# Patient Record
Sex: MALE | Race: WHITE | NOT HISPANIC OR LATINO | Employment: FULL TIME | URBAN - METROPOLITAN AREA
[De-identification: names, ages, dates, MRNs, and addresses within clinical notes are randomized per-mention and may not be internally consistent; named-entity substitution may affect disease eponyms.]

---

## 2017-08-18 ENCOUNTER — OFFICE VISIT (OUTPATIENT)
Dept: FAMILY MEDICINE | Facility: CLINIC | Age: 47
End: 2017-08-18
Payer: COMMERCIAL

## 2017-08-18 VITALS
HEART RATE: 88 BPM | SYSTOLIC BLOOD PRESSURE: 139 MMHG | DIASTOLIC BLOOD PRESSURE: 89 MMHG | OXYGEN SATURATION: 97 % | BODY MASS INDEX: 37.76 KG/M2 | HEIGHT: 72 IN | TEMPERATURE: 97.7 F | WEIGHT: 278.8 LBS

## 2017-08-18 DIAGNOSIS — J31.0 CHRONIC RHINITIS, UNSPECIFIED TYPE: ICD-10-CM

## 2017-08-18 DIAGNOSIS — G47.33 OBSTRUCTIVE SLEEP APNEA SYNDROME: ICD-10-CM

## 2017-08-18 DIAGNOSIS — R73.01 IMPAIRED FASTING GLUCOSE: ICD-10-CM

## 2017-08-18 DIAGNOSIS — E66.01 MORBID OBESITY DUE TO EXCESS CALORIES (H): ICD-10-CM

## 2017-08-18 DIAGNOSIS — E78.5 HYPERLIPIDEMIA LDL GOAL <160: ICD-10-CM

## 2017-08-18 DIAGNOSIS — K76.0 FATTY LIVER: ICD-10-CM

## 2017-08-18 DIAGNOSIS — I10 BENIGN ESSENTIAL HYPERTENSION: Primary | ICD-10-CM

## 2017-08-18 PROBLEM — L65.9 ALOPECIA: Status: ACTIVE | Noted: 2017-08-18

## 2017-08-18 PROCEDURE — 99203 OFFICE O/P NEW LOW 30 MIN: CPT | Performed by: INTERNAL MEDICINE

## 2017-08-18 RX ORDER — FLUTICASONE PROPIONATE 50 MCG
1 SPRAY, SUSPENSION (ML) NASAL PRN
COMMUNITY

## 2017-08-18 RX ORDER — IPRATROPIUM BROMIDE 42 UG/1
2 SPRAY, METERED NASAL PRN
COMMUNITY
End: 2021-05-25

## 2017-08-18 RX ORDER — LISINOPRIL AND HYDROCHLOROTHIAZIDE 20; 25 MG/1; MG/1
1 TABLET ORAL DAILY
Qty: 90 TABLET | Refills: 3 | Status: SHIPPED | OUTPATIENT
Start: 2017-08-18 | End: 2019-06-13

## 2017-08-18 NOTE — MR AVS SNAPSHOT
After Visit Summary   8/18/2017    Low Juárez    MRN: [id number]           Patient Information     Date Of Birth          1970        Visit Information        Provider Department      8/18/2017 1:30 PM Gilson Tello MD [place] [facility]        Today's Diagnoses     Benign essential hypertension    -  1    Obstructive sleep apnea syndrome        Morbid obesity due to excess calories (H)        Impaired fasting glucose        Hyperlipidemia LDL goal <160        Fatty liver        Chronic rhinitis, unspecified type           Follow-ups after your visit        Additional Services     OTOLARYNGOLOGY REFERRAL       Your provider has referred you to: [facility] [place] [phone]   [email or web address]    Stephanie Wilkes MD    Please be aware that coverage of these services is subject to the terms and limitations of your health insurance plan.  Call member services at your health plan with any benefit or coverage questions.      Please bring the following with you to your appointment:    (1) Any X-Rays, CTs or MRIs which have been performed.  Contact the facility where they were done to arrange for  prior to your scheduled appointment.   (2) List of current medications  (3) This referral request   (4) Any documents/labs given to you for this referral                  Future tests that were ordered for you today     Open Future Orders        Priority Expected Expires Ordered    Lipid panel reflex to direct LDL Routine  8/18/2018 8/18/2017    Comprehensive metabolic panel Routine  8/18/2018 8/18/2017    CBC with platelets Routine  8/18/2018 8/18/2017    Hemoglobin A1c Routine  8/18/2018 8/18/2017            Who to contact     If you have questions or need follow up information about today's clinic visit or your schedule please contact [facility] [place] directly at [phone].  Normal or non-critical lab and imaging results will be  "communicated to you by cottonTrackshart, letter or phone within 4 business days after the clinic has received the results. If you do not hear from us within 7 days, please contact the clinic through [employer] or phone. If you have a critical or abnormal lab result, we will notify you by phone as soon as possible.  Submit refill requests through [employer] or call your pharmacy and they will forward the refill request to us. Please allow 3 business days for your refill to be completed.          Additional Information About Your Visit        [employer] Information     [employer] lets you send messages to your doctor, view your test results, renew your prescriptions, schedule appointments and more. To sign up, go to [email or web address] . Click on \"Log in\" on the left side of the screen, which will take you to the Welcome page. Then click on \"Sign up Now\" on the right side of the page.     You will be asked to enter the access code listed below, as well as some personal information. Please follow the directions to create your username and password.     Your access code is: [email or web address]  Expires: 2017  2:22 PM     Your access code will  in 90 days. If you need help or a new code, please call your [place] clinic or [phone].        Care EveryWhere ID     This is your Care EveryWhere ID. This could be used by other organizations to access your [place] medical records  [id number]        Your Vitals Were     Pulse Temperature Height Pulse Oximetry BMI (Body Mass Index)       88 97.7  F (36.5  C) (Tympanic) 5' 11.5\" (1.816 m) 97% 38.34 kg/m2        Blood Pressure from Last 3 Encounters:   17 139/89   12 127/83    Weight from Last 3 Encounters:   17 278 lb 12.8 oz (126.5 kg)   12 264 lb (119.7 kg)              We Performed the Following     OTOLARYNGOLOGY REFERRAL          Today's Medication Changes          These changes are accurate as of: 17  2:22 PM.  If you have any questions, ask your " nurse or doctor.               Stop taking these medicines if you haven't already. Please contact your care team if you have questions.     amLODIPine 10 MG tablet   Commonly known as:  NORVASC   Stopped by:  Gilson Tello MD                Where to get your medicines      These medications were sent to [id number] - [place], [place] - [address]  [address] [facility] [address]     Phone:  [phone]     lisinopril-hydrochlorothiazide 20-25 MG per tablet                Primary Care Provider Office Phone # Fax #    James Hoff [phone]       [facility] [address] [place] [address]        Equal Access to Services     CRISTINA GREENBERG : Hadii aad ku hadasho Soomaali, waaxda luqadaha, qaybta kaalmada adeegyada, waxay idiin haysanjuanan hilario prajapati . So [facility] [phone].    ATENCIÓN: Si habla español, tiene a barros disposición servicios gratuitos de asistencia lingüística. Llame al [phone].    We comply with applicable federal civil rights laws and [place] laws. We do not discriminate on the basis of race, color, national origin, age, disability sex, sexual orientation or gender identity.            Thank you!     Thank you for choosing [facility]  for your care. Our goal is always to provide you with excellent care. Hearing back from our patients is one way we can continue to improve our services. Please take a few minutes to complete the written survey that you may receive in the mail after your visit with us. Thank you!             Your Updated Medication List - Protect others around you: Learn how to safely use, store and throw away your medicines at www.disposemymeds.org.          This list is accurate as of: 8/18/17  2:22 PM.  Always use your most recent med list.                   Brand Name Dispense Instructions for use Diagnosis    fluticasone 50 MCG/ACT spray    FLONASE     Spray 1 spray into both nostrils as needed for  rhinitis or allergies        glucosamine-chondroitin 500-400 MG Caps per capsule      Take 1 capsule by mouth daily.        ipratropium 0.06 % spray    ATROVENT     Spray 2 sprays into both nostrils as needed for rhinitis        lisinopril-hydrochlorothiazide 20-25 MG per tablet    PRINZIDE/ZESTORETIC    90 tablet    Take 1 tablet by mouth daily    Benign essential hypertension       Multi-vitamin Tabs tablet   Generic drug:  multivitamin, therapeutic with minerals      Take 1 tablet by mouth daily.

## 2017-08-18 NOTE — NURSING NOTE
"No chief complaint on file.      Initial /89 (BP Location: Left arm, Patient Position: Chair, Cuff Size: Adult Large)  Pulse 88  Temp 97.7  F (36.5  C) (Tympanic)  Ht 5' 11.5\" (1.816 m)  Wt 278 lb 12.8 oz (126.5 kg)  SpO2 97%  BMI 38.34 kg/m2 Estimated body mass index is 38.34 kg/(m^2) as calculated from the following:    Height as of this encounter: 5' 11.5\" (1.816 m).    Weight as of this encounter: 278 lb 12.8 oz (126.5 kg).  Medication Reconciliation: complete   Silvina Bartlett MA  "

## 2017-08-18 NOTE — PROGRESS NOTES
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  Additional history: as documented    Patient Active Problem List   Diagnosis     Benign essential hypertension     Obstructive sleep apnea syndrome     Morbid obesity due to excess calories (H)     Alopecia     Impaired fasting glucose     Hyperlipidemia LDL goal <160     Fatty liver     Past Surgical History:   Procedure Laterality Date     CHOLECYSTECTOMY       CYSTECTOMY PILONIDAL       SEPTOPLASTY, TURBINOPLASTY, COMBINED  3/20/2012    Procedure:COMBINED SEPTOPLASTY, TURBINOPLASTY; COMBINED SEPTOPLASTY,  Grafts, Left Turbinate Reduction ; Surgeon:SARABJIT COREAS; Location: OR       Social History   Substance Use Topics     Smoking status: Former Smoker     Smokeless tobacco: Never Used     Alcohol use Yes      Comment: occas     Family History   Problem Relation Age of Onset     Hypertension Father          Current Outpatient Prescriptions   Medication Sig Dispense Refill     lisinopril-hydrochlorothiazide (PRINZIDE/ZESTORETIC) 20-25 MG per tablet Take 1 tablet by mouth daily 90 tablet 3     fluticasone (FLONASE) 50 MCG/ACT spray Spray 1 spray into both nostrils as needed for rhinitis or allergies       ipratropium (ATROVENT) 0.06 % spray Spray 2 sprays into both nostrils as needed for rhinitis       Multiple Vitamin (MULTI-VITAMIN) per tablet Take 1 tablet by mouth daily.       glucosamine-chondroitinoitin 500-400 MG CAPS Take 1 capsule by mouth  "daily.       [DISCONTINUED] lisinopril-hydrochlorothiazide (PRINZIDE,ZESTORETIC) 20-25 MG per tablet Take 1 tablet by mouth daily.       No Known Allergies      Reviewed and updated as needed this visit by clinical staffTobacco  Allergies  Meds  Med Hx  Surg Hx  Fam Hx  Soc Hx      Reviewed and updated as needed this visit by Provider  Tobacco  Med Hx  Surg Hx  Fam Hx  Soc Hx        ROS:  Constitutional, HEENT, cardiovascular, pulmonary, gi and gu systems are negative, except as otherwise noted.      OBJECTIVE:   /89 (BP Location: Left arm, Patient Position: Chair, Cuff Size: Adult Large)  Pulse 88  Temp 97.7  F (36.5  C) (Tympanic)  Ht 5' 11.5\" (1.816 m)  Wt 278 lb 12.8 oz (126.5 kg)  SpO2 97%  BMI 38.34 kg/m2  Body mass index is 38.34 kg/(m^2).  GENERAL: healthy, alert and no distress  EYES: Eyes grossly normal to inspection, PERRL and conjunctivae and sclerae normal  HENT: ear canals and TM's normal, nose and mouth without ulcers or lesions  NECK: no adenopathy, no asymmetry, masses, or scars and thyroid normal to palpation  RESP: lungs clear to auscultation - no rales, rhonchi or wheezes  CV: regular rate and rhythm, normal S1 S2, no S3 or S4, no murmur, click or rub, no peripheral edema and peripheral pulses strong  ABDOMEN: Obese, soft, nontender, no hepatosplenomegaly, no masses and bowel sounds normal  MS: no gross musculoskeletal defects noted, no edema  SKIN: no suspicious lesions or rashes  NEURO: Normal strength and tone, mentation intact and speech normal  PSYCH: mentation appears normal, affect normal/bright    Diagnostic Test Results:  Labs pending     ASSESSMENT/PLAN:       1. Benign essential hypertension  Adequately controlled   - lisinopril-hydrochlorothiazide (PRINZIDE/ZESTORETIC) 20-25 MG per tablet; Take 1 tablet by mouth daily  Dispense: 90 tablet; Refill: 3  - CBC with platelets; Future    2. Obstructive sleep apnea syndrome  Continue CPAP     3. Morbid obesity due to " excess calories (H)  Counseled on diet and exercise interventions to promote weight loss     4. Impaired fasting glucose    - Hemoglobin A1c; Future    5. Hyperlipidemia LDL goal <160    - Lipid panel reflex to direct LDL; Future    6. Fatty liver  We discussed that weight loss would improve this   - Comprehensive metabolic panel; Future    7. Chronic rhinitis, unspecified type  Return to ENT   - OTOLARYNGOLOGY REFERRAL    FUTURE APPOINTMENTS:       - Return lab for above tests fasting     Gilson Tello MD  [facility]

## 2018-01-30 NOTE — PROGRESS NOTES
SUBJECTIVE:   Low Juárez is a 47 year old male who presents to clinic today for the following health issues:      ED/UC Followup:    Facility:  [facility]  Date of visit: 12/20/17  Reason for visit: High Blood Sugar  Current Status: at home     Seen at [facility] ER with elevated sugars noted on home glucometer   No polyuria or polydypsia     Diabetes Follow-up      Patient is checking blood sugars: not at all    Diabetic concerns: None and other - new diagnosis      Symptoms of hypoglycemia (low blood sugar): none     Paresthesias (numbness or burning in feet) or sores: No     Date of last diabetic eye exam: Reminded to schedule ASAP     Hyperlipidemia Follow-Up      Rate your low fat/cholesterol diet?: good    Taking statin?  No    Other lipid medications/supplements?:  none    Hypertension Follow-up      Outpatient blood pressures are not being checked.    Low Salt Diet: no added salt    BP Readings from Last 2 Encounters:   01/31/18 129/73   08/18/17 139/89     No results found for: A1C, LDL    Problem list and histories reviewed & adjusted, as indicated.  Additional history: as documented    Patient Active Problem List   Diagnosis     Benign essential hypertension     Obstructive sleep apnea syndrome     Morbid obesity due to excess calories (H)     Alopecia     Hyperlipidemia LDL goal <100     Fatty liver     Type 2 diabetes mellitus without complication, without long-term current use of insulin (H)     Past Surgical History:   Procedure Laterality Date     CHOLECYSTECTOMY       CYSTECTOMY PILONIDAL       SEPTOPLASTY, TURBINOPLASTY, COMBINED  3/20/2012    Procedure:COMBINED SEPTOPLASTY, TURBINOPLASTY; COMBINED SEPTOPLASTY,  Grafts, Left Turbinate Reduction ; Surgeon:SARABJIT COREAS; Location: OR       Social History   Substance Use Topics     Smoking status: Former Smoker     Smokeless tobacco: Never Used     Alcohol use Yes      Comment: occas     Family History   Problem Relation Age of  "Onset     Hypertension Father          Current Outpatient Prescriptions   Medication Sig Dispense Refill     metFORMIN (GLUCOPHAGE) 500 MG tablet Take 1 tablet (500 mg) by mouth 2 times daily (with meals) 180 tablet 3     betamethasone, augmented, (DIPROLENE) 0.05 % lotion Apply topically 2 times daily 60 mL 11     augmented betamethasone dipropionate (DIPROLENE-AF) 0.05 % ointment Apply topically 2 times daily 45 g 11     lisinopril-hydrochlorothiazide (PRINZIDE/ZESTORETIC) 20-25 MG per tablet Take 1 tablet by mouth daily 90 tablet 3     fluticasone (FLONASE) 50 MCG/ACT spray Spray 1 spray into both nostrils as needed for rhinitis or allergies       ipratropium (ATROVENT) 0.06 % spray Spray 2 sprays into both nostrils as needed for rhinitis       Multiple Vitamin (MULTI-VITAMIN) per tablet Take 1 tablet by mouth daily.       glucosamine-chondroitinoitin 500-400 MG CAPS Take 1 capsule by mouth daily.       Allergies   Allergen Reactions     Vicodin [Hydrocodone-Acetaminophen]      itch       Reviewed and updated as needed this visit by clinical staff  Tobacco  Allergies  Meds  Soc Hx      Reviewed and updated as needed this visit by Provider         ROS:  Constitutional, HEENT, cardiovascular, pulmonary, gi and gu systems are negative, except as otherwise noted.    OBJECTIVE:     /73 (BP Location: Right arm, Patient Position: Chair, Cuff Size: Adult Large)  Pulse 80  Temp 97  F (36.1  C) (Tympanic)  Ht 5' 11.5\" (1.816 m)  Wt 271 lb 8 oz (123.2 kg)  SpO2 99%  BMI 37.34 kg/m2  Body mass index is 37.34 kg/(m^2).  GENERAL: healthy, alert and no distress  MS: no gross musculoskeletal defects noted, no edema  SKIN: Diffuse erythema with scale on face and areas of scalp   NEURO: Normal strength and tone, mentation intact and speech normal  PSYCH: mentation appears normal, affect normal/bright  Diabetic foot exam: normal DP and PT pulses, no trophic changes or ulcerative lesions and normal sensory " exam    Diagnostic Test Results:  A1c 8.6 at [facility] on 12/20/17    ASSESSMENT/PLAN:       1. Type 2 diabetes mellitus without complication, without long-term current use of insulin (H)  Continue metformin at current dose  See ophthalmology   Go to DIABETES education   Return around 3/20 for follow up A1c with office visit appointment several days later to discuss result   - metFORMIN (GLUCOPHAGE) 500 MG tablet; Take 1 tablet (500 mg) by mouth 2 times daily (with meals)  Dispense: 180 tablet; Refill: 3  - DIABETES EDUCATOR REFERRAL  - OPHTHALMOLOGY ADULT REFERRAL  - Hemoglobin A1c; Future    2. Morbid obesity due to excess calories (H)  Counseled on diet and exercise interventions to promote weight loss     3. Psoriasis  Refill topical medications   - betamethasone, augmented, (DIPROLENE) 0.05 % lotion; Apply topically 2 times daily  Dispense: 60 mL; Refill: 11  - augmented betamethasone dipropionate (DIPROLENE-AF) 0.05 % ointment; Apply topically 2 times daily  Dispense: 45 g; Refill: 11    4. Hyperlipidemia LDL goal <100  Check fasting lipids when he returns   - Lipid panel reflex to direct LDL Fasting; Future    5. Fatty liver  Work on weight loss to address this     6. Obstructive sleep apnea syndrome  Continue CPAP     7. Benign essential hypertension  Actually well controlled today     8. Need for prophylactic vaccination and inoculation against influenza  He had one this season at [facility]       FUTURE APPOINTMENTS:  Lab appointment after 3/20 for A1c, lipids        - Make follow-up visit after next lab draw    Gilson Tello MD  [facility] [place]    30 minutes mostly counseling and coordinating care

## 2018-01-31 ENCOUNTER — OFFICE VISIT (OUTPATIENT)
Dept: FAMILY MEDICINE | Facility: CLINIC | Age: 48
End: 2018-01-31
Payer: COMMERCIAL

## 2018-01-31 VITALS
HEIGHT: 72 IN | SYSTOLIC BLOOD PRESSURE: 129 MMHG | OXYGEN SATURATION: 99 % | DIASTOLIC BLOOD PRESSURE: 73 MMHG | TEMPERATURE: 97 F | HEART RATE: 80 BPM | BODY MASS INDEX: 36.77 KG/M2 | WEIGHT: 271.5 LBS

## 2018-01-31 DIAGNOSIS — E11.9 TYPE 2 DIABETES MELLITUS WITHOUT COMPLICATION, WITHOUT LONG-TERM CURRENT USE OF INSULIN (H): Primary | ICD-10-CM

## 2018-01-31 DIAGNOSIS — K76.0 FATTY LIVER: ICD-10-CM

## 2018-01-31 DIAGNOSIS — E66.01 MORBID OBESITY DUE TO EXCESS CALORIES (H): ICD-10-CM

## 2018-01-31 DIAGNOSIS — L40.9 PSORIASIS: ICD-10-CM

## 2018-01-31 DIAGNOSIS — I10 BENIGN ESSENTIAL HYPERTENSION: ICD-10-CM

## 2018-01-31 DIAGNOSIS — Z23 NEED FOR PROPHYLACTIC VACCINATION AND INOCULATION AGAINST INFLUENZA: ICD-10-CM

## 2018-01-31 DIAGNOSIS — E78.5 HYPERLIPIDEMIA LDL GOAL <100: ICD-10-CM

## 2018-01-31 DIAGNOSIS — G47.33 OBSTRUCTIVE SLEEP APNEA SYNDROME: ICD-10-CM

## 2018-01-31 PROBLEM — R73.01 IMPAIRED FASTING GLUCOSE: Status: RESOLVED | Noted: 2017-08-18 | Resolved: 2018-01-31

## 2018-01-31 PROCEDURE — 99214 OFFICE O/P EST MOD 30 MIN: CPT | Performed by: INTERNAL MEDICINE

## 2018-01-31 RX ORDER — BETAMETHASONE DIPROPIONATE 0.5 MG/ML
LOTION, AUGMENTED TOPICAL 2 TIMES DAILY
Qty: 60 ML | Refills: 11 | Status: SHIPPED | OUTPATIENT
Start: 2018-01-31 | End: 2019-07-03

## 2018-01-31 RX ORDER — BETAMETHASONE DIPROPIONATE 0.5 MG/G
OINTMENT, AUGMENTED TOPICAL 2 TIMES DAILY
Qty: 45 G | Refills: 11 | Status: SHIPPED | OUTPATIENT
Start: 2018-01-31 | End: 2020-05-13

## 2018-01-31 NOTE — MR AVS SNAPSHOT
After Visit Summary   1/31/2018    Low Juárez    MRN: [id number]           Patient Information     Date Of Birth          1970        Visit Information        Provider Department      1/31/2018 2:00 PM Gilson Tello MD [facility]        Today's Diagnoses     Type 2 diabetes mellitus without complication, without long-term current use of insulin (H)    -  1    Morbid obesity due to excess calories (H)        Psoriasis        Hyperlipidemia LDL goal <100        Fatty liver        Obstructive sleep apnea syndrome        Benign essential hypertension        Need for prophylactic vaccination and inoculation against influenza           Follow-ups after your visit        Additional Services     DIABETES EDUCATOR REFERRAL       DIABETES SELF MANAGEMENT TRAINING (DSMT)      Your provider has referred you to Diabetes Education: FMG: Diabetes Education - All [facility] [phone]   [email or web address]     If an urgent visit is needed or A1C is above 12, Care Team to call the Diabetes  Education Team at [phone] or send an In Basket message to the Diabetes Education Pool (P DIAB ED-PATIENT CARE).    A  will call you to make your appointment. If it has been more than 3 business days since your referral was placed, please call the above phone number to schedule.    Type of training and number of hours: New Diagnosis: Initial group DSMT - 10 hours.      Medicare covers: 10 hours of initial DSMT in 12 month period from the time of first visit, plus 2 hours of follow-up DSMT annually, and additional hours as requested for insulin training.    Diabetes Type: Type 2 - On Oral Medication             Diabetes Co-Morbidities: none               A1C Goal:  <7.0       A1C is: No results found for: A1C    Diabetes Education Topics: Comprehensive Knowledge Assessment and Instruction    Special Educational Needs Requiring Individual DSMT:  None       MEDICAL NUTRITION THERAPY (MNT) for Diabetes    Medical Nutrition Therapy with a Registered Dietitian can be provided in coordination with Diabetes Self-Management Training to assist in achieving optimal diabetes management.    MNT Type and Hours: Do not initiate MNT at this time.                       Medicare will cover: 3 hours initial MNT in 12 month period after first visit, plus 2 hours of follow-up MNT annually    Please be aware that coverage of these services is subject to the terms and limitations of your health insurance plan.  Call member services at your health plan to determine Diabetes Self-Management Training (Codes  &amp; ) and Medical Nutrition Therapy (Codes 73684 & 47114) benefits and ask which blood glucose monitor brands are covered by your plan.  Please bring the following with you to your appointment:    (1)  List of current medications   (2)  List of Blood Glucose Monitor brands that are covered by your insurance plan  (3)  Blood Glucose Monitor and log book  (4)   Food records for the 3 days prior to your visit    The Certified Diabetes Educator may make diabetes medication adjustments per the CDE Protocol and Collaborative Practice Agreement.            OPHTHALMOLOGY ADULT REFERRAL       Your provider has referred you to: [facility], [facility] [phone]   [email or web address]    Dr. Jackson Boston  Dr. Kandice Rubin    Please be aware that coverage of these services is subject to the terms and limitations of your health insurance plan.  Call member services at your health plan with any benefit or coverage questions.      Please bring the following with you to your appointment:    (1) Any X-Rays, CTs or MRIs which have been performed.  Contact the facility where they were done to arrange for  prior to your scheduled appointment.    (2) List of current  "medications  (3) This referral request   (4) Any documents/labs given to you for this referral                  Follow-up notes from your care team     Return in about 7 weeks (around 3/20/2018) for Lab Work; office vist with Dr. Tello 3-4 days later.      Future tests that were ordered for you today     Open Future Orders        Priority Expected Expires Ordered    Lipid panel reflex to direct LDL Fasting Routine 3/20/2018 2019 2018    Hemoglobin A1c Routine 3/20/2018 2019 2018            Who to contact     If you have questions or need follow up information about today's clinic visit or your schedule please contact [facility] directly at [phone].  Normal or non-critical lab and imaging results will be communicated to you by MyChart, letter or phone within 4 business days after the clinic has received the results. If you do not hear from us within 7 days, please contact the clinic through [employer] or phone. If you have a critical or abnormal lab result, we will notify you by phone as soon as possible.  Submit refill requests through [employer] or call your pharmacy and they will forward the refill request to us. Please allow 3 business days for your refill to be completed.          Additional Information About Your Visit        [employer] Information     [employer] lets you send messages to your doctor, view your test results, renew your prescriptions, schedule appointments and more. To sign up, go to [email or web address] . Click on \"Log in\" on the left side of the screen, which will take you to the Welcome page. Then click on \"Sign up Now\" on the right side of the page.     You will be asked to enter the access code listed below, as well as some personal information. Please follow the directions to create your username and password.     Your access code is: [email or web address]  Expires: 2018  1:48 PM     Your access code will  in 90 days. If you need help or a new code, please " "call your [facility] clinic or [phone].        Care EveryWhere ID     This is your Care EveryWhere ID. This could be used by other organizations to access your [facility] medical records  [id number]        Your Vitals Were     Pulse Temperature Height Pulse Oximetry BMI (Body Mass Index)       80 97  F (36.1  C) (Tympanic) 5' 11.5\" (1.816 m) 99% 37.34 kg/m2        Blood Pressure from Last 3 Encounters:   01/31/18 129/73   08/18/17 139/89   03/20/12 127/83    Weight from Last 3 Encounters:   01/31/18 271 lb 8 oz (123.2 kg)   08/18/17 278 lb 12.8 oz (126.5 kg)   03/20/12 264 lb (119.7 kg)              We Performed the Following     DIABETES EDUCATOR REFERRAL     OPHTHALMOLOGY ADULT REFERRAL          Today's Medication Changes          These changes are accurate as of 1/31/18  2:45 PM.  If you have any questions, ask your nurse or doctor.               Start taking these medicines.        Dose/Directions    * betamethasone (augmented) 0.05 % lotion   Commonly known as:  DIPROLENE   Used for:  Psoriasis   Started by:  Gilson Tello MD        Apply topically 2 times daily   Quantity:  60 mL   Refills:  11       * augmented betamethasone dipropionate 0.05 % ointment   Commonly known as:  DIPROLENE-AF   Used for:  Psoriasis   Started by:  Gilson Tello MD        Apply topically 2 times daily   Quantity:  45 g   Refills:  11       metFORMIN 500 MG tablet   Commonly known as:  GLUCOPHAGE   Used for:  Type 2 diabetes mellitus without complication, without long-term current use of insulin (H)   Started by:  Gilson Tello MD        Dose:  500 mg   Take 1 tablet (500 mg) by mouth 2 times daily (with meals)   Quantity:  180 tablet   Refills:  3       * Notice:  This list has 2 medication(s) that are the same as other medications prescribed for you. Read the directions carefully, and ask your doctor or other care provider to review them with you.         Where to get your medicines      These medications were sent to " [id number] - [place], [place] - [address]  [address] [address]     Phone:  [phone]     augmented betamethasone dipropionate 0.05 % ointment    betamethasone (augmented) 0.05 % lotion    metFORMIN 500 MG tablet                Primary Care Provider Office Phone # Fax #    Gilson EDWARDO Tello [phone]       [facility] [address] [facility] [address]  [place] [address]        Equal Access to Services     PRASHANTH GREENBERG : Hadii aad ku hadasho Soomaali, waaxda luqadaha, qaybta kaalmada adeegyada, waxay idiin hayaan adeeg kharash lashona . So [facility] [phone].    ATENCIÓN: Si habla español, tiene a barros disposición servicios gratuitos de asistencia lingüística. [facility] [phone].    We comply with applicable federal civil rights laws and [place] laws. We do not discriminate on the basis of race, color, national origin, age, disability, sex, sexual orientation, or gender identity.            Thank you!     Thank you for choosing [facility]  for your care. Our goal is always to provide you with excellent care. Hearing back from our patients is one way we can continue to improve our services. Please take a few minutes to complete the written survey that you may receive in the mail after your visit with us. Thank you!             Your Updated Medication List - Protect others around you: Learn how to safely use, store and throw away your medicines at www.disposemymeds.org.          This list is accurate as of 1/31/18  2:45 PM.  Always use your most recent med list.                   Brand Name Dispense Instructions for use Diagnosis    * betamethasone (augmented) 0.05 % lotion    DIPROLENE    60 mL    Apply topically 2 times daily    Psoriasis       * augmented betamethasone dipropionate 0.05 % ointment    DIPROLENE-AF    45 g    Apply topically 2 times daily    Psoriasis       fluticasone 50 MCG/ACT spray    FLONASE     Spray 1 spray into both nostrils  as needed for rhinitis or allergies        glucosamine-chondroitin 500-400 MG Caps per capsule      Take 1 capsule by mouth daily.        ipratropium 0.06 % spray    ATROVENT     Spray 2 sprays into both nostrils as needed for rhinitis        lisinopril-hydrochlorothiazide 20-25 MG per tablet    PRINZIDE/ZESTORETIC    90 tablet    Take 1 tablet by mouth daily    Benign essential hypertension       metFORMIN 500 MG tablet    GLUCOPHAGE    180 tablet    Take 1 tablet (500 mg) by mouth 2 times daily (with meals)    Type 2 diabetes mellitus without complication, without long-term current use of insulin (H)       Multi-vitamin Tabs tablet   Generic drug:  multivitamin, therapeutic with minerals      Take 1 tablet by mouth daily.        * Notice:  This list has 2 medication(s) that are the same as other medications prescribed for you. Read the directions carefully, and ask your doctor or other care provider to review them with you.

## 2018-01-31 NOTE — NURSING NOTE
"Chief Complaint   Patient presents with     ER Follow up        Initial /73 (BP Location: Right arm, Patient Position: Chair, Cuff Size: Adult Large)  Pulse 80  Temp 97  F (36.1  C) (Tympanic)  Ht 5' 11.5\" (1.816 m)  Wt 271 lb 8 oz (123.2 kg)  SpO2 99%  BMI 37.34 kg/m2 Estimated body mass index is 37.34 kg/(m^2) as calculated from the following:    Height as of this encounter: 5' 11.5\" (1.816 m).    Weight as of this encounter: 271 lb 8 oz (123.2 kg)..    BP completed using cuff size: large  MEDICATIONS REVIEWED  SOCIAL AND FAMILY HX REVIEWED  Miya Nick CMA  "

## 2018-09-06 ENCOUNTER — TELEPHONE (OUTPATIENT)
Dept: FAMILY MEDICINE | Facility: CLINIC | Age: 48
End: 2018-09-06

## 2018-09-17 ENCOUNTER — RADIANT APPOINTMENT (OUTPATIENT)
Dept: GENERAL RADIOLOGY | Facility: CLINIC | Age: 48
End: 2018-09-17
Attending: PHYSICIAN ASSISTANT
Payer: COMMERCIAL

## 2018-09-17 ENCOUNTER — OFFICE VISIT (OUTPATIENT)
Dept: URGENT CARE | Facility: URGENT CARE | Age: 48
End: 2018-09-17
Payer: COMMERCIAL

## 2018-09-17 VITALS
DIASTOLIC BLOOD PRESSURE: 87 MMHG | HEART RATE: 73 BPM | OXYGEN SATURATION: 99 % | WEIGHT: 270 LBS | TEMPERATURE: 97.2 F | BODY MASS INDEX: 36.57 KG/M2 | SYSTOLIC BLOOD PRESSURE: 136 MMHG | HEIGHT: 72 IN

## 2018-09-17 DIAGNOSIS — R07.9 RIGHT-SIDED CHEST PAIN: Primary | ICD-10-CM

## 2018-09-17 PROCEDURE — 93010 ELECTROCARDIOGRAM REPORT: CPT | Performed by: PHYSICIAN ASSISTANT

## 2018-09-17 PROCEDURE — 71046 X-RAY EXAM CHEST 2 VIEWS: CPT

## 2018-09-17 PROCEDURE — 99214 OFFICE O/P EST MOD 30 MIN: CPT | Performed by: PHYSICIAN ASSISTANT

## 2018-09-17 ASSESSMENT — ENCOUNTER SYMPTOMS
NAUSEA: 0
HEMOPTYSIS: 0
DIARRHEA: 1
SHORTNESS OF BREATH: 0
BACK PAIN: 1
NECK PAIN: 0
DIZZINESS: 0
BLURRED VISION: 1
ABDOMINAL PAIN: 0
FEVER: 0
COUGH: 0
DYSURIA: 0
HEADACHES: 0
VOMITING: 0
SORE THROAT: 0

## 2018-09-17 NOTE — MR AVS SNAPSHOT
After Visit Summary   9/17/2018    Low Juárez    MRN: [id number]           Patient Information     Date Of Birth          1970        Visit Information        Provider Department      9/17/2018 6:50 PM Alisa Linda PA-C [facility]        Today's Diagnoses     Right-sided chest pain    -  1      Care Instructions    Your EKG showed a normal heartbeat today.  Your chest xray showed no pneumonia, fluid in the lungs, or signs of an enlarged heart. There are some changes in your thoracic spine which could be arthritis.    Your chest pain is most likely musculoskeletal in nature due to positional worsening.    Recommend ibuprofen 600mg every 6 hours for pain. Recommend heating pad to affected area. Recommend avoiding activities that worsen the pain.    Monitor symptoms closely - if worsening in any way, or if developing lightheadedness, shortness of breath, cough, coughing up blood, fever, or any other new, concerning symptoms, go to the ER immediately.    Otherwise, follow up with your primary care provider for a recheck in 1 week.          Follow-ups after your visit        Follow-up notes from your care team     Return in about 1 week (around 9/24/2018).      Who to contact     If you have questions or need follow up information about today's clinic visit or your schedule please contact [facility] directly at [phone].  Normal or non-critical lab and imaging results will be communicated to you by MyChart, letter or phone within 4 business days after the clinic has received the results. If you do not hear from us within 7 days, please contact the clinic through MyChart or phone. If you have a critical or abnormal lab result, we will notify you by phone as soon as possible.  Submit refill requests through [employer] or call your pharmacy and they will forward the refill request to us. Please allow 3 business days for your refill to be  "completed.          Additional Information About Your Visit        [employer] Information     [employer] lets you send messages to your doctor, view your test results, renew your prescriptions, schedule appointments and more. To sign up, go to [email or web address] . Click on \"Log in\" on the left side of the screen, which will take you to the Welcome page. Then click on \"Sign up Now\" on the right side of the page.     You will be asked to enter the access code listed below, as well as some personal information. Please follow the directions to create your username and password.     Your access code is: [email or web address]  Expires: 2018  8:46 PM     Your access code will  in 90 days. If you need help or a new code, please call your [facility] clinic or [phone].        Care EveryWhere ID     This is your Care EveryWhere ID. This could be used by other organizations to access your [facility] medical records  [id number]        Your Vitals Were     Pulse Temperature Height Pulse Oximetry BMI (Body Mass Index)       73 97.2  F (36.2  C) (Oral) 5' 11.5\" (1.816 m) 99% 37.13 kg/m2        Blood Pressure from Last 3 Encounters:   18 136/87   18 129/73   17 139/89    Weight from Last 3 Encounters:   18 270 lb (122.5 kg)   18 271 lb 8 oz (123.2 kg)   17 278 lb 12.8 oz (126.5 kg)              We Performed the Following     EKG 12-LEAD CLINIC READ     XR Chest 2 Views        Primary Care Provider Office Phone # Fax #    Gilson Tello [phone] [address]  [place] [address]        Equal Access to Services     [facility] YARI : Haddonna aquinoo Socherry, waaxda luqadaha, qaybta kaalmada vince, nam humphries. So [facility] [phone].    ATENCIÓN: Si habla español, tiene a barros disposición servicios gratuitos de asistencia lingüística. Llame al [phone].    We comply with applicable federal civil rights laws and [place] " laws. We do not discriminate on the basis of race, color, national origin, age, disability, sex, sexual orientation, or gender identity.            Thank you!     Thank you for choosing [facility]  for your care. Our goal is always to provide you with excellent care. Hearing back from our patients is one way we can continue to improve our services. Please take a few minutes to complete the written survey that you may receive in the mail after your visit with us. Thank you!             Your Updated Medication List - Protect others around you: Learn how to safely use, store and throw away your medicines at www.disposemymeds.org.          This list is accurate as of 9/17/18  8:46 PM.  Always use your most recent med list.                   Brand Name Dispense Instructions for use Diagnosis    * betamethasone (augmented) 0.05 % lotion    DIPROLENE    60 mL    Apply topically 2 times daily    Psoriasis       * augmented betamethasone dipropionate 0.05 % ointment    DIPROLENE-AF    45 g    Apply topically 2 times daily    Psoriasis       fluticasone 50 MCG/ACT spray    FLONASE     Spray 1 spray into both nostrils as needed for rhinitis or allergies        glucosamine-chondroitin 500-400 MG Caps per capsule      Take 1 capsule by mouth daily.        ipratropium 0.06 % spray    ATROVENT     Spray 2 sprays into both nostrils as needed for rhinitis        lisinopril-hydrochlorothiazide 20-25 MG per tablet    PRINZIDE/ZESTORETIC    90 tablet    Take 1 tablet by mouth daily    Benign essential hypertension       metFORMIN 500 MG tablet    GLUCOPHAGE    180 tablet    Take 1 tablet (500 mg) by mouth 2 times daily (with meals)    Type 2 diabetes mellitus without complication, without long-term current use of insulin (H)       Multi-vitamin Tabs tablet   Generic drug:  multivitamin, therapeutic with minerals      Take 1 tablet by mouth daily.        * Notice:  This list has 2 medication(s) that are the same  as other medications prescribed for you. Read the directions carefully, and ask your doctor or other care provider to review them with you.

## 2018-09-18 NOTE — PROGRESS NOTES
"SUBJECTIVE:   Low Juárez is a 48 year old male presenting for evaluation of   Chief Complaint   Patient presents with     Urgent Care     Chest Wall Pain     c/o chest wall pain ,Rib pain ,lower back pain and SOB for 3 days     Tightness in his chest started 3-4 days ago. Pain is located around the lower sternal area. Pain radiates to the right thoracic back region. Pain is worse when he takes a deep breath in and when he twists to the left side.  No known inciting events, heavy lifting, or injuries.   No cough, hemoptysis. No shortness of breath. Pain is NOT worse with exertion.   He wears compression socks to work but he denies worsening swelling today. No calf pain. He did get a DVT in his arm when he had a PIC line in his arm. He is no longer on blood thinners.  No nausea, vomiting, or abdominal pain. He had some diarrhea the other day. He denies fever but was having \"neck sweats\" yesterday.   He has been taking naproxen for pain. He bought a new pillow. Nothing is helping.        Review of Systems   Constitutional: Negative for fever.   HENT: Negative for sore throat.    Eyes: Positive for blurred vision (every once in a while over the last few months, thinks he needs to have an eye exam).   Respiratory: Negative for cough, hemoptysis and shortness of breath.    Cardiovascular: Positive for chest pain. Negative for leg swelling.   Gastrointestinal: Positive for diarrhea (had it the other day). Negative for abdominal pain, nausea and vomiting.   Genitourinary: Negative for dysuria.   Musculoskeletal: Positive for back pain. Negative for neck pain.   Skin: Negative for rash.   Neurological: Negative for dizziness and headaches.         PMH:  Past Medical History:   Diagnosis Date     Apnea, sleep      Fatty liver 8/18/2017     High cholesterol      Hyperlipidemia LDL goal <100 8/18/2017     Hypertension      Morbid obesity due to excess calories (H) 8/18/2017     Rhinitis      Type 2 diabetes mellitus without " complication, without long-term current use of insulin (H) 1/31/2018     Patient Active Problem List    Diagnosis Date Noted     Type 2 diabetes mellitus without complication, without long-term current use of insulin (H) 01/31/2018     Priority: Medium     Benign essential hypertension 08/18/2017     Priority: Medium     Obstructive sleep apnea syndrome 08/18/2017     Priority: Medium     Morbid obesity due to excess calories (H) 08/18/2017     Priority: Medium     Alopecia 08/18/2017     Priority: Medium     Hyperlipidemia LDL goal <100 08/18/2017     Priority: Medium     Fatty liver 08/18/2017     Priority: Medium         Current medications:  Current Outpatient Prescriptions   Medication Sig Dispense Refill     betamethasone, augmented, (DIPROLENE) 0.05 % lotion Apply topically 2 times daily 60 mL 11     fluticasone (FLONASE) 50 MCG/ACT spray Spray 1 spray into both nostrils as needed for rhinitis or allergies       glucosamine-chondroitinoitin 500-400 MG CAPS Take 1 capsule by mouth daily.       ipratropium (ATROVENT) 0.06 % spray Spray 2 sprays into both nostrils as needed for rhinitis       lisinopril-hydrochlorothiazide (PRINZIDE/ZESTORETIC) 20-25 MG per tablet Take 1 tablet by mouth daily 90 tablet 3     metFORMIN (GLUCOPHAGE) 500 MG tablet Take 1 tablet (500 mg) by mouth 2 times daily (with meals) 180 tablet 3     Multiple Vitamin (MULTI-VITAMIN) per tablet Take 1 tablet by mouth daily.       augmented betamethasone dipropionate (DIPROLENE-AF) 0.05 % ointment Apply topically 2 times daily (Patient not taking: Reported on 9/17/2018) 45 g 11       Surgical History:  Past Surgical History:   Procedure Laterality Date     CHOLECYSTECTOMY       CYSTECTOMY PILONIDAL       SEPTOPLASTY, TURBINOPLASTY, COMBINED  3/20/2012    Procedure:COMBINED SEPTOPLASTY, TURBINOPLASTY; COMBINED SEPTOPLASTY,  Grafts, Left Turbinate Reduction ; Surgeon:SARABJIT COREAS; Location: OR       Family history:  Family History  "  Problem Relation Age of Onset     Hypertension Father          Social History:  Social History   Substance Use Topics     Smoking status: Former Smoker     Smokeless tobacco: Never Used     Alcohol use Yes      Comment: antonietta     Works in xray    OBJECTIVE:  /87  Pulse 73  Temp 97.2  F (36.2  C) (Oral)  Ht 5' 11.5\" (1.816 m)  Wt 270 lb (122.5 kg)  SpO2 99%  BMI 37.13 kg/m2    Physical Exam  General: alert, appears well and comfortable, NAD. Afebrile.  Skin: no pallor or diaphoresis.  Neck: supple, no lymphadenopathy.  Respiratory: No distress. Equal inspiration to bilateral bases. No crackles wheeze, rhonchi, rales.   Cardiovascular: RRR. No murmurs, clicks, gallups, or rub. No peripheral edema.  Gastrointestinal: Abdomen obese, soft, nontender, BS present. No RUQ tenderness, negative Duron's sign. No masses, organomegaly.  Musculoskeletal: there is tenderness over the right side of the chest wall with lateral compression. There is mild tenderness along the inferior aspect of the sternum. No crepitus.  Neurologic: Follows commands. Gait normal.   Psychiatric: mentation appears normal and affect normal/bright           Labs:  Results for orders placed or performed in visit on 09/17/18 (from the past 24 hour(s))   XR Chest 2 Views    Narrative    CHEST TWO VIEWS  9/17/2018 8:26 PM     HISTORY:  Right-sided chest pain.    COMPARISON: None.      Impression    IMPRESSION: Hypertrophic changes are noted in the thoracic spine.  Lungs clear. No pleural effusions are identified, however the  costophrenic angles are not entirely included on the lateral view.    DEBRA CAMPBELL MD       X-Ray was done, my findings are: no infiltrate, pneumothorax, pleural effusion, or pneumothorax. No rib fractures seen.    EKG per my read:  Rate: 75  Rhythm: normal sinus  Conduction: normal  Axis: normal  Ischemic changes/ST segments:  Comparison to prior: Prior EKG- none available    Overall impression: normal " EKG        ASSESSMENT/PLAN:      ICD-10-CM    1. Right-sided chest pain R07.9 EKG [facility] READ     XR Chest 2 Views        Medical Decision Makinyo male with a history of SIMON, T2DM, and HTN, presenting for chest pain x 3 days. Patient was vitally normal here today and appeared comfortable and well.    Differential Diagnosis:   -costochondritis-  Reports pain originates at the bottom of the sternum and did have some tenderness here on exam as well.  -intercostal muscle pain- does have tenderness with my palpation of the right side of the chest in the lower rib region, and reports pain with deep inspiration. Pain is also reportedly worse when he bends to the left side which would put the right-sided intercostal muscles in stretch.   -PE - this is much less likely with 3 days of symptoms, lack of hypoxia, shortness of breath, chest pain   -ACS- this is much less likely with no ischemic EKG changes today, no exertional worsening of pain.  -aortic dissection- chest pain radiating to back.. Yet much less likely with normal vital signs, symptoms over 3 days  -acute cholecystitis- considered referred pain with pain in the right lower chest area. However, no GI symptoms and no RUQ tenderness on exam today.    Overall I favor a musculoskeletal etiology of the pain as above. Discussed with patient limitations of workup in urgent care, but also think it is much less likely that he has a serious, life-threatening cause of his pain over the last 3 days as above.  I discussed with patient that if any symptoms are worsening, that should prompt him to go into the ER immediately. He understood and agreed to this.    For treatment of musculoskeletal pain, recommend NSAIDs, ice. Recommend he follow up with his PCP for a recheck in a week.    At the end of the encounter, I discussed all available results with patient. Discussed diagnosis and treatment plan.   Return precautions provided to patient and printed below in  patient instructions.  Patient understood and agreed to plan. Patient was appropriate for discharge.        Patient Instructions   Your EKG showed a normal heartbeat today.  Your chest xray showed no pneumonia, fluid in the lungs, or signs of an enlarged heart. There are some changes in your thoracic spine which could be arthritis.    Your chest pain is most likely musculoskeletal in nature due to positional worsening.    Recommend ibuprofen 600mg every 6 hours for pain. Recommend heating pad to affected area. Recommend avoiding activities that worsen the pain.    Monitor symptoms closely - if worsening in any way, or if developing lightheadedness, shortness of breath, cough, coughing up blood, fever, or any other new, concerning symptoms, go to the ER immediately.    Otherwise, follow up with your primary care provider for a recheck in 1 week.            Alisa Linda PA-C  09/17/18 10:45 PM

## 2018-09-18 NOTE — PATIENT INSTRUCTIONS
Your EKG showed a normal heartbeat today.  Your chest xray showed no pneumonia, fluid in the lungs, or signs of an enlarged heart. There are some changes in your thoracic spine which could be arthritis.    Your chest pain is most likely musculoskeletal in nature due to positional worsening.    Recommend ibuprofen 600mg every 6 hours for pain. Recommend heating pad to affected area. Recommend avoiding activities that worsen the pain.    Monitor symptoms closely - if worsening in any way, or if developing lightheadedness, shortness of breath, cough, coughing up blood, fever, or any other new, concerning symptoms, go to the ER immediately.    Otherwise, follow up with your primary care provider for a recheck in 1 week.

## 2018-12-05 DIAGNOSIS — E78.5 HYPERLIPIDEMIA LDL GOAL <100: ICD-10-CM

## 2018-12-05 DIAGNOSIS — E11.9 TYPE 2 DIABETES MELLITUS WITHOUT COMPLICATION, WITHOUT LONG-TERM CURRENT USE OF INSULIN (H): ICD-10-CM

## 2018-12-05 LAB — HBA1C MFR BLD: 10.4 % (ref 0–5.6)

## 2018-12-05 PROCEDURE — 80061 LIPID PANEL: CPT | Performed by: INTERNAL MEDICINE

## 2018-12-05 PROCEDURE — 83721 ASSAY OF BLOOD LIPOPROTEIN: CPT | Performed by: INTERNAL MEDICINE

## 2018-12-05 PROCEDURE — 36415 COLL VENOUS BLD VENIPUNCTURE: CPT | Performed by: INTERNAL MEDICINE

## 2018-12-05 PROCEDURE — 83036 HEMOGLOBIN GLYCOSYLATED A1C: CPT | Performed by: INTERNAL MEDICINE

## 2018-12-05 NOTE — LETTER
[address] [facility]  [address]  [place]  [address]  Tel: [phone]    December 6, 2018    Low Juárez  [address] [facility] [id number]        Dear Mr. Juárez,    The following letter pertains to your most recent diagnostic tests:    Your cholesterol is dangerously elevated.  The triglycerides are very high because your blood sugar control is very poor.  Your A1c is out of control.  You goal A1c is less than 7 and your A1c is 10.4.      Bottom line:  Your numbers look terrible.      You need to add a medication called glipizide to your existing metformin and take it twice daily to get your sugars down.  You need to start checking your sugars every morning before breakfast and right them down so we can review them together.  You goal is morning fasting sugars greater than 90 but less than 130.        Follow up:  You need to schedule an office visit appointment with me as soon as possible so we can talk about how to manage this.  Bring your blood sugar log so we can review it. Come in fasting so we can recheck your triglycerides on the glipizide.     Sincerely,    Gilson Tello MD/SML          Enclosure: Lab Results  Results for orders placed or performed in visit on 12/05/18   Lipid panel reflex to direct LDL Fasting   Result Value Ref Range    Cholesterol 257 (H) <200 mg/dL    Triglycerides 919 (H) <150 mg/dL    HDL Cholesterol 24 (L) >39 mg/dL    LDL Cholesterol Calculated  <100 mg/dL     Cannot estimate LDL when triglyceride exceeds 400 mg/dL    Non HDL Cholesterol 233 (H) <130 mg/dL   Hemoglobin A1c   Result Value Ref Range    Hemoglobin A1C 10.4 (H) 0 - 5.6 %   LDL cholesterol direct   Result Value Ref Range    LDL Cholesterol Direct 133 (H) <100 mg/dL

## 2018-12-06 ENCOUNTER — TELEPHONE (OUTPATIENT)
Dept: FAMILY MEDICINE | Facility: CLINIC | Age: 48
End: 2018-12-06

## 2018-12-06 LAB
CHOLEST SERPL-MCNC: 257 MG/DL
HDLC SERPL-MCNC: 24 MG/DL
LDLC SERPL CALC-MCNC: ABNORMAL MG/DL
LDLC SERPL DIRECT ASSAY-MCNC: 133 MG/DL
NONHDLC SERPL-MCNC: 233 MG/DL
TRIGL SERPL-MCNC: 919 MG/DL

## 2018-12-06 RX ORDER — GLIPIZIDE 10 MG/1
10 TABLET ORAL
Qty: 180 TABLET | Refills: 3 | Status: SHIPPED | OUTPATIENT
Start: 2018-12-06 | End: 2019-12-10

## 2018-12-06 NOTE — PROGRESS NOTES
Can you please call this patient and inform him of his lab letter results and help him schedule an appointment with me and remind him to  the glipizide and start taking it.

## 2018-12-06 NOTE — TELEPHONE ENCOUNTER
Discussed lab result notes with patient in detail  Per patient request mailed copy of lab results to him  Future appointment scheduled with PCP     Na BASS RN

## 2018-12-06 NOTE — TELEPHONE ENCOUNTER
Gilson Tello MD at 12/5/2018  2:45 PM        Status: Sign at close encounter            Can you please call this patient and inform him of his lab letter results and help him schedule an appointment with me and remind him to  the glipizide and start taking it.

## 2018-12-06 NOTE — LETTER
December 6, 2018      Low Juárez  [address] [place] [id number]        Dear Low,     Notes Recorded by Gilson Tello MD on 12/6/2018 at 9:33 AM  The following letter pertains to your most recent diagnostic tests:    Your cholesterol is dangerously elevated.  The triglycerides are very high because your blood sugar control is very poor.  Your A1c is out of control.  You goal A1c is less than 7 and your A1c is 10.4.    Bottom line:  Your numbers look terrible.      You need to add a medication called glipizide to your existing metformin and take it twice daily to get your sugars down.  You need to start checking your sugars every morning before breakfast and right them down so we can review them together.  You goal is morning fasting sugars greater than 90 but less than 130.      Follow up:  You need to schedule an office visit appointment with me as soon as possible so we can talk about how to manage this.  Bring your blood sugar log so we can review it. Come in fasting so we can recheck your triglycerides on the glipizide.       Sincerely,    Dr. Tello

## 2018-12-06 NOTE — PROGRESS NOTES
The following letter pertains to your most recent diagnostic tests:    Your cholesterol is dangerously elevated.  The triglycerides are very high because your blood sugar control is very poor.  Your A1c is out of control.  You goal A1c is less than 7 and your A1c is 10.4.      Bottom line:  Your numbers look terrible.      You need to add a medication called glipizide to your existing metformin and take it twice daily to get your sugars down.  You need to start checking your sugars every morning before breakfast and right them down so we can review them together.  You goal is morning fasting sugars greater than 90 but less than 130.        Follow up:  You need to schedule an office visit appointment with me as soon as possible so we can talk about how to manage this.  Bring your blood sugar log so we can review it. Come in fasting so we can recheck your triglycerides on the glipizide.         Sincerely,    Dr. Tello

## 2018-12-20 ENCOUNTER — OFFICE VISIT (OUTPATIENT)
Dept: FAMILY MEDICINE | Facility: CLINIC | Age: 48
End: 2018-12-20
Payer: COMMERCIAL

## 2018-12-20 VITALS
SYSTOLIC BLOOD PRESSURE: 119 MMHG | OXYGEN SATURATION: 99 % | WEIGHT: 255 LBS | DIASTOLIC BLOOD PRESSURE: 80 MMHG | HEART RATE: 72 BPM | BODY MASS INDEX: 35.07 KG/M2 | TEMPERATURE: 97.1 F

## 2018-12-20 DIAGNOSIS — Z11.4 ENCOUNTER FOR SCREENING FOR HIV: ICD-10-CM

## 2018-12-20 DIAGNOSIS — H57.89 REDNESS OF LEFT EYE: ICD-10-CM

## 2018-12-20 DIAGNOSIS — E78.5 HYPERLIPIDEMIA LDL GOAL <100: ICD-10-CM

## 2018-12-20 DIAGNOSIS — E11.9 TYPE 2 DIABETES MELLITUS WITHOUT COMPLICATION, WITHOUT LONG-TERM CURRENT USE OF INSULIN (H): Primary | ICD-10-CM

## 2018-12-20 LAB
ERYTHROCYTE [DISTWIDTH] IN BLOOD BY AUTOMATED COUNT: 14 % (ref 10–15)
HCT VFR BLD AUTO: 45.3 % (ref 40–53)
HGB BLD-MCNC: 15.4 G/DL (ref 13.3–17.7)
MCH RBC QN AUTO: 30.2 PG (ref 26.5–33)
MCHC RBC AUTO-ENTMCNC: 34 G/DL (ref 31.5–36.5)
MCV RBC AUTO: 89 FL (ref 78–100)
PLATELET # BLD AUTO: 316 10E9/L (ref 150–450)
RBC # BLD AUTO: 5.1 10E12/L (ref 4.4–5.9)
WBC # BLD AUTO: 7.1 10E9/L (ref 4–11)

## 2018-12-20 PROCEDURE — 87389 HIV-1 AG W/HIV-1&-2 AB AG IA: CPT | Performed by: INTERNAL MEDICINE

## 2018-12-20 PROCEDURE — 36415 COLL VENOUS BLD VENIPUNCTURE: CPT | Performed by: INTERNAL MEDICINE

## 2018-12-20 PROCEDURE — 85027 COMPLETE CBC AUTOMATED: CPT | Performed by: INTERNAL MEDICINE

## 2018-12-20 PROCEDURE — 84443 ASSAY THYROID STIM HORMONE: CPT | Performed by: INTERNAL MEDICINE

## 2018-12-20 PROCEDURE — 80053 COMPREHEN METABOLIC PANEL: CPT | Performed by: INTERNAL MEDICINE

## 2018-12-20 PROCEDURE — 80061 LIPID PANEL: CPT | Performed by: INTERNAL MEDICINE

## 2018-12-20 PROCEDURE — 99214 OFFICE O/P EST MOD 30 MIN: CPT | Performed by: INTERNAL MEDICINE

## 2018-12-20 PROCEDURE — 82043 UR ALBUMIN QUANTITATIVE: CPT | Performed by: INTERNAL MEDICINE

## 2018-12-20 RX ORDER — TOBRAMYCIN 3 MG/ML
1 SOLUTION/ DROPS OPHTHALMIC EVERY 4 HOURS
Qty: 1 BOTTLE | Refills: 0 | Status: SHIPPED | OUTPATIENT
Start: 2018-12-20 | End: 2018-12-27

## 2018-12-20 ASSESSMENT — PAIN SCALES - GENERAL: PAINLEVEL: NO PAIN (0)

## 2018-12-20 NOTE — PROGRESS NOTES
SUBJECTIVE:   Low Juárez is a 48 year old male who presents to clinic today for the following health issues:      Diabetes Follow-up      Patient is checking blood sugars: rarely.  Results range from morning fasting 215-149  to before dinner 149- 170'S    Diabetic concerns: None and blood sugar frequently over 200     Symptoms of hypoglycemia (low blood sugar): blurred vision, Symptoms occur if sugars < sweat  Patient will get tired and feel weird .     Paresthesias (numbness or burning in feet) or sores: No     Date of last diabetic eye exam: not yet    BP Readings from Last 2 Encounters:   09/17/18 136/87   01/31/18 129/73     Hemoglobin A1C (%)   Date Value   12/05/2018 10.4 (H)     LDL Cholesterol Calculated (mg/dL)   Date Value   12/05/2018     Cannot estimate LDL when triglyceride exceeds 400 mg/dL     LDL Cholesterol Direct (mg/dL)   Date Value   12/05/2018 133 (H)       Diabetes Management Resources    Amount of exercise or physical activity:   Keeps active     Problems taking medications regularly: No    Medication side effects: none    Diet: regular (no restrictions) watches sugar intake    All.EJVON Ya      A1c and triglycerides were really  High sugars improving since starting glipizide  He is fasting today with hopes to recheck triglycerides   Intermittent redness and mild discomfort in left eye for several weeks without vision change     Problem list and histories reviewed & adjusted, as indicated.  Additional history: as documented    Patient Active Problem List   Diagnosis     Benign essential hypertension     Obstructive sleep apnea syndrome     Morbid obesity due to excess calories (H)     Alopecia     Hyperlipidemia LDL goal <100     Fatty liver     Type 2 diabetes mellitus without complication, without long-term current use of insulin (H)     Past Surgical History:   Procedure Laterality Date     CHOLECYSTECTOMY       CYSTECTOMY PILONIDAL       SEPTOPLASTY, TURBINOPLASTY, COMBINED   3/20/2012    Procedure:COMBINED SEPTOPLASTY, TURBINOPLASTY; COMBINED SEPTOPLASTY,  Grafts, Left Turbinate Reduction ; Surgeon:SARABJIT COREAS; Location:[facility] OR       Social History     Tobacco Use     Smoking status: Former Smoker     Smokeless tobacco: Never Used   Substance Use Topics     Alcohol use: Yes     Comment: occas     Family History   Problem Relation Age of Onset     Hypertension Father          Current Outpatient Medications   Medication Sig Dispense Refill     augmented betamethasone dipropionate (DIPROLENE-AF) 0.05 % ointment Apply topically 2 times daily 45 g 11     betamethasone, augmented, (DIPROLENE) 0.05 % lotion Apply topically 2 times daily 60 mL 11     fluticasone (FLONASE) 50 MCG/ACT spray Spray 1 spray into both nostrils as needed for rhinitis or allergies       glipiZIDE (GLUCOTROL) 10 MG tablet Take 1 tablet (10 mg) by mouth 2 times daily (before meals) 180 tablet 3     glucosamine-chondroitinoitin 500-400 MG CAPS Take 1 capsule by mouth daily.       ipratropium (ATROVENT) 0.06 % spray Spray 2 sprays into both nostrils as needed for rhinitis       lisinopril-hydrochlorothiazide (PRINZIDE/ZESTORETIC) 20-25 MG per tablet Take 1 tablet by mouth daily 90 tablet 3     metFORMIN (GLUCOPHAGE) 1000 MG tablet Take 1 tablet (1,000 mg) by mouth 2 times daily (with meals) 180 tablet 3     Multiple Vitamin (MULTI-VITAMIN) per tablet Take 1 tablet by mouth daily.       tobramycin (TOBREX) 0.3 % ophthalmic solution Apply 1 drop to eye every 4 hours for 7 days 1 Bottle 0     Allergies   Allergen Reactions     Vicodin [Hydrocodone-Acetaminophen]      itch       Reviewed and updated as needed this visit by clinical staff       Reviewed and updated as needed this visit by Provider         ROS:  Constitutional, HEENT, cardiovascular, pulmonary, gi and gu systems are negative, except as otherwise noted.    OBJECTIVE:     /80 (BP Location: Right arm, Patient Position: Sitting, Cuff Size: Adult  Large)   Pulse 72   Temp 97.1  F (36.2  C) (Oral)   Wt 115.7 kg (255 lb)   SpO2 99%   BMI 35.07 kg/m    Body mass index is 35.07 kg/m .  GENERAL: healthy, alert and no distress  EYES: Eyes grossly normal to inspection, PERRL and conjunctivae and sclerae normal    Diagnostic Test Results:  Labs pending     ASSESSMENT/PLAN:       1. Type 2 diabetes mellitus without complication, without long-term current use of insulin (H)  Diet consideration discussed  He really needs to go to diabetes education and he was re referred  We discussed starting insulin  He would really like to try to improve his diet an loose weight rather than go on insuli but has lots of questions regarding food choices  We made a deal that if his triglycerides and random sugar this are improved we can try continuing glipizide and increasing metformin for next 3 months, if A1c in March > 8 then we would need to stop glipizide and start lantus   - metFORMIN (GLUCOPHAGE) 1000 MG tablet; Take 1 tablet (1,000 mg) by mouth 2 times daily (with meals)  Dispense: 180 tablet; Refill: 3  - DIABETES EDUCATOR REFERRAL  - OPHTHALMOLOGY ADULT REFERRAL  - TSH  - Albumin Random Urine Quantitative with Creat Ratio  - Hemoglobin A1c; Future    2. Encounter for screening for HIV    - HIV Antigen Antibody Combo    3. Hyperlipidemia LDL goal <100  If triglycerides remain > 500, then start tricor to prevent recurrent pancreatitis   - Lipid panel reflex to direct LDL Fasting  - Comprehensive metabolic panel  - CBC with platelets    4. Redness of left eye  I don't see any obvious foreign body on exam today, start antibiotic incase he has corneal abrasion  See ophthalmology as soon as possible for exam for diabetes and to evaluate recurrent red eye; see referral above   - tobramycin (TOBREX) 0.3 % ophthalmic solution; Apply 1 drop to eye every 4 hours for 7 days  Dispense: 1 Bottle; Refill: 0    Total face to face contact time was greater than 26 minutes of which more  than 50% of this time was spent counseling and coordinating care regarding the above topics.      Gilson Tello MD  [facility]

## 2018-12-20 NOTE — LETTER
"[address]. [facility]  [address]  [place]  [address]  Tel: [phone]    December 24, 2018    Low Juárez  [address] [facility] [address]        Dear Mr. Juárez,    The following letter pertains to your most recent diagnostic tests:    -TSH (thyroid stimulating hormone) level is normal which indicates normal circulating thyroid hormone levels.      Triglycerides look better, but LDL cholesterol (\"bad cholesterol\") is too high and good cholesterol HDL is too low.      Your sugar is better.      -Your HIV test is negative.     -Your micro albumin level is elevated. This means you have trace amounts of protein in the urine. It indicates that your kidneys are being affected by diabetes. Keeping blood pressure low is the best treatment in order to keep this stable. People with microalbuminuria should avoid anti-inflamatory agents such as motrin, alleve and ibuprofen as much as possible because excessive use of these medications can be harmful to the kidneys. Anybody that has microalbuminuria should be on lisinopril or losartan-as you already are-since these medications are protective for the kidney's in this situation.     -Your complete blood counts including your hemoglobin returned normal for you.             Bottom line:  These results indicate that we can see how things go on the higher dose of metformin and the glipizide over the next 3 months.  You can reduce your total and LDL cholesterol levels by eating less saturated fats.  This means you should eat less fried foods and meat.  If you eat meat, you should try to eat more chicken and fish and less beef or pork.  Also, you should increase dietary fiber intake by eating more fruits, vegetables and whole grains.  A diet high in fiber reduces total and LDL cholesterol levels. Reducing carbohydrates and fats in your diet, losing weight and consuming less alcohol can improve your triglyceride levels. Increasing exercise can improve " "HDL (\"good cholesterol\") levels.  A good target to shoot for is 30 minutes of aerobic activity at least 4 days per week. We should discuss a statin medication such as atorvastatin (Lipitor) to improve the LDL cholesterol when we see you back in 3 months.        Follow up:  Lab appointment followed by office visit appointment in 3 months or return sooner if new symptoms or problems develop.        Sincerely,    Gilson Tello MD/ELPIDIO          Enclosure: Lab Results  Results for orders placed or performed in visit on 12/20/18   TSH   Result Value Ref Range    TSH 0.84 0.40 - 4.00 mU/L   Lipid panel reflex to direct LDL Fasting   Result Value Ref Range    Cholesterol 219 (H) <200 mg/dL    Triglycerides 247 (H) <150 mg/dL    HDL Cholesterol 30 (L) >39 mg/dL    LDL Cholesterol Calculated 140 (H) <100 mg/dL    Non HDL Cholesterol 189 (H) <130 mg/dL   Comprehensive metabolic panel   Result Value Ref Range    Sodium 135 133 - 144 mmol/L    Potassium 4.2 3.4 - 5.3 mmol/L    Chloride 102 94 - 109 mmol/L    Carbon Dioxide 25 20 - 32 mmol/L    Anion Gap 8 3 - 14 mmol/L    Glucose 157 (H) 70 - 99 mg/dL    Urea Nitrogen 18 7 - 30 mg/dL    Creatinine 0.94 0.66 - 1.25 mg/dL    GFR Estimate >90 >60 mL/min/[1.73_m2]    GFR Estimate If Black >90 >60 mL/min/[1.73_m2]    Calcium 9.8 8.5 - 10.1 mg/dL    Bilirubin Total 0.6 0.2 - 1.3 mg/dL    Albumin 3.9 3.4 - 5.0 g/dL    Protein Total 7.4 6.8 - 8.8 g/dL    Alkaline Phosphatase 80 40 - 150 U/L     (H) 0 - 70 U/L    AST 99 (H) 0 - 45 U/L   CBC with platelets   Result Value Ref Range    WBC 7.1 4.0 - 11.0 10e9/L    RBC Count 5.10 4.4 - 5.9 10e12/L    Hemoglobin 15.4 13.3 - 17.7 g/dL    Hematocrit 45.3 40.0 - 53.0 %    MCV 89 78 - 100 fl    MCH 30.2 26.5 - 33.0 pg    MCHC 34.0 31.5 - 36.5 g/dL    RDW 14.0 10.0 - 15.0 %    Platelet Count 316 150 - 450 10e9/L   Albumin Random Urine Quantitative with Creat Ratio   Result Value Ref Range    Creatinine Urine 151 mg/dL    Albumin Urine mg/L " 60 mg/L    Albumin Urine mg/g Cr 39.74 (H) 0 - 17 mg/g Cr   HIV Antigen Antibody Combo   Result Value Ref Range    HIV Antigen Antibody Combo Nonreactive NR^Nonreactive

## 2018-12-20 NOTE — NURSING NOTE
"Chief Complaint   Patient presents with     Diabetes     follow up     /80 (BP Location: Right arm, Patient Position: Sitting, Cuff Size: Adult Large)   Pulse 72   Temp 97.1  F (36.2  C) (Oral)   Wt 115.7 kg (255 lb)   SpO2 99%   BMI 35.07 kg/m   Estimated body mass index is 35.07 kg/m  as calculated from the following:    Height as of 9/17/18: 1.816 m (5' 11.5\").    Weight as of this encounter: 115.7 kg (255 lb).  bp completed using cuff size: large      Health Maintenance addressed:  NONE    n/a              "

## 2018-12-21 LAB
ALBUMIN SERPL-MCNC: 3.9 G/DL (ref 3.4–5)
ALP SERPL-CCNC: 80 U/L (ref 40–150)
ALT SERPL W P-5'-P-CCNC: 170 U/L (ref 0–70)
ANION GAP SERPL CALCULATED.3IONS-SCNC: 8 MMOL/L (ref 3–14)
AST SERPL W P-5'-P-CCNC: 99 U/L (ref 0–45)
BILIRUB SERPL-MCNC: 0.6 MG/DL (ref 0.2–1.3)
BUN SERPL-MCNC: 18 MG/DL (ref 7–30)
CALCIUM SERPL-MCNC: 9.8 MG/DL (ref 8.5–10.1)
CHLORIDE SERPL-SCNC: 102 MMOL/L (ref 94–109)
CHOLEST SERPL-MCNC: 219 MG/DL
CO2 SERPL-SCNC: 25 MMOL/L (ref 20–32)
CREAT SERPL-MCNC: 0.94 MG/DL (ref 0.66–1.25)
CREAT UR-MCNC: 151 MG/DL
GFR SERPL CREATININE-BSD FRML MDRD: >90 ML/MIN/{1.73_M2}
GLUCOSE SERPL-MCNC: 157 MG/DL (ref 70–99)
HDLC SERPL-MCNC: 30 MG/DL
HIV 1+2 AB+HIV1 P24 AG SERPL QL IA: NONREACTIVE
LDLC SERPL CALC-MCNC: 140 MG/DL
MICROALBUMIN UR-MCNC: 60 MG/L
MICROALBUMIN/CREAT UR: 39.74 MG/G CR (ref 0–17)
NONHDLC SERPL-MCNC: 189 MG/DL
POTASSIUM SERPL-SCNC: 4.2 MMOL/L (ref 3.4–5.3)
PROT SERPL-MCNC: 7.4 G/DL (ref 6.8–8.8)
SODIUM SERPL-SCNC: 135 MMOL/L (ref 133–144)
TRIGL SERPL-MCNC: 247 MG/DL
TSH SERPL DL<=0.005 MIU/L-ACNC: 0.84 MU/L (ref 0.4–4)

## 2018-12-24 NOTE — RESULT ENCOUNTER NOTE
"The following letter pertains to your most recent diagnostic tests:    -TSH (thyroid stimulating hormone) level is normal which indicates normal circulating thyroid hormone levels.      Triglycerides look better, but LDL cholesterol (\"bad cholesterol\") is too high and good cholesterol HDL is too low.      Your sugar is better.      -Your HIV test is negative.     -Your micro albumin level is elevated. This means you have trace amounts of protein in the urine. It indicates that your kidneys are being affected by diabetes. Keeping blood pressure low is the best treatment in order to keep this stable. People with microalbuminuria should avoid anti-inflamatory agents such as motrin, alleve and ibuprofen as much as possible because excessive use of these medications can be harmful to the kidneys. Anybody that has microalbuminuria should be on lisinopril or losartan-as you already are-since these medications are protective for the kidney's in this situation.     -Your complete blood counts including your hemoglobin returned normal for you.             Bottom line:  These results indicate that we can see how things go on the higher dose of metformin and the glipizide over the next 3 months.  You can reduce your total and LDL cholesterol levels by eating less saturated fats.  This means you should eat less fried foods and meat.  If you eat meat, you should try to eat more chicken and fish and less beef or pork.  Also, you should increase dietary fiber intake by eating more fruits, vegetables and whole grains.  A diet high in fiber reduces total and LDL cholesterol levels. Reducing carbohydrates and fats in your diet, losing weight and consuming less alcohol can improve your triglyceride levels. Increasing exercise can improve HDL (\"good cholesterol\") levels.  A good target to shoot for is 30 minutes of aerobic activity at least 4 days per week. We should discuss a statin medication such as atorvastatin (Lipitor) to improve " the LDL cholesterol when we see you back in 3 months.        Follow up:  Lab appointment followed by office visit appointment in 3 months or return sooner if new symptoms or problems develop.        Sincerely,    Dr. Tello

## 2019-01-16 ENCOUNTER — TRANSFERRED RECORDS (OUTPATIENT)
Dept: HEALTH INFORMATION MANAGEMENT | Facility: CLINIC | Age: 49
End: 2019-01-16

## 2019-01-29 ENCOUNTER — OFFICE VISIT (OUTPATIENT)
Dept: FAMILY MEDICINE | Facility: CLINIC | Age: 49
End: 2019-01-29
Payer: COMMERCIAL

## 2019-01-29 VITALS
TEMPERATURE: 97.9 F | SYSTOLIC BLOOD PRESSURE: 135 MMHG | WEIGHT: 260.5 LBS | HEIGHT: 72 IN | OXYGEN SATURATION: 98 % | HEART RATE: 93 BPM | DIASTOLIC BLOOD PRESSURE: 94 MMHG | BODY MASS INDEX: 35.28 KG/M2

## 2019-01-29 DIAGNOSIS — J02.9 SORE THROAT: ICD-10-CM

## 2019-01-29 DIAGNOSIS — J01.00 SUBACUTE MAXILLARY SINUSITIS: Primary | ICD-10-CM

## 2019-01-29 DIAGNOSIS — J04.0 LARYNGITIS: ICD-10-CM

## 2019-01-29 LAB
DEPRECATED S PYO AG THROAT QL EIA: NORMAL
SPECIMEN SOURCE: NORMAL

## 2019-01-29 PROCEDURE — 87081 CULTURE SCREEN ONLY: CPT | Performed by: INTERNAL MEDICINE

## 2019-01-29 PROCEDURE — 87880 STREP A ASSAY W/OPTIC: CPT | Performed by: INTERNAL MEDICINE

## 2019-01-29 PROCEDURE — 99214 OFFICE O/P EST MOD 30 MIN: CPT | Performed by: INTERNAL MEDICINE

## 2019-01-29 RX ORDER — PREDNISONE 20 MG/1
20 TABLET ORAL DAILY
Qty: 3 TABLET | Refills: 0 | Status: SHIPPED | OUTPATIENT
Start: 2019-01-29 | End: 2019-02-01

## 2019-01-29 ASSESSMENT — MIFFLIN-ST. JEOR: SCORE: 2081.68

## 2019-01-29 NOTE — PROGRESS NOTES
"HPI      SUBJECTIVE:   Low Juárez is a 48 year old male who presents to clinic today for the following health issues:      RESPIRATORY SYMPTOMS      Duration: X 2 weeks     Description: sore throat, facial pain/pressure    Severity: moderate    Accompanying signs and symptoms: horse voice, nasal congestion, cough, headache, fatigue/malaise    History (predisposing factors):  has been exposed to sick contacts with same symptoms    Precipitating or alleviating factors:     Therapies tried and outcome:  Sudafed -- temporary relief      Past Medical History:   Diagnosis Date     Apnea, sleep      Fatty liver 8/18/2017     High cholesterol      Hyperlipidemia LDL goal <100 8/18/2017     Hypertension      Morbid obesity due to excess calories (H) 8/18/2017     Rhinitis      Type 2 diabetes mellitus without complication, without long-term current use of insulin (H) 1/31/2018       Review of Systems   Constitutional: Positive for chills and malaise/fatigue. Negative for fever.   HENT: Positive for congestion, sinus pain and sore throat. Negative for ear pain.    Respiratory: Positive for cough. Negative for hemoptysis, sputum production and shortness of breath.    Cardiovascular: Negative for chest pain.   Gastrointestinal: Negative for abdominal pain, diarrhea, nausea and vomiting.   Skin: Negative for rash.       BP (!) 135/94 (BP Location: Left arm, Cuff Size: Adult Large)   Pulse 93   Temp 97.9  F (36.6  C) (Oral)   Ht 1.816 m (5' 11.5\")   Wt 118.2 kg (260 lb 8 oz)   SpO2 98%   BMI 35.83 kg/m        Physical Exam   Constitutional: He is oriented to person, place, and time. No distress.   HENT:   Right Ear: External ear normal.   Left Ear: External ear normal.   Mouth/Throat: No oropharyngeal exudate.   Cardiovascular: Normal rate, regular rhythm and normal heart sounds.   Pulmonary/Chest: Effort normal and breath sounds normal. No respiratory distress.   Lymphadenopathy:     He has no cervical adenopathy. "   Neurological: He is alert and oriented to person, place, and time.   Skin: No rash noted.   Vitals reviewed.        ICD-10-CM    1. Subacute maxillary sinusitis J01.00 amoxicillin-clavulanate (AUGMENTIN) 875-125 MG tablet   2. Laryngitis J04.0 predniSONE (DELTASONE) 20 MG tablet   3. Sore throat J02.9 Strep, Rapid Screen     Beta strep group A culture       Patient Instructions   Take prescribed medications as directed.    Monitor your blood glucose closely while you are taking the prednisone.  Call doctor if your blood sugars are in the high 200s or even higher.    Follow up symptoms persist/worsens, or if you develop new symptoms or side effects from the medication/s.

## 2019-01-29 NOTE — PATIENT INSTRUCTIONS
Take prescribed medications as directed.    Monitor your blood glucose closely while you are taking the prednisone.  Call doctor if your blood sugars are in the high 200s or even higher.    Follow up symptoms persist/worsens, or if you develop new symptoms or side effects from the medication/s.

## 2019-01-30 LAB
BACTERIA SPEC CULT: NORMAL
SPECIMEN SOURCE: NORMAL

## 2019-02-11 ASSESSMENT — ENCOUNTER SYMPTOMS
HEMOPTYSIS: 0
SHORTNESS OF BREATH: 0
ABDOMINAL PAIN: 0
FEVER: 0
DIARRHEA: 0
COUGH: 1
SPUTUM PRODUCTION: 0
VOMITING: 0
SINUS PAIN: 1
NAUSEA: 0
CHILLS: 1
SORE THROAT: 1

## 2019-03-18 ENCOUNTER — TELEPHONE (OUTPATIENT)
Dept: FAMILY MEDICINE | Facility: CLINIC | Age: 49
End: 2019-03-18

## 2019-03-18 DIAGNOSIS — E11.9 TYPE 2 DIABETES MELLITUS WITHOUT COMPLICATION, WITHOUT LONG-TERM CURRENT USE OF INSULIN (H): Primary | ICD-10-CM

## 2019-03-18 NOTE — LETTER
[address] [address]  Phone: [phone]        March 22, 2019      Low Juárez                                                                                                                                [address] [place] [id number]            Dear Mr. Juárez,    We have been trying to reach you by phone and left several messages for you to return our call.     Dr Tello wants you to schedule an office visit with him to discuss your uncontrolled diabetes.  He also wants you to schedule a non-fasting lab test to be done in our lab a few days prior to the office visit.     Please call our clinic to schedule both appointments: a non-fasting lab test, and an office visit with Dr Tello.:  [phone]      Thank you,      Gilson Tello MD/ Sirisha DARDEN RN,BSN

## 2019-03-18 NOTE — TELEPHONE ENCOUNTER
Non-detailed message left for patient to return call.    LOU MirzaN, RN  [employer] [facility]

## 2019-03-18 NOTE — TELEPHONE ENCOUNTER
Please call him and get him rescheduled for an office visit to follow up on his uncontrolled diabetes   He should have pre visit non fasting hemoglobin A1c test   Order placed

## 2019-03-20 NOTE — TELEPHONE ENCOUNTER
Recalled, no answer. Left another VM to return call to clinic    Pt Reps: Please assist pt with scheduling appt with Dr. Tello (also for Lab Only prior to scheduled appt).     Thank you!  Marlen KELLER RN

## 2019-03-21 NOTE — TELEPHONE ENCOUNTER
Attempt #3.  Routing to TC.  Please send letter patient needs to schedule an appointment for uncontrolled diabetes.    SABINO Gallagher, RN  [employer] [facility]

## 2019-06-13 DIAGNOSIS — I10 BENIGN ESSENTIAL HYPERTENSION: ICD-10-CM

## 2019-06-13 RX ORDER — LISINOPRIL AND HYDROCHLOROTHIAZIDE 20; 25 MG/1; MG/1
1 TABLET ORAL DAILY
Qty: 30 TABLET | Refills: 1 | Status: SHIPPED | OUTPATIENT
Start: 2019-06-13 | End: 2019-07-18

## 2019-06-13 NOTE — TELEPHONE ENCOUNTER
Reason for Call:  Medication or medication refill:    Do you use a [facility]?  Name of the pharmacy and phone number for the current request:  [facility] [id number] [place], [place] - [address]      Name of the medication requested: lisinopril-hydrochlorothiazide (PRINZIDE/ZESTORETIC) 20-25 MG       Other request: pt reports pharmacy called this in to us a couple of days ago    Can we leave a detailed message on this number? YES    Phone number patient can be reached at: Home number on file [phone] (home)    Best Time: any    Call taken on 6/13/2019 at 11:25 AM by Kelsey Veliz

## 2019-06-13 NOTE — TELEPHONE ENCOUNTER
"lisinopril-hydrochlorothiazide (PRINZIDE/ZESTORETIC) 20-25 MG     Last Written Prescription Date:  8/18/17  Last Fill Quantity: 90,  # refills: 3   Last office visit: 12/20/2019 with prescribing provider:  Elisha   Future Office Visit:   Next 5 appointments (look out 90 days)    Jul 18, 2019 12:30 PM CDT  Office Visit with Gilson Tello MD  [facility] ([facility]) [address] [facility] [address] [phone]          Refilled #30, R-1- with note to pharmacy to inform patient to attend upcoming appt.       Requested Prescriptions   Pending Prescriptions Disp Refills     lisinopril-hydrochlorothiazide (PRINZIDE/ZESTORETIC) 20-25 MG tablet 30 tablet 1     Sig: Take 1 tablet by mouth daily       Diuretics (Including Combos) Protocol Failed - 6/13/2019 11:36 AM        Failed - Blood pressure under 140/90 in past 12 months     BP Readings from Last 3 Encounters:   01/29/19 (!) 135/94   12/20/18 119/80   09/17/18 136/87                 Passed - Recent (12 mo) or future (30 days) visit within the authorizing provider's specialty     Patient had office visit in the last 12 months or has a visit in the next 30 days with authorizing provider or within the authorizing provider's specialty.  See \"Patient Info\" tab in inbasket, or \"Choose Columns\" in Meds & Orders section of the refill encounter.              Passed - Medication is active on med list        Passed - Patient is age 18 or older        Passed - Normal serum creatinine on file in past 12 months     Recent Labs   Lab Test 12/20/18  1256   CR 0.94              Passed - Normal serum potassium on file in past 12 months     Recent Labs   Lab Test 12/20/18  1256   POTASSIUM 4.2                    Passed - Normal serum sodium on file in past 12 months     Recent Labs   Lab Test 12/20/18  1256                   "

## 2019-07-03 DIAGNOSIS — L40.9 PSORIASIS: ICD-10-CM

## 2019-07-03 NOTE — TELEPHONE ENCOUNTER
"Pending Prescriptions:                       Disp   Refills    augmented betamethasone dipropionate (DIP*60 mL  11           Sig: APPLY TOPICALLY 2 TIMES DAILY    Last Written Prescription Date:  1/31/18  Last Fill Quantity: 45 g,  # refills: 11   Last office visit: 1/29/2019 with prescribing provider:     Future Office Visit:   Next 5 appointments (look out 90 days)    Jul 18, 2019 12:30 PM CDT  Office Visit with Gilson Tello MD  [facility] ([facility]) [address] [facility] [id number] [phone]         Requested Prescriptions   Pending Prescriptions Disp Refills     augmented betamethasone dipropionate (DIPROLENE) 0.05 % external lotion [Pharmacy Med Name: BETAMETHASONE DP AUG 0.05% LOT] 60 mL 11     Sig: APPLY TOPICALLY 2 TIMES DAILY       Topical Steroids and Nonsteroidals Protocol Passed - 7/3/2019  2:57 PM        Passed - Patient is age 6 or older        Passed - Authorizing prescriber's most recent note related to this medication read.     If refill request is for ophthalmic use, please forward request to provider for approval.          Passed - High potency steroid not ordered        Passed - Recent (12 mo) or future (30 days) visit within the authorizing provider's specialty     Patient had office visit in the last 12 months or has a visit in the next 30 days with authorizing provider or within the authorizing provider's specialty.  See \"Patient Info\" tab in inbasket, or \"Choose Columns\" in Meds & Orders section of the refill encounter.              Passed - Medication is active on med list          "

## 2019-07-04 ENCOUNTER — NURSE TRIAGE (OUTPATIENT)
Dept: NURSING | Facility: CLINIC | Age: 49
End: 2019-07-04

## 2019-07-04 NOTE — TELEPHONE ENCOUNTER
"\"Taking two pills in the past. Lisinopril-hydrochlorothiazide 20-25 mg. MD ordered it wrong\", first time ordering it for the patient who switched clinics.   \"Old bottle says take two tabs by mouth once daily. 20-25 mg Lisinopril-hydrochlorothiazide   from previous MD who filled it Dr Gramajo. This is the first time Dr Camejo did a refill and it's wrong compared to what he prevsiously was on.\"   Has been out of the pills since the 28th of June. He has an headache and it's not going away with drinking caffeine. July 18th is his next appointment.   12:43 p.m. Dr Santana was paged to call this RN directly.  Dr Santana returned the page and stated patient has one refill left and the pharmacy call fill it and the patient will have to pay out of pocket for the early refill. The patient must take ONE pill a day and discuss any thing more with his MD at his next appointment. Dr Santana stated they don't prescribe 50 mg hydrochlorothiazide. I left a detailed message for the patient to go to the pharmacy and  his medication refill within one hour when they close. I clearly told him he is to take one pill, not two pills and he must discuss any change from that with his MD when he sees him at the next appointment. That he will be paying out of pocket since it is an early refill unless insurance approves an early refill.  Keya Mclaughlin RN-[facility]    "

## 2019-07-05 RX ORDER — BETAMETHASONE DIPROPIONATE 0.5 MG/ML
LOTION, AUGMENTED TOPICAL 2 TIMES DAILY
Qty: 60 ML | Refills: 6 | Status: SHIPPED | OUTPATIENT
Start: 2019-07-05 | End: 2020-05-13

## 2019-07-18 ENCOUNTER — OFFICE VISIT (OUTPATIENT)
Dept: FAMILY MEDICINE | Facility: CLINIC | Age: 49
End: 2019-07-18
Payer: COMMERCIAL

## 2019-07-18 VITALS
HEIGHT: 72 IN | TEMPERATURE: 97.4 F | SYSTOLIC BLOOD PRESSURE: 124 MMHG | WEIGHT: 274.5 LBS | HEART RATE: 78 BPM | BODY MASS INDEX: 37.18 KG/M2 | OXYGEN SATURATION: 98 % | DIASTOLIC BLOOD PRESSURE: 68 MMHG

## 2019-07-18 DIAGNOSIS — I10 BENIGN ESSENTIAL HYPERTENSION: ICD-10-CM

## 2019-07-18 DIAGNOSIS — N52.9 MALE ERECTILE DISORDER: ICD-10-CM

## 2019-07-18 DIAGNOSIS — E78.5 HYPERLIPIDEMIA LDL GOAL <100: ICD-10-CM

## 2019-07-18 DIAGNOSIS — E66.01 MORBID OBESITY DUE TO EXCESS CALORIES (H): Primary | ICD-10-CM

## 2019-07-18 DIAGNOSIS — E11.9 TYPE 2 DIABETES MELLITUS WITHOUT COMPLICATION, WITHOUT LONG-TERM CURRENT USE OF INSULIN (H): ICD-10-CM

## 2019-07-18 DIAGNOSIS — R74.8 ELEVATED LIVER ENZYMES: ICD-10-CM

## 2019-07-18 LAB
ERYTHROCYTE [DISTWIDTH] IN BLOOD BY AUTOMATED COUNT: 13.9 % (ref 10–15)
HBA1C MFR BLD: 7.5 % (ref 0–5.6)
HCT VFR BLD AUTO: 45.5 % (ref 40–53)
HGB BLD-MCNC: 16.2 G/DL (ref 13.3–17.7)
MCH RBC QN AUTO: 32 PG (ref 26.5–33)
MCHC RBC AUTO-ENTMCNC: 35.6 G/DL (ref 31.5–36.5)
MCV RBC AUTO: 90 FL (ref 78–100)
PLATELET # BLD AUTO: 251 10E9/L (ref 150–450)
RBC # BLD AUTO: 5.07 10E12/L (ref 4.4–5.9)
WBC # BLD AUTO: 5.9 10E9/L (ref 4–11)

## 2019-07-18 PROCEDURE — 83540 ASSAY OF IRON: CPT | Performed by: INTERNAL MEDICINE

## 2019-07-18 PROCEDURE — 83550 IRON BINDING TEST: CPT | Performed by: INTERNAL MEDICINE

## 2019-07-18 PROCEDURE — 86704 HEP B CORE ANTIBODY TOTAL: CPT | Performed by: INTERNAL MEDICINE

## 2019-07-18 PROCEDURE — 99207 C FOOT EXAM  NO CHARGE: CPT | Performed by: INTERNAL MEDICINE

## 2019-07-18 PROCEDURE — 86706 HEP B SURFACE ANTIBODY: CPT | Performed by: INTERNAL MEDICINE

## 2019-07-18 PROCEDURE — 80061 LIPID PANEL: CPT | Performed by: INTERNAL MEDICINE

## 2019-07-18 PROCEDURE — 82390 ASSAY OF CERULOPLASMIN: CPT | Performed by: INTERNAL MEDICINE

## 2019-07-18 PROCEDURE — 86803 HEPATITIS C AB TEST: CPT | Performed by: INTERNAL MEDICINE

## 2019-07-18 PROCEDURE — 80053 COMPREHEN METABOLIC PANEL: CPT | Mod: 59 | Performed by: INTERNAL MEDICINE

## 2019-07-18 PROCEDURE — 83516 IMMUNOASSAY NONANTIBODY: CPT | Mod: 59 | Performed by: INTERNAL MEDICINE

## 2019-07-18 PROCEDURE — 87340 HEPATITIS B SURFACE AG IA: CPT | Performed by: INTERNAL MEDICINE

## 2019-07-18 PROCEDURE — 85027 COMPLETE CBC AUTOMATED: CPT | Performed by: INTERNAL MEDICINE

## 2019-07-18 PROCEDURE — 83036 HEMOGLOBIN GLYCOSYLATED A1C: CPT | Performed by: INTERNAL MEDICINE

## 2019-07-18 PROCEDURE — 36415 COLL VENOUS BLD VENIPUNCTURE: CPT | Performed by: INTERNAL MEDICINE

## 2019-07-18 PROCEDURE — 83516 IMMUNOASSAY NONANTIBODY: CPT | Performed by: INTERNAL MEDICINE

## 2019-07-18 PROCEDURE — 82248 BILIRUBIN DIRECT: CPT | Performed by: INTERNAL MEDICINE

## 2019-07-18 PROCEDURE — 99214 OFFICE O/P EST MOD 30 MIN: CPT | Performed by: INTERNAL MEDICINE

## 2019-07-18 RX ORDER — SILDENAFIL 100 MG/1
100 TABLET, FILM COATED ORAL DAILY PRN
Qty: 20 TABLET | Refills: 11 | Status: SHIPPED | OUTPATIENT
Start: 2019-07-18 | End: 2023-10-13

## 2019-07-18 RX ORDER — LISINOPRIL AND HYDROCHLOROTHIAZIDE 20; 25 MG/1; MG/1
2 TABLET ORAL DAILY
Qty: 180 TABLET | Refills: 3 | Status: SHIPPED | OUTPATIENT
Start: 2019-07-18 | End: 2021-05-25

## 2019-07-18 ASSESSMENT — MIFFLIN-ST. JEOR: SCORE: 2140.18

## 2019-07-18 NOTE — PROGRESS NOTES
Subjective     Low Juárez is a 49 year old male who presents to clinic today for the following health issues:    HPI     Follow up on blood pressure medications  49-year-old man with multiple problems here for follow-up he has gained weight since his last visit.  He tries to watch his diet but he is busy at work.  He ran out of blood pressure medications.  He tells me that he had been in the habit of taking too 20 mg / 25 mg lisinopril hydrochlorothiazide tablets per day.  However, since he was running out of medications he reduced his dose to 1 tablet daily recently.  He has been taking the glipizide without issues.  He has a new girlfriend and has trouble maintaining an erection although he reports good libido.  He has been having symptoms for several months.  He does not get much time to exercise.  He recently bought a home.  Patient Active Problem List   Diagnosis     Benign essential hypertension     Obstructive sleep apnea syndrome     Morbid obesity due to excess calories (H)     Alopecia     Hyperlipidemia LDL goal <100     Fatty liver     Type 2 diabetes mellitus without complication, without long-term current use of insulin (H)     Past Surgical History:   Procedure Laterality Date     CHOLECYSTECTOMY       CYSTECTOMY PILONIDAL       SEPTOPLASTY, TURBINOPLASTY, COMBINED  3/20/2012    Procedure:COMBINED SEPTOPLASTY, TURBINOPLASTY; COMBINED SEPTOPLASTY,  Grafts, Left Turbinate Reduction ; Surgeon:SARABJIT COREAS; Location: OR       Social History     Tobacco Use     Smoking status: Former Smoker     Smokeless tobacco: Never Used   Substance Use Topics     Alcohol use: Yes     Comment: occas     Family History   Problem Relation Age of Onset     Hypertension Father          Current Outpatient Medications   Medication Sig Dispense Refill     augmented betamethasone dipropionate (DIPROLENE) 0.05 % external lotion APPLY TOPICALLY 2 TIMES DAILY 60 mL 6     augmented betamethasone dipropionate  "(DIPROLENE-AF) 0.05 % ointment Apply topically 2 times daily 45 g 11     fluticasone (FLONASE) 50 MCG/ACT spray Spray 1 spray into both nostrils as needed for rhinitis or allergies       glipiZIDE (GLUCOTROL) 10 MG tablet Take 1 tablet (10 mg) by mouth 2 times daily (before meals) 180 tablet 3     glucosamine-chondroitinoitin 500-400 MG CAPS Take 1 capsule by mouth daily.       ipratropium (ATROVENT) 0.06 % spray Spray 2 sprays into both nostrils as needed for rhinitis       lisinopril-hydrochlorothiazide (PRINZIDE/ZESTORETIC) 20-25 MG tablet Take 2 tablets by mouth daily 180 tablet 3     metFORMIN (GLUCOPHAGE) 1000 MG tablet Take 1 tablet (1,000 mg) by mouth 2 times daily (with meals) 180 tablet 3     Multiple Vitamin (MULTI-VITAMIN) per tablet Take 1 tablet by mouth daily.       sildenafil (VIAGRA) 100 MG tablet Take 1 tablet (100 mg) by mouth daily as needed 20 tablet 11     Allergies   Allergen Reactions     Vicodin [Hydrocodone-Acetaminophen]      itch         Reviewed and updated as needed this visit by Provider         Review of Systems   ROS COMP: Constitutional, HEENT, cardiovascular, pulmonary, gi and gu systems are negative, except as otherwise noted.      Objective    /88 (BP Location: Right arm, Patient Position: Sitting, Cuff Size: Adult Large)   Pulse 78   Temp 97.4  F (36.3  C) (Oral)   Ht 1.816 m (5' 11.5\")   Wt 124.5 kg (274 lb 8 oz)   SpO2 98%   BMI 37.75 kg/m    Body mass index is 37.75 kg/m .  Physical Exam   GENERAL: healthy, alert and no distress  Diabetic foot exam: normal DP and PT pulses, no trophic changes or ulcerative lesions and normal sensory exam    Diagnostic Test Results:  Labs pending        Assessment & Plan     1. Morbid obesity due to excess calories (H)  Counseled on diet and exercise interventions to promote weight loss     2. Type 2 diabetes mellitus without complication, without long-term current use of insulin (H)  Recheck A1c, start Victoza or insulin pending " "results  - Hemoglobin A1c  - C FOOT EXAM  NO CHARGE  - metFORMIN (GLUCOPHAGE) 1000 MG tablet; Take 1 tablet (1,000 mg) by mouth 2 times daily (with meals)  Dispense: 180 tablet; Refill: 3    3. Hyperlipidemia LDL goal <100  Recheck lipids  - CBC with platelets  - Lipid panel reflex to direct LDL Fasting    4. Benign essential hypertension  Second check in clinic shows adequate control, provided that metabolic panel stable, double dose of lisinopril hydrochlorothiazide is reasonable  - lisinopril-hydrochlorothiazide (PRINZIDE/ZESTORETIC) 20-25 MG tablet; Take 2 tablets by mouth daily  Dispense: 180 tablet; Refill: 3    5. Male erectile disorder  Can try Viagra, side effects and risks discussed  - sildenafil (VIAGRA) 100 MG tablet; Take 1 tablet (100 mg) by mouth daily as needed  Dispense: 20 tablet; Refill: 11    6. Elevated liver enzymes  Recheck liver enzymes, with weight gain, I am not hopeful that things will have improved, if they are persistently high, check for viral serologies and other potentially reversible causes for liver enzyme elevation and consider ultrasound to further evaluate  - Comprehensive metabolic panel  - JUST IN CASE     BMI:   Estimated body mass index is 37.75 kg/m  as calculated from the following:    Height as of this encounter: 1.816 m (5' 11.5\").    Weight as of this encounter: 124.5 kg (274 lb 8 oz).   Weight management plan: Discussed healthy diet and exercise guidelines            Return in about 3 months (around 10/18/2019) for Diabetes Check.  Discussed the importance of more than every 6 or 7-month intervals follow-up to get the diabetes and other health parameters under better control.  He will need to return in at least 3 months or sooner depending on symptoms and the results of his lab tests    Total face to face contact time was greater than 27 minutes of which more than 50% of this time was spent counseling and coordinating care regarding the above topics.      Gilson RHODES" MD [place]  [facility]

## 2019-07-18 NOTE — LETTER
[address]  [address]  [place]  [address]  Tel: [phone]    July 23, 2019    Low Juárez  [address] [place] [id number]        Dear Cori Franck,    The following letter pertains to your most recent diagnostic tests:     Tests for viral hepatitis, Bebeto's disease, hemochromatosis, celiac sprue are all negative.  I suspect that your liver enzymes are mildly elevated to overweight and too many fat cells in the liver.  The liver ultrasound would confirm this and rule out any obstruction of the liver ducts.  Please call [facility] at [phone] to schedule your liver ultrasound.  Any amount of weight loss would improve this.  Also, consuming alcohol will make this liver problem worse.           Sincerely,     Dr. Tello/SHANELL           Enclosure: Lab Results    Results for orders placed or performed in visit on 07/18/19   Hemoglobin A1c   Result Value Ref Range    Hemoglobin A1C 7.5 (H) 0 - 5.6 %   Comprehensive metabolic panel   Result Value Ref Range    Sodium 137 133 - 144 mmol/L    Potassium 4.1 3.4 - 5.3 mmol/L    Chloride 102 94 - 109 mmol/L    Carbon Dioxide 28 20 - 32 mmol/L    Anion Gap 7 3 - 14 mmol/L    Glucose 176 (H) 70 - 99 mg/dL    Urea Nitrogen 22 7 - 30 mg/dL    Creatinine 1.01 0.66 - 1.25 mg/dL    GFR Estimate 87 >60 mL/min/[1.73_m2]    GFR Estimate If Black >90 >60 mL/min/[1.73_m2]    Calcium 9.2 8.5 - 10.1 mg/dL    Bilirubin Total 0.6 0.2 - 1.3 mg/dL    Albumin 4.2 3.4 - 5.0 g/dL    Protein Total 7.5 6.8 - 8.8 g/dL    Alkaline Phosphatase 101 40 - 150 U/L     (H) 0 - 70 U/L    AST 73 (H) 0 - 45 U/L   CBC with platelets   Result Value Ref Range    WBC 5.9 4.0 - 11.0 10e9/L    RBC Count 5.07 4.4 - 5.9 10e12/L    Hemoglobin 16.2 13.3 - 17.7 g/dL    Hematocrit 45.5 40.0 - 53.0 %    MCV 90 78 - 100 fl    MCH 32.0 26.5 - 33.0 pg    MCHC 35.6 31.5 - 36.5 g/dL    RDW 13.9 10.0 - 15.0 %    Platelet Count 251 150 - 450 10e9/L   Lipid panel reflex to  direct LDL Fasting   Result Value Ref Range    Cholesterol 224 (H) <200 mg/dL    Triglycerides 292 (H) <150 mg/dL    HDL Cholesterol 27 (L) >39 mg/dL    LDL Cholesterol Calculated 139 (H) <100 mg/dL    Non HDL Cholesterol 197 (H) <130 mg/dL   Iron and iron binding capacity   Result Value Ref Range    Iron 118 35 - 180 ug/dL    Iron Binding Cap 319 240 - 430 ug/dL    Iron Saturation Index 37 15 - 46 %   Tissue transglutaminase jayne IgA and IgG   Result Value Ref Range    Tissue Transglutaminase Antibody IgA 2 <7 U/mL    Tissue Transglutaminase Jayne IgG <1 <7 U/mL   Hepatitis B core antibody   Result Value Ref Range    Hepatitis B Core Jayne Nonreactive NR^Nonreactive   Hepatitis B Surface Antibody   Result Value Ref Range    Hepatitis B Surface Antibody 258.48 (H) <8.00 m[IU]/mL   Hepatitis B surface antigen   Result Value Ref Range    Hep B Surface Agn Nonreactive NR^Nonreactive   Hepatitis C antibody   Result Value Ref Range    Hepatitis C Antibody Nonreactive NR^Nonreactive   Ceruloplasmin   Result Value Ref Range    Ceruloplasmin 27 23 - 53 mg/dL   Hepatic panel (Albumin, ALT, AST, Bili, Alk Phos, TP)   Result Value Ref Range    Bilirubin Direct 0.2 0.0 - 0.2 mg/dL    Bilirubin Total 0.6 0.2 - 1.3 mg/dL    Albumin 4.3 3.4 - 5.0 g/dL    Protein Total 7.4 6.8 - 8.8 g/dL    Alkaline Phosphatase 105 40 - 150 U/L     (H) 0 - 70 U/L    AST 73 (H) 0 - 45 U/L

## 2019-07-18 NOTE — LETTER
"[facility]  [address] [place] [address]. [facility]  [address]  [place]  [address]  Tel: [phone]    July 22, 2019    Lowsameera Juárez  [address] [place] [id number]        Dear Mr. Juárez,    The following letter pertains to your most recent diagnostic tests:    Your cholesterol is a little higher than last check.  We should talk about medications to lower cholesterol when you return.      Your liver tests are better, but still high.    -Your hemoglobin A1c test which is a diabetes blood test that represents and average of your blood sugars over the last 3 months returned at 7.5 which is above your goal of hemoglobin A1c less than 7, but improved a lot since last check when it was 10.4.     -Your complete blood counts including your hemoglobin returned normal for you.       Bottom line:  Diabetes control is better.      Liver enzymes remain high and this needs further investigation with an ultrasound test.  Please call [facility] at [phone] to schedule your liver ultrasound.      Your cholesterol is high.  You can reduce your total and LDL cholesterol levels by eating less saturated fats.  This means you should eat less fried foods and meat.  If you eat meat, you should try to eat more chicken and fish and less beef or pork.  Also, you should increase dietary fiber intake by eating more fruits, vegetables and whole grains.  A diet high in fiber reduces total and LDL cholesterol levels. Reducing carbohydrates and fats in your diet, losing weight and consuming less alcohol can improve your triglyceride levels. Increasing exercise can improve HDL (\"good cholesterol\") levels.  A good target to shoot for is 30 minutes of aerobic activity at least 4 days per week. I think we should discuss a medication to lower cholesterol as well when we see you next in 3 months.        Follow up:  Schedule your ultrasound and return in 3 months.  Work hard on your diet between then and now.        Sincerely,    Dr." Elisha/SML        Enclosure: Lab Results  Results for orders placed or performed in visit on 07/18/19   Hemoglobin A1c   Result Value Ref Range    Hemoglobin A1C 7.5 (H) 0 - 5.6 %   Comprehensive metabolic panel   Result Value Ref Range    Sodium 137 133 - 144 mmol/L    Potassium 4.1 3.4 - 5.3 mmol/L    Chloride 102 94 - 109 mmol/L    Carbon Dioxide 28 20 - 32 mmol/L    Anion Gap 7 3 - 14 mmol/L    Glucose 176 (H) 70 - 99 mg/dL    Urea Nitrogen 22 7 - 30 mg/dL    Creatinine 1.01 0.66 - 1.25 mg/dL    GFR Estimate 87 >60 mL/min/[1.73_m2]    GFR Estimate If Black >90 >60 mL/min/[1.73_m2]    Calcium 9.2 8.5 - 10.1 mg/dL    Bilirubin Total 0.6 0.2 - 1.3 mg/dL    Albumin 4.2 3.4 - 5.0 g/dL    Protein Total 7.5 6.8 - 8.8 g/dL    Alkaline Phosphatase 101 40 - 150 U/L     (H) 0 - 70 U/L    AST 73 (H) 0 - 45 U/L   CBC with platelets   Result Value Ref Range    WBC 5.9 4.0 - 11.0 10e9/L    RBC Count 5.07 4.4 - 5.9 10e12/L    Hemoglobin 16.2 13.3 - 17.7 g/dL    Hematocrit 45.5 40.0 - 53.0 %    MCV 90 78 - 100 fl    MCH 32.0 26.5 - 33.0 pg    MCHC 35.6 31.5 - 36.5 g/dL    RDW 13.9 10.0 - 15.0 %    Platelet Count 251 150 - 450 10e9/L   Lipid panel reflex to direct LDL Fasting   Result Value Ref Range    Cholesterol 224 (H) <200 mg/dL    Triglycerides 292 (H) <150 mg/dL    HDL Cholesterol 27 (L) >39 mg/dL    LDL Cholesterol Calculated 139 (H) <100 mg/dL    Non HDL Cholesterol 197 (H) <130 mg/dL   Iron and iron binding capacity   Result Value Ref Range    Iron 118 35 - 180 ug/dL    Iron Binding Cap 319 240 - 430 ug/dL    Iron Saturation Index 37 15 - 46 %   Ceruloplasmin   Result Value Ref Range    Ceruloplasmin 27 23 - 53 mg/dL

## 2019-07-19 LAB
ALBUMIN SERPL-MCNC: 4.2 G/DL (ref 3.4–5)
ALP SERPL-CCNC: 101 U/L (ref 40–150)
ALT SERPL W P-5'-P-CCNC: 126 U/L (ref 0–70)
ANION GAP SERPL CALCULATED.3IONS-SCNC: 7 MMOL/L (ref 3–14)
AST SERPL W P-5'-P-CCNC: 73 U/L (ref 0–45)
BILIRUB SERPL-MCNC: 0.6 MG/DL (ref 0.2–1.3)
BUN SERPL-MCNC: 22 MG/DL (ref 7–30)
CALCIUM SERPL-MCNC: 9.2 MG/DL (ref 8.5–10.1)
CHLORIDE SERPL-SCNC: 102 MMOL/L (ref 94–109)
CHOLEST SERPL-MCNC: 224 MG/DL
CO2 SERPL-SCNC: 28 MMOL/L (ref 20–32)
CREAT SERPL-MCNC: 1.01 MG/DL (ref 0.66–1.25)
GFR SERPL CREATININE-BSD FRML MDRD: 87 ML/MIN/{1.73_M2}
GLUCOSE SERPL-MCNC: 176 MG/DL (ref 70–99)
HDLC SERPL-MCNC: 27 MG/DL
LDLC SERPL CALC-MCNC: 139 MG/DL
NONHDLC SERPL-MCNC: 197 MG/DL
POTASSIUM SERPL-SCNC: 4.1 MMOL/L (ref 3.4–5.3)
PROT SERPL-MCNC: 7.5 G/DL (ref 6.8–8.8)
SODIUM SERPL-SCNC: 137 MMOL/L (ref 133–144)
TRIGL SERPL-MCNC: 292 MG/DL

## 2019-07-20 LAB
IRON SATN MFR SERPL: 37 % (ref 15–46)
IRON SERPL-MCNC: 118 UG/DL (ref 35–180)
TIBC SERPL-MCNC: 319 UG/DL (ref 240–430)

## 2019-07-20 NOTE — RESULT ENCOUNTER NOTE
"The following letter pertains to your most recent diagnostic tests:    Your cholesterol is a little higher than last check.  We should talk about medications to lower cholesterol when you return.      Your liver tests are better, but still high.    -Your hemoglobin A1c test which is a diabetes blood test that represents and average of your blood sugars over the last 3 months returned at 7.5 which is above your goal of hemoglobin A1c less than 7, but improved a lot since last check when it was 10.4.     -Your complete blood counts including your hemoglobin returned normal for you.       Bottom line:  Diabetes control is better.      Liver enzymes remain high and this needs further investigation with an ultrasound test.  Please call [facility] at [phone] to schedule your liver ultrasound.      Your cholesterol is high.  You can reduce your total and LDL cholesterol levels by eating less saturated fats.  This means you should eat less fried foods and meat.  If you eat meat, you should try to eat more chicken and fish and less beef or pork.  Also, you should increase dietary fiber intake by eating more fruits, vegetables and whole grains.  A diet high in fiber reduces total and LDL cholesterol levels. Reducing carbohydrates and fats in your diet, losing weight and consuming less alcohol can improve your triglyceride levels. Increasing exercise can improve HDL (\"good cholesterol\") levels.  A good target to shoot for is 30 minutes of aerobic activity at least 4 days per week. I think we should discuss a medication to lower cholesterol as well when we see you next in 3 months.        Follow up:  Schedule your ultrasound and return in 3 months.  Work hard on your diet between then and now.        Sincerely,    Dr. Tello"

## 2019-07-22 LAB
ALBUMIN SERPL-MCNC: 4.3 G/DL (ref 3.4–5)
ALP SERPL-CCNC: 105 U/L (ref 40–150)
ALT SERPL W P-5'-P-CCNC: 129 U/L (ref 0–70)
AST SERPL W P-5'-P-CCNC: 73 U/L (ref 0–45)
BILIRUB DIRECT SERPL-MCNC: 0.2 MG/DL (ref 0–0.2)
BILIRUB SERPL-MCNC: 0.6 MG/DL (ref 0.2–1.3)
CERULOPLASMIN SERPL-MCNC: 27 MG/DL (ref 23–53)
HBV CORE AB SERPL QL IA: NONREACTIVE
HBV SURFACE AB SERPL IA-ACNC: 258.48 M[IU]/ML
HBV SURFACE AG SERPL QL IA: NONREACTIVE
HCV AB SERPL QL IA: NONREACTIVE
PROT SERPL-MCNC: 7.4 G/DL (ref 6.8–8.8)
TTG IGA SER-ACNC: 2 U/ML
TTG IGG SER-ACNC: <1 U/ML

## 2019-07-23 NOTE — RESULT ENCOUNTER NOTE
The following letter pertains to your most recent diagnostic tests:    Tests for viral hepatitis, Bebeto's disease, hemochromatosis, celiac sprue are all negative.  I suspect that your liver enzymes are mildly elevated to overweight and too many fat cells in the liver.  The liver ultrasound would confirm this and rule out any obstruction of the liver ducts.  Please call [facility] at [phone] to schedule your liver ultrasound.  Any amount of weight loss would improve this.  Also, consuming alcohol will make this liver problem worse.          Sincerely,    Dr. Tello

## 2019-07-30 DIAGNOSIS — I10 BENIGN ESSENTIAL HYPERTENSION: ICD-10-CM

## 2019-07-30 NOTE — TELEPHONE ENCOUNTER
"Last Written Prescription Date:  7/18/19  Last Fill Quantity: 180 tablet,  # refills: 3   Last office visit: 7/18/2019 with prescribing provider:  Elsiha   Future Office Visit:   Next 5 appointments (look out 90 days)    Oct 18, 2019 12:30 PM CDT  Office Visit with Gilson Tello MD  [facility] ([facility] [address] [facility] [address] [phone]         Requested Prescriptions   Pending Prescriptions Disp Refills     lisinopril-hydrochlorothiazide (PRINZIDE/ZESTORETIC) 20-25 MG tablet [Pharmacy Med Name: LISINOPRIL-HCTZ 20-25 MG TAB] 30 tablet 1     Sig: TAKE 1 TABLET BY MOUTH EVERY DAY       Diuretics (Including Combos) Protocol Passed - 7/30/2019  1:27 AM        Passed - Blood pressure under 140/90 in past 12 months     BP Readings from Last 3 Encounters:   07/18/19 124/68   01/29/19 (!) 135/94   12/20/18 119/80                 Passed - Recent (12 mo) or future (30 days) visit within the authorizing provider's specialty     Patient had office visit in the last 12 months or has a visit in the next 30 days with authorizing provider or within the authorizing provider's specialty.  See \"Patient Info\" tab in inbasket, or \"Choose Columns\" in Meds & Orders section of the refill encounter.              Passed - Medication is active on med list        Passed - Patient is age 18 or older        Passed - Normal serum creatinine on file in past 12 months     Recent Labs   Lab Test 07/18/19  1325   CR 1.01              Passed - Normal serum potassium on file in past 12 months     Recent Labs   Lab Test 07/18/19  1325   POTASSIUM 4.1                    Passed - Normal serum sodium on file in past 12 months     Recent Labs   Lab Test 07/18/19  1325                   "

## 2019-07-31 RX ORDER — LISINOPRIL AND HYDROCHLOROTHIAZIDE 20; 25 MG/1; MG/1
TABLET ORAL
Refills: 0
Start: 2019-07-31

## 2019-10-18 ENCOUNTER — ANCILLARY PROCEDURE (OUTPATIENT)
Dept: GENERAL RADIOLOGY | Facility: CLINIC | Age: 49
End: 2019-10-18
Attending: INTERNAL MEDICINE
Payer: COMMERCIAL

## 2019-10-18 ENCOUNTER — OFFICE VISIT (OUTPATIENT)
Dept: FAMILY MEDICINE | Facility: CLINIC | Age: 49
End: 2019-10-18
Payer: COMMERCIAL

## 2019-10-18 VITALS
SYSTOLIC BLOOD PRESSURE: 122 MMHG | OXYGEN SATURATION: 96 % | BODY MASS INDEX: 36.57 KG/M2 | TEMPERATURE: 97.1 F | DIASTOLIC BLOOD PRESSURE: 79 MMHG | HEIGHT: 72 IN | WEIGHT: 270 LBS | HEART RATE: 85 BPM

## 2019-10-18 DIAGNOSIS — E11.9 TYPE 2 DIABETES MELLITUS WITHOUT COMPLICATION, WITHOUT LONG-TERM CURRENT USE OF INSULIN (H): Primary | ICD-10-CM

## 2019-10-18 DIAGNOSIS — I10 BENIGN ESSENTIAL HYPERTENSION: ICD-10-CM

## 2019-10-18 DIAGNOSIS — R21 RASH: ICD-10-CM

## 2019-10-18 DIAGNOSIS — E78.5 HYPERLIPIDEMIA LDL GOAL <100: ICD-10-CM

## 2019-10-18 DIAGNOSIS — R07.89 CHEST WALL PAIN: ICD-10-CM

## 2019-10-18 DIAGNOSIS — R74.8 ELEVATED LIVER ENZYMES: ICD-10-CM

## 2019-10-18 DIAGNOSIS — Z12.11 COLON CANCER SCREENING: ICD-10-CM

## 2019-10-18 LAB — HBA1C MFR BLD: 8.6 % (ref 0–5.6)

## 2019-10-18 PROCEDURE — 83036 HEMOGLOBIN GLYCOSYLATED A1C: CPT | Performed by: INTERNAL MEDICINE

## 2019-10-18 PROCEDURE — 99214 OFFICE O/P EST MOD 30 MIN: CPT | Performed by: INTERNAL MEDICINE

## 2019-10-18 PROCEDURE — 36415 COLL VENOUS BLD VENIPUNCTURE: CPT | Performed by: INTERNAL MEDICINE

## 2019-10-18 PROCEDURE — 71046 X-RAY EXAM CHEST 2 VIEWS: CPT

## 2019-10-18 RX ORDER — ATORVASTATIN CALCIUM 40 MG/1
40 TABLET, FILM COATED ORAL DAILY
Qty: 90 TABLET | Refills: 3 | Status: SHIPPED | OUTPATIENT
Start: 2019-10-18 | End: 2020-10-26

## 2019-10-18 ASSESSMENT — MIFFLIN-ST. JEOR: SCORE: 2119.77

## 2019-10-18 NOTE — LETTER
[address]  [address]  [place]  [address]  Tel: [phone]    October 21, 2019    Low Juárez  [address] [facility] [address]        Dear Mr. Juárez,    The following letter pertains to your most recent diagnostic tests:     The hemoglobin A1c has increase from 7.5 to 8.6.  This is movement in the wrong direction.  Our goal is hemoglobin A1c at least less than 8, but better would be less than 7.         Bottom line:  Based on this finding, I would recommend adding another medication to your regimen to help get the blood sugar down.   I would recommend adding Jardiance to the metformin and glipizide that you are already taking.   Jardiance lowers blood sugar by making you urinate out blood sugar.   Side effects are rare but frequent urination and dizziness /lightheadeness are possible side effects.  We should definitely check your hemoglobin A1c in 3 months.  Schedule a fasting lab appointment prior to your office visit appointment so we can have the lab results including your follow up cholesterol panel and hemoglobin A1c result to discuss when we meet.         Follow up:  Lab appointment followed by office visit appointment in 3 months.     If you have any further questions or problems, please contact our office.      Sincerely,    Gilson Tello MD        Enclosure: Lab Results  Results for orders placed or performed in visit on 10/18/19   Hemoglobin A1c   Result Value Ref Range    Hemoglobin A1C 8.6 (H) 0 - 5.6 %

## 2019-10-18 NOTE — PROGRESS NOTES
Subjective     Low Juárez is a 49 year old male who presents to clinic today for the following health issues:    HPI   Diabetes Follow-up      BP Readings from Last 2 Encounters:   10/18/19 122/79   07/18/19 124/68     Hemoglobin A1C (%)   Date Value   07/18/2019 7.5 (H)   12/05/2018 10.4 (H)     LDL Cholesterol Calculated (mg/dL)   Date Value   07/18/2019 139 (H)   12/20/2018 140 (H)       Diabetes Management Resources      How many servings of fruits and vegetables do you eat daily?  2-3    On average, how many sweetened beverages do you drink each day (soda, juice, sweet tea, etc)?   Energy drinks  How many days per week do you miss taking your medication? 1    What makes it hard for you to take your medications?  remembering to take    49-year-old man with type 2 diabetes, morbid obesity, sleep apnea, hypertension, hyperlipidemia, psoriasis, fatty liver.  He is a non-smoker.  He returns in follow-up for these problems.  He developed right chest wall pain after lifting a 600 pound man onto an x-ray table about 2 weeks ago.  The pain is intermittent and does not seem to worsen with exertion.  The pain can be reproduced by pushing hard on the right chest wall.  Also, he developed a intermittent rash on his abdomen and back that is intermittently itchy.  It is different than his previous psoriasis although sometimes it is scaly or forms blisters.  Rash has been present for several months and is mild in severity.    Patient Active Problem List   Diagnosis     Benign essential hypertension     Obstructive sleep apnea syndrome     Morbid obesity due to excess calories (H)     Alopecia     Hyperlipidemia LDL goal <100     Fatty liver     Type 2 diabetes mellitus without complication, without long-term current use of insulin (H)     Past Surgical History:   Procedure Laterality Date     CHOLECYSTECTOMY       CYSTECTOMY PILONIDAL       SEPTOPLASTY, TURBINOPLASTY, COMBINED  3/20/2012    Procedure:COMBINED SEPTOPLASTY,  TURBINOPLASTY; COMBINED SEPTOPLASTY,  Grafts, Left Turbinate Reduction ; Surgeon:SARABJIT COREAS; Location:[facility]       Social History     Tobacco Use     Smoking status: Former Smoker     Smokeless tobacco: Never Used   Substance Use Topics     Alcohol use: Yes     Comment: occas     Family History   Problem Relation Age of Onset     Hypertension Father          Current Outpatient Medications   Medication Sig Dispense Refill     aspirin (ASA) 81 MG tablet Take 81 mg by mouth daily       atorvastatin (LIPITOR) 40 MG tablet Take 1 tablet (40 mg) by mouth daily 90 tablet 3     augmented betamethasone dipropionate (DIPROLENE) 0.05 % external lotion APPLY TOPICALLY 2 TIMES DAILY 60 mL 6     augmented betamethasone dipropionate (DIPROLENE-AF) 0.05 % ointment Apply topically 2 times daily 45 g 11     fluticasone (FLONASE) 50 MCG/ACT spray Spray 1 spray into both nostrils as needed for rhinitis or allergies       glipiZIDE (GLUCOTROL) 10 MG tablet Take 1 tablet (10 mg) by mouth 2 times daily (before meals) 180 tablet 3     glucosamine-chondroitinoitin 500-400 MG CAPS Take 1 capsule by mouth daily.       ipratropium (ATROVENT) 0.06 % spray Spray 2 sprays into both nostrils as needed for rhinitis       lisinopril-hydrochlorothiazide (PRINZIDE/ZESTORETIC) 20-25 MG tablet Take 2 tablets by mouth daily 180 tablet 3     metFORMIN (GLUCOPHAGE) 1000 MG tablet Take 1 tablet (1,000 mg) by mouth 2 times daily (with meals) 180 tablet 3     Multiple Vitamin (MULTI-VITAMIN) per tablet Take 1 tablet by mouth daily.       sildenafil (VIAGRA) 100 MG tablet Take 1 tablet (100 mg) by mouth daily as needed 20 tablet 11     Allergies   Allergen Reactions     Vicodin [Hydrocodone-Acetaminophen]      itch         Reviewed and updated as needed this visit by Provider         Review of Systems   ROS COMP: Constitutional, HEENT, cardiovascular, pulmonary, gi and gu systems are negative, except as otherwise noted.      Objective    BP  "122/79 (BP Location: Left arm, Cuff Size: Adult Large)   Pulse 85   Temp 97.1  F (36.2  C) (Oral)   Ht 1.816 m (5' 11.5\")   Wt 122.5 kg (270 lb)   SpO2 96%   BMI 37.13 kg/m    Body mass index is 37.13 kg/m .  Physical Exam   GENERAL: healthy, alert and no distress  RESP: lungs clear to auscultation - no rales, rhonchi or wheezes  CV: Heart has a regular rate and rhythm, no murmur gallop or rub  ABDOMEN: soft, nontender, no hepatosplenomegaly, no masses and bowel sounds normal  MS: no gross musculoskeletal defects noted, no edema  NEURO: Normal strength and tone, mentation intact and speech normal  PSYCH: mentation appears normal, affect normal/bright  Skin: Diffuse erythematous macules and papules distributed on abdomen and back unclear etiology    Chest x-ray pending, A1c pending        Assessment & Plan     1. Type 2 diabetes mellitus without complication, without long-term current use of insulin (H)  Recheck A1c, continue diet and exercise modification  - Hemoglobin A1c    2. Hyperlipidemia LDL goal <100  Start statin therapy for primary prevention, side effects toxicities discussed, recheck lipids when he returns in 3 months  - atorvastatin (LIPITOR) 40 MG tablet; Take 1 tablet (40 mg) by mouth daily  Dispense: 90 tablet; Refill: 3    3. Benign essential hypertension  Under good control, continue current medications    4. Chest wall pain  Check chest x-ray to exclude pathologic fracture or intrathoracic pathology.  Chest x-ray negative, probably muscular pain and try Tylenol and heat.  - XR Chest 2 Views; Future    5. Rash  I wonder if this might be an atypical presentation of psoriasis?  I think he should see a dermatologist regarding this.  He was referred to Dr. hinton  - DERMATOLOGY REFERRAL    6. Elevated liver enzymes  Continue to work on diet modification to promote weight loss to improve this.  I reminded him that I thought an ultrasound would be helpful.  He reminded me that he has had ultrasounds " "in the past showing fatty liver without other pathology and other health systems although I cannot really see those results in care everywhere.  He declines to have a liver ultrasound today.    7. Colon cancer screening  He will be turning 50 this spring, time to think about scheduling a screening colonoscopy.  Patient was reminded.  He was referred for the exam.  - GASTROENTEROLOGY ADULT REF PROCEDURE ONLY None     BMI:   Estimated body mass index is 37.13 kg/m  as calculated from the following:    Height as of this encounter: 1.816 m (5' 11.5\").    Weight as of this encounter: 122.5 kg (270 lb).   Weight management plan: Discussed healthy diet and exercise guidelines            Return in about 3 months (around 1/18/2020) for Diabetes Check.    Gilson Tello MD  [facility]    The 10-year ASCVD risk score (Suffieldaro ALBRIGHT Jr., et al., 2013) is: 14.3%    Values used to calculate the score:      Age: 49 years      Sex: Male      Is Non- : No      Diabetic: Yes      Tobacco smoker: No      Systolic Blood Pressure: 122 mmHg      Is BP treated: Yes      HDL Cholesterol: 27 mg/dL      Total Cholesterol: 224 mg/dL     Total face to face contact time was greater than 30 minutes of which more than 50% of this time was spent counseling and coordinating care regarding the above topics.    "

## 2019-10-18 NOTE — LETTER
[address]  [address]  [place]  [address]  Tel: [phone]    October 21, 2019    Low Juárez  [address] [facility] [address]        Dear Mr. Juárez,    The following letter pertains to your most recent diagnostic tests:    Chest x-ray is normal.  No abnormal findings in the chest wall to explain symptoms.  The chest symptom are almost certainly muscular.  The hypertrophic findings in the thoracic spine are again noted.      If you have any further questions or problems, please contact our office.      Sincerely,    Gilson Tello MD/ Aury Ramirez CMA  Results for orders placed or performed in visit on 10/18/19   XR Chest 2 Views    Narrative    Examination:  XR CHEST 2 VW    Date:  10/18/2019 5:25 PM     Clinical Information: Chest wall pain     Additional Information: none    Comparison: 9/17/2018.    Findings:     Clear lungs. No infiltrate, pulmonary edema, or pleural fluid. No  pneumothorax. Normal heart and pulmonary vessels. Hypertrophic changes  in the thoracic spine. No acute osseous abnormality.      Impression    Impression:    1. No acute cardiopulmonary or osseous abnormality. Hypertrophic  changes in the thoracic spine.    LUCIA ARVIZU MD               Enclosure: Lab Results

## 2019-10-19 NOTE — RESULT ENCOUNTER NOTE
The following letter pertains to your most recent diagnostic tests:    The hemoglobin A1c has increase from 7.5 to 8.6.  This is movement in the wrong direction.  Our goal is hemoglobin A1c at least less than 8, but better would be less than 7.        Bottom line:  Based on this finding, I would recommend adding another medication to your regimen to help get the blood sugar down.   I would recommend adding Jardiance to the metformin and glipizide that you are already taking.   Jardiance lowers blood sugar by making you urinate out blood sugar.   Side effects are rare but frequent urination and dizziness /lightheadeness are possible side effects.  We should definitely check your hemoglobin A1c in 3 months.  Schedule a fasting lab appointment prior to your office visit appointment so we can have the lab results including your follow up cholesterol panel and hemoglobin A1c result to discuss when we meet.         Follow up:  Lab appointment followed by office visit appointment in 3 months.         Sincerely,    Dr. Tello

## 2019-10-19 NOTE — RESULT ENCOUNTER NOTE
The following letter pertains to your most recent diagnostic tests:    Chest x-ray is normal.  No abnormal findings in the chest wall to explain symptoms.  The chest symptom are almost certainly muscular.  The hypertrophic findings in the thoracic spine are again noted.           Sincerely,    Dr. Tello

## 2019-12-09 DIAGNOSIS — E11.9 TYPE 2 DIABETES MELLITUS WITHOUT COMPLICATION, WITHOUT LONG-TERM CURRENT USE OF INSULIN (H): ICD-10-CM

## 2019-12-10 RX ORDER — GLIPIZIDE 10 MG/1
10 TABLET ORAL
Qty: 180 TABLET | Refills: 1 | Status: SHIPPED | OUTPATIENT
Start: 2019-12-10 | End: 2020-10-26

## 2019-12-10 NOTE — TELEPHONE ENCOUNTER
"    Disp Refills Start End JUANY   glipiZIDE (GLUCOTROL) 10 MG tablet 180 tablet 3 12/6/2018  --   Last office visit: 10/18/2019 with prescribing provider:  PCP   Future Office Visit:   Next 5 appointments (look out 90 days)    Jan 20, 2020 12:30 PM CST  Office Visit with Gilson Tello MD  [facility] [address] [facility] [address] [phone]         Requested Prescriptions   Pending Prescriptions Disp Refills     glipiZIDE (GLUCOTROL) 10 MG tablet 180 tablet 3     Sig: Take 1 tablet (10 mg) by mouth 2 times daily (before meals)       Sulfonylurea Agents Passed - 12/9/2019  7:32 PM        Passed - Blood pressure less than 140/90 in past 6 months     BP Readings from Last 3 Encounters:   10/18/19 122/79   07/18/19 124/68   01/29/19 (!) 135/94                 Passed - Patient has documented LDL within the past 12 mos.     Recent Labs   Lab Test 07/18/19  1325   *             Passed - Patient has had a Microalbumin in the past 15 mos.     Recent Labs   Lab Test 12/20/18  1256   MICROL 60   UMALCR 39.74*             Passed - Patient has documented A1c within the specified period of time.     If HgbA1C is 8 or greater, it needs to be on file within the past 3 months.  If less than 8, must be on file within the past 6 months.     Recent Labs   Lab Test 10/18/19  1727   A1C 8.6*             Passed - Medication is active on med list        Passed - Patient is age 18 or older        Passed - Patient has a recent creatinine (normal) within the past 12 mos.     Recent Labs   Lab Test 07/18/19  1325   CR 1.01             Passed - Recent (6 mo) or future (30 days) visit within the authorizing provider's specialty     Patient had office visit in the last 6 months or has a visit in the next 30 days with authorizing provider or within the authorizing provider's specialty.  See \"Patient Info\" tab in inbasket, or \"Choose Columns\" in Meds & Orders section of the refill " encounter.          Future refills by PCP Dr. Gilson Tello with phone number [phone].

## 2020-01-20 ENCOUNTER — OFFICE VISIT (OUTPATIENT)
Dept: FAMILY MEDICINE | Facility: CLINIC | Age: 50
End: 2020-01-20
Payer: COMMERCIAL

## 2020-01-20 VITALS
HEART RATE: 90 BPM | HEIGHT: 72 IN | OXYGEN SATURATION: 97 % | SYSTOLIC BLOOD PRESSURE: 128 MMHG | BODY MASS INDEX: 33.49 KG/M2 | TEMPERATURE: 96.8 F | WEIGHT: 247.3 LBS | DIASTOLIC BLOOD PRESSURE: 80 MMHG

## 2020-01-20 DIAGNOSIS — I10 BENIGN ESSENTIAL HYPERTENSION: ICD-10-CM

## 2020-01-20 DIAGNOSIS — E78.5 HYPERLIPIDEMIA LDL GOAL <100: ICD-10-CM

## 2020-01-20 DIAGNOSIS — L29.9 PRURITIC DISORDER: ICD-10-CM

## 2020-01-20 DIAGNOSIS — E66.01 MORBID OBESITY DUE TO EXCESS CALORIES (H): ICD-10-CM

## 2020-01-20 DIAGNOSIS — E11.9 TYPE 2 DIABETES MELLITUS WITHOUT COMPLICATION, WITHOUT LONG-TERM CURRENT USE OF INSULIN (H): Primary | ICD-10-CM

## 2020-01-20 LAB
ERYTHROCYTE [DISTWIDTH] IN BLOOD BY AUTOMATED COUNT: 13.4 % (ref 10–15)
HBA1C MFR BLD: 12.2 % (ref 0–5.6)
HCT VFR BLD AUTO: 45.3 % (ref 40–53)
HGB BLD-MCNC: 15.8 G/DL (ref 13.3–17.7)
MCH RBC QN AUTO: 30.4 PG (ref 26.5–33)
MCHC RBC AUTO-ENTMCNC: 34.9 G/DL (ref 31.5–36.5)
MCV RBC AUTO: 87 FL (ref 78–100)
PLATELET # BLD AUTO: 306 10E9/L (ref 150–450)
RBC # BLD AUTO: 5.19 10E12/L (ref 4.4–5.9)
WBC # BLD AUTO: 13.1 10E9/L (ref 4–11)

## 2020-01-20 PROCEDURE — 80061 LIPID PANEL: CPT | Performed by: INTERNAL MEDICINE

## 2020-01-20 PROCEDURE — 36415 COLL VENOUS BLD VENIPUNCTURE: CPT | Performed by: INTERNAL MEDICINE

## 2020-01-20 PROCEDURE — 83036 HEMOGLOBIN GLYCOSYLATED A1C: CPT | Performed by: INTERNAL MEDICINE

## 2020-01-20 PROCEDURE — 99214 OFFICE O/P EST MOD 30 MIN: CPT | Performed by: INTERNAL MEDICINE

## 2020-01-20 PROCEDURE — 82043 UR ALBUMIN QUANTITATIVE: CPT | Performed by: INTERNAL MEDICINE

## 2020-01-20 PROCEDURE — 84443 ASSAY THYROID STIM HORMONE: CPT | Performed by: INTERNAL MEDICINE

## 2020-01-20 PROCEDURE — 80053 COMPREHEN METABOLIC PANEL: CPT | Performed by: INTERNAL MEDICINE

## 2020-01-20 PROCEDURE — 85027 COMPLETE CBC AUTOMATED: CPT | Performed by: INTERNAL MEDICINE

## 2020-01-20 RX ORDER — PREDNISONE 20 MG/1
TABLET ORAL
COMMUNITY
Start: 2020-01-08 | End: 2020-01-20

## 2020-01-20 RX ORDER — DOXYCYCLINE 100 MG/1
100 TABLET ORAL
COMMUNITY
Start: 2020-01-08 | End: 2020-01-20

## 2020-01-20 RX ORDER — HYDROXYZINE HYDROCHLORIDE 25 MG/1
25 TABLET, FILM COATED ORAL 3 TIMES DAILY PRN
COMMUNITY
Start: 2020-01-14

## 2020-01-20 ASSESSMENT — MIFFLIN-ST. JEOR: SCORE: 2016.81

## 2020-01-20 NOTE — PROGRESS NOTES
Subjective     Low Juárez is a 49 year old male who presents to clinic today for the following health issues:    HPI     Several month history of severe full body pruritis with nodules  Was seen in urgent care 3 times   Treated with antibiotics and steroids  Dermatologist did a biopsy without definitive diagnosis   He is completing course of prednisone and doxycycline and is no better  Very frustrated  Scabs on back, trunk arms, inner thighs   He does endorse some family and work stressors; he had to switch to graveyard shifts     Patient Active Problem List   Diagnosis     Benign essential hypertension     Obstructive sleep apnea syndrome     Morbid obesity due to excess calories (H)     Alopecia     Hyperlipidemia LDL goal <100     Fatty liver     Type 2 diabetes mellitus without complication, without long-term current use of insulin (H)     Past Surgical History:   Procedure Laterality Date     CHOLECYSTECTOMY       CYSTECTOMY PILONIDAL       SEPTOPLASTY, TURBINOPLASTY, COMBINED  3/20/2012    Procedure:COMBINED SEPTOPLASTY, TURBINOPLASTY; COMBINED SEPTOPLASTY,  Grafts, Left Turbinate Reduction ; Surgeon:SARABJIT COREAS; Location: OR       Social History     Tobacco Use     Smoking status: Former Smoker     Smokeless tobacco: Never Used   Substance Use Topics     Alcohol use: Yes     Comment: occas     Family History   Problem Relation Age of Onset     Hypertension Father          Current Outpatient Medications   Medication Sig Dispense Refill     aspirin (ASA) 81 MG tablet Take 81 mg by mouth daily       atorvastatin (LIPITOR) 40 MG tablet Take 1 tablet (40 mg) by mouth daily 90 tablet 3     augmented betamethasone dipropionate (DIPROLENE) 0.05 % external lotion APPLY TOPICALLY 2 TIMES DAILY 60 mL 6     augmented betamethasone dipropionate (DIPROLENE-AF) 0.05 % ointment Apply topically 2 times daily 45 g 11     fluticasone (FLONASE) 50 MCG/ACT spray Spray 1 spray into both nostrils as needed for  "rhinitis or allergies       glipiZIDE (GLUCOTROL) 10 MG tablet Take 1 tablet (10 mg) by mouth 2 times daily (before meals) 180 tablet 1     glucosamine-chondroitinoitin 500-400 MG CAPS Take 1 capsule by mouth daily.       hydrOXYzine (ATARAX) 25 MG tablet Take 25 mg by mouth       ipratropium (ATROVENT) 0.06 % spray Spray 2 sprays into both nostrils as needed for rhinitis       lisinopril-hydrochlorothiazide (PRINZIDE/ZESTORETIC) 20-25 MG tablet Take 2 tablets by mouth daily 180 tablet 3     metFORMIN (GLUCOPHAGE) 1000 MG tablet Take 1 tablet (1,000 mg) by mouth 2 times daily (with meals) 180 tablet 3     Multiple Vitamin (MULTI-VITAMIN) per tablet Take 1 tablet by mouth daily.       sildenafil (VIAGRA) 100 MG tablet Take 1 tablet (100 mg) by mouth daily as needed 20 tablet 11     Allergies   Allergen Reactions     Vicodin [Hydrocodone-Acetaminophen]      itch         Reviewed and updated as needed this visit by Provider         Review of Systems   ROS COMP: No fevers or chills , jardiance caused too much frequent urination without dysuria and so he stopped taking it       Objective    /80 (BP Location: Right arm, Patient Position: Sitting, Cuff Size: Adult Large)   Pulse 90   Temp 96.8  F (36  C) (Oral)   Ht 1.816 m (5' 11.5\")   Wt 112.2 kg (247 lb 4.8 oz)   SpO2 97%   BMI 34.01 kg/m    Body mass index is 34.01 kg/m .  Physical Exam     GEN:  Well appearing  SKIN:  Nodules, excoriations distributed on chest, arms, legs, back   Labs pending         Assessment & Plan     1. Type 2 diabetes mellitus without complication, without long-term current use of insulin (H)  Recheck A1c, he does not tolerate jardiance, if A1c< 7 consider injectable medication   - Albumin Random Urine Quantitative with Creat Ratio  - Hemoglobin A1c    2. Morbid obesity due to excess calories (H)  Counseled on diet and exercise interventions to promote weight loss     3. Benign essential hypertension  Better control today; he has " lost some weight which may be improving     4. Hyperlipidemia LDL goal <100  He tells me he is taking the atorvastatin (Lipitor) recheck lipids   - Lipid panel reflex to direct LDL Fasting  - Comprehensive metabolic panel  - CBC with platelets    5. Pruritic disorder  I don't know what is causing this, I told him honestly  I think his best bet would be second opinion form Dr. Bullard, he will walk up to his clinic today to see what he has available for consultation  - DERMATOLOGY REFERRAL       Return in about 3 months (around 4/20/2020) for Diabetes Check.    Gilson Tello MD  [facility]    Total face to face contact time was greater than 25 minutes of which more than 50% of this time was spent counseling and coordinating care regarding the above topics.

## 2020-01-20 NOTE — LETTER
[facility]  [address] [place], [place] [address]. [facility]  [address]  [place]  [address]  Tel: [phone]    January 21, 2020    Low Juárez  [address] [facility] [address]        Dear Cori Franck,    The following letter pertains to your most recent diagnostic tests:     -Your micro albumin level is elevated. This means you have trace amounts of protein in the urine. It indicates that your kidneys are being affected by diabetes. Keeping blood pressure low is the best treatment in order to keep this stable. People with microalbuminuria should avoid anti-inflamatory agents such as motrin, alleve and ibuprofen as much as possible because excessive use of these medications can be harmful to the kidneys. Anybody that has microalbuminuria should be on lisinopril or losartan-as you already are-since these medications are protective for the kidney's in this situation.     -TSH (thyroid stimulating hormone) level is normal which indicates normal circulating thyroid hormone levels.       -Your cholesterol has not changed significantly since last check.  Are you taking atorvastatin (Lipitor) every day?     -The liver tests have normalized.  The sugar is extremely high.       -The white blood cell count is high due to prednisone.       -The hemoglobin A1c increased dramatically.  Part of this is from the prednisone, but the diabetes remains very poorly controlled.           Bottom line:  The sugars will come down a little as you taper off prednisone.  However, I still think you should try something to get your sugars down.  One option is to use a lower dose of jardiance (10mg instead of 25mg), another option is to start an injectable medication.   My recommendation would be to try the 10 mg dose of jardiance.  If the urinary symptoms are intolerable, then stop jardiance and inform me.   I sent an prescription for 10 mg tablets to your pharmacy.  We need to recheck your hemoglobin A1c in 3 months.  I recommend you schedule a lab  appointment for that purpose followed by an office visit appointment with me to discuss the result.   See Dr. Bullard as soon as you can regarding your skin.         Follow up:  Lab appointment followed by an office visit appointment in 3 months.         Sincerely,     Dr. Tello / arya      Resulted Orders   Lipid panel reflex to direct LDL Fasting   Result Value Ref Range    Cholesterol 238 (H) <200 mg/dL      Comment:      Desirable:       <200 mg/dl    Triglycerides 383 (H) <150 mg/dL      Comment:      Borderline high:  150-199 mg/dl  High:             200-499 mg/dl  Very high:       >499 mg/dl  Fasting specimen      HDL Cholesterol 28 (L) >39 mg/dL    LDL Cholesterol Calculated 133 (H) <100 mg/dL      Comment:      Above desirable:  100-129 mg/dl  Borderline High:  130-159 mg/dL  High:             160-189 mg/dL  Very high:       >189 mg/dl      Non HDL Cholesterol 210 (H) <130 mg/dL      Comment:      Above Desirable:  130-159 mg/dl  Borderline high:  160-189 mg/dl  High:             190-219 mg/dl  Very high:       >219 mg/dl     Albumin Random Urine Quantitative with Creat Ratio   Result Value Ref Range    Creatinine Urine 63 mg/dL    Albumin Urine mg/L 54 mg/L    Albumin Urine mg/g Cr 86.05 (H) 0 - 17 mg/g Cr   Hemoglobin A1c   Result Value Ref Range    Hemoglobin A1C 12.2 (H) 0 - 5.6 %      Comment:      Normal <5.7% Prediabetes 5.7-6.4%  Diabetes 6.5% or higher - adopted from ADA   consensus guidelines.     Comprehensive metabolic panel   Result Value Ref Range    Sodium 130 (L) 133 - 144 mmol/L    Potassium 4.4 3.4 - 5.3 mmol/L    Chloride 96 94 - 109 mmol/L    Carbon Dioxide 25 20 - 32 mmol/L    Anion Gap 9 3 - 14 mmol/L    Glucose 363 (H) 70 - 99 mg/dL      Comment:      Fasting specimen    Urea Nitrogen 20 7 - 30 mg/dL    Creatinine 0.84 0.66 - 1.25 mg/dL    GFR Estimate >90 >60 mL/min/[1.73_m2]      Comment:      Non  GFR Calc  Starting 12/18/2018, serum creatinine based estimated GFR  (eGFR) will be   calculated using the Chronic Kidney Disease Epidemiology Collaboration   (CKD-EPI) equation.      GFR Estimate If Black >90 >60 mL/min/[1.73_m2]      Comment:       GFR Calc  Starting 12/18/2018, serum creatinine based estimated GFR (eGFR) will be   calculated using the Chronic Kidney Disease Epidemiology Collaboration   (CKD-EPI) equation.      Calcium 9.1 8.5 - 10.1 mg/dL    Bilirubin Total 0.7 0.2 - 1.3 mg/dL    Albumin 4.1 3.4 - 5.0 g/dL    Protein Total 7.4 6.8 - 8.8 g/dL    Alkaline Phosphatase 125 40 - 150 U/L    ALT 49 0 - 70 U/L    AST 16 0 - 45 U/L   CBC with platelets   Result Value Ref Range    WBC 13.1 (H) 4.0 - 11.0 10e9/L    RBC Count 5.19 4.4 - 5.9 10e12/L    Hemoglobin 15.8 13.3 - 17.7 g/dL    Hematocrit 45.3 40.0 - 53.0 %    MCV 87 78 - 100 fl    MCH 30.4 26.5 - 33.0 pg    MCHC 34.9 31.5 - 36.5 g/dL    RDW 13.4 10.0 - 15.0 %    Platelet Count 306 150 - 450 10e9/L   TSH with free T4 reflex   Result Value Ref Range    TSH 1.57 0.40 - 4.00 mU/L

## 2020-01-21 LAB
ALBUMIN SERPL-MCNC: 4.1 G/DL (ref 3.4–5)
ALP SERPL-CCNC: 125 U/L (ref 40–150)
ALT SERPL W P-5'-P-CCNC: 49 U/L (ref 0–70)
ANION GAP SERPL CALCULATED.3IONS-SCNC: 9 MMOL/L (ref 3–14)
AST SERPL W P-5'-P-CCNC: 16 U/L (ref 0–45)
BILIRUB SERPL-MCNC: 0.7 MG/DL (ref 0.2–1.3)
BUN SERPL-MCNC: 20 MG/DL (ref 7–30)
CALCIUM SERPL-MCNC: 9.1 MG/DL (ref 8.5–10.1)
CHLORIDE SERPL-SCNC: 96 MMOL/L (ref 94–109)
CHOLEST SERPL-MCNC: 238 MG/DL
CO2 SERPL-SCNC: 25 MMOL/L (ref 20–32)
CREAT SERPL-MCNC: 0.84 MG/DL (ref 0.66–1.25)
CREAT UR-MCNC: 63 MG/DL
GFR SERPL CREATININE-BSD FRML MDRD: >90 ML/MIN/{1.73_M2}
GLUCOSE SERPL-MCNC: 363 MG/DL (ref 70–99)
HDLC SERPL-MCNC: 28 MG/DL
LDLC SERPL CALC-MCNC: 133 MG/DL
MICROALBUMIN UR-MCNC: 54 MG/L
MICROALBUMIN/CREAT UR: 86.05 MG/G CR (ref 0–17)
NONHDLC SERPL-MCNC: 210 MG/DL
POTASSIUM SERPL-SCNC: 4.4 MMOL/L (ref 3.4–5.3)
PROT SERPL-MCNC: 7.4 G/DL (ref 6.8–8.8)
SODIUM SERPL-SCNC: 130 MMOL/L (ref 133–144)
TRIGL SERPL-MCNC: 383 MG/DL
TSH SERPL DL<=0.005 MIU/L-ACNC: 1.57 MU/L (ref 0.4–4)

## 2020-01-21 NOTE — RESULT ENCOUNTER NOTE
The following letter pertains to your most recent diagnostic tests:    -Your micro albumin level is elevated. This means you have trace amounts of protein in the urine. It indicates that your kidneys are being affected by diabetes. Keeping blood pressure low is the best treatment in order to keep this stable. People with microalbuminuria should avoid anti-inflamatory agents such as motrin, alleve and ibuprofen as much as possible because excessive use of these medications can be harmful to the kidneys. Anybody that has microalbuminuria should be on lisinopril or losartan-as you already are-since these medications are protective for the kidney's in this situation.     -TSH (thyroid stimulating hormone) level is normal which indicates normal circulating thyroid hormone levels.      -Your cholesterol has not changed significantly since last check.  Are you taking atorvastatin (Lipitor) every day?    -The liver tests have normalized.  The sugar is extremely high.      -The white blood cell count is high due to prednisone.      -The hemoglobin A1c increased dramatically.  Part of this is from the prednisone, but the diabetes remains very poorly controlled.           Bottom line:  The sugars will come down a little as you taper off prednisone.  However, I still think you should try something to get your sugars down.  One option is to use a lower dose of jardiance (10mg instead of 25mg), another option is to start an injectable medication.   My recommendation would be to try the 10 mg dose of jardiance.  If the urinary symptoms are intolerable, then stop jardiance and inform me.   I sent an prescription for 10 mg tablets to your pharmacy.  We need to recheck your hemoglobin A1c in 3 months.  I recommend you schedule a lab appointment for that purpose followed by an office visit appointment with me to discuss the result.   See Dr. Bullard as soon as you can regarding your skin.        Follow up:  Lab appointment followed by an  office visit appointment in 3 months.         Sincerely,    Dr. Telol

## 2020-02-03 ENCOUNTER — TELEPHONE (OUTPATIENT)
Dept: FAMILY MEDICINE | Facility: CLINIC | Age: 50
End: 2020-02-03

## 2020-02-03 DIAGNOSIS — E11.9 TYPE 2 DIABETES MELLITUS WITHOUT COMPLICATION, WITHOUT LONG-TERM CURRENT USE OF INSULIN (H): Primary | ICD-10-CM

## 2020-02-03 RX ORDER — LIRAGLUTIDE 6 MG/ML
INJECTION SUBCUTANEOUS
Qty: 9 ML | Refills: 11 | Status: ON HOLD | OUTPATIENT
Start: 2020-02-03 | End: 2020-08-23

## 2020-02-03 NOTE — TELEPHONE ENCOUNTER
He can stop the jardiance  If the eventual plan is for him to get off prednisone for his rash, his blood sugars will improve  I recommend trying victoza, I sent prescription, he would need MTM appointment to learn how to inject and to make sure it gets covered by insurance

## 2020-02-03 NOTE — TELEPHONE ENCOUNTER
Dr. Tello,    Pt states that he tried the 10mg Jardiance and is back to urinating every 20 minutes.  Pt would like to take next step for injectable medication training.  BG has gone up to 466, this morning fasting 10-12 hours, BG was 200.  Was going to pend Diab Ed, but unsure if you want Diabetic ed or MTM for insulin.  Please review and order appropriate referral/medication.       Silvana Lundberg RN

## 2020-02-03 NOTE — TELEPHONE ENCOUNTER
Please see below and call patient or send to RN's if you would like them to discuss with patient.   Guerline Adamson MA

## 2020-02-03 NOTE — TELEPHONE ENCOUNTER
Reason for Call:  Other     Detailed comments: Pt called requesting to speak with Dr Tello regarding A1C. Pt states he is pre diabetic and has questions. Please call to discuss.     Phone Number Patient can be reached at: Home number on file [phone] (home)    Best Time: Anytime     Can we leave a detailed message on this number? YES    Call taken on 2/3/2020 at 9:47 AM by Vargas Lozoya

## 2020-02-06 ENCOUNTER — OFFICE VISIT (OUTPATIENT)
Dept: PHARMACY | Facility: CLINIC | Age: 50
End: 2020-02-06
Payer: COMMERCIAL

## 2020-02-06 VITALS — DIASTOLIC BLOOD PRESSURE: 80 MMHG | SYSTOLIC BLOOD PRESSURE: 126 MMHG

## 2020-02-06 DIAGNOSIS — Z78.9 TAKES DIETARY SUPPLEMENTS: ICD-10-CM

## 2020-02-06 DIAGNOSIS — R21 RASH: ICD-10-CM

## 2020-02-06 DIAGNOSIS — E11.9 TYPE 2 DIABETES MELLITUS WITHOUT COMPLICATION, WITHOUT LONG-TERM CURRENT USE OF INSULIN (H): Primary | ICD-10-CM

## 2020-02-06 DIAGNOSIS — I10 BENIGN ESSENTIAL HYPERTENSION: ICD-10-CM

## 2020-02-06 DIAGNOSIS — E78.5 HYPERLIPIDEMIA LDL GOAL <100: ICD-10-CM

## 2020-02-06 PROCEDURE — 99607 MTMS BY PHARM ADDL 15 MIN: CPT | Performed by: PHARMACIST

## 2020-02-06 PROCEDURE — 99605 MTMS BY PHARM NP 15 MIN: CPT | Performed by: PHARMACIST

## 2020-02-06 RX ORDER — BLOOD-GLUCOSE CONTROL, NORMAL
EACH MISCELLANEOUS
Qty: 1 EACH | Refills: 3 | Status: SHIPPED | OUTPATIENT
Start: 2020-02-06

## 2020-02-06 RX ORDER — LIRAGLUTIDE 6 MG/ML
1.2 INJECTION SUBCUTANEOUS DAILY
Qty: 6 ML | Status: CANCELLED | OUTPATIENT
Start: 2020-02-06

## 2020-02-06 RX ORDER — CETIRIZINE HYDROCHLORIDE 10 MG/1
10-20 TABLET ORAL PRN
COMMUNITY

## 2020-02-06 RX ORDER — CHLORAL HYDRATE 500 MG
1 CAPSULE ORAL DAILY
COMMUNITY
End: 2020-08-23

## 2020-02-06 NOTE — PATIENT INSTRUCTIONS
Recommendations from today's MTM visit:                                                    MTM (medication therapy management) is a service provided by a clinical pharmacist designed to help you get the most of out of your medicines.   Today we reviewed what your medicines are for, how to know if they are working, that your medicines are safe and how to make your medicine regimen as easy as possible.     1. Sent Rx for pen needles, new blood sugar meter.     2. Could consider stopping the fish oil and potassium, and chinese herb.     3. Stop Jardiance.    4. Start Victoza 0.6 mg once daily x 1 week, then increase to 1.2 mg daily.    Handout on carb counting given.     It was great to speak with you today.  I value your experience and would be very thankful for your time with providing feedback on our clinic survey. You may receive a survey via email or text message in the next few days.     Next MTM visit: 1 month    To schedule another MTM appointment, please call the clinic directly or you may call the MTM scheduling line at [phone] or toll-free at [phone].     My Clinical Pharmacist's contact information:                                                      It was a pleasure talking with you today!  Please feel free to contact me with any questions or concerns you have.      Susie Tran, PharmD  Medication Therapy Management Pharmacist  [phone]

## 2020-02-06 NOTE — PROGRESS NOTES
SUBJECTIVE/OBJECTIVE:                           Low Juárez is a 49 year old male coming in for an initial visit for Medication Therapy Management.  He was referred to me from Gilson Tello for start victoza.    Chief Complaint: Victoza start. He also has several questions about blood sugars, carb counting, and medications that could contribute to rash.    Allergies/ADRs: Reviewed in Epic  Tobacco:  reports that he has quit smoking. He has never used smokeless tobacco.  Alcohol: occasional use  Caffeine: several cups/day of coffee  Activity: going to start exercising again before evening shift  PMH: Reviewed in Epic    Medication Adherence/Access:  Patient takes medications 2 time(s) per day.  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    Diabetes:    Jardiance 10 mg daily   Metformin 1000 mg twice daily   Glipizide 10 mg twice daily     Reduced Jardiance from 25 mg daily to 10 mg daily d/t frequent urination. Takes glipizide right when he wakes up without food.  Pt is not experiencing side effects.  SMBG: one-two times daily.   Ranges (patient reported): 140 (FBG) yesterday, 200 (FBG) and 220 (1 hour after eating) at work yesterday. He has been buying meter OTC, doesn't have prescription.  Patient is not experiencing hypoglycemia  Recent symptoms of high blood sugar? vision changes, polyuria and fatigue  Eye exam: up to date  Foot exam: due  ACEi/ARB: Yes: lisinopril/ hydrochlorothiazide.   Urine Albumin:   Lab Results   Component Value Date    UMALCR 86.05 (H) 01/20/2020      Aspirin: Taking 81mg daily and denies side effects  Diet/Exercise: Sample meal:Tomatoes, cheese, apple. Starting to exercise again before night shift. Notes he sometimes eats Ramen noodles at work, needs to cut back on sweets, pop.  Lab Results   Component Value Date    A1C 12.2 01/20/2020    A1C 8.6 10/18/2019     A1C 7.5 07/18/2019    A1C 10.4 12/05/2018     Rash:    Cetirizine 10-20 mg daily  Prednisone (done with this x 2-3 weeks, he did three different rounds of it)  Hydroxyzine three times daily as needed for itching  Betamethasone 0.05% cream and lotion as needed    Also has used triamcinolone for this. States he is still having issues with this.  All body rash, he's working with specialists on this, but is frustrated.  This all started last fall, around November.     Hyperlipidemia:  Atorvastatin 40 mg daily (had been taking 1-2 months prior to most recent lipid panel)     He has a history of pancreatitis (~10 years ago).  He started atorvastatin in the fall, but then stopped x 2-3 weeks because he thought it might have caused the rash, but he discussed with PCP and agreed to restart. His rash did not change during the time off atorvastatin and did not get worse when restarted.   The 10-year ASCVD risk score ([place]ownrao ALBRIGHT Jr., et al., 2013) is: 16.2%    Values used to calculate the score:      Age: 49 years      Sex: Male      Is Non- : No      Diabetic: Yes      Tobacco smoker: No      Systolic Blood Pressure: 128 mmHg      Is BP treated: Yes      HDL Cholesterol: 28 mg/dL      Total Cholesterol: 238 mg/dL  Recent Labs   Lab Test 01/20/20  1334 07/18/19  1325   CHOL 238* 224*   HDL 28* 27*   * 139*   TRIG 383* 292*     Hypertension:   Lisinopril- hydrochlorothiazide 20-25 - take two tablets daily.    Patient does not self-monitor BP.  Patient reports no current medication side effects.  BP Readings from Last 3 Encounters:   01/20/20 128/80   10/18/19 122/79   07/18/19 124/68     Supplements:   Fish oil 1000 mg daily  Potassium (for leg cramps)  Magnesium (for leg cramps)  Chinese herb    Was taking echinacea, bottle is now out.    Denies side effects.     Potassium   Date Value Ref Range Status   01/20/2020 4.4 3.4 - 5.3 mmol/L Final     Today's Vitals: /80     ASSESSMENT:                               Medication Adherence: good, no issues identified    Diabetes: Needs Improvement. Patient is not meeting A1c goal of < 7%. Pt would benefit from starting Victoza and stopping Jaridance.  Education provided.  With current A1C of 12.2, it is likely his increased urination is from elevated glucose, of which Jardiance and Hydrochlorothiazide are exacerbating. Pt may also benefit for referral to diabetic education for nutrition advice. Due for annual eye exam.  Aspirin therapy is safe and appropriate.    Rash:  Needs improvement. Discussed supplements and potential for these to contain allergens, he plans to stop some and will not restart echinacea. (see plan).  Future: this was not discussed with patient, however it is possible that hydrochlorothiazide is contributing, as he is on a higher dose (50 mg daily) and of current medications is the most likely to contribute. This dose was also increased from 25 mg to 50 mg in July of last year, and his symptoms started around October/November.       Hyperlipidemia: Needs Improvement. Pt is on high intensity statin which is indicated based on 2019 ACC/AHA guidelines for lipid management, however FLP remains elevated. Future: pt may benefit from atorvastatin dose increase.     Hypertension: Needs improvement. BP is at goal < 140/90, however see rash above regarding hydrochlorothiazide.     Supplements: See rash and plan.    PLAN:                            Message sent to Dr. Tello regarding possibility of hydrochlorothiazide contributing to rash.    Patient:    1. Sent Rx for pen needles, new blood sugar meter.     2. Could consider stopping the fish oil and potassium, and chinese herb.     3. Stop Jardiance.    4. Start Victoza 0.6 mg once daily x 1 week, then increase to 1.2 mg daily.    Handout on carb counting given.     I spent 60 minutes with this patient today. All changes were made via collaborative practice agreement with Gilson Tello. A copy of the  visit note was provided to the patient's primary care provider.    Will follow up in 1 month.    The patient was given a summary of these recommendations as an after visit summary.     Addendum 2/6/20: After discussion with Dr. Tello, unclear from dermatology perspective if his rash is a drug rash and patient is seeking second option, will continue hydrochlorothiazide for now. (as above, this was not discussed with patient).    Susie Tran, PharmD  Medication Therapy Management Pharmacist  [phone]

## 2020-02-07 ENCOUNTER — TELEPHONE (OUTPATIENT)
Dept: PHARMACY | Facility: CLINIC | Age: 50
End: 2020-02-07

## 2020-02-07 NOTE — TELEPHONE ENCOUNTER
Diabetes EducationDiabetes Education Scheduling Outreach #1:    Call to patient to schedule. Left message with phone number to call to schedule.    Plan for 2nd outreach attempt within 2 business days.    [facility]  Diabetes and Nutrition Scheduling

## 2020-02-11 NOTE — TELEPHONE ENCOUNTER
Diabetes Education Scheduling Outreach #2:    Call to patient to schedule. Left message with phone number to call to schedule.    Na Pedro  [facility]  Diabetes and Nutrition Scheduling

## 2020-03-05 ENCOUNTER — OFFICE VISIT (OUTPATIENT)
Dept: PHARMACY | Facility: CLINIC | Age: 50
End: 2020-03-05
Payer: COMMERCIAL

## 2020-03-05 VITALS — WEIGHT: 251 LBS | DIASTOLIC BLOOD PRESSURE: 80 MMHG | SYSTOLIC BLOOD PRESSURE: 120 MMHG | BODY MASS INDEX: 34.52 KG/M2

## 2020-03-05 DIAGNOSIS — I10 BENIGN ESSENTIAL HYPERTENSION: ICD-10-CM

## 2020-03-05 DIAGNOSIS — Z78.9 TAKES DIETARY SUPPLEMENTS: ICD-10-CM

## 2020-03-05 DIAGNOSIS — E11.9 TYPE 2 DIABETES MELLITUS WITHOUT COMPLICATION, WITHOUT LONG-TERM CURRENT USE OF INSULIN (H): ICD-10-CM

## 2020-03-05 DIAGNOSIS — R21 RASH: Primary | ICD-10-CM

## 2020-03-05 DIAGNOSIS — E78.5 HYPERLIPIDEMIA LDL GOAL <100: ICD-10-CM

## 2020-03-05 PROCEDURE — 99607 MTMS BY PHARM ADDL 15 MIN: CPT | Performed by: PHARMACIST

## 2020-03-05 PROCEDURE — 99606 MTMS BY PHARM EST 15 MIN: CPT | Performed by: PHARMACIST

## 2020-03-05 RX ORDER — IVERMECTIN 3 MG/1
24 TABLET ORAL
COMMUNITY
Start: 2020-03-04 | End: 2020-08-23

## 2020-03-05 NOTE — PATIENT INSTRUCTIONS
Recommendations from today's MTM visit:                                                      1. Get yearly eye exam.     2. Continue Jardiance 25 mg daily    3.    Rule of 15    To treat low blood sugar (< 70 mg/dL on a glucometer)  a) take 15g of carbs (e.g. 3-4 glucose tablets,   cup or 4 oz of fruit juice,   can of regular soda, 7 or 8 pieces of sugary hard candy, 4 teaspoons of sugar, 1 tablespoon of honey).   b) Rest and then check your sugar again in 15 minutes.   c) Repeat these steps until your blood sugar is over 70 mg/dL. If your blood sugar does not raise to >70 mg/dL after 3 attempts, call 911.   d) If glucose level is in target range and your next meal is more than 2 hours away, follow with an additional 30g of carbs. Always be prepared to treat low blood sugar by having a source of sugar with you at all times.     It was great to speak with you today.  I value your experience and would be very thankful for your time with providing feedback on our clinic survey. You may receive a survey via email or text message in the next few days.     Next MTM visit: 3 weeks    To schedule another MTM appointment, please call the clinic directly or you may call the MTM scheduling line at [phone] or toll-free at [phone].     My Clinical Pharmacist's contact information:                                                      It was a pleasure talking with you today!  Please feel free to contact me with any questions or concerns you have.      Susie Tran, PharmD  Medication Therapy Management Pharmacist  [phone]

## 2020-03-05 NOTE — PROGRESS NOTES
[facility] ENCOUNTER  SUBJECTIVE/OBJECTIVE:                           Low Juárez is a 49 year old male coming in for a follow-up visit. He was referred to me from Gilson Tello for start victoza.  Follow-up from [facility] visit on 2/6/20.     Chief Complaint: Victoza start. He also has several questions about blood sugars, carb counting, and medications that could contribute to rash.    Allergies/ADRs: Reviewed in Epic  Tobacco:  reports that he has quit smoking. He has never used smokeless tobacco.  Alcohol: occasional use  Caffeine: several cups/day of coffee  Activity: going to start exercising again before evening shift  PMH: Reviewed in Epic    Medication Adherence/Access:  Patient takes medications 2 time(s) per day.  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    Diabetes:    Jardiance 25 mg daily  Metformin 1000 mg twice daily  (twice a day difficult while on vacation)  Glipizide 10 mg twice daily  (twice a day difficult while on vacation)   Victoza 1.2 mg daily      He he restarted Jardiance while he was on vacation, he's been out ~ 1 now. When he was taking again he wasn't having the increased urination.    Victoza is new, he has really liked it, has helped curb appetite.  His BG readings were < 120 on Jardiance 25 (lowest was 87 prior to vacation), now they are 160-200 off Jardiance 25 mg.  Pt is not experiencing side effects.  Patient is not experiencing hypoglycemia  Recent symptoms of high blood sugar? vision changes, polyuria and fatigue - improved  Eye exam: up to date  Foot exam: due  ACEi/ARB: Yes: lisinopril/ hydrochlorothiazide.   Urine Albumin:   Lab Results   Component Value Date    UMALCR 86.05 (H) 01/20/2020   ]  Aspirin: Taking 81mg daily and denies side effects  Diet/Exercise: Sample meal:Tomatoes, cheese, apple. Starting to exercise again before night shift. Notes he  sometimes eats Ramen noodles at work, needs to cut back on sweets, pop.  Lab Results   Component Value Date    A1C 12.2 01/20/2020    A1C 8.6 10/18/2019    A1C 7.5 07/18/2019    A1C 10.4 12/05/2018     Rash:    Cetirizine 10-20 mg daily  Hydroxyzine three times daily as needed for itching  Betamethasone 0.05% cream and lotion as needed  Ivermectin 24 mg q14 x 2 doses to rule out scabies    He is off prednisone.  Also has used triamcinolone for this. States he is still having issues with this.  All body rash, he's working with specialists on this, but is frustrated.  This all started last fall, around November.     Hyperlipidemia:  Atorvastatin 40 mg daily (had been taking 1-2 months prior to most recent lipid panel)     He has a history of pancreatitis (~10 years ago).  He started atorvastatin in the fall, but then stopped x 2-3 weeks because he thought it might have caused the rash, but he discussed with PCP and agreed to restart. His rash did not change during the time off atorvastatin and did not get worse when restarted.   The 10-year ASCVD risk score (Mark JOSE RAMON Jr., et al., 2013) is: 15.8%    Values used to calculate the score:      Age: 49 years      Sex: Male      Is Non- : No      Diabetic: Yes      Tobacco smoker: No      Systolic Blood Pressure: 126 mmHg      Is BP treated: Yes      HDL Cholesterol: 28 mg/dL      Total Cholesterol: 238 mg/dL  Recent Labs   Lab Test 01/20/20  1334 07/18/19  1325   CHOL 238* 224*   HDL 28* 27*   * 139*   TRIG 383* 292*     Hypertension:   Lisinopril- hydrochlorothiazide 20-25 daily    He decreased this from two tablets daily d/t postural hypotension.   Patient does not self-monitor BP.  Patient reports no current medication side effects.  BP Readings from Last 3 Encounters:   02/06/20 126/80   01/20/20 128/80   10/18/19 122/79     Supplements:   Fish oil 1000 mg daily  Potassium (for leg cramps)  Magnesium (for leg cramps)  Chinese herb    Was  taking echinacea, bottle is now out.    Denies side effects.     Potassium   Date Value Ref Range Status   01/20/2020 4.4 3.4 - 5.3 mmol/L Final     Today's Vitals: /80   Wt 251 lb (113.9 kg)   BMI 34.52 kg/m      ASSESSMENT:                              Medication Adherence: fair, no issues identified    Diabetes: Needs Improvement. Patient is not meeting A1c goal of < 7%. Pt would benefit from continuing Jaridance.   With A1C of 12.2 it is likely his initial increased urination was from elevated glucose, of which Jardiance and Hydrochlorothiazide were exacerbating, with improved blood glucose this has minimized.  Due for annual eye exam.  Aspirin therapy is safe and appropriate.  Education provided on glipizide, and risk of hypoglycemia.    Rash:  Needs improvement. Plan in place (see previous note)     Hyperlipidemia: Needs Improvement. Pt is on high intensity statin which is indicated based on 2019 ACC/AHA guidelines for lipid management, however FLP remains elevated. Future: pt may benefit from atorvastatin dose increase.     Hypertension: stable. BP is at goal < 140/90.    Supplements: Stable.     PLAN:                            Patient:    1. Get yearly eye exam.     2. Continue Jardiance 25 mg daily    3.    Rule of 15    To treat low blood sugar (< 70 mg/dL on a glucometer)  a) take 15g of carbs (e.g. 3-4 glucose tablets,   cup or 4 oz of fruit juice,   can of regular soda, 7 or 8 pieces of sugary hard candy, 4 teaspoons of sugar, 1 tablespoon of honey).   b) Rest and then check your sugar again in 15 minutes.   c) Repeat these steps until your blood sugar is over 70 mg/dL. If your blood sugar does not raise to >70 mg/dL after 3 attempts, call 911.   d) If glucose level is in target range and your next meal is more than 2 hours away, follow with an additional 30g of carbs. Always be prepared to treat low blood sugar by having a source of sugar with you at all times.     I spent 30 minutes with this  patient today. All changes were made via collaborative practice agreement with Gilson Tello. A copy of the visit note was provided to the patient's primary care provider.    Will follow up in 3 weeks.    The patient was given a summary of these recommendations.     Susie Tran, PharmD  Medication Therapy Management Pharmacist  [phone]

## 2020-05-11 ENCOUNTER — TELEPHONE (OUTPATIENT)
Dept: PHARMACY | Facility: CLINIC | Age: 50
End: 2020-05-11

## 2020-05-11 NOTE — TELEPHONE ENCOUNTER
This patient is due for MT follow-up. I called the patient to schedule an appointment and left a message with the clinic phone number for the patient to call to schedule.      Jessika Schofield, PharmD  PGY1 Medication Therapy Management Resident   [phone]

## 2020-05-13 DIAGNOSIS — L40.9 PSORIASIS: ICD-10-CM

## 2020-05-13 RX ORDER — BETAMETHASONE DIPROPIONATE 0.5 MG/ML
LOTION, AUGMENTED TOPICAL 2 TIMES DAILY
Qty: 60 ML | Refills: 6 | Status: SHIPPED | OUTPATIENT
Start: 2020-05-13 | End: 2022-01-13

## 2020-05-13 RX ORDER — BETAMETHASONE DIPROPIONATE 0.5 MG/G
OINTMENT, AUGMENTED TOPICAL 2 TIMES DAILY
Qty: 45 G | Refills: 11 | Status: SHIPPED | OUTPATIENT
Start: 2020-05-13 | End: 2020-08-23

## 2020-05-13 NOTE — TELEPHONE ENCOUNTER
Reason for Call:  Medication or medication refill:    Do you use a [facility]?  Name of the pharmacy and phone number for the current request[facility]       [facility] [place] [facility] [address] [facility]      Name of the medication requested:   augmented betamethasone dipropionate (DIPROLENE) 0.05 % external lotion  60 mL  6  7/5/2019   No    Sig - Route: APPLY TOPICALLY 2 TIMES DAILY - Topical    Sent to pharmacy as: augmented betamethasone dipropionate (DIPROLENE) 0.05 % external lotion    Class: E-Prescribe    Notes to Pharmacy: DX Code Needed  NEW RX PLEASE.    Order: [id number]    E-Prescribing Status: Receipt confirmed by pharmacy (7/5/2019  9:49 AM CDT)      augmented betamethasone dipropionate (DIPROLENE-AF) 0.05 % ointment  45 g  11  1/31/2018   --    Sig - Route: Apply topically 2 times daily - Topical    Sent to pharmacy as: augmented betamethasone dipropionate (DIPROLENE-AF) 0.05 % ointment    Class: E-Prescribe    Order: [id number]    E-Prescribing Status: Receipt confirmed by pharmacy (1/31/2018  2:26 PM CST)          Other request:  This is an old Rx and pt is hoping to have a call if it can be refilled. He is open to having a virtual visit if necessary.      Can we leave a detailed message on this number? YES    Phone number patient can be reached at: Home number on file [phone] (home)    Best Time: Any     Call taken on 5/13/2020 at 4:30 PM by Tata Garcia

## 2020-05-13 NOTE — TELEPHONE ENCOUNTER
Called patient regarding refill requests.  Patient states since he has to wear a mask and PPE he has noticed increased irritation behind his ears, on the bridge of his nose and sometimes on his arms.  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  Patient states he has been suffering from skin conditions his entire life and the ointment and lotion (used on his head) has been very helpful.    Routing to CS refill as high priority as patient works tomorrow at [employer] and would like to  then.    LOU GallagherN, RN  [employer] Triage

## 2020-05-28 ENCOUNTER — NURSE TRIAGE (OUTPATIENT)
Dept: FAMILY MEDICINE | Facility: CLINIC | Age: 50
End: 2020-05-28
Payer: COMMERCIAL

## 2020-05-28 NOTE — TELEPHONE ENCOUNTER
Reason for call:  Patient reporting a symptom    Symptom or request: lower abd pain    Duration (how long have symptoms been present): 2 weeks    Have you been treated for this before? No    Additional comments:    Phone Number patient can be reached at:  Cell number on file:    Telephone Information:   Mobile [phone]     Best Time:  any    Can we leave a detailed message on this number:  YES    Call taken on 5/28/2020 at 9:04 AM by Loreto Ballard    *transferred to triage

## 2020-05-29 ENCOUNTER — VIRTUAL VISIT (OUTPATIENT)
Dept: FAMILY MEDICINE | Facility: CLINIC | Age: 50
End: 2020-05-29
Payer: COMMERCIAL

## 2020-05-29 DIAGNOSIS — E11.9 TYPE 2 DIABETES MELLITUS WITHOUT COMPLICATION, WITHOUT LONG-TERM CURRENT USE OF INSULIN (H): ICD-10-CM

## 2020-05-29 DIAGNOSIS — R10.31 RIGHT LOWER QUADRANT PAIN: Primary | ICD-10-CM

## 2020-05-29 PROCEDURE — 99214 OFFICE O/P EST MOD 30 MIN: CPT | Mod: 95 | Performed by: INTERNAL MEDICINE

## 2020-05-29 NOTE — PROGRESS NOTES
"Low Juárez is a 50 year old male who is being evaluated via a billable telephone visit.      The patient has been notified of following:     \"This telephone visit will be conducted via a call between you and your physician/provider. We have found that certain health care needs can be provided without the need for a physical exam.  This service lets us provide the care you need with a short phone conversation.  If a prescription is necessary we can send it directly to your pharmacy.  If lab work is needed we can place an order for that and you can then stop by our lab to have the test done at a later time.    Telephone visits are billed at different rates depending on your insurance coverage. During this emergency period, for some insurers they may be billed the same as an in-person visit.  Please reach out to your insurance provider with any questions.    If during the course of the call the physician/provider feels a telephone visit is not appropriate, you will not be charged for this service.\"    Patient has given verbal consent for Telephone visit?  Yes    What phone number would you like to be contacted at?     How would you like to obtain your AVS? Mail a copy    Subjective     Low Juárez is a 50 year old male who presents via phone visit today for the following health issues:    HPI    Abdominal Pain      Duration: x 2 weeks ongoing     Description (location/character/radiation): shifting pain, from Left to Right, R>L for most part.Waist region       Associated flank pain: unsure     Intensity:  mild, moderate    Accompanying signs and symptoms:        Fever/Chills: no        Gas/Bloating: no        Nausea/vomitting: no        Diarrhea: no        Dysuria or Hematuria: no     History (previous similar pain/trauma/previous testing): none    Precipitating or alleviating factors:       Pain worse with eating/BM/urination: no       Pain relieved by BM: no     Therapies tried and outcome: metamucil,. But has " stopped taking.     LMP:  not applicable       Blood sugar control has improved considerably since starting victoza and restarting Jardiance      Reviewed and updated as needed this visit by Provider         Review of Systems   Constitutional, HEENT, cardiovascular, pulmonary, gi and gu systems are negative, except as otherwise noted.       Objective   Reported vitals:  There were no vitals taken for this visit.     PSYCH: Alert and oriented times 3; coherent speech, normal   rate and volume, able to articulate logical thoughts, able   to abstract reason, no tangential thoughts, no hallucinations   or delusions  His affect is normal  RESP: No cough, no audible wheezing, able to talk in full sentences  Remainder of exam unable to be completed due to telephone visits            Assessment/Plan:  1. Right lower quadrant pain  I cannot accurately diagnose the cause of his right sided abdominal pain by telephone  Since there are certain potentially dangerous conditions that could be causing his pain such as appendicitis or diverticular disease or renal stones, I recommended a face to face visit at an urgent care today to further evaluate; he had many questions about this which I tried to answer    2. Type 2 diabetes mellitus without complication, without long-term current use of insulin (H)  This seems to be under better control by his report  I would like him to return to clinic post COVID for A1c and lipid check   We will plan for a July visit         Return in about 2 months (around 7/29/2020) for Diabetes Check.  Urgent care visit today to evaluate RLQ pain      Phone call duration:  24 minutes    Gilson Tello MD

## 2020-06-15 ENCOUNTER — TELEPHONE (OUTPATIENT)
Dept: PHARMACY | Facility: CLINIC | Age: 50
End: 2020-06-15

## 2020-06-15 NOTE — TELEPHONE ENCOUNTER
This patient is due for MT follow-up. I called the patient to schedule an appointment and left a message with the clinic phone number for the patient to call to schedule.      Jessika Schofield, PharmD  PGY1 Medication Therapy Management Resident   [phone]

## 2020-07-20 ENCOUNTER — TELEPHONE (OUTPATIENT)
Dept: PHARMACY | Facility: CLINIC | Age: 50
End: 2020-07-20

## 2020-07-20 NOTE — TELEPHONE ENCOUNTER
We have been unable to reach this patient for MTM follow-up after several attempts. We will stop reaching out to the patient at this time. Please let us know if we can assist in this patient's care in the future.    Routing to PCP as Osorio PerezD, [facility]  Medication Therapy Management Provider  Pager: [phone]

## 2020-08-18 ENCOUNTER — HOSPITAL ENCOUNTER (OUTPATIENT)
Facility: CLINIC | Age: 50
Discharge: HOME | End: 2020-08-19
Attending: EMERGENCY MEDICINE | Admitting: DENTIST
Payer: COMMERCIAL

## 2020-08-18 DIAGNOSIS — K12.2 SUBMANDIBULAR SPACE INFECTION: ICD-10-CM

## 2020-08-18 PROBLEM — K04.7 DENTAL ABSCESS: Status: ACTIVE | Noted: 2020-08-18

## 2020-08-18 LAB
ALBUMIN SERPL-MCNC: 3.8 G/DL (ref 3.4–5)
ALP SERPL-CCNC: 102 U/L (ref 40–150)
ALT SERPL W P-5'-P-CCNC: 28 U/L (ref 0–70)
ANION GAP SERPL CALCULATED.3IONS-SCNC: 4 MMOL/L (ref 3–14)
AST SERPL W P-5'-P-CCNC: 7 U/L (ref 0–45)
BASOPHILS # BLD AUTO: 0 10E9/L (ref 0–0.2)
BASOPHILS NFR BLD AUTO: 0.2 %
BILIRUB SERPL-MCNC: 1.2 MG/DL (ref 0.2–1.3)
BUN SERPL-MCNC: 12 MG/DL (ref 7–30)
CALCIUM SERPL-MCNC: 9.5 MG/DL (ref 8.5–10.1)
CHLORIDE SERPL-SCNC: 102 MMOL/L (ref 94–109)
CO2 SERPL-SCNC: 28 MMOL/L (ref 20–32)
CREAT SERPL-MCNC: 1.07 MG/DL (ref 0.66–1.25)
DIFFERENTIAL METHOD BLD: ABNORMAL
EOSINOPHIL # BLD AUTO: 0.1 10E9/L (ref 0–0.7)
EOSINOPHIL NFR BLD AUTO: 0.5 %
ERYTHROCYTE [DISTWIDTH] IN BLOOD BY AUTOMATED COUNT: 13.7 % (ref 10–15)
GFR SERPL CREATININE-BSD FRML MDRD: 80 ML/MIN/{1.73_M2}
GLUCOSE BLDC GLUCOMTR-MCNC: 267 MG/DL (ref 70–99)
GLUCOSE SERPL-MCNC: 213 MG/DL (ref 70–99)
HCT VFR BLD AUTO: 43 % (ref 40–53)
HGB BLD-MCNC: 14.5 G/DL (ref 13.3–17.7)
IMM GRANULOCYTES # BLD: 0.1 10E9/L (ref 0–0.4)
IMM GRANULOCYTES NFR BLD: 0.3 %
LACTATE BLD-SCNC: 1.4 MMOL/L (ref 0.7–2)
LYMPHOCYTES # BLD AUTO: 1.1 10E9/L (ref 0.8–5.3)
LYMPHOCYTES NFR BLD AUTO: 7.6 %
MCH RBC QN AUTO: 30.8 PG (ref 26.5–33)
MCHC RBC AUTO-ENTMCNC: 33.7 G/DL (ref 31.5–36.5)
MCV RBC AUTO: 91 FL (ref 78–100)
MONOCYTES # BLD AUTO: 1.3 10E9/L (ref 0–1.3)
MONOCYTES NFR BLD AUTO: 9.1 %
NEUTROPHILS # BLD AUTO: 11.8 10E9/L (ref 1.6–8.3)
NEUTROPHILS NFR BLD AUTO: 82.3 %
NRBC # BLD AUTO: 0 10*3/UL
NRBC BLD AUTO-RTO: 0 /100
PLATELET # BLD AUTO: 253 10E9/L (ref 150–450)
POTASSIUM SERPL-SCNC: 3.9 MMOL/L (ref 3.4–5.3)
PROT SERPL-MCNC: 8.3 G/DL (ref 6.8–8.8)
RBC # BLD AUTO: 4.71 10E12/L (ref 4.4–5.9)
SODIUM SERPL-SCNC: 134 MMOL/L (ref 133–144)
WBC # BLD AUTO: 14.4 10E9/L (ref 4–11)

## 2020-08-18 PROCEDURE — 83605 ASSAY OF LACTIC ACID: CPT | Performed by: EMERGENCY MEDICINE

## 2020-08-18 PROCEDURE — 85025 COMPLETE CBC W/AUTO DIFF WBC: CPT | Performed by: EMERGENCY MEDICINE

## 2020-08-18 PROCEDURE — 25000128 H RX IP 250 OP 636: Performed by: DENTIST

## 2020-08-18 PROCEDURE — 25000131 ZZH RX MED GY IP 250 OP 636 PS 637: Performed by: DENTIST

## 2020-08-18 PROCEDURE — 96365 THER/PROPH/DIAG IV INF INIT: CPT | Performed by: EMERGENCY MEDICINE

## 2020-08-18 PROCEDURE — 12000001 ZZH R&B MED SURG/OB UMMC

## 2020-08-18 PROCEDURE — 99285 EMERGENCY DEPT VISIT HI MDM: CPT | Mod: 25 | Performed by: EMERGENCY MEDICINE

## 2020-08-18 PROCEDURE — 96372 THER/PROPH/DIAG INJ SC/IM: CPT | Mod: XS

## 2020-08-18 PROCEDURE — 25000128 H RX IP 250 OP 636: Performed by: EMERGENCY MEDICINE

## 2020-08-18 PROCEDURE — 25800030 ZZH RX IP 258 OP 636: Performed by: DENTIST

## 2020-08-18 PROCEDURE — 25000132 ZZH RX MED GY IP 250 OP 250 PS 637: Performed by: DENTIST

## 2020-08-18 PROCEDURE — 25000125 ZZHC RX 250: Performed by: EMERGENCY MEDICINE

## 2020-08-18 PROCEDURE — 96375 TX/PRO/DX INJ NEW DRUG ADDON: CPT | Performed by: EMERGENCY MEDICINE

## 2020-08-18 PROCEDURE — 80053 COMPREHEN METABOLIC PANEL: CPT | Performed by: EMERGENCY MEDICINE

## 2020-08-18 PROCEDURE — 87040 BLOOD CULTURE FOR BACTERIA: CPT | Performed by: EMERGENCY MEDICINE

## 2020-08-18 PROCEDURE — 99285 EMERGENCY DEPT VISIT HI MDM: CPT | Mod: Z6 | Performed by: EMERGENCY MEDICINE

## 2020-08-18 PROCEDURE — 00000146 ZZHCL STATISTIC GLUCOSE BY METER IP

## 2020-08-18 RX ORDER — CLINDAMYCIN PHOSPHATE 600 MG/50ML
600 INJECTION, SOLUTION INTRAVENOUS EVERY 8 HOURS
Status: DISCONTINUED | OUTPATIENT
Start: 2020-08-18 | End: 2020-08-18

## 2020-08-18 RX ORDER — FLUTICASONE PROPIONATE 50 MCG
1 SPRAY, SUSPENSION (ML) NASAL DAILY
Status: DISCONTINUED | OUTPATIENT
Start: 2020-08-19 | End: 2020-08-19 | Stop reason: HOSPADM

## 2020-08-18 RX ORDER — MORPHINE SULFATE 2 MG/ML
1 INJECTION, SOLUTION INTRAMUSCULAR; INTRAVENOUS
Status: DISCONTINUED | OUTPATIENT
Start: 2020-08-18 | End: 2020-08-19 | Stop reason: HOSPADM

## 2020-08-18 RX ORDER — ATORVASTATIN CALCIUM 40 MG/1
40 TABLET, FILM COATED ORAL DAILY
Status: DISCONTINUED | OUTPATIENT
Start: 2020-08-19 | End: 2020-08-19 | Stop reason: HOSPADM

## 2020-08-18 RX ORDER — HYDROXYZINE HYDROCHLORIDE 25 MG/1
25 TABLET, FILM COATED ORAL EVERY 6 HOURS PRN
Status: DISCONTINUED | OUTPATIENT
Start: 2020-08-18 | End: 2020-08-19 | Stop reason: HOSPADM

## 2020-08-18 RX ORDER — LIRAGLUTIDE 6 MG/ML
0.6 INJECTION SUBCUTANEOUS DAILY
Status: DISCONTINUED | OUTPATIENT
Start: 2020-08-19 | End: 2020-08-18

## 2020-08-18 RX ORDER — DEXAMETHASONE SODIUM PHOSPHATE 4 MG/ML
8 INJECTION, SOLUTION INTRA-ARTICULAR; INTRALESIONAL; INTRAMUSCULAR; INTRAVENOUS; SOFT TISSUE EVERY 8 HOURS
Status: DISCONTINUED | OUTPATIENT
Start: 2020-08-18 | End: 2020-08-19 | Stop reason: HOSPADM

## 2020-08-18 RX ORDER — HYDROMORPHONE HCL/0.9% NACL/PF 0.2MG/0.2
0.2 SYRINGE (ML) INTRAVENOUS
Status: COMPLETED | OUTPATIENT
Start: 2020-08-18 | End: 2020-08-18

## 2020-08-18 RX ORDER — DEXTROSE MONOHYDRATE 25 G/50ML
25-50 INJECTION, SOLUTION INTRAVENOUS
Status: DISCONTINUED | OUTPATIENT
Start: 2020-08-18 | End: 2020-08-19 | Stop reason: HOSPADM

## 2020-08-18 RX ORDER — NALOXONE HYDROCHLORIDE 0.4 MG/ML
.1-.4 INJECTION, SOLUTION INTRAMUSCULAR; INTRAVENOUS; SUBCUTANEOUS
Status: DISCONTINUED | OUTPATIENT
Start: 2020-08-18 | End: 2020-08-19 | Stop reason: HOSPADM

## 2020-08-18 RX ORDER — AMPICILLIN AND SULBACTAM 2; 1 G/1; G/1
3 INJECTION, POWDER, FOR SOLUTION INTRAMUSCULAR; INTRAVENOUS EVERY 6 HOURS
Status: DISCONTINUED | OUTPATIENT
Start: 2020-08-19 | End: 2020-08-19 | Stop reason: HOSPADM

## 2020-08-18 RX ORDER — IPRATROPIUM BROMIDE 42 UG/1
2 SPRAY, METERED NASAL 3 TIMES DAILY
Status: DISCONTINUED | OUTPATIENT
Start: 2020-08-18 | End: 2020-08-19 | Stop reason: HOSPADM

## 2020-08-18 RX ORDER — SODIUM CHLORIDE 9 MG/ML
1000 INJECTION, SOLUTION INTRAVENOUS CONTINUOUS
Status: DISCONTINUED | OUTPATIENT
Start: 2020-08-18 | End: 2020-08-19 | Stop reason: HOSPADM

## 2020-08-18 RX ORDER — KETOROLAC TROMETHAMINE 30 MG/ML
30 INJECTION, SOLUTION INTRAMUSCULAR; INTRAVENOUS EVERY 6 HOURS PRN
Status: DISCONTINUED | OUTPATIENT
Start: 2020-08-18 | End: 2020-08-19 | Stop reason: HOSPADM

## 2020-08-18 RX ORDER — NICOTINE POLACRILEX 4 MG
15-30 LOZENGE BUCCAL
Status: DISCONTINUED | OUTPATIENT
Start: 2020-08-18 | End: 2020-08-19 | Stop reason: HOSPADM

## 2020-08-18 RX ORDER — BETAMETHASONE DIPROPIONATE 0.5 MG/ML
LOTION, AUGMENTED TOPICAL 2 TIMES DAILY
Status: DISCONTINUED | OUTPATIENT
Start: 2020-08-18 | End: 2020-08-19 | Stop reason: HOSPADM

## 2020-08-18 RX ORDER — GLIPIZIDE 10 MG/1
10 TABLET ORAL
Status: DISCONTINUED | OUTPATIENT
Start: 2020-08-19 | End: 2020-08-19 | Stop reason: HOSPADM

## 2020-08-18 RX ADMIN — Medication 0.2 MG: at 19:40

## 2020-08-18 RX ADMIN — KETOROLAC TROMETHAMINE 30 MG: 30 INJECTION, SOLUTION INTRAMUSCULAR at 21:06

## 2020-08-18 RX ADMIN — CLINDAMYCIN IN 5 PERCENT DEXTROSE 600 MG: 12 INJECTION, SOLUTION INTRAVENOUS at 19:00

## 2020-08-18 RX ADMIN — BETAMETHASONE DIPROPIONATE: 0.5 LOTION, AUGMENTED TOPICAL at 22:40

## 2020-08-18 RX ADMIN — IPRATROPIUM BROMIDE 2 SPRAY: 42 SPRAY NASAL at 22:39

## 2020-08-18 RX ADMIN — DEXAMETHASONE SODIUM PHOSPHATE 8 MG: 4 INJECTION, SOLUTION INTRA-ARTICULAR; INTRALESIONAL; INTRAMUSCULAR; INTRAVENOUS; SOFT TISSUE at 20:40

## 2020-08-18 RX ADMIN — METFORMIN HYDROCHLORIDE 1000 MG: 500 TABLET, FILM COATED ORAL at 22:33

## 2020-08-18 RX ADMIN — INSULIN ASPART 1 UNITS: 100 INJECTION, SOLUTION INTRAVENOUS; SUBCUTANEOUS at 22:38

## 2020-08-18 RX ADMIN — SODIUM CHLORIDE 1000 ML: 9 INJECTION, SOLUTION INTRAVENOUS at 20:40

## 2020-08-18 RX ADMIN — HYDROXYZINE HYDROCHLORIDE 25 MG: 25 TABLET, FILM COATED ORAL at 22:33

## 2020-08-18 ASSESSMENT — ACTIVITIES OF DAILY LIVING (ADL)
FALL_HISTORY_WITHIN_LAST_SIX_MONTHS: NO
RETIRED_EATING: 0-->INDEPENDENT
TRANSFERRING: 0-->INDEPENDENT
BATHING: 0-->INDEPENDENT
SWALLOWING: 0-->SWALLOWS FOODS/LIQUIDS WITHOUT DIFFICULTY
RETIRED_COMMUNICATION: 0-->UNDERSTANDS/COMMUNICATES WITHOUT DIFFICULTY
AMBULATION: 0-->INDEPENDENT
TOILETING: 0-->INDEPENDENT
COGNITION: 0 - NO COGNITION ISSUES REPORTED
DRESS: 0-->INDEPENDENT

## 2020-08-18 ASSESSMENT — ENCOUNTER SYMPTOMS
ARTHRALGIAS: 0
DIFFICULTY URINATING: 0
EYE REDNESS: 0
CONFUSION: 0
HEADACHES: 0
SHORTNESS OF BREATH: 0
NECK STIFFNESS: 0
FEVER: 0
FACIAL SWELLING: 1
ABDOMINAL PAIN: 0
COLOR CHANGE: 0

## 2020-08-18 ASSESSMENT — MIFFLIN-ST. JEOR
SCORE: 2031.99
SCORE: 2067.37

## 2020-08-18 NOTE — ED TRIAGE NOTES
Pt arrives to ER with facial swelling of the L side. Pt had recent oral surgery. Pt seen by oral surgeon in clinic today and was sent to ER to see oral surgery. Temp of 100.0 at home. Pt able to talk in complete sentences, reports some trouble swallowing. Tolerating secretions. A&O VSS

## 2020-08-18 NOTE — ED PROVIDER NOTES
"    [place] EMERGENCY DEPARTMENT ([facility])  August 18, 2020  History     Chief Complaint   Patient presents with     Facial Swelling     The history is provided by the patient and medical records.     Low Juárez is a 50 year old male with a PMH for DM 2, hyperlipidemia, HTN, fatty liver, and cholesterol who presents to the ED with complaint of facial swelling.    Patient reports he had a tooth pulled years ago and developed ezekiel mandibularis which was \"growing big and had an abscess\". He states he had it removed 4 days ago near [place]. He was discharged home with penicillin and oxycodone for pain. Patient reports waking up on Saturday, 08/15/20, with his lower chin swollen and very hard. He states the swelling then progressed to the right side of his face and then the left side. He states his breathing is fine but he can't swallow, eat or sleep. He notes there is a collection of pus in the back of his throat. He notes it is hard to tell if swelling has worsened because he has been taking clindamycin that he got last night from [facility]. He reports testing negative for COVID-19 yesterday night when he went to the [facility] ED with 100F fever and similar facial swelling.       Per chart review, patient recently had a right lower mandible surgery 4 days ago.  Today, patient reported at [facility] ED with similar symptoms and fever. Patient was administered 7 days of clindamycin and 10 tablets of oxycodone for pain.      PAST MEDICAL HISTORY:   Past Medical History:   Diagnosis Date     Apnea, sleep      Fatty liver 8/18/2017     High cholesterol      Hyperlipidemia LDL goal <100 8/18/2017     Hypertension      Morbid obesity due to excess calories (H) 8/18/2017     Rhinitis      Type 2 diabetes mellitus without complication, without long-term current use of insulin (H) 1/31/2018       PAST SURGICAL HISTORY:   Past Surgical History:   Procedure Laterality Date     CHOLECYSTECTOMY       " CYSTECTOMY PILONIDAL       SEPTOPLASTY, TURBINOPLASTY, COMBINED  3/20/2012    Procedure:COMBINED SEPTOPLASTY, TURBINOPLASTY; COMBINED SEPTOPLASTY,  Grafts, Left Turbinate Reduction ; Surgeon:SARABJIT COREAS; Location:[facility] OR       Past medical history, past surgical history, medications, and allergies were reviewed with the patient. Additional pertinent items: None    FAMILY HISTORY:   Family History   Problem Relation Age of Onset     Hypertension Father        SOCIAL HISTORY:   Social History     Tobacco Use     Smoking status: Former Smoker     Smokeless tobacco: Never Used   Substance Use Topics     Alcohol use: Yes     Comment: occas     Social history was reviewed with the patient. Additional pertinent items: None      Current Discharge Medication List      CONTINUE these medications which have NOT CHANGED    Details   aspirin (ASA) 81 MG tablet Take 81 mg by mouth daily      atorvastatin (LIPITOR) 40 MG tablet Take 1 tablet (40 mg) by mouth daily  Qty: 90 tablet, Refills: 3    Associated Diagnoses: Hyperlipidemia LDL goal <100      augmented betamethasone dipropionate (DIPROLENE) 0.05 % external lotion Apply topically 2 times daily  Qty: 60 mL, Refills: 6    Associated Diagnoses: Psoriasis      augmented betamethasone dipropionate (DIPROLENE-AF) 0.05 % external ointment Apply topically 2 times daily  Qty: 45 g, Refills: 11    Associated Diagnoses: Psoriasis      blood glucose (NO BRAND SPECIFIED) lancets standard Use to test blood sugar 2 times daily or as directed.  Qty: 200 each, Refills: 3    Associated Diagnoses: Type 2 diabetes mellitus without complication, without long-term current use of insulin (H)      blood glucose (NO BRAND SPECIFIED) test strip Use to test blood sugar 2 times daily or as directed.  Qty: 200 each, Refills: 3    Associated Diagnoses: Type 2 diabetes mellitus without complication, without long-term current use of insulin (H)      blood glucose calibration (NO BRAND  SPECIFIED) solution Use to calibrate blood glucose monitor as needed as directed.  Qty: 1 each, Refills: 3    Associated Diagnoses: Type 2 diabetes mellitus without complication, without long-term current use of insulin (H)      blood glucose monitoring (NO BRAND SPECIFIED) meter device kit Use to test blood sugar 2 times daily or as directed.  Qty: 1 kit, Refills: 0    Associated Diagnoses: Type 2 diabetes mellitus without complication, without long-term current use of insulin (H)      cetirizine (ZYRTEC) 10 MG tablet Take 10-20 mg by mouth daily      fish oil-omega-3 fatty acids 1000 MG capsule Take 1 g by mouth daily      fluticasone (FLONASE) 50 MCG/ACT spray Spray 1 spray into both nostrils as needed for rhinitis or allergies      glipiZIDE (GLUCOTROL) 10 MG tablet Take 1 tablet (10 mg) by mouth 2 times daily (before meals)  Qty: 180 tablet, Refills: 1    Associated Diagnoses: Type 2 diabetes mellitus without complication, without long-term current use of insulin (H)      hydrOXYzine (ATARAX) 25 MG tablet Take 25 mg by mouth 3 times daily as needed       insulin pen needle (31G X 8 MM) 31G X 8 MM miscellaneous Use 1 pen needles daily or as directed.  Qty: 100 each, Refills: 3    Comments: Ok to substitute with alternative pen needle size/gauge if needed.  Associated Diagnoses: Type 2 diabetes mellitus without complication, without long-term current use of insulin (H)      ipratropium (ATROVENT) 0.06 % spray Spray 2 sprays into both nostrils as needed for rhinitis      ivermectin (STROMECTOL) 3 MG TABS tablet Take 24 mg by mouth every 14 days X 2 doses      liraglutide (VICTOZA) 18 MG/3ML solution 0.6 mg once daily for 1 week, then increase to 1.2 mg once daily  Qty: 9 mL, Refills: 11    Associated Diagnoses: Type 2 diabetes mellitus without complication, without long-term current use of insulin (H)      lisinopril-hydrochlorothiazide (PRINZIDE/ZESTORETIC) 20-25 MG tablet Take 2 tablets by mouth daily  Qty: 180  tablet, Refills: 3    Associated Diagnoses: Benign essential hypertension      metFORMIN (GLUCOPHAGE) 1000 MG tablet Take 1 tablet (1,000 mg) by mouth 2 times daily (with meals)  Qty: 180 tablet, Refills: 3    Associated Diagnoses: Type 2 diabetes mellitus without complication, without long-term current use of insulin (H)      sildenafil (VIAGRA) 100 MG tablet Take 1 tablet (100 mg) by mouth daily as needed  Qty: 20 tablet, Refills: 11    Associated Diagnoses: Male erectile disorder                Allergies   Allergen Reactions     Vicodin [Hydrocodone-Acetaminophen]      itch        Review of Systems   Constitutional: Negative for fever.   HENT: Positive for facial swelling. Negative for congestion.    Eyes: Negative for redness.   Respiratory: Negative for shortness of breath.    Cardiovascular: Negative for chest pain.   Gastrointestinal: Negative for abdominal pain.   Genitourinary: Negative for difficulty urinating.   Musculoskeletal: Negative for arthralgias and neck stiffness.   Skin: Negative for color change.   Neurological: Negative for headaches.   Psychiatric/Behavioral: Negative for confusion.   All other systems reviewed and are negative.    A complete review of systems was performed with pertinent positives and negatives noted in the HPI, and all other systems negative.    Physical Exam   BP: (!) 150/95  Pulse: 87  Heart Rate: 89  Temp: 99.9  F (37.7  C)  Resp: 18  Height: 182.9 cm (6')  Weight: 113.4 kg (250 lb)  SpO2: 98 %      Physical Exam  Vitals signs and nursing note reviewed.   Constitutional:       General: He is not in acute distress.  HENT:      Head: Atraumatic.      Mouth/Throat:      Mouth: Mucous membranes are moist.      Comments: Firm, swollen submandibular space.  Tender floor of mouth.  Tooth #30 missing a purulent drainage from extraction site.  Mild trismus  Eyes:      General: No scleral icterus.     Extraocular Movements: Extraocular movements intact.   Neck:       Musculoskeletal: Neck supple.   Cardiovascular:      Rate and Rhythm: Normal rate and regular rhythm.      Heart sounds: Normal heart sounds.   Pulmonary:      Effort: Pulmonary effort is normal. No respiratory distress.   Chest:      Chest wall: No tenderness.   Abdominal:      Palpations: Abdomen is soft.      Tenderness: There is no abdominal tenderness. There is no guarding or rebound.   Musculoskeletal:      Right lower leg: No edema.      Left lower leg: No edema.   Skin:     General: Skin is warm.      Coloration: Skin is not pale.   Neurological:      General: No focal deficit present.      Mental Status: He is alert and oriented to person, place, and time.         ED Course   6:33 PM  The patient was seen and examined by Dr. Manuel Rice in Room ED25.       Procedures                           Results for orders placed or performed during the hospital encounter of 08/18/20 (from the past 24 hour(s))   CBC with platelets differential   Result Value Ref Range    WBC 14.4 (H) 4.0 - 11.0 10e9/L    RBC Count 4.71 4.4 - 5.9 10e12/L    Hemoglobin 14.5 13.3 - 17.7 g/dL    Hematocrit 43.0 40.0 - 53.0 %    MCV 91 78 - 100 fl    MCH 30.8 26.5 - 33.0 pg    MCHC 33.7 31.5 - 36.5 g/dL    RDW 13.7 10.0 - 15.0 %    Platelet Count 253 150 - 450 10e9/L    Diff Method Automated Method     % Neutrophils 82.3 %    % Lymphocytes 7.6 %    % Monocytes 9.1 %    % Eosinophils 0.5 %    % Basophils 0.2 %    % Immature Granulocytes 0.3 %    Nucleated RBCs 0 0 /100    Absolute Neutrophil 11.8 (H) 1.6 - 8.3 10e9/L    Absolute Lymphocytes 1.1 0.8 - 5.3 10e9/L    Absolute Monocytes 1.3 0.0 - 1.3 10e9/L    Absolute Eosinophils 0.1 0.0 - 0.7 10e9/L    Absolute Basophils 0.0 0.0 - 0.2 10e9/L    Abs Immature Granulocytes 0.1 0 - 0.4 10e9/L    Absolute Nucleated RBC 0.0    Comprehensive metabolic panel   Result Value Ref Range    Sodium 134 133 - 144 mmol/L    Potassium 3.9 3.4 - 5.3 mmol/L    Chloride 102 94 - 109 mmol/L    Carbon  Dioxide 28 20 - 32 mmol/L    Anion Gap 4 3 - 14 mmol/L    Glucose 213 (H) 70 - 99 mg/dL    Urea Nitrogen 12 7 - 30 mg/dL    Creatinine 1.07 0.66 - 1.25 mg/dL    GFR Estimate 80 >60 mL/min/[1.73_m2]    GFR Estimate If Black >90 >60 mL/min/[1.73_m2]    Calcium 9.5 8.5 - 10.1 mg/dL    Bilirubin Total 1.2 0.2 - 1.3 mg/dL    Albumin 3.8 3.4 - 5.0 g/dL    Protein Total 8.3 6.8 - 8.8 g/dL    Alkaline Phosphatase 102 40 - 150 U/L    ALT 28 0 - 70 U/L    AST 7 0 - 45 U/L   Lactic acid whole blood   Result Value Ref Range    Lactic Acid 1.4 0.7 - 2.0 mmol/L     Medications   sodium chloride 0.9% infusion (1,000 mLs Intravenous New Bag 8/18/20 2040)   ketorolac (TORADOL) injection 30 mg (30 mg Intravenous Given 8/18/20 2106)   naloxone (NARCAN) injection 0.1-0.4 mg (has no administration in time range)   morphine (PF) injection 1 mg (has no administration in time range)   ampicillin-sulbactam (UNASYN) 3 g vial to attach to  mL bag (has no administration in time range)   dexamethasone (DECADRON) injection 8 mg (8 mg Intravenous Given 8/18/20 2040)   atorvastatin (LIPITOR) tablet 40 mg (has no administration in time range)   augmented betamethasone dipropionate (DIPROLENE) 0.05 % lotion (has no administration in time range)   fluticasone (FLONASE) 50 MCG/ACT spray 1 spray (has no administration in time range)   glipiZIDE (GLUCOTROL) tablet 10 mg (has no administration in time range)   hydrOXYzine (ATARAX) tablet 25 mg (25 mg Oral Given 8/18/20 2233)   ipratropium (ATROVENT) 0.06 % spray 2 spray (has no administration in time range)   lisinopril-hydrochlorothiazide (ZESTORETIC) 20-25 mg combo dose (has no administration in time range)   metFORMIN (GLUCOPHAGE) tablet 1,000 mg (1,000 mg Oral Given 8/18/20 4987)   glucose gel 15-30 g (has no administration in time range)     Or   dextrose 50 % injection 25-50 mL (has no administration in time range)     Or   glucagon injection 1 mg (has no administration in time range)    insulin aspart (NovoLOG) injection (RAPID ACTING) (has no administration in time range)   insulin aspart (NovoLOG) injection (RAPID ACTING) (has no administration in time range)   HYDROmorphone (DILAUDID) injection 0.2 mg (0.2 mg Intravenous Given 8/18/20 1940)             Assessments & Plan (with Medical Decision Making)   The patient has ongoing swelling and fevers after extraction of tooth #30 along with a growth on the mandible.  He was on penicillin for 4 days and now clindamycin since last night and is having worsening sensation of trouble swallowing but no trouble breathing.  The patient was seen by oral surgery who reviewed his outside CT scan.  They feel that this is more likely a cellulitis of that space than a collection within the brain.  Will be admitted on Unasyn and steroids tonight, get IV fluids and they will decide on further intervention in the morning.  He was given IV Dilaudid in the ED with good relief.    I have reviewed the nursing notes.    I have reviewed the findings, diagnosis, plan and need for follow up with the patient.    Current Discharge Medication List          Final diagnoses:   Submandibular space infection     I, Kulwinder Ashby, am serving as a trained medical scribe to document services personally performed by Manuel Rice MD, based on the provider's statements to me.     I, Manuel Rice MD, was physically present and have reviewed and verified the accuracy of this note documented by Kulwinder Ashby.    8/18/2020   [facility], EMERGENCY DEPARTMENT     Manuel Rice MD  08/18/20 3034

## 2020-08-19 ENCOUNTER — PATIENT OUTREACH (OUTPATIENT)
Dept: CARE COORDINATION | Facility: CLINIC | Age: 50
End: 2020-08-19

## 2020-08-19 VITALS
WEIGHT: 257.8 LBS | OXYGEN SATURATION: 94 % | TEMPERATURE: 96.9 F | BODY MASS INDEX: 34.92 KG/M2 | HEART RATE: 103 BPM | DIASTOLIC BLOOD PRESSURE: 76 MMHG | SYSTOLIC BLOOD PRESSURE: 143 MMHG | RESPIRATION RATE: 18 BRPM | HEIGHT: 72 IN

## 2020-08-19 PROBLEM — K12.2 SUBMANDIBULAR SPACE INFECTION: Status: ACTIVE | Noted: 2020-08-19

## 2020-08-19 LAB
ALBUMIN SERPL-MCNC: 3.2 G/DL (ref 3.4–5)
ALP SERPL-CCNC: 94 U/L (ref 40–150)
ALT SERPL W P-5'-P-CCNC: 27 U/L (ref 0–70)
ANION GAP SERPL CALCULATED.3IONS-SCNC: 6 MMOL/L (ref 3–14)
AST SERPL W P-5'-P-CCNC: 23 U/L (ref 0–45)
BILIRUB SERPL-MCNC: 0.8 MG/DL (ref 0.2–1.3)
BUN SERPL-MCNC: 18 MG/DL (ref 7–30)
CALCIUM SERPL-MCNC: 9.5 MG/DL (ref 8.5–10.1)
CHLORIDE SERPL-SCNC: 105 MMOL/L (ref 94–109)
CO2 SERPL-SCNC: 25 MMOL/L (ref 20–32)
CREAT SERPL-MCNC: 0.84 MG/DL (ref 0.66–1.25)
GFR SERPL CREATININE-BSD FRML MDRD: >90 ML/MIN/{1.73_M2}
GLUCOSE BLDC GLUCOMTR-MCNC: 258 MG/DL (ref 70–99)
GLUCOSE BLDC GLUCOMTR-MCNC: 313 MG/DL (ref 70–99)
GLUCOSE BLDC GLUCOMTR-MCNC: 331 MG/DL (ref 70–99)
GLUCOSE SERPL-MCNC: 280 MG/DL (ref 70–99)
LACTATE BLD-SCNC: 3.5 MMOL/L (ref 0.7–2)
POTASSIUM SERPL-SCNC: 4.7 MMOL/L (ref 3.4–5.3)
PROT SERPL-MCNC: 7.9 G/DL (ref 6.8–8.8)
SODIUM SERPL-SCNC: 136 MMOL/L (ref 133–144)

## 2020-08-19 PROCEDURE — D7140 ZZHC DENTAL EXTRACTION ERUPTED TOOTH/EXPOSED ROOT: HCPCS

## 2020-08-19 PROCEDURE — 36415 COLL VENOUS BLD VENIPUNCTURE: CPT | Performed by: DENTIST

## 2020-08-19 PROCEDURE — 80053 COMPREHEN METABOLIC PANEL: CPT | Performed by: DENTIST

## 2020-08-19 PROCEDURE — 25000128 H RX IP 250 OP 636: Performed by: DENTIST

## 2020-08-19 PROCEDURE — 96376 TX/PRO/DX INJ SAME DRUG ADON: CPT

## 2020-08-19 PROCEDURE — 25000125 ZZHC RX 250: Performed by: DENTIST

## 2020-08-19 PROCEDURE — 96375 TX/PRO/DX INJ NEW DRUG ADDON: CPT

## 2020-08-19 PROCEDURE — 00000146 ZZHCL STATISTIC GLUCOSE BY METER IP

## 2020-08-19 PROCEDURE — 96372 THER/PROPH/DIAG INJ SC/IM: CPT

## 2020-08-19 PROCEDURE — 25800030 ZZH RX IP 258 OP 636: Performed by: DENTIST

## 2020-08-19 PROCEDURE — 83605 ASSAY OF LACTIC ACID: CPT | Performed by: DENTIST

## 2020-08-19 PROCEDURE — 25000132 ZZH RX MED GY IP 250 OP 250 PS 637: Performed by: DENTIST

## 2020-08-19 RX ORDER — SENNA AND DOCUSATE SODIUM 50; 8.6 MG/1; MG/1
1 TABLET, FILM COATED ORAL AT BEDTIME
Qty: 20 TABLET | Refills: 0 | Status: SHIPPED | OUTPATIENT
Start: 2020-08-19 | End: 2021-07-19

## 2020-08-19 RX ORDER — OXYCODONE HYDROCHLORIDE 5 MG/1
5 TABLET ORAL EVERY 6 HOURS PRN
Qty: 12 TABLET | Refills: 0 | Status: SHIPPED | OUTPATIENT
Start: 2020-08-19 | End: 2020-08-23

## 2020-08-19 RX ORDER — CHLORHEXIDINE GLUCONATE ORAL RINSE 1.2 MG/ML
15 SOLUTION DENTAL 2 TIMES DAILY
Qty: 473 ML | Refills: 0 | Status: ON HOLD | OUTPATIENT
Start: 2020-08-19 | End: 2020-08-26

## 2020-08-19 RX ORDER — IBUPROFEN 600 MG/1
600 TABLET, FILM COATED ORAL EVERY 6 HOURS PRN
Qty: 30 TABLET | Refills: 1 | Status: ON HOLD | OUTPATIENT
Start: 2020-08-19 | End: 2020-08-26

## 2020-08-19 RX ADMIN — AMPICILLIN SODIUM AND SULBACTAM SODIUM 3 G: 2; 1 INJECTION, POWDER, FOR SOLUTION INTRAMUSCULAR; INTRAVENOUS at 08:09

## 2020-08-19 RX ADMIN — GLIPIZIDE 10 MG: 10 TABLET ORAL at 06:44

## 2020-08-19 RX ADMIN — AMPICILLIN SODIUM AND SULBACTAM SODIUM 3 G: 2; 1 INJECTION, POWDER, FOR SOLUTION INTRAMUSCULAR; INTRAVENOUS at 02:40

## 2020-08-19 RX ADMIN — SODIUM CHLORIDE 1000 ML: 9 INJECTION, SOLUTION INTRAVENOUS at 07:01

## 2020-08-19 RX ADMIN — ATORVASTATIN CALCIUM 40 MG: 40 TABLET, FILM COATED ORAL at 08:09

## 2020-08-19 RX ADMIN — IPRATROPIUM BROMIDE 2 SPRAY: 42 SPRAY NASAL at 08:08

## 2020-08-19 RX ADMIN — KETOROLAC TROMETHAMINE 30 MG: 30 INJECTION, SOLUTION INTRAMUSCULAR at 05:07

## 2020-08-19 RX ADMIN — DEXAMETHASONE SODIUM PHOSPHATE 8 MG: 4 INJECTION, SOLUTION INTRA-ARTICULAR; INTRALESIONAL; INTRAMUSCULAR; INTRAVENOUS; SOFT TISSUE at 12:43

## 2020-08-19 RX ADMIN — MORPHINE SULFATE 1 MG: 2 INJECTION, SOLUTION INTRAMUSCULAR; INTRAVENOUS at 00:54

## 2020-08-19 RX ADMIN — HYDROCHLOROTHIAZIDE: 25 TABLET ORAL at 08:09

## 2020-08-19 RX ADMIN — DEXAMETHASONE SODIUM PHOSPHATE 8 MG: 4 INJECTION, SOLUTION INTRA-ARTICULAR; INTRALESIONAL; INTRAMUSCULAR; INTRAVENOUS; SOFT TISSUE at 04:58

## 2020-08-19 RX ADMIN — METFORMIN HYDROCHLORIDE 1000 MG: 500 TABLET, FILM COATED ORAL at 08:09

## 2020-08-19 RX ADMIN — FLUTICASONE PROPIONATE 1 SPRAY: 50 SPRAY, METERED NASAL at 08:09

## 2020-08-19 RX ADMIN — BETAMETHASONE DIPROPIONATE: 0.5 LOTION, AUGMENTED TOPICAL at 08:09

## 2020-08-19 RX ADMIN — IPRATROPIUM BROMIDE 2 SPRAY: 42 SPRAY NASAL at 12:46

## 2020-08-19 ASSESSMENT — ACTIVITIES OF DAILY LIVING (ADL)
ADLS_ACUITY_SCORE: 10

## 2020-08-19 NOTE — PROGRESS NOTES
Arrived from:  ED  Belongings/meds:  With patient, cell phone, clothing, watch, duffel bag   2 RN Skin Assessment Completed by:  Vickie RIGGS RN and Heidy PATEL RN   Non-intact findings documented (yes/no/NA): Neck/facial swelling. Sutures in low back. Minor scabs, bruises and scrapes throughout body. A&Ox4. Neuros intact.

## 2020-08-19 NOTE — PROGRESS NOTES
Arrived from:  ED  Belongings/meds:  With patient- clothing, cell phone  2 RN Skin Assessment Completed by:  Pt declined skin check at this time as he is eating. Requests check back later.   Non-intact findings documented (yes/no/NA): A&Ox4, Neck swelling L>R. Neuros intact. VSS.

## 2020-08-19 NOTE — PLAN OF CARE
Status: Pt on 6A from ED with facial and neck swelling related to complications from oral surgery 4 days ago   Vitals: VSS on RA, slightly hypertensive within parameters  Neuros: A/Ox4. All extremities 5/5. Calm, cooperative. Some difficulty swallowing. Neck and jaw are tender. Denies N/T  IV: PIV infusing at 100ml/hr between abx.   Resp/trach: on RA, denies SOB. Clear bilaterally  Diet: Advanced to regular this morning  Bowel status: BM this shift  : Voids spontaneously  Skin: Intact. Sutures in low back from skin biopsy recently. Scar on coccyx from cyst removal.  Pain: 9/10. Utilized PRN toradol and morphine  Activity: Up ad renay  Social: Has been in contact with daughter  Plan: Going to [facility] clinic in the AM for eval and extraction. Follow POC.

## 2020-08-19 NOTE — DISCHARGE INSTRUCTIONS
HOME CARE INSTRUCTIONS FOLLOWING SURGERY    CARE OF THE MOUTH    1. WOUND CARE: Do not disturb the wound. Do not rinse your mouth or use a mouthwash for the day of surgery. If you smoke, please refrain from doing so for at least 4 days. Avoid probing the wound. A waterpick should not be used during the early healing period. The above activities will cause complications with wound healing.     2. SWELLING: Facial swelling occurs following most dental extractions and oral surgery procedures. To help minimize swelling, apply an ice pack to the face for 20 minutes; remove for 20 minutes; alternating back and forth throughout the first day after surgery. To be most effective, the applications of ice packs should begin as soon as possible.   The maximum facial swelling normally occurs on days 2-5 following surgery. Once present, it can remain swollen for 7-10 days after surgery.     3. PAIN: A variable amount of pain follows most extractions or oral surgical procedures. If you are given a prescription for pain medication please use as directed. Many times analgesics are prescribed on a scheduled basis. Excessive or increased pain after the third day following surgery is not normal. Should this occur, please call the clinic and set up a follow up appointment if not already scheduled.     4. BLEEDING: A slight amount of bleeding or oozing is expected up to 24 hours after surgery. Theses conditions are no cause for alarm. Following your oral surgery, a small sterile gauze compress may be placed on the wound and we ask that you maintain steady biting pressure on the gauze for 1 hour.      5. NAUSEA: Nausea is not an uncommon event after surgery, and it is sometimes cause by narcotic pain medication. Nausea may be reduce by taking pills with food or water. Attempt to keep drinking small amounts of clear fluids if you find the nausea does not improve. Cola drinks with less carbonation may help with nausea.     6.  "DISCOLORATION: Facial discoloration (black and blue bruising) often follows many extraction and oral surgery procedures. Discoloration is normal and is no cause for alarm. It may persist for several weeks.     7. JAW STIFFNESS: For several days following most oral procedures, the jaw may become somewhat stiff. Should jaw stiffness progress or persist after one week, notify you oral surgeon.     8. DIET: Soft diet must be followed for 7 days. It is extremely important to maintain adequate hydration for normal healing. Examples of soft foods include: masked potatoes, soups, applesauce, jell-o, ice cream, overcooked pasta.     Please use the Internet to look up liquid diets to help with ideas     9. MOUTH RINSES: The day following surgery begin using warm salt water rinses after meals and several times during the day as directed by your oral surgeon. One-half teaspoon of table salt in a full glass of warm water is recommended.       10. PHYSICAL ACTIVITY/REST: Keep physical activity to a minimum. Avoid athletic and strenuous activities and get plenty of rest.    11. STICHES: Following many extractions and oral surgery procedures, stiches as placed in the gums. Your doctor will advise you if a return appointment is needed for stitch removal.    12. DRY SOCKET: Despite the best of care, a small percentage of patients who have teeth removed will develop a \"Dry Socket\". A \"Dry Socket\" is a condition where the wound healing is interrupted. This condition usually develops 3-10 days after your extraction. Typically, patients will note increased pain at the extraction site. The aching pain may worsen and spread to the ear and even eye area of the face. If you exhibit these symptoms please call our clinic and schedule a follow up appointment. \"Dry Sockets\" are an easily treatable condition.      13. BRUSHING: Resume your normal oral hygiene routine within 24 hours following surgery. Soreness ans swelling may not permit vigorous " brushing of all areas, but please make every effort to clean your teeth within comfort.     14. NUMBNESS: Local anesthetics may be effective for as long as 24-48 hours. Should you experience numbness beyone this period, notify your oral surgeon.       AFTER HOURS CONTACT FOR EMERGENCIES:  Please call the [facility] switchboard at [phone] and ask to have the Oral and Maxillofacial Surgery Resident On-call paged.

## 2020-08-19 NOTE — ED NOTES
[facility], [place]   ED Nurse to Floor Handoff     Low Juárez is a 50 year old male who speaks English and lives alone,  in a home  They arrived in the ED by ambulance from emergency room    ED Chief Complaint: Facial Swelling    ED Dx;   Final diagnoses:   Submandibular space infection         Needed?: No    Allergies:   Allergies   Allergen Reactions     Vicodin [Hydrocodone-Acetaminophen]      itch   .  Past Medical Hx:   Past Medical History:   Diagnosis Date     Apnea, sleep      Fatty liver 8/18/2017     High cholesterol      Hyperlipidemia LDL goal <100 8/18/2017     Hypertension      Morbid obesity due to excess calories (H) 8/18/2017     Rhinitis      Type 2 diabetes mellitus without complication, without long-term current use of insulin (H) 1/31/2018      Baseline Mental status: WDL  Current Mental Status changes: at basesline    Infection present or suspected this encounter: yes other oral  Sepsis suspected: No  Isolation type: No active isolations     Activity level - Baseline/Home:  Independent  Activity Level - Current:   Independent    Bariatric equipment needed?: No    In the ED these meds were given:   Medications   clindamycin (CLEOCIN) infusion 600 mg (600 mg Intravenous New Bag 8/18/20 1900)   sodium chloride 0.9% infusion (has no administration in time range)   ketorolac (TORADOL) injection 30 mg (has no administration in time range)   naloxone (NARCAN) injection 0.1-0.4 mg (has no administration in time range)   morphine (PF) injection 1 mg (has no administration in time range)   ampicillin-sulbactam (UNASYN) 3 g vial to attach to  mL bag (has no administration in time range)   dexamethasone (DECADRON) injection 8 mg (has no administration in time range)   HYDROmorphone (DILAUDID) injection 0.2 mg (0.2 mg Intravenous Given 8/18/20 1940)       Drips running?  No    Home pump  No    Current LDAs  Peripheral IV 08/18/20 Left Upper forearm (Active)    Number of days: 0       Incision/Surgical Site 03/20/12 Nose (Active)   Number of days: 3073       Labs results:   Labs Ordered and Resulted from Time of ED Arrival Up to the Time of Departure from the ED   CBC WITH PLATELETS DIFFERENTIAL - Abnormal; Notable for the following components:       Result Value    WBC 14.4 (*)     Absolute Neutrophil 11.8 (*)     All other components within normal limits   COMPREHENSIVE METABOLIC PANEL - Abnormal; Notable for the following components:    Glucose 213 (*)     All other components within normal limits   LACTIC ACID WHOLE BLOOD   NO VTE PROPHYLAXIS   IP ASSIGN PROVIDER TEAM TO TREATMENT TEAM   VITAL SIGNS   ACTIVITY   ACTIVITY   BLOOD CULTURE       Imaging Studies: No results found for this or any previous visit (from the past 24 hour(s)).    Recent vital signs:   BP (!) 152/99   Pulse 86   Temp 99.9  F (37.7  C) (Oral)   Resp 18   Ht 1.829 m (6')   Wt 113.4 kg (250 lb)   SpO2 98%   BMI 33.91 kg/m      Neto Coma Scale Score: 15 (08/18/20 1925)       Cardiac Rhythm: Normal Sinus  Pt needs tele? No  Skin/wound Issues: None    Code Status: Full Code    Pain control: good    Nausea control: pt had none    Abnormal labs/tests/findings requiring intervention: see epic    Family present during ED course? No   Family Comments/Social Situation comments: none    Tasks needing completion: None    Neema Lynch, RN    [phone] [facility]

## 2020-08-19 NOTE — UTILIZATION REVIEW
"  Admission Status; Secondary Review Determination     Admission Date: 8/18/2020  6:14 PM       Under the authority of the Utilization Management Committee, the utilization review process indicated a secondary review on the above patient.  The review outcome is based on review of the medical records, discussions with staff, and applying clinical experience noted on the date of the review.          (x) Observation Status Appropriate - This patient does not meet hospital inpatient criteria and is placed in observation status. If this patient's primary payer is Medicare and was admitted as an inpatient, Condition Code 44 should be used and patient status changed to \"observation\".     RATIONALE FOR DETERMINATION      Brief clinical presentation, information copied from the chart, abbreviated and edited for relevant content:     Low Juárez is a 50 year old male who presented with left submandibular cellulitis. Initially admitted to Inpatient yesterday for IV abx, and pain control. Today, he presented to OMFS clinic from the hospital for his appointment for evaluation of tooth #18 and submandibular swelling on left side. Orthopantomogram x-ray taken and confirmed that tooth #18 is broken down and indicated for extraction. Tooth removed surgically utilizing surgical handpiece to remove bone. Irrigated well and placed 2 resorbable sutures. He was deemed stable for discharge and an order is in the chart.      I paged the team to urgently change him to OBS before discharge. MD called back to confirm the order will be changed       In summary, the severity of illness, intensity of service provided, expected length of stay and risk for adverse outcome make the care appropriate for observation, and doesn't meet criteria for inpatient admission.         The information on this document is developed by the utilization review team in order for the business office to ensure compliance.  This only denotes the appropriateness of " proper admission status and does not reflect the quality of care rendered.         The definitions of Inpatient Status and Observation Status used in making the determination above are those provided in the CMS Coverage Manual, Chapter 1 and Chapter 6, section 70.4.      Sincerely,     Renetta Arzola MD   Utilization Review/ Case Management  [facility].

## 2020-08-19 NOTE — DISCHARGE SUMMARY
Belkys Goodwin Patient Age: 32 year old  MESSAGE:   Patient is calling to inform that she has been having post partum depression symptoms.    Patient delivered on 5/31/19.    Patient has an appointment tomorrow with .    Please advise.  Call connected to [facility] [[email or web address]] OB Triage Hotline.  Routed to provider's clinical pool       WEIGHT AND HEIGHT:   Wt Readings from Last 1 Encounters:   06/25/19 59.6 kg (131 lb 6 oz)     Ht Readings from Last 1 Encounters:   10/11/18 5' 6\" (1.676 m)     BMI Readings from Last 1 Encounters:   06/25/19 21.20 kg/m²       ALLERGIES: no known allergies.  Current Outpatient Medications   Medication   • IBUPROFEN PO   • cephalexin (KEFLEX) 500 MG capsule   • Prenatal Vit-Fe Fumarate-FA (PRENATAL PO)     No current facility-administered medications for this visit.      PHARMACY to use:           Pharmacy preference(s) on file:   [facility] [address] [place], [place] - [address]  [address] [address]  Phone: [phone] Fax: [phone]      CALL BACK INFO: Ok to leave response (including medical information) on answering machine  ROUTING: Patient's physician/staff        PCP: No Pcp         INS: Payor: BLUE CROSS BLUE [facility] / Plan: PPO [id number] / Product Type: PPO MISC   PATIENT ADDRESS:  [address] [place] [address]     [facility] [place]    Oral & Maxillofacial Surgery - Discharge Summary    Date of Admission:  8/18/2020  Date of Discharge:  8/19/2020  2:30 PM  Discharging Attending Provider: Dr. Veloz  Discharge Service: Oral & Maxillofacial Surgery    Discharge Diagnoses   (K12.2) Submandibular space infection    Follow-ups Needed After Discharge   Patient to follow up with OMFS on 7th floor [facility] in 1 week.     Hospital Course   Low Juárez was admitted on 8/18/2020 for submandibular space infection.  The following problems were addressed during his hospitalization:    - Extraction of the offending tooth (#18)  - IV antibiotics given to fight infection.     Code Status   Full Code       The patient was discussed with Dr. Magdiel Snow DDS  Oral & Maxillofacial Surgery, PGY-1  Pager: [phone]  ______________________________________________________________________    Physical Exam   Vital Signs: Temp: 96.9  F (36.1  C) Temp src: Oral BP: (!) 143/76 Pulse: 103 RETIRE: Heart Rate: 87 Resp: 18 SpO2: 94 % O2 Device: None (Room air)    Weight: 257 lbs 12.8 oz    GEN: WD/WN male, NAD  HEENT: NC/AT, EOMI, PERRL, submandibular swelling decreased, inferior border of the mandible palpable bilaterally, swelling is not tender to palpation, ADDISON 30, tooth #18 extraction socket, 2 sutures in place in the lower left.   CV: RRR, no M/G/R  PULM: CTAB, breathing comfortably on room air  GI: Soft, ND/NT  MSK: VALERA, no peripheral extremity edema  NEURO: AAOx4, CN II-XII intact bilaterally  PSYCH: Appropriate mood and affect     Significant Results and Procedures   Surgical extraction of tooth #18 (lower left molar) utilizing surgical drill.     Pending Results   These results will be followed up by OMFS.     Unresulted Labs Ordered in the Past 30 Days of this Admission     Date and Time Order Name Status Description    8/18/2020 1513 Blood Culture ONE site Preliminary              Primary Care Physician    Gilson Tello    Discharge Disposition   Discharged to home  Condition at discharge: Stable    Discharge Orders      Reason for your hospital stay    Left cervical cellutlitis     Follow Up and recommended labs and tests    OMS clinic will call patient to follow up in 1 week.     Activity    Your activity upon discharge: activity as tolerated     Full Code     Diet    Follow this diet upon discharge: Orders Placed This Encounter      Regular Diet Adult     Discharge Medications   Discharge Medication List as of 8/19/2020  1:41 PM      START taking these medications    Details   amoxicillin-clavulanate (AUGMENTIN) 875-125 MG tablet Take 1 tablet by mouth 2 times daily for 7 days, Disp-14 tablet,R-0, E-Prescribe      chlorhexidine (PERIDEX) 0.12 % solution Swish and spit 15 mLs in mouth 2 times daily, Disp-473 mL,R-0, E-Prescribe      ibuprofen (ADVIL/MOTRIN) 600 MG tablet Take 1 tablet (600 mg) by mouth every 6 hours as needed for moderate pain, Disp-30 tablet,R-1, E-Prescribe      oxyCODONE (ROXICODONE) 5 MG tablet Take 1 tablet (5 mg) by mouth every 6 hours as needed for pain, Disp-12 tablet,R-0, E-Prescribe      SENNA-docusate sodium (SENNA S) 8.6-50 MG tablet Take 1 tablet by mouth At Bedtime, Disp-20 tablet,R-0, E-Prescribe         CONTINUE these medications which have NOT CHANGED    Details   aspirin (ASA) 81 MG tablet Take 81 mg by mouth daily, Historical      atorvastatin (LIPITOR) 40 MG tablet Take 1 tablet (40 mg) by mouth daily, Disp-90 tablet, R-3, E-Prescribe      augmented betamethasone dipropionate (DIPROLENE) 0.05 % external lotion Apply topically 2 times dailyDisp-60 mL,[id number]      augmented betamethasone dipropionate (DIPROLENE-AF) 0.05 % external ointment Apply topically 2 times dailyDisp-45 g,[id number]      blood glucose (NO BRAND SPECIFIED) lancets standard Use to test blood sugar 2 times daily or as directed.Disp-200 each, [id number]      blood glucose (NO BRAND SPECIFIED)  test strip Use to test blood sugar 2 times daily or as directed., Disp-200 each, R-3, E-Prescribe      blood glucose calibration (NO BRAND SPECIFIED) solution Use to calibrate blood glucose monitor as needed as directed.Disp-1 each, [id number]-[id number]      blood glucose monitoring (NO BRAND SPECIFIED) meter device kit Use to test blood sugar 2 times daily or as directed.Disp-1 kit, [id number]-2R-[id number]      cetirizine (ZYRTEC) 10 MG tablet Take 10-20 mg by mouth daily, Historical      fish oil-omega-3 fatty acids 1000 MG capsule Take 1 g by mouth daily, Historical      fluticasone (FLONASE) 50 MCG/ACT spray Spray 1 spray into both nostrils as needed for rhinitis or allergies, Historical      glipiZIDE (GLUCOTROL) 10 MG tablet Take 1 tablet (10 mg) by mouth 2 times daily (before meals), Disp-180 tablet, R-1, E-Prescribe      hydrOXYzine (ATARAX) 25 MG tablet Take 25 mg by mouth 3 times daily as needed , Historical      insulin pen needle (31G X 8 MM) 31G X 8 MM miscellaneous Use 1 pen needles daily or as directed.Disp-100 each, [id number] to substitute with alternative pen needle size/gauge if needed.      ipratropium (ATROVENT) 0.06 % spray Spray 2 sprays into both nostrils as needed for rhinitis, Historical      ivermectin (STROMECTOL) 3 MG TABS tablet Take 24 mg by mouth every 14 days X 2 doses, Historical      liraglutide (VICTOZA) 18 MG/3ML solution 0.6 mg once daily for 1 week, then increase to 1.2 mg once daily, Disp-9 mL, R-11, E-Prescribe      lisinopril-hydrochlorothiazide (PRINZIDE/ZESTORETIC) 20-25 MG tablet Take 2 tablets by mouth daily, Disp-180 tablet, R-3, E-Prescribe      metFORMIN (GLUCOPHAGE) 1000 MG tablet Take 1 tablet (1,000 mg) by mouth 2 times daily (with meals), Disp-180 tablet, R-3, E-Prescribe      sildenafil (VIAGRA) 100 MG tablet Take 1 tablet (100 mg) by mouth daily as needed, Disp-20 tablet, R-11, Local Print           Allergies   Allergies   Allergen Reactions     Vicodin  [Hydrocodone-Acetaminophen]      itch

## 2020-08-19 NOTE — PROGRESS NOTES
ORAL & MAXILLOFACIAL SURGERY   PROGRESS NOTE  Low Juárez  MRN: [id number],  : 1970           ASSESSMENT:  50 year old male presents with left submandibular cellulitis. Intraorally- left posterior mandibular molar that is badly broken down likely causing the swelling. Patient is handling secretions. Swelling this morning appears to have gone down and patient reports feeling much better.       PLAN:  - Plan to have patient transported to [facility] clinic on 7th floor [place] [address] today at 8:30 am for appointment for tooth extraction.   - Discharge to home today    Please contact the OMFS resident on-call with questions or concerns.    Discussed with chief resident and staff.    Romeo Snow DDS  Oral & Maxillofacial Surgery, PGY-1  Pager: [phone]    ____________________________________      SUBJECTIVE:  Patient is feeling much better this morning and reports the swelling is going down following administration of the antibiotics.     PHYSICAL EXAM:   GEN: WD/WN male, NAD  HEENT: NC/AT, EOMI, PERRL, ADDISON 30, submandibular swelling on left that is tender to palpation. Patient is handling secretions and endorses dysphagia but no odynophagia or dysphonia.   CV: RRR, no M/G/R, warm and well-perfused  PULM: CTAB, breathing comfortably on room air  GI: Soft, ND/NT  MSK: VALERA, no peripheral extremity edema  NEURO: AAOx4, CN II-XII intact bilaterally  PSYCH: Appropriate mood and affect     LABS:   Reviewed    RADIOLOGY:  Reviewed

## 2020-08-19 NOTE — PLAN OF CARE
Pt discharged home in stable condition at 1415  AVS given and discussed, questions answered. Belongings sent with patient.

## 2020-08-19 NOTE — H&P
ORAL & MAXILLOFACIAL SURGERY   HISTORY & PHYSICAL  Low Juárez,  MRN: [id number],  : 1970        HPI  Low Juárez is a 50 year old male with a PMH for DM 2, hyperlipidemia, HTN, fatty liver, and cholesterol who presented to the [facility] ED with complaint of facial swelling.     He states he had right mandibular jesica removed 4 days ago near [place]. He was discharged home with penicillin and oxycodone for pain. Patient reports waking up on Saturday, 08/15/20, with his lower chin swollen and very hard. He states the swelling then progressed to the right side of his face and then the left side. He states his breathing is fine but he can't swallow, eat or sleep. He notes it is hard to tell if swelling has worsened because he has been taking clindamycin that he got last night from [place] ED. He reports testing negative for COVID-19 yesterday night when he went to the [place] ED with 100F fever and similar facial swelling.       Today, patient reported at [facility] ED with similar symptoms and fever. Patient was administered 7 days of clindamycin and 10 tablets of oxycodone for pain. OMFS team was contacted by the ED attending and consulted.     HISTORY  Past Medical History:   Past Medical History:   Diagnosis Date     Apnea, sleep      Fatty liver 2017     High cholesterol      Hyperlipidemia LDL goal <100 2017     Hypertension      Morbid obesity due to excess calories (H) 2017     Rhinitis      Type 2 diabetes mellitus without complication, without long-term current use of insulin (H) 2018     Past Surgical History:   Past Surgical History:   Procedure Laterality Date     CHOLECYSTECTOMY       CYSTECTOMY PILONIDAL       SEPTOPLASTY, TURBINOPLASTY, COMBINED  3/20/2012    Procedure:COMBINED SEPTOPLASTY, TURBINOPLASTY; COMBINED SEPTOPLASTY,  Grafts, Left Turbinate Reduction ; Surgeon:SARABJIT COREAS; Location: OR     Medications:   No current facility-administered  medications on file prior to encounter.   aspirin (ASA) 81 MG tablet, Take 81 mg by mouth daily  atorvastatin (LIPITOR) 40 MG tablet, Take 1 tablet (40 mg) by mouth daily  augmented betamethasone dipropionate (DIPROLENE) 0.05 % external lotion, Apply topically 2 times daily  augmented betamethasone dipropionate (DIPROLENE-AF) 0.05 % external ointment, Apply topically 2 times daily  blood glucose (NO BRAND SPECIFIED) lancets standard, Use to test blood sugar 2 times daily or as directed.  blood glucose (NO BRAND SPECIFIED) test strip, Use to test blood sugar 2 times daily or as directed.  blood glucose calibration (NO BRAND SPECIFIED) solution, Use to calibrate blood glucose monitor as needed as directed.  blood glucose monitoring (NO BRAND SPECIFIED) meter device kit, Use to test blood sugar 2 times daily or as directed.  cetirizine (ZYRTEC) 10 MG tablet, Take 10-20 mg by mouth daily  fish oil-omega-3 fatty acids 1000 MG capsule, Take 1 g by mouth daily  fluticasone (FLONASE) 50 MCG/ACT spray, Spray 1 spray into both nostrils as needed for rhinitis or allergies  glipiZIDE (GLUCOTROL) 10 MG tablet, Take 1 tablet (10 mg) by mouth 2 times daily (before meals)  hydrOXYzine (ATARAX) 25 MG tablet, Take 25 mg by mouth 3 times daily as needed   insulin pen needle (31G X 8 MM) 31G X 8 MM miscellaneous, Use 1 pen needles daily or as directed.  ipratropium (ATROVENT) 0.06 % spray, Spray 2 sprays into both nostrils as needed for rhinitis  ivermectin (STROMECTOL) 3 MG TABS tablet, Take 24 mg by mouth every 14 days X 2 doses  liraglutide (VICTOZA) 18 MG/3ML solution, 0.6 mg once daily for 1 week, then increase to 1.2 mg once daily  lisinopril-hydrochlorothiazide (PRINZIDE/ZESTORETIC) 20-25 MG tablet, Take 2 tablets by mouth daily (Patient taking differently: Take 1 tablet by mouth daily )  metFORMIN (GLUCOPHAGE) 1000 MG tablet, Take 1 tablet (1,000 mg) by mouth 2 times daily (with meals)  sildenafil (VIAGRA) 100 MG tablet, Take 1  tablet (100 mg) by mouth daily as needed         [START ON 8/19/2020] ampicillin-sulbactam (UNASYN) IV  3 g Intravenous Q6H     [START ON 8/19/2020] atorvastatin  40 mg Oral Daily     augmented betamethasone dipropionate   Topical BID     dexamethasone  8 mg Intravenous Q8H     [START ON 8/19/2020] fluticasone  1 spray Both Nostrils Daily     [START ON 8/19/2020] glipiZIDE  10 mg Oral BID AC     ipratropium  2 spray Both Nostrils TID     [START ON 8/19/2020] liraglutide  0.6 mg Subcutaneous Daily     [START ON 8/19/2020] lisinopril-hydrochlorothiazide (ZESTORETIC) 20-25 mg combo dose   Oral Daily     metFORMIN  1,000 mg Oral BID w/meals     Allergies:   Allergies   Allergen Reactions     Vicodin [Hydrocodone-Acetaminophen]      itch     Social History:   Social History     Socioeconomic History     Marital status:      Spouse name: Not on file     Number of children: Not on file     Years of education: Not on file     Highest education level: Not on file   Occupational History     Not on file   Social Needs     Financial resource strain: Not on file     Food insecurity     Worry: Not on file     Inability: Not on file     Transportation needs     Medical: Not on file     Non-medical: Not on file   Tobacco Use     Smoking status: Former Smoker     Smokeless tobacco: Never Used   Substance and Sexual Activity     Alcohol use: Yes     Comment: occas     Drug use: No     Sexual activity: Not Currently   Lifestyle     Physical activity     Days per week: Not on file     Minutes per session: Not on file     Stress: Not on file   Relationships     Social connections     Talks on phone: Not on file     Gets together: Not on file     Attends Jain service: Not on file     Active member of club or organization: Not on file     Attends meetings of clubs or organizations: Not on file     Relationship status: Not on file     Intimate partner violence     Fear of current or ex partner: Not on file     Emotionally  abused: Not on file     Physically abused: Not on file     Forced sexual activity: Not on file   Other Topics Concern     Not on file   Social History Narrative     Not on file       REVIEW OF SYSTEMS  ROS reviewed and negative aside from listed in HPI    PHYSICAL EXAM  Vital Signs:   Vitals:    08/18/20 1429 08/18/20 1509 08/18/20 1830 08/18/20 1915   BP:  (!) 150/95 (!) 165/106 (!) 152/99   Pulse:   87 86   Resp: 18      Temp: 99.9  F (37.7  C)      TempSrc: Oral      SpO2: 98%  99% 98%   Weight:  113.4 kg (250 lb)     Height:  1.829 m (6')         GEN: WD/WN male, NAD  HEENT: NC/AT, EOMI, PERRL, FOM not elevated, noted mild swelling in left mandible and anterior neck, inferior border of the mandible is palpable bilaterally  CV: RRR, warm and well-perfused  PULM: CTAB, breathing comfortably on room air  GI: Soft, ND/NT  MSK: VALERA, no peripheral extremity edema  NEURO: AAOx4, CN II-XII intact bilaterally  PSYCH: Appropriate mood and affect              IMAGING RESULTS (Include outside hospital results)  CT scan from 8/18/2020 reveals mild left edema with phlegmon consistent with cellulitis    ASSESSMENT   50 year old male presents with left submandibular cellulitis. Will monitor progress. Plan for FS clinic in the AM for eval and extraction.     PLAN  # NEURO:  Stable    #CV:  HTN continue home meds; monitor BP per routine.     #PULM:  Rhinitis; continue home meds.     #GI:  Prophy due to steriods    #FEN/Renal:  Clear liquid diet. Advance as tolerated.   IVF NS 100ml per hour  Insulin sliding scale    #MSK:  Ambulate PRN    #HEME/ID:  Left submandibular cellulitis, IV Unasyn Q6H, Decadron 8mg Q8H *3 doses    #HEENT:  Monitor edema       Discussed with chief resident and staff.    Manuel Trent, YENNYS  Oral & Maxillofacial Surgery, PGY-1

## 2020-08-19 NOTE — PROGRESS NOTES
[facility] Brief Note:     Patient presented to [facility] clinic on 7th floor [facility] by transport from the hospital for his appointment for evaluation of tooth #18 and submandibular swelling on left side. Orthopantomogram x-ray taken and confirmed that tooth #18 is broken down and indicated for extraction. Tooth removed surgically utilizing surgical handpiece to remove bone. Irrigated well and placed 2 resorbable sutures. Gave patient post-op instructions regarding the extraction and to follow up in [facility] clinic in 1 week. Transport called to get patient from [facility] and bring him to his bed on 6A.     Romeo Snow DDS  [facility], G-1  Pager: [phone]

## 2020-08-20 ENCOUNTER — PATIENT OUTREACH (OUTPATIENT)
Dept: CARE COORDINATION | Facility: CLINIC | Age: 50
End: 2020-08-20

## 2020-08-20 DIAGNOSIS — Z20.822 2019-NCOV NOT DETECTED: Primary | ICD-10-CM

## 2020-08-20 NOTE — PROGRESS NOTES
Clinic Care Coordination Contact  RUST/Voicemail    Referral Source: IP Report  Clinical Data: Care Coordinator Outreach    Pt was admitted into the hospital for Submandibular space infection from 8/18-8/19.    Outreach attempted x 1.  Left message on patient's voicemail with call back information and requested return call.    Plan: Care Coordinator will try to reach patient again in 1-2 business days.    PANFILO Mancuso, [facility]  Clinic Care Coordinator  [facility]  [phone]  [email or web address]

## 2020-08-20 NOTE — PROGRESS NOTES
[facility]: Post-Discharge Note  SITUATION                                                      Admission:    Admission Date: 08/18/20   Reason for Admission: Submandibular space infection  Discharge:   Discharge Date: 08/19/20  Discharge Diagnosis: Submandibular space infection    BACKGROUND                                                      Low Juárez was admitted on 8/18/2020 for submandibular space infection.  The following problems were addressed during his hospitalization:     - Extraction of the offending tooth (#18)  - IV antibiotics given to fight infection    ASSESSMENT      Discharge Assessment  Patient reports symptoms are: Improved  Does the patient have all of their medications?: Yes  Does patient know what their new medications are for?: Yes  Does patient have a follow-up appointment scheduled?: Yes  Does patient have any other questions or concerns?: No    Post-op  Did the patient have surgery or a procedure: Yes  Drainage: No  Bleeding: none  Fever: No  Chills: No  Redness: No  Warmth: No  Swelling: No  Incision site pain: No  Eating & Drinking: unable to tolerate solid foods  PO Intake: soft foods  Bowel Function: normal  Urinary Status: voiding without complaint/concerns        PLAN                                                      Outpatient Plan:    Follow Up and recommended labs and tests     OMS clinic will call patient to follow up in 1 week.         No future appointments.        Ashlee Jacobson, CMA

## 2020-08-21 ENCOUNTER — PATIENT OUTREACH (OUTPATIENT)
Dept: NURSING | Facility: CLINIC | Age: 50
End: 2020-08-21
Payer: COMMERCIAL

## 2020-08-21 SDOH — SOCIAL STABILITY: SOCIAL NETWORK: HOW OFTEN DO YOU GET TOGETHER WITH FRIENDS OR RELATIVES?: MORE THAN THREE TIMES A WEEK

## 2020-08-21 SDOH — ECONOMIC STABILITY: FOOD INSECURITY: WITHIN THE PAST 12 MONTHS, THE FOOD YOU BOUGHT JUST DIDN'T LAST AND YOU DIDN'T HAVE MONEY TO GET MORE.: NEVER TRUE

## 2020-08-21 SDOH — ECONOMIC STABILITY: TRANSPORTATION INSECURITY
IN THE PAST 12 MONTHS, HAS THE LACK OF TRANSPORTATION KEPT YOU FROM MEDICAL APPOINTMENTS OR FROM GETTING MEDICATIONS?: NO

## 2020-08-21 SDOH — SOCIAL STABILITY: SOCIAL NETWORK: ARE YOU MARRIED, WIDOWED, DIVORCED, SEPARATED, NEVER MARRIED, OR LIVING WITH A PARTNER?: DIVORCED

## 2020-08-21 SDOH — ECONOMIC STABILITY: FOOD INSECURITY: WITHIN THE PAST 12 MONTHS, YOU WORRIED THAT YOUR FOOD WOULD RUN OUT BEFORE YOU GOT MONEY TO BUY MORE.: NEVER TRUE

## 2020-08-21 SDOH — ECONOMIC STABILITY: TRANSPORTATION INSECURITY
IN THE PAST 12 MONTHS, HAS LACK OF TRANSPORTATION KEPT YOU FROM MEETINGS, WORK, OR FROM GETTING THINGS NEEDED FOR DAILY LIVING?: NO

## 2020-08-21 SDOH — ECONOMIC STABILITY: INCOME INSECURITY: HOW HARD IS IT FOR YOU TO PAY FOR THE VERY BASICS LIKE FOOD, HOUSING, MEDICAL CARE, AND HEATING?: NOT HARD AT ALL

## 2020-08-21 ASSESSMENT — ACTIVITIES OF DAILY LIVING (ADL): DEPENDENT_IADLS:: INDEPENDENT

## 2020-08-21 NOTE — LETTER
[facility] CARE COORDINATION  [address] [address]    August 21, 2020    Low Juárez  [address]  [place] [address]      Dear Low,    I am a clinic care coordinator who works with Gilson Tello MD at [facility]. I wanted to thank you for spending the time to talk with me.  Below is a description of clinic care coordination and how I can further assist you.      The clinic care coordination team is made up of a registered nurse,  and community health worker who understand the health care system. The goal of clinic care coordination is to help you manage your health and improve access to the health care system in the most efficient manner. The team can assist you in meeting your health care goals by providing education, coordinating services, strengthening the communication among your providers and supporting you with any resource needs.    Please feel free to contact me at [phone] with any questions or concerns. We are focused on providing you with the highest-quality healthcare experience possible and that all starts with you.     Sincerely,     PANFILO Mancuso, [facility]  Clinic Care Coordinator  [facility]  [phone]  [email or web address]

## 2020-08-21 NOTE — PROGRESS NOTES
Clinic Care Coordination Contact    Clinic Care Coordination Contact  OUTREACH    Referral Information:  Referral Source: IP Report    Primary Diagnosis: Other (include Comment box)(dental)    Chief Complaint   Patient presents with     Clinic Care Coordination - Initial     Clinic Care Coordination - [facility]        [place] Utilization: 2 ED visits in the past 90 days for dental infection  Clinic Utilization  Difficulty keeping appointments:: No  Compliance Concerns: No  No-Show Concerns: No  No PCP office visit in Past Year: No  Utilization    Last refreshed: 8/21/2020  4:34 AM:  Hospital Admissions 1           Last refreshed: 8/21/2020  4:34 AM:  ED Visits 1           Last refreshed: 8/21/2020  4:34 AM:  No Show Count (past year) 1              Current as of: 8/21/2020  4:34 AM              Clinical Concerns:  MAYELIN TABARES spoke with pt regarding his recent ED visit for a dental infection that resulted from an oral surgery. Pt shared that he is doing ok but still has significant swelling. He explained that it has gone down overall though. He is taking amoxicillin for 4 more days and ibuprofen and oxycodone for pain management. He is experiencing increased pain anytime he eats or swallows. Overall he is very sore. Discussed keeping an eye on the swelling and if he isn't seeing improvement or it worsen to follow up with the Oral Surgeon or PCP to discuss.     Pt shared that he plans to be in contact with the clinic to discuss his Diabetes management. MAYELIN TABARES informed him the MTM was trying to set up an appointment and he can call to make that to discuss medications.    Additionally, pt want to discuss with Rheumatology PCP. He explained that his daughter has a Rheumatology condition and pt is curious if he may have something as well.     Current Medical Concerns:  Dental infection    Current Behavioral Concerns: none    Education Provided to patient: CC Role   Pain  Pain (GOAL):: No  Health Maintenance Reviewed: Not  assessed  Clinical Pathway: None    Medication Management:  Discussed medications that were added during visit for the infection and pain management; amoxicillin, chlorhexidine, ibprofen, oxycodon, and senna.    Functional Status:  Dependent ADLs:: Independent  Dependent IADLs:: Independent  Bed or wheelchair confined:: No  Mobility Status: Independent  Fallen 2 or more times in the past year?: No  Any fall with injury in the past year?: No    Living Situation:  Current living arrangement:: I live in a private home with family  Type of residence:: Private home - stairs    Lifestyle & Psychosocial Needs:     Social Needs     Financial resource strain: Not hard at all     Food insecurity     Worry: Never true     Inability: Never true     Transportation needs     Medical: No     Non-medical: No     Diet:: Diabetic diet  Inadequate nutrition (GOAL):: No  Tube Feeding: No  Inadequate activity/exercise (GOAL):: No  Significant changes in sleep pattern (GOAL): No  Transportation means:: Regular car     Amish or spiritual beliefs that impact treatment:: No  Mental health DX:: No  Mental health management concern (GOAL):: No  Informal Support system:: Spouse, Children   Socioeconomic History     Marital status:      Spouse name: Not on file     Number of children: Not on file     Years of education: Not on file     Highest education level: Not on file   Relationships     Social connections     Talks on phone: Not on file     Gets together: More than three times a week     Attends Catholic service: Not on file     Active member of club or organization: Not on file     Attends meetings of clubs or organizations: Not on file     Relationship status:      Intimate partner violence     Fear of current or ex partner: Not on file     Emotionally abused: Not on file     Physically abused: Not on file     Forced sexual activity: Not on file     Tobacco Use     Smoking status: Former Smoker     Smokeless tobacco:  Never Used   Substance and Sexual Activity     Alcohol use: Yes     Comment: occas     Drug use: No     Sexual activity: Not Currently        Resources and Interventions:  Current Resources:    Community Resources: None  Supplies used at home:: Diabetic Supplies  Equipment Currently Used at Home: none    Advance Care Plan/Directive  Advanced Care Plans/Directives on file:: No    Referrals Placed: None     Patient/Caregiver understanding: Pt reports understanding and denies any additional questions or concerns at this times. SW CC engaged in AIDET communication during encounter.     Plan: At this time, pt denies outstanding need for connection or referral to resources or assistance navigating recommended follow up care. No further outreaches will be made at this time unless a new referral is made or a change in the pt's status occurs. Patient was provided with CC SW contact information and encouraged to call with any questions or concerns.    PANFILO Mancuso, [facility]  Clinic Care Coordinator  [facility]  [phone]  [email or web address]

## 2020-08-23 ENCOUNTER — ANESTHESIA (OUTPATIENT)
Dept: SURGERY | Facility: CLINIC | Age: 50
End: 2020-08-23
Payer: COMMERCIAL

## 2020-08-23 ENCOUNTER — HOSPITAL ENCOUNTER (INPATIENT)
Facility: CLINIC | Age: 50
LOS: 3 days | Discharge: HOME | End: 2020-08-26
Attending: FAMILY MEDICINE | Admitting: DENTIST
Payer: COMMERCIAL

## 2020-08-23 ENCOUNTER — ANESTHESIA EVENT (OUTPATIENT)
Dept: SURGERY | Facility: CLINIC | Age: 50
End: 2020-08-23
Payer: COMMERCIAL

## 2020-08-23 ENCOUNTER — APPOINTMENT (OUTPATIENT)
Dept: CT IMAGING | Facility: CLINIC | Age: 50
End: 2020-08-23
Attending: FAMILY MEDICINE
Payer: COMMERCIAL

## 2020-08-23 DIAGNOSIS — K12.2 SUBMANDIBULAR SPACE INFECTION: ICD-10-CM

## 2020-08-23 DIAGNOSIS — K12.2 SUBMANDIBULAR ABSCESS: Primary | ICD-10-CM

## 2020-08-23 DIAGNOSIS — Z87.891 PERSONAL HISTORY OF TOBACCO USE, PRESENTING HAZARDS TO HEALTH: ICD-10-CM

## 2020-08-23 DIAGNOSIS — K12.2 LUDWIG'S ANGINA: ICD-10-CM

## 2020-08-23 DIAGNOSIS — Z03.818 ENCNTR FOR OBS FOR SUSP EXPSR TO OTH BIOLG AGENTS RULED OUT: ICD-10-CM

## 2020-08-23 LAB
ALBUMIN SERPL-MCNC: 3.4 G/DL (ref 3.4–5)
ALP SERPL-CCNC: 114 U/L (ref 40–150)
ALT SERPL W P-5'-P-CCNC: 22 U/L (ref 0–70)
ANION GAP SERPL CALCULATED.3IONS-SCNC: 2 MMOL/L (ref 3–14)
APTT PPP: 27 SEC (ref 22–37)
AST SERPL W P-5'-P-CCNC: 7 U/L (ref 0–45)
BASOPHILS # BLD AUTO: 0 10E9/L (ref 0–0.2)
BASOPHILS NFR BLD AUTO: 0.2 %
BILIRUB SERPL-MCNC: 0.5 MG/DL (ref 0.2–1.3)
BUN SERPL-MCNC: 11 MG/DL (ref 7–30)
CALCIUM SERPL-MCNC: 10.2 MG/DL (ref 8.5–10.1)
CHLORIDE SERPL-SCNC: 100 MMOL/L (ref 94–109)
CO2 SERPL-SCNC: 32 MMOL/L (ref 20–32)
CREAT SERPL-MCNC: 0.9 MG/DL (ref 0.66–1.25)
CRP SERPL-MCNC: 220 MG/L (ref 0–8)
DIFFERENTIAL METHOD BLD: ABNORMAL
EOSINOPHIL # BLD AUTO: 0.3 10E9/L (ref 0–0.7)
EOSINOPHIL NFR BLD AUTO: 2 %
ERYTHROCYTE [DISTWIDTH] IN BLOOD BY AUTOMATED COUNT: 13 % (ref 10–15)
GFR SERPL CREATININE-BSD FRML MDRD: >90 ML/MIN/{1.73_M2}
GLUCOSE BLDC GLUCOMTR-MCNC: 238 MG/DL (ref 70–99)
GLUCOSE BLDC GLUCOMTR-MCNC: 239 MG/DL (ref 70–99)
GLUCOSE BLDC GLUCOMTR-MCNC: 269 MG/DL (ref 70–99)
GLUCOSE SERPL-MCNC: 248 MG/DL (ref 70–99)
HCT VFR BLD AUTO: 45.8 % (ref 40–53)
HGB BLD-MCNC: 15.6 G/DL (ref 13.3–17.7)
IMM GRANULOCYTES # BLD: 0.2 10E9/L (ref 0–0.4)
IMM GRANULOCYTES NFR BLD: 1.4 %
INR PPP: 0.95 (ref 0.86–1.14)
LABORATORY COMMENT REPORT: NORMAL
LACTATE BLD-SCNC: 1.3 MMOL/L (ref 0.7–2)
LYMPHOCYTES # BLD AUTO: 1.6 10E9/L (ref 0.8–5.3)
LYMPHOCYTES NFR BLD AUTO: 11.2 %
MCH RBC QN AUTO: 30.5 PG (ref 26.5–33)
MCHC RBC AUTO-ENTMCNC: 34.1 G/DL (ref 31.5–36.5)
MCV RBC AUTO: 90 FL (ref 78–100)
MONOCYTES # BLD AUTO: 1.2 10E9/L (ref 0–1.3)
MONOCYTES NFR BLD AUTO: 8.5 %
NEUTROPHILS # BLD AUTO: 11 10E9/L (ref 1.6–8.3)
NEUTROPHILS NFR BLD AUTO: 76.7 %
NRBC # BLD AUTO: 0 10*3/UL
NRBC BLD AUTO-RTO: 0 /100
PLATELET # BLD AUTO: 381 10E9/L (ref 150–450)
POTASSIUM SERPL-SCNC: 4 MMOL/L (ref 3.4–5.3)
PROT SERPL-MCNC: 8.6 G/DL (ref 6.8–8.8)
RADIOLOGIST FLAGS: ABNORMAL
RBC # BLD AUTO: 5.12 10E12/L (ref 4.4–5.9)
SARS-COV-2 RNA SPEC QL NAA+PROBE: NEGATIVE
SARS-COV-2 RNA SPEC QL NAA+PROBE: NORMAL
SODIUM SERPL-SCNC: 134 MMOL/L (ref 133–144)
SPECIMEN SOURCE: NORMAL
SPECIMEN SOURCE: NORMAL
WBC # BLD AUTO: 14.3 10E9/L (ref 4–11)

## 2020-08-23 PROCEDURE — 37000009 ZZH ANESTHESIA TECHNICAL FEE, EACH ADDTL 15 MIN: Performed by: DENTIST

## 2020-08-23 PROCEDURE — 36000051 ZZH SURGERY LEVEL 2 1ST 30 MIN - UMMC: Performed by: DENTIST

## 2020-08-23 PROCEDURE — 85025 COMPLETE CBC W/AUTO DIFF WBC: CPT | Performed by: FAMILY MEDICINE

## 2020-08-23 PROCEDURE — 25800030 ZZH RX IP 258 OP 636: Performed by: FAMILY MEDICINE

## 2020-08-23 PROCEDURE — 25000132 ZZH RX MED GY IP 250 OP 250 PS 637: Performed by: FAMILY MEDICINE

## 2020-08-23 PROCEDURE — 25000128 H RX IP 250 OP 636: Performed by: ANESTHESIOLOGY

## 2020-08-23 PROCEDURE — 87077 CULTURE AEROBIC IDENTIFY: CPT | Performed by: DENTIST

## 2020-08-23 PROCEDURE — 00000146 ZZHCL STATISTIC GLUCOSE BY METER IP

## 2020-08-23 PROCEDURE — 25000128 H RX IP 250 OP 636

## 2020-08-23 PROCEDURE — 96375 TX/PRO/DX INJ NEW DRUG ADDON: CPT | Performed by: FAMILY MEDICINE

## 2020-08-23 PROCEDURE — 25000125 ZZHC RX 250

## 2020-08-23 PROCEDURE — 25800030 ZZH RX IP 258 OP 636

## 2020-08-23 PROCEDURE — 25800030 ZZH RX IP 258 OP 636: Performed by: STUDENT IN AN ORGANIZED HEALTH CARE EDUCATION/TRAINING PROGRAM

## 2020-08-23 PROCEDURE — 25000125 ZZHC RX 250: Performed by: DENTIST

## 2020-08-23 PROCEDURE — 87075 CULTR BACTERIA EXCEPT BLOOD: CPT | Performed by: DENTIST

## 2020-08-23 PROCEDURE — 25000128 H RX IP 250 OP 636: Performed by: FAMILY MEDICINE

## 2020-08-23 PROCEDURE — 99285 EMERGENCY DEPT VISIT HI MDM: CPT | Mod: 25 | Performed by: FAMILY MEDICINE

## 2020-08-23 PROCEDURE — 25000128 H RX IP 250 OP 636: Performed by: STUDENT IN AN ORGANIZED HEALTH CARE EDUCATION/TRAINING PROGRAM

## 2020-08-23 PROCEDURE — 99291 CRITICAL CARE FIRST HOUR: CPT | Mod: Z6 | Performed by: FAMILY MEDICINE

## 2020-08-23 PROCEDURE — 83605 ASSAY OF LACTIC ACID: CPT | Performed by: FAMILY MEDICINE

## 2020-08-23 PROCEDURE — 27210794 ZZH OR GENERAL SUPPLY STERILE: Performed by: DENTIST

## 2020-08-23 PROCEDURE — 12000001 ZZH R&B MED SURG/OB UMMC

## 2020-08-23 PROCEDURE — 25000132 ZZH RX MED GY IP 250 OP 250 PS 637: Performed by: STUDENT IN AN ORGANIZED HEALTH CARE EDUCATION/TRAINING PROGRAM

## 2020-08-23 PROCEDURE — C9803 HOPD COVID-19 SPEC COLLECT: HCPCS | Performed by: FAMILY MEDICINE

## 2020-08-23 PROCEDURE — 96361 HYDRATE IV INFUSION ADD-ON: CPT | Performed by: FAMILY MEDICINE

## 2020-08-23 PROCEDURE — 25000566 ZZH SEVOFLURANE, EA 15 MIN: Performed by: DENTIST

## 2020-08-23 PROCEDURE — 37000008 ZZH ANESTHESIA TECHNICAL FEE, 1ST 30 MIN: Performed by: DENTIST

## 2020-08-23 PROCEDURE — 36000053 ZZH SURGERY LEVEL 2 EA 15 ADDTL MIN - UMMC: Performed by: DENTIST

## 2020-08-23 PROCEDURE — 25000128 H RX IP 250 OP 636: Performed by: DENTIST

## 2020-08-23 PROCEDURE — 25000125 ZZHC RX 250: Performed by: ANESTHESIOLOGY

## 2020-08-23 PROCEDURE — 85730 THROMBOPLASTIN TIME PARTIAL: CPT | Performed by: FAMILY MEDICINE

## 2020-08-23 PROCEDURE — 96365 THER/PROPH/DIAG IV INF INIT: CPT | Mod: 59 | Performed by: FAMILY MEDICINE

## 2020-08-23 PROCEDURE — 80053 COMPREHEN METABOLIC PANEL: CPT | Performed by: FAMILY MEDICINE

## 2020-08-23 PROCEDURE — 85610 PROTHROMBIN TIME: CPT | Performed by: FAMILY MEDICINE

## 2020-08-23 PROCEDURE — 86140 C-REACTIVE PROTEIN: CPT | Performed by: FAMILY MEDICINE

## 2020-08-23 PROCEDURE — 71000015 ZZH RECOVERY PHASE 1 LEVEL 2 EA ADDTL HR: Performed by: DENTIST

## 2020-08-23 PROCEDURE — 25800030 ZZH RX IP 258 OP 636: Performed by: DENTIST

## 2020-08-23 PROCEDURE — 87070 CULTURE OTHR SPECIMN AEROBIC: CPT | Performed by: DENTIST

## 2020-08-23 PROCEDURE — 87076 CULTURE ANAEROBE IDENT EACH: CPT | Performed by: DENTIST

## 2020-08-23 PROCEDURE — 0W950ZZ DRAINAGE OF LOWER JAW, OPEN APPROACH: ICD-10-PCS | Performed by: DENTIST

## 2020-08-23 PROCEDURE — 25000131 ZZH RX MED GY IP 250 OP 636 PS 637: Performed by: STUDENT IN AN ORGANIZED HEALTH CARE EDUCATION/TRAINING PROGRAM

## 2020-08-23 PROCEDURE — U0003 INFECTIOUS AGENT DETECTION BY NUCLEIC ACID (DNA OR RNA); SEVERE ACUTE RESPIRATORY SYNDROME CORONAVIRUS 2 (SARS-COV-2) (CORONAVIRUS DISEASE [COVID-19]), AMPLIFIED PROBE TECHNIQUE, MAKING USE OF HIGH THROUGHPUT TECHNOLOGIES AS DESCRIBED BY CMS-2020-01-R: HCPCS

## 2020-08-23 PROCEDURE — 71000014 ZZH RECOVERY PHASE 1 LEVEL 2 FIRST HR: Performed by: DENTIST

## 2020-08-23 PROCEDURE — 87040 BLOOD CULTURE FOR BACTERIA: CPT | Performed by: FAMILY MEDICINE

## 2020-08-23 PROCEDURE — 70491 CT SOFT TISSUE NECK W/DYE: CPT

## 2020-08-23 RX ORDER — MAGNESIUM HYDROXIDE 1200 MG/15ML
LIQUID ORAL PRN
Status: DISCONTINUED | OUTPATIENT
Start: 2020-08-23 | End: 2020-08-23 | Stop reason: HOSPADM

## 2020-08-23 RX ORDER — OXYCODONE HYDROCHLORIDE 5 MG/1
5 TABLET ORAL ONCE
Status: COMPLETED | OUTPATIENT
Start: 2020-08-23 | End: 2020-08-23

## 2020-08-23 RX ORDER — AMPICILLIN AND SULBACTAM 2; 1 G/1; G/1
3 INJECTION, POWDER, FOR SOLUTION INTRAMUSCULAR; INTRAVENOUS ONCE
Status: COMPLETED | OUTPATIENT
Start: 2020-08-23 | End: 2020-08-23

## 2020-08-23 RX ORDER — ONDANSETRON 2 MG/ML
4 INJECTION INTRAMUSCULAR; INTRAVENOUS EVERY 30 MIN PRN
Status: DISCONTINUED | OUTPATIENT
Start: 2020-08-23 | End: 2020-08-23 | Stop reason: HOSPADM

## 2020-08-23 RX ORDER — HYDROMORPHONE HYDROCHLORIDE 1 MG/ML
0.5 INJECTION, SOLUTION INTRAMUSCULAR; INTRAVENOUS; SUBCUTANEOUS
Status: DISCONTINUED | OUTPATIENT
Start: 2020-08-23 | End: 2020-08-23

## 2020-08-23 RX ORDER — PROPOFOL 10 MG/ML
INJECTION, EMULSION INTRAVENOUS PRN
Status: DISCONTINUED | OUTPATIENT
Start: 2020-08-23 | End: 2020-08-23

## 2020-08-23 RX ORDER — CHLORHEXIDINE GLUCONATE ORAL RINSE 1.2 MG/ML
15 SOLUTION DENTAL 2 TIMES DAILY
Status: DISCONTINUED | OUTPATIENT
Start: 2020-08-23 | End: 2020-08-26 | Stop reason: HOSPADM

## 2020-08-23 RX ORDER — DEXAMETHASONE SODIUM PHOSPHATE 4 MG/ML
0.07 INJECTION, SOLUTION INTRA-ARTICULAR; INTRALESIONAL; INTRAMUSCULAR; INTRAVENOUS; SOFT TISSUE EVERY 12 HOURS
Status: DISCONTINUED | OUTPATIENT
Start: 2020-08-23 | End: 2020-08-23

## 2020-08-23 RX ORDER — FENTANYL CITRATE 50 UG/ML
INJECTION, SOLUTION INTRAMUSCULAR; INTRAVENOUS PRN
Status: DISCONTINUED | OUTPATIENT
Start: 2020-08-23 | End: 2020-08-23

## 2020-08-23 RX ORDER — NICOTINE POLACRILEX 4 MG
15-30 LOZENGE BUCCAL
Status: DISCONTINUED | OUTPATIENT
Start: 2020-08-23 | End: 2020-08-26 | Stop reason: HOSPADM

## 2020-08-23 RX ORDER — DEXTROSE MONOHYDRATE 25 G/50ML
25-50 INJECTION, SOLUTION INTRAVENOUS
Status: DISCONTINUED | OUTPATIENT
Start: 2020-08-23 | End: 2020-08-26 | Stop reason: HOSPADM

## 2020-08-23 RX ORDER — ONDANSETRON 4 MG/1
4 TABLET, ORALLY DISINTEGRATING ORAL EVERY 30 MIN PRN
Status: DISCONTINUED | OUTPATIENT
Start: 2020-08-23 | End: 2020-08-23 | Stop reason: HOSPADM

## 2020-08-23 RX ORDER — CHLORHEXIDINE GLUCONATE ORAL RINSE 1.2 MG/ML
10 SOLUTION DENTAL ONCE
Status: DISCONTINUED | OUTPATIENT
Start: 2020-08-23 | End: 2020-08-23

## 2020-08-23 RX ORDER — OXYCODONE HYDROCHLORIDE 5 MG/1
5 TABLET ORAL EVERY 6 HOURS PRN
Status: ON HOLD | COMMUNITY
End: 2020-08-26

## 2020-08-23 RX ORDER — DEXAMETHASONE SODIUM PHOSPHATE 4 MG/ML
8 INJECTION, SOLUTION INTRA-ARTICULAR; INTRALESIONAL; INTRAMUSCULAR; INTRAVENOUS; SOFT TISSUE EVERY 8 HOURS
Status: COMPLETED | OUTPATIENT
Start: 2020-08-24 | End: 2020-08-24

## 2020-08-23 RX ORDER — KETOROLAC TROMETHAMINE 30 MG/ML
INJECTION, SOLUTION INTRAMUSCULAR; INTRAVENOUS PRN
Status: DISCONTINUED | OUTPATIENT
Start: 2020-08-23 | End: 2020-08-23

## 2020-08-23 RX ORDER — BUPIVACAINE HYDROCHLORIDE AND EPINEPHRINE 2.5; 5 MG/ML; UG/ML
INJECTION, SOLUTION INFILTRATION; PERINEURAL PRN
Status: DISCONTINUED | OUTPATIENT
Start: 2020-08-23 | End: 2020-08-23 | Stop reason: HOSPADM

## 2020-08-23 RX ORDER — SODIUM CHLORIDE, SODIUM LACTATE, POTASSIUM CHLORIDE, CALCIUM CHLORIDE 600; 310; 30; 20 MG/100ML; MG/100ML; MG/100ML; MG/100ML
INJECTION, SOLUTION INTRAVENOUS CONTINUOUS
Status: DISCONTINUED | OUTPATIENT
Start: 2020-08-23 | End: 2020-08-23 | Stop reason: HOSPADM

## 2020-08-23 RX ORDER — LIDOCAINE HYDROCHLORIDE 40 MG/ML
SOLUTION TOPICAL PRN
Status: DISCONTINUED | OUTPATIENT
Start: 2020-08-23 | End: 2020-08-23

## 2020-08-23 RX ORDER — KETOROLAC TROMETHAMINE 15 MG/ML
15 INJECTION, SOLUTION INTRAMUSCULAR; INTRAVENOUS ONCE
Status: DISCONTINUED | OUTPATIENT
Start: 2020-08-23 | End: 2020-08-23

## 2020-08-23 RX ORDER — FENTANYL CITRATE 50 UG/ML
25-50 INJECTION, SOLUTION INTRAMUSCULAR; INTRAVENOUS
Status: DISCONTINUED | OUTPATIENT
Start: 2020-08-23 | End: 2020-08-23 | Stop reason: HOSPADM

## 2020-08-23 RX ORDER — ONDANSETRON 2 MG/ML
INJECTION INTRAMUSCULAR; INTRAVENOUS PRN
Status: DISCONTINUED | OUTPATIENT
Start: 2020-08-23 | End: 2020-08-23

## 2020-08-23 RX ORDER — IOPAMIDOL 755 MG/ML
100 INJECTION, SOLUTION INTRAVASCULAR ONCE
Status: DISCONTINUED | OUTPATIENT
Start: 2020-08-23 | End: 2020-08-25

## 2020-08-23 RX ORDER — SODIUM CHLORIDE 9 MG/ML
INJECTION, SOLUTION INTRAVENOUS CONTINUOUS
Status: DISCONTINUED | OUTPATIENT
Start: 2020-08-23 | End: 2020-08-23

## 2020-08-23 RX ORDER — ATORVASTATIN CALCIUM 40 MG/1
40 TABLET, FILM COATED ORAL DAILY
Status: DISCONTINUED | OUTPATIENT
Start: 2020-08-24 | End: 2020-08-26 | Stop reason: HOSPADM

## 2020-08-23 RX ORDER — METOPROLOL TARTRATE 1 MG/ML
1-2 INJECTION, SOLUTION INTRAVENOUS EVERY 5 MIN PRN
Status: DISCONTINUED | OUTPATIENT
Start: 2020-08-23 | End: 2020-08-23 | Stop reason: HOSPADM

## 2020-08-23 RX ORDER — OXYCODONE HYDROCHLORIDE 5 MG/1
5 TABLET ORAL ONCE
Status: DISCONTINUED | OUTPATIENT
Start: 2020-08-23 | End: 2020-08-25

## 2020-08-23 RX ORDER — HYDRALAZINE HYDROCHLORIDE 20 MG/ML
2.5-5 INJECTION INTRAMUSCULAR; INTRAVENOUS EVERY 10 MIN PRN
Status: DISCONTINUED | OUTPATIENT
Start: 2020-08-23 | End: 2020-08-23 | Stop reason: HOSPADM

## 2020-08-23 RX ORDER — SODIUM CHLORIDE, SODIUM LACTATE, POTASSIUM CHLORIDE, CALCIUM CHLORIDE 600; 310; 30; 20 MG/100ML; MG/100ML; MG/100ML; MG/100ML
INJECTION, SOLUTION INTRAVENOUS CONTINUOUS PRN
Status: DISCONTINUED | OUTPATIENT
Start: 2020-08-23 | End: 2020-08-23

## 2020-08-23 RX ORDER — HYDROMORPHONE HYDROCHLORIDE 1 MG/ML
.3-.5 INJECTION, SOLUTION INTRAMUSCULAR; INTRAVENOUS; SUBCUTANEOUS
Status: DISCONTINUED | OUTPATIENT
Start: 2020-08-23 | End: 2020-08-25

## 2020-08-23 RX ORDER — CETIRIZINE HYDROCHLORIDE 10 MG/1
10-20 TABLET ORAL DAILY
Status: DISCONTINUED | OUTPATIENT
Start: 2020-08-23 | End: 2020-08-26 | Stop reason: HOSPADM

## 2020-08-23 RX ORDER — KETAMINE HYDROCHLORIDE 10 MG/ML
INJECTION INTRAMUSCULAR; INTRAVENOUS PRN
Status: DISCONTINUED | OUTPATIENT
Start: 2020-08-23 | End: 2020-08-23

## 2020-08-23 RX ORDER — GLIPIZIDE 10 MG/1
10 TABLET ORAL
Status: DISCONTINUED | OUTPATIENT
Start: 2020-08-23 | End: 2020-08-23

## 2020-08-23 RX ORDER — IOPAMIDOL 755 MG/ML
100 INJECTION, SOLUTION INTRAVASCULAR ONCE
Status: COMPLETED | OUTPATIENT
Start: 2020-08-23 | End: 2020-08-23

## 2020-08-23 RX ORDER — LIDOCAINE 40 MG/G
CREAM TOPICAL
Status: DISCONTINUED | OUTPATIENT
Start: 2020-08-23 | End: 2020-08-23

## 2020-08-23 RX ORDER — ACETAMINOPHEN 325 MG/1
650 TABLET ORAL EVERY 6 HOURS
Status: DISCONTINUED | OUTPATIENT
Start: 2020-08-23 | End: 2020-08-26 | Stop reason: HOSPADM

## 2020-08-23 RX ORDER — NALOXONE HYDROCHLORIDE 0.4 MG/ML
.1-.4 INJECTION, SOLUTION INTRAMUSCULAR; INTRAVENOUS; SUBCUTANEOUS
Status: DISCONTINUED | OUTPATIENT
Start: 2020-08-23 | End: 2020-08-26 | Stop reason: HOSPADM

## 2020-08-23 RX ORDER — MEPERIDINE HYDROCHLORIDE 25 MG/ML
12.5 INJECTION INTRAMUSCULAR; INTRAVENOUS; SUBCUTANEOUS EVERY 5 MIN PRN
Status: DISCONTINUED | OUTPATIENT
Start: 2020-08-23 | End: 2020-08-23 | Stop reason: HOSPADM

## 2020-08-23 RX ORDER — DEXAMETHASONE 4 MG/1
8 TABLET ORAL EVERY 8 HOURS SCHEDULED
Status: DISCONTINUED | OUTPATIENT
Start: 2020-08-23 | End: 2020-08-23

## 2020-08-23 RX ORDER — SODIUM PHOSPHATE,MONO-DIBASIC 19G-7G/118
2 ENEMA (ML) RECTAL DAILY
COMMUNITY

## 2020-08-23 RX ORDER — DEXAMETHASONE SODIUM PHOSPHATE 4 MG/ML
0.01 INJECTION, SOLUTION INTRA-ARTICULAR; INTRALESIONAL; INTRAMUSCULAR; INTRAVENOUS; SOFT TISSUE EVERY 12 HOURS
Status: DISCONTINUED | OUTPATIENT
Start: 2020-08-31 | End: 2020-08-23

## 2020-08-23 RX ORDER — SODIUM CHLORIDE, SODIUM LACTATE, POTASSIUM CHLORIDE, CALCIUM CHLORIDE 600; 310; 30; 20 MG/100ML; MG/100ML; MG/100ML; MG/100ML
1000 INJECTION, SOLUTION INTRAVENOUS CONTINUOUS
Status: DISCONTINUED | OUTPATIENT
Start: 2020-08-23 | End: 2020-08-25

## 2020-08-23 RX ORDER — LIRAGLUTIDE 6 MG/ML
1.2 INJECTION SUBCUTANEOUS DAILY
COMMUNITY
End: 2021-04-01

## 2020-08-23 RX ORDER — KETOROLAC TROMETHAMINE 30 MG/ML
30 INJECTION, SOLUTION INTRAMUSCULAR; INTRAVENOUS EVERY 6 HOURS
Status: DISCONTINUED | OUTPATIENT
Start: 2020-08-23 | End: 2020-08-25

## 2020-08-23 RX ORDER — IPRATROPIUM BROMIDE 42 UG/1
2 SPRAY, METERED NASAL 3 TIMES DAILY
Status: DISCONTINUED | OUTPATIENT
Start: 2020-08-23 | End: 2020-08-26 | Stop reason: HOSPADM

## 2020-08-23 RX ORDER — DEXAMETHASONE SODIUM PHOSPHATE 4 MG/ML
0.03 INJECTION, SOLUTION INTRA-ARTICULAR; INTRALESIONAL; INTRAMUSCULAR; INTRAVENOUS; SOFT TISSUE EVERY 12 HOURS
Status: DISCONTINUED | OUTPATIENT
Start: 2020-08-29 | End: 2020-08-23

## 2020-08-23 RX ORDER — HYDROXYZINE HYDROCHLORIDE 25 MG/1
25 TABLET, FILM COATED ORAL 3 TIMES DAILY PRN
Status: DISCONTINUED | OUTPATIENT
Start: 2020-08-23 | End: 2020-08-26 | Stop reason: HOSPADM

## 2020-08-23 RX ORDER — IBUPROFEN 600 MG/1
600 TABLET, FILM COATED ORAL EVERY 6 HOURS
Status: DISCONTINUED | OUTPATIENT
Start: 2020-08-23 | End: 2020-08-23

## 2020-08-23 RX ORDER — ALBUTEROL SULFATE 0.83 MG/ML
2.5 SOLUTION RESPIRATORY (INHALATION) EVERY 4 HOURS PRN
Status: DISCONTINUED | OUTPATIENT
Start: 2020-08-23 | End: 2020-08-23 | Stop reason: HOSPADM

## 2020-08-23 RX ORDER — DEXAMETHASONE SODIUM PHOSPHATE 4 MG/ML
INJECTION, SOLUTION INTRA-ARTICULAR; INTRALESIONAL; INTRAMUSCULAR; INTRAVENOUS; SOFT TISSUE PRN
Status: DISCONTINUED | OUTPATIENT
Start: 2020-08-23 | End: 2020-08-23

## 2020-08-23 RX ORDER — DEXAMETHASONE SODIUM PHOSPHATE 4 MG/ML
0.05 INJECTION, SOLUTION INTRA-ARTICULAR; INTRALESIONAL; INTRAMUSCULAR; INTRAVENOUS; SOFT TISSUE EVERY 12 HOURS
Status: DISCONTINUED | OUTPATIENT
Start: 2020-08-26 | End: 2020-08-23

## 2020-08-23 RX ORDER — OXYCODONE HYDROCHLORIDE 5 MG/1
5 TABLET ORAL EVERY 4 HOURS PRN
Status: DISCONTINUED | OUTPATIENT
Start: 2020-08-23 | End: 2020-08-26 | Stop reason: HOSPADM

## 2020-08-23 RX ORDER — HYDROMORPHONE HYDROCHLORIDE 1 MG/ML
.3-.5 INJECTION, SOLUTION INTRAMUSCULAR; INTRAVENOUS; SUBCUTANEOUS EVERY 5 MIN PRN
Status: DISCONTINUED | OUTPATIENT
Start: 2020-08-23 | End: 2020-08-23 | Stop reason: HOSPADM

## 2020-08-23 RX ORDER — NALOXONE HYDROCHLORIDE 0.4 MG/ML
.1-.4 INJECTION, SOLUTION INTRAMUSCULAR; INTRAVENOUS; SUBCUTANEOUS
Status: ACTIVE | OUTPATIENT
Start: 2020-08-23 | End: 2020-08-24

## 2020-08-23 RX ORDER — AMPICILLIN AND SULBACTAM 2; 1 G/1; G/1
3 INJECTION, POWDER, FOR SOLUTION INTRAMUSCULAR; INTRAVENOUS EVERY 6 HOURS
Status: DISCONTINUED | OUTPATIENT
Start: 2020-08-23 | End: 2020-08-25

## 2020-08-23 RX ORDER — KETOROLAC TROMETHAMINE 30 MG/ML
30 INJECTION, SOLUTION INTRAMUSCULAR; INTRAVENOUS EVERY 6 HOURS PRN
Status: DISCONTINUED | OUTPATIENT
Start: 2020-08-23 | End: 2020-08-23

## 2020-08-23 RX ADMIN — PHENYLEPHRINE HYDROCHLORIDE 100 MCG: 10 INJECTION INTRAVENOUS at 14:36

## 2020-08-23 RX ADMIN — LIDOCAINE HYDROCHLORIDE 5 ML: 40 SOLUTION TOPICAL at 14:04

## 2020-08-23 RX ADMIN — SODIUM CHLORIDE, POTASSIUM CHLORIDE, SODIUM LACTATE AND CALCIUM CHLORIDE: 600; 310; 30; 20 INJECTION, SOLUTION INTRAVENOUS at 14:24

## 2020-08-23 RX ADMIN — KETOROLAC TROMETHAMINE 30 MG: 30 INJECTION, SOLUTION INTRAMUSCULAR at 22:41

## 2020-08-23 RX ADMIN — SODIUM CHLORIDE, POTASSIUM CHLORIDE, SODIUM LACTATE AND CALCIUM CHLORIDE 1000 ML: 600; 310; 30; 20 INJECTION, SOLUTION INTRAVENOUS at 19:38

## 2020-08-23 RX ADMIN — DEXAMETHASONE SODIUM PHOSPHATE 4 MG: 4 INJECTION, SOLUTION INTRA-ARTICULAR; INTRALESIONAL; INTRAMUSCULAR; INTRAVENOUS; SOFT TISSUE at 14:22

## 2020-08-23 RX ADMIN — HYDROMORPHONE HYDROCHLORIDE 0.5 MG: 1 INJECTION, SOLUTION INTRAMUSCULAR; INTRAVENOUS; SUBCUTANEOUS at 12:13

## 2020-08-23 RX ADMIN — INSULIN ASPART 1 UNITS: 100 INJECTION, SOLUTION INTRAVENOUS; SUBCUTANEOUS at 22:41

## 2020-08-23 RX ADMIN — DEXAMETHASONE SODIUM PHOSPHATE 8.56 MG: 4 INJECTION, SOLUTION INTRAMUSCULAR; INTRAVENOUS at 20:41

## 2020-08-23 RX ADMIN — SODIUM CHLORIDE: 900 INJECTION INTRAVENOUS at 16:48

## 2020-08-23 RX ADMIN — SODIUM CHLORIDE: 900 INJECTION INTRAVENOUS at 14:24

## 2020-08-23 RX ADMIN — HYDROMORPHONE HYDROCHLORIDE 0.5 MG: 1 INJECTION, SOLUTION INTRAMUSCULAR; INTRAVENOUS; SUBCUTANEOUS at 16:49

## 2020-08-23 RX ADMIN — ROCURONIUM BROMIDE 50 MG: 10 INJECTION INTRAVENOUS at 14:10

## 2020-08-23 RX ADMIN — DEXMEDETOMIDINE HYDROCHLORIDE 4 MCG: 100 INJECTION, SOLUTION INTRAVENOUS at 14:09

## 2020-08-23 RX ADMIN — SODIUM CHLORIDE: 900 INJECTION INTRAVENOUS at 12:13

## 2020-08-23 RX ADMIN — HYDROMORPHONE HYDROCHLORIDE 0.5 MG: 1 INJECTION, SOLUTION INTRAMUSCULAR; INTRAVENOUS; SUBCUTANEOUS at 21:50

## 2020-08-23 RX ADMIN — AMPICILLIN SODIUM AND SULBACTAM SODIUM 3 G: 2; 1 INJECTION, POWDER, FOR SOLUTION INTRAMUSCULAR; INTRAVENOUS at 16:48

## 2020-08-23 RX ADMIN — ONDANSETRON 4 MG: 2 INJECTION INTRAMUSCULAR; INTRAVENOUS at 15:12

## 2020-08-23 RX ADMIN — SUGAMMADEX 200 MG: 100 INJECTION, SOLUTION INTRAVENOUS at 15:28

## 2020-08-23 RX ADMIN — HYDROMORPHONE HYDROCHLORIDE 0.5 MG: 1 INJECTION, SOLUTION INTRAMUSCULAR; INTRAVENOUS; SUBCUTANEOUS at 23:55

## 2020-08-23 RX ADMIN — Medication 1 UNITS: at 14:43

## 2020-08-23 RX ADMIN — PROPOFOL 50 MG: 10 INJECTION, EMULSION INTRAVENOUS at 14:10

## 2020-08-23 RX ADMIN — KETOROLAC TROMETHAMINE 30 MG: 30 INJECTION, SOLUTION INTRAMUSCULAR at 15:15

## 2020-08-23 RX ADMIN — AMPICILLIN SODIUM AND SULBACTAM SODIUM 3 G: 2; 1 INJECTION, POWDER, FOR SOLUTION INTRAMUSCULAR; INTRAVENOUS at 22:42

## 2020-08-23 RX ADMIN — MIDAZOLAM 2 MG: 1 INJECTION INTRAMUSCULAR; INTRAVENOUS at 14:05

## 2020-08-23 RX ADMIN — Medication 30 MG: at 14:32

## 2020-08-23 RX ADMIN — PHENYLEPHRINE HYDROCHLORIDE 150 MCG: 10 INJECTION INTRAVENOUS at 15:18

## 2020-08-23 RX ADMIN — OXYCODONE HYDROCHLORIDE 5 MG: 5 TABLET ORAL at 10:13

## 2020-08-23 RX ADMIN — AMPICILLIN SODIUM AND SULBACTAM SODIUM 3 G: 2; 1 INJECTION, POWDER, FOR SOLUTION INTRAMUSCULAR; INTRAVENOUS at 12:28

## 2020-08-23 RX ADMIN — SODIUM CHLORIDE 1000 ML: 9 INJECTION, SOLUTION INTRAVENOUS at 10:08

## 2020-08-23 RX ADMIN — DEXMEDETOMIDINE HYDROCHLORIDE 4 MCG: 100 INJECTION, SOLUTION INTRAVENOUS at 14:08

## 2020-08-23 RX ADMIN — DEXMEDETOMIDINE HYDROCHLORIDE 8 MCG: 100 INJECTION, SOLUTION INTRAVENOUS at 14:53

## 2020-08-23 RX ADMIN — CHLORHEXIDINE GLUCONATE 0.12% ORAL RINSE 15 ML: 1.2 LIQUID ORAL at 19:31

## 2020-08-23 RX ADMIN — DEXMEDETOMIDINE HYDROCHLORIDE 8 MCG: 100 INJECTION, SOLUTION INTRAVENOUS at 14:05

## 2020-08-23 RX ADMIN — IOPAMIDOL 100 ML: 755 INJECTION, SOLUTION INTRAVENOUS at 11:48

## 2020-08-23 RX ADMIN — SODIUM CHLORIDE 1000 ML: 9 INJECTION, SOLUTION INTRAVENOUS at 16:35

## 2020-08-23 RX ADMIN — FENTANYL CITRATE 100 MCG: 50 INJECTION, SOLUTION INTRAMUSCULAR; INTRAVENOUS at 14:32

## 2020-08-23 RX ADMIN — DEXMEDETOMIDINE HYDROCHLORIDE 0.7 MCG/KG/HR: 100 INJECTION, SOLUTION INTRAVENOUS at 14:05

## 2020-08-23 RX ADMIN — PHENYLEPHRINE HYDROCHLORIDE 200 MCG: 10 INJECTION INTRAVENOUS at 14:39

## 2020-08-23 ASSESSMENT — ENCOUNTER SYMPTOMS
SHORTNESS OF BREATH: 0
WEAKNESS: 0
DYSPHORIC MOOD: 0
ACTIVITY CHANGE: 1
DECREASED CONCENTRATION: 0
VOMITING: 0
COLOR CHANGE: 0
TROUBLE SWALLOWING: 0
BRUISES/BLEEDS EASILY: 0
NUMBNESS: 0
DIAPHORESIS: 0
FATIGUE: 1
NAUSEA: 0
VOICE CHANGE: 0
STRIDOR: 0
FACIAL SWELLING: 1
ABDOMINAL PAIN: 0
FEVER: 1
COUGH: 0
APPETITE CHANGE: 1

## 2020-08-23 ASSESSMENT — PAIN DESCRIPTION - DESCRIPTORS
DESCRIPTORS: DISCOMFORT
DESCRIPTORS: ACHING;SHARP;PRESSURE
DESCRIPTORS: ACHING;CONSTANT;DISCOMFORT

## 2020-08-23 ASSESSMENT — ACTIVITIES OF DAILY LIVING (ADL): ADLS_ACUITY_SCORE: 10

## 2020-08-23 ASSESSMENT — MIFFLIN-ST. JEOR
SCORE: 2040.16
SCORE: 2047

## 2020-08-23 NOTE — ED PROVIDER NOTES
ED Provider Note  [facility]      History     Chief Complaint   Patient presents with     Oral Swelling     HPI  Low Juárez is a 50 year old male who presents emergency room with increasing left facial swelling and swelling under the lower portion of his chin.  Patient had recent hospitalization with submandibular space infection requiring admitted in the hospital.  Extraction of tooth #18 was done by oral surgery.  Patient discharged on Augmentin is been home a couple days noted increasing swelling the left side of the face now and increasing swelling under his chin neck area with this.  He still able to swallow his speech is the same he is breathing okay some low reported low-grade fevers now presents here for evaluation.    Past Medical History  Past Medical History:   Diagnosis Date     Apnea, sleep      Fatty liver 8/18/2017     High cholesterol      Hyperlipidemia LDL goal <100 8/18/2017     Hypertension      Morbid obesity due to excess calories (H) 8/18/2017     Rhinitis      Type 2 diabetes mellitus without complication, without long-term current use of insulin (H) 1/31/2018     Past Surgical History:   Procedure Laterality Date     CHOLECYSTECTOMY       CYSTECTOMY PILONIDAL       SEPTOPLASTY, TURBINOPLASTY, COMBINED  3/20/2012    Procedure:COMBINED SEPTOPLASTY, TURBINOPLASTY; COMBINED SEPTOPLASTY,  Grafts, Left Turbinate Reduction ; Surgeon:SARABJIT COREAS; Location:[facility] OR     No current outpatient medications on file.    Allergies   Allergen Reactions     Vicodin [Hydrocodone-Acetaminophen]      itch     Family History  Family History   Problem Relation Age of Onset     Hypertension Father      Social History   Social History     Tobacco Use     Smoking status: Former Smoker     Smokeless tobacco: Never Used   Substance Use Topics     Alcohol use: Yes     Comment: occas     Drug use: No      Past medical history, past surgical history, medications, allergies, family  history, and social history were reviewed with the patient. No additional pertinent items.       Review of Systems   Constitutional: Positive for activity change, appetite change, fatigue and fever. Negative for diaphoresis.   HENT: Positive for dental problem and facial swelling. Negative for trouble swallowing and voice change.    Respiratory: Negative for cough, shortness of breath and stridor.    Cardiovascular: Negative for chest pain.   Gastrointestinal: Negative for abdominal pain, nausea and vomiting.   Skin: Negative for color change, pallor and rash.   Allergic/Immunologic: Negative for immunocompromised state.   Neurological: Negative for weakness and numbness.   Hematological: Does not bruise/bleed easily.   Psychiatric/Behavioral: Negative for decreased concentration and dysphoric mood.   All other systems reviewed and are negative.    A complete review of systems was performed with pertinent positives and negatives noted in the HPI, and all other systems negative.    Physical Exam   BP: (!) 165/96  Pulse: 87  Temp: 98.8  F (37.1  C)  Resp: 18  Height: 182.9 cm (6')  Weight: 114.9 kg (253 lb 4.9 oz)  SpO2: 97 %  Physical Exam  Vitals signs and nursing note reviewed.   Constitutional:       General: He is in acute distress.      Appearance: He is well-developed. He is ill-appearing. He is not toxic-appearing or diaphoretic.      Comments: Patient sitting upright in a chair breathing well handling secretions still able of his mouth slightly.  There is some left-sided facial swelling with mild erythema.  There is some fullness in the submandibular space also anterior neck itself appears to not be swollen no crepitus noted.  Trachea is midline no stridor noted   HENT:      Head: Atraumatic.      Comments: Left facial swelling over the left mandibular area without crepitus some mild erythema     Mouth/Throat:      Mouth: Mucous membranes are moist.   Eyes:      General: No scleral icterus.     Extraocular  Movements: Extraocular movements intact.      Conjunctiva/sclera: Conjunctivae normal.      Pupils: Pupils are equal, round, and reactive to light.   Neck:      Musculoskeletal: Normal range of motion and neck supple.   Cardiovascular:      Rate and Rhythm: Normal rate.   Pulmonary:      Effort: No respiratory distress.      Breath sounds: No stridor.   Abdominal:      Tenderness: There is no guarding.   Musculoskeletal:         General: Swelling present.   Skin:     General: Skin is warm and dry.      Capillary Refill: Capillary refill takes less than 2 seconds.      Findings: Erythema (left face) present. No rash.   Neurological:      General: No focal deficit present.      Mental Status: He is alert and oriented to person, place, and time. Mental status is at baseline.   Psychiatric:      Comments: Flat affect uncomfortable         ED Course      Procedures        Patient valuate here in the ER.  Will contact OMFS IV labs ordered.    Had established labs drawn.  Blood culture sent white count is 14,000.  CRP is 220.  IV fluids given oral surgery did see the patient CT scan with contrast soft tissue neck were done.  Findings concerning for swelling concerning for Nino's angina.  Patient received Unasyn IV kept n.p.o. Dilaudid for pain control in the ER.  Labs reviewed as noted.  Patient admitted to oral surgery but first going to the OR for definitive care.  Patient and family understand.  Labs reviewed below as noted    COVID swab sent.    Lactic acid within normal limits.          Critical CAre noted 30 minutes in th ER with eval of labs and CT findings and coordination of care with OMFS and to OR for treatment.           Results for orders placed or performed during the hospital encounter of 08/23/20   Soft tissue neck CT w contrast     Status: Abnormal   Result Value Ref Range    Radiologist flags Concern for Nino's angina (AA)     Narrative    CT SOFT TISSUE NECK W CONTRAST 8/23/2020 11:54 AM    History:   increasing swelling of submandibular and left face  recent  infection now worsening on antibiotics      Comparison: Outside CT soft tissue neck 8/18/2020    Technique: Following intravenous administration of nonionic iodinated  contrast medium, thin section helical CT images were obtained from the  skull base down to the level of the aortic arch.  Axial, coronal and  sagittal reformations were performed with 2-3 mm slice thickness  reconstruction. Images were reviewed in soft tissue, lung and bone  windows.    Contrast: 100ml isovue 370    Findings:   There is an enhancing multiloculated fluid collection measuring at its  largest approximately 7 x 2 cm centered in the floor of the mouth and  left sublingual space extending also to the left submandibular space.  Anteriorly it extends between the geniohyoid and anterior digastric  muscles. Inferolaterally it extends to the left submandibular gland.  Cranially it is extends along the medial aspect of the medial  pterygoid muscle. The left palatine tonsil is thicker than the right  without evidence of abscess within the tonsil. There is overall  resultant  effacement of the nasopharynx, oropharynx, left vallecula,  and pyriform recess.  There is thickening of the platysma. There is  mild subcutaneous fat stranding superficial to this collection  compatible cellulitis, no evidence of cutaneous fistula tract. There  is an area of hypodensity and gas within the left mandibular second  molar tooth socket with possible connection to the surrounding  abscess, possible infectious source.     Reactive level 1B,  2A, and left  2B  lymph nodes bilaterally.    The thyroid gland appears normal.     Left jugular vein above the level of hyoid bone to the level of skull  base is severely stenotic a new finding since 8/18/2020. Caudally in  the lower neck it is open. The rest of the vascular structures are  open.     Evaluation of the osseous structures demonstrate no worrisome lytic  or  sclerotic lesion. Degenerative changes of the cervical spine. No overt  spinal canal or neuroforaminal stenosis. The visualized paranasal  sinuses are clear. The mastoid air cells are clear.     The visualized lung apices are clear.      Impression    Impression:  1. Abscess involving the floor of the mouth, left sublingual space  extending to left submandibular space with mild mass effect on the  airway. Findings are suggestive of Nino's angina.   2. Area of hypodensity and air within the left mandible second molar  tooth socket with possible connection to the developing abscesses,  possible infectious source.  3. Cellulitis involving the left submandibular area, no evidence of  cutaneous fistula tract.    [Critical Result: Concern for Nino's angina     Finding was identified on 8/23/2020 11:57 AM.      was contacted by Dr. Fletcher at 8/23/2020 12:19 PM and  verbalized understanding of the critical finding.      I have personally reviewed the examination and initial interpretation  and I agree with the findings.    DADA BREWER MD   CBC with platelets differential     Status: Abnormal   Result Value Ref Range    WBC 14.3 (H) 4.0 - 11.0 10e9/L    RBC Count 5.12 4.4 - 5.9 10e12/L    Hemoglobin 15.6 13.3 - 17.7 g/dL    Hematocrit 45.8 40.0 - 53.0 %    MCV 90 78 - 100 fl    MCH 30.5 26.5 - 33.0 pg    MCHC 34.1 31.5 - 36.5 g/dL    RDW 13.0 10.0 - 15.0 %    Platelet Count 381 150 - 450 10e9/L    Diff Method Automated Method     % Neutrophils 76.7 %    % Lymphocytes 11.2 %    % Monocytes 8.5 %    % Eosinophils 2.0 %    % Basophils 0.2 %    % Immature Granulocytes 1.4 %    Nucleated RBCs 0 0 /100    Absolute Neutrophil 11.0 (H) 1.6 - 8.3 10e9/L    Absolute Lymphocytes 1.6 0.8 - 5.3 10e9/L    Absolute Monocytes 1.2 0.0 - 1.3 10e9/L    Absolute Eosinophils 0.3 0.0 - 0.7 10e9/L    Absolute Basophils 0.0 0.0 - 0.2 10e9/L    Abs Immature Granulocytes 0.2 0 - 0.4 10e9/L    Absolute Nucleated RBC 0.0    CRP  inflammation     Status: Abnormal   Result Value Ref Range    CRP Inflammation 220.0 (H) 0.0 - 8.0 mg/L   INR     Status: None   Result Value Ref Range    INR 0.95 0.86 - 1.14   Partial thromboplastin time     Status: None   Result Value Ref Range    PTT 27 22 - 37 sec   Comprehensive metabolic panel     Status: Abnormal   Result Value Ref Range    Sodium 134 133 - 144 mmol/L    Potassium 4.0 3.4 - 5.3 mmol/L    Chloride 100 94 - 109 mmol/L    Carbon Dioxide 32 20 - 32 mmol/L    Anion Gap 2 (L) 3 - 14 mmol/L    Glucose 248 (H) 70 - 99 mg/dL    Urea Nitrogen 11 7 - 30 mg/dL    Creatinine 0.90 0.66 - 1.25 mg/dL    GFR Estimate >90 >60 mL/min/[1.73_m2]    GFR Estimate If Black >90 >60 mL/min/[1.73_m2]    Calcium 10.2 (H) 8.5 - 10.1 mg/dL    Bilirubin Total 0.5 0.2 - 1.3 mg/dL    Albumin 3.4 3.4 - 5.0 g/dL    Protein Total 8.6 6.8 - 8.8 g/dL    Alkaline Phosphatase 114 40 - 150 U/L    ALT 22 0 - 70 U/L    AST 7 0 - 45 U/L   Lactic acid whole blood     Status: None   Result Value Ref Range    Lactic Acid 1.3 0.7 - 2.0 mmol/L   Asymptomatic COVID-19 Virus (Coronavirus) by PCR     Status: None    Specimen: Nasopharyngeal   Result Value Ref Range    COVID-19 Virus PCR to [facility] - Source Nasopharyngeal     COVID-19 Virus PCR to [facility] - Result       Test received-See reflex to IDDL test SARS CoV2 (COVID-19) Virus RT-PCR   SARS-CoV-2 COVID-19 Virus (Coronavirus) RT-PCR Nasopharyngeal     Status: None    Specimen: Nasopharyngeal   Result Value Ref Range    SARS-CoV-2 Virus Specimen Source Nasopharyngeal     SARS-CoV-2 PCR Result NEGATIVE     SARS-CoV-2 PCR Comment       Testing was performed using the Xpert Xpress SARS-CoV-2 Assay on the Cepheid Gene-Xpert   Instrument Systems. Additional information about this Emergency Use Authorization (EUA)   assay can be found via the Lab Guide.     Glucose by meter     Status: Abnormal   Result Value Ref Range    Glucose 238 (H) 70 - 99 mg/dL   Glucose by meter     Status: Abnormal    Result Value Ref Range    Glucose 269 (H) 70 - 99 mg/dL   Glucose by meter     Status: Abnormal   Result Value Ref Range    Glucose 239 (H) 70 - 99 mg/dL   Blood culture     Status: None (Preliminary result)    Specimen: Arm, Left; Blood    Left Arm   Result Value Ref Range    Specimen Description Blood Left Arm     Culture Micro No growth after 8 hours    Anaerobic bacterial culture     Status: None (Preliminary result)    Specimen: Neck; Fluid    Left~Fluid   Result Value Ref Range    Specimen Description Neck Left Fluid     Special Requests       This specimen was received on a swab. Results may not be optimal. For maximum sensitivity   of detection, submit tissue, fluid, or needle aspirate.  Received in anaerobic tubes.      Culture Micro PENDING    Fluid Culture Aerobic Bacterial     Status: None (Preliminary result)    Specimen: Neck; Fluid    Left~Fluid   Result Value Ref Range    Specimen Description Neck Left Fluid     Special Requests       This specimen was received on a swab. Results may not be optimal. For maximum sensitivity   of detection, submit tissue, fluid, or needle aspirate.      Culture Micro PENDING      Medications   sodium chloride (PF) 0.9% PF flush 3 mL ( Intracatheter Unhold 8/23/20 1741)   sodium chloride (PF) 0.9% PF flush 3 mL ( Intracatheter Unhold 8/23/20 1741)   naloxone (NARCAN) injection 0.1-0.4 mg (has no administration in time range)   oxyCODONE (ROXICODONE) tablet 5 mg (has no administration in time range)   oxyCODONE (ROXICODONE) tablet 5 mg (5 mg Oral Not Given 8/23/20 2101)   iopamidol (ISOVUE-370) solution 100 mL (100 mLs Intravenous Not Given 8/23/20 1853)   acetaminophen (TYLENOL) tablet 650 mg (650 mg Oral Not Given 8/23/20 2102)   ampicillin-sulbactam (UNASYN) 3 g vial to attach to  mL bag (3 g Intravenous New Bag 8/23/20 2242)   glucose gel 15-30 g (has no administration in time range)     Or   dextrose 50 % injection 25-50 mL (has no administration in time  range)     Or   glucagon injection 1 mg (has no administration in time range)   insulin aspart (NovoLOG) injection (RAPID ACTING) (1 Units Subcutaneous Not Given 8/23/20 1848)   insulin aspart (NovoLOG) injection (RAPID ACTING) (1 Units Subcutaneous Given 8/23/20 2241)   atorvastatin (LIPITOR) tablet 40 mg (has no administration in time range)   cetirizine (zyrTEC) tablet 10-20 mg (10 mg Oral Not Given 8/23/20 2102)   chlorhexidine (PERIDEX) 0.12 % solution 15 mL (15 mLs Swish & Spit Given 8/23/20 1931)   hydrOXYzine (ATARAX) tablet 25 mg (has no administration in time range)   ipratropium (ATROVENT) 0.06 % spray 2 spray (2 sprays Both Nostrils Not Given 8/23/20 2104)   lisinopril-hydrochlorothiazide (ZESTORETIC) 20-25 mg combo dose (has no administration in time range)   metFORMIN (GLUCOPHAGE) tablet 1,000 mg (1,000 mg Oral Not Given 8/23/20 2103)   naloxone (NARCAN) injection 0.1-0.4 mg (has no administration in time range)   lactated ringers infusion (1,000 mLs Intravenous New Bag 8/23/20 1938)   dexamethasone (DECADRON) injection 8 mg (has no administration in time range)   ketorolac (TORADOL) injection 30 mg (30 mg Intravenous Given 8/23/20 2241)   HYDROmorphone (PF) (DILAUDID) injection 0.3-0.5 mg (0.5 mg Intravenous Given 8/23/20 2150)   0.9% sodium chloride BOLUS (0 mLs Intravenous Stopped 8/23/20 1140)   oxyCODONE (ROXICODONE) tablet 5 mg (5 mg Oral Given 8/23/20 1013)   sodium chloride (PF) 0.9% PF flush 60 mL (60 mLs Intravenous Given 8/23/20 1148)   iopamidol (ISOVUE-370) solution 100 mL (100 mLs Intravenous Given 8/23/20 1148)   ampicillin-sulbactam (UNASYN) 3 g vial to attach to  mL bag (3 g Intravenous New Bag 8/23/20 1228)   0.9% sodium chloride BOLUS (1,000 mLs Intravenous New Bag 8/23/20 1635)   ampicillin-sulbactam (UNASYN) 3 g vial to attach to  mL bag (3 g Intravenous New Bag 8/23/20 9290)        Assessments & Plan (with Medical Decision Making)  50-year-old male presents ER with  increasing left facial and submandibular swelling.  Patient recent to hospital for dental infection post procedure along with extraction of tooth #18.  Now increasing swelling on antibiotics although airways intact able to swallow no dysphonia no drooling presented with increasing swelling.  Patient in the ER had a CT scan showing concerns for Nino's angina.  His airway is intact he received IV fluids pain control with Dilaudid IV patient received Unasyn IV also.  Seen by oral surgery will be admitted to oral surgery with first definitive care to the OR.  Patient stable as noted.         I have reviewed the nursing notes. I have reviewed the findings, diagnosis, plan and need for follow up with the patient.    Current Discharge Medication List          Final diagnoses:   Submandibular space infection   Nino's angina       --  Rene Weaver    [facility], [place], EMERGENCY DEPARTMENT  8/23/2020      This note was created at least in part by the use of dragon voice dictation system. Inadvertent typographical errors may still exist.  Rene Weaver MD.    Patient evaluated in the emergency department during the COVID-19 pandemic period. Careful attention to patients safety was addressed throughout the evaluation. Evaluation and treatment management was initiated with disposition made efficiently and appropriate as possible to minimize any risk of potential exposure to patient during this evaluation.       Rene Weaver MD  08/23/20 0553

## 2020-08-23 NOTE — ANESTHESIA POSTPROCEDURE EVALUATION
Anesthesia POST Procedure Evaluation    Patient: Low Juárez   MRN:     [id number] Gender:   male   Age:    50 year old :      1970        Preoperative Diagnosis: Submandibular abscess [K12.2]   Procedure(s):  INCISION AND DRAINAGE, left SUBMANDIBLE space   Postop Comments: No value filed.     Anesthesia Type: General          Postop Pain Control: Uneventful            Sign Out: Well controlled pain   PONV: No   Neuro/Psych: Uneventful            Sign Out: Acceptable/Baseline neuro status   Airway/Respiratory: Uneventful            Sign Out: Acceptable/Baseline resp. status   CV/Hemodynamics: Uneventful            Sign Out: Acceptable CV status   Other NRE: NONE   DID A NON-ROUTINE EVENT OCCUR? No         Last Anesthesia Record Vitals:  CRNA VITALS  2020 1509 - 2020 1609      2020             Pulse:  77    SpO2:  100 %          Last PACU Vitals:  Vitals Value Taken Time   /76 2020  4:00 PM   Temp     Pulse 86 2020  4:11 PM   Resp 5 2020  4:10 PM   SpO2 94 % 2020  4:11 PM   Temp src     NIBP     Pulse     SpO2     Resp     Temp     Ht Rate     Temp 2     Vitals shown include unvalidated device data.      Electronically Signed By: Clarence Pelaez MD, 2020, 4:12 PM

## 2020-08-23 NOTE — ANESTHESIA CARE TRANSFER NOTE
Patient: Low Juárez    Procedure(s):  INCISION AND DRAINAGE, left SUBMANDIBLE space    Diagnosis: Submandibular abscess [K12.2]  Diagnosis Additional Information: No value filed.    Anesthesia Type:   General     Note:  Airway :Nasal Cannula  Patient transferred to:PACU  Comments: Pt. Pink and breathing spontaneously.  Vitals stable.  Report given to oncoming nurse.  Transfer of care occurred. Handoff Report: Identifed the Patient, Identified the Reponsible Provider, Reviewed the pertinent medical history, Discussed the surgical course, Reviewed Intra-OP anesthesia mangement and issues during anesthesia, Set expectations for post-procedure period and Allowed opportunity for questions and acknowledgement of understanding      Vitals: (Last set prior to Anesthesia Care Transfer)    CRNA VITALS  8/23/2020 1509 - 8/23/2020 1549      8/23/2020             Pulse:  77    SpO2:  100 %                Electronically Signed By: ELSA Barroso CRNA  August 23, 2020  3:49 PM

## 2020-08-23 NOTE — PHARMACY-ADMISSION MEDICATION HISTORY
Admission Medication History Completed by Pharmacy    See [facility] Admission Navigator for allergy information, preferred outpatient pharmacy, prior to admission medications and immunization status.     Medication History Sources:     Patient and dispense report    Changes made to PTA medication list (reason):    Added: Glucosamine    Deleted: Fish oil, ivermectin    Changed: None    Additional Information:    Patient reports he has about 6 tablets left of his Augmentin and has not missed any doses since he started taking them.    Oxycodone was  but he still has some so I re-added this to the PTA list.    Prior to Admission medications    Medication Sig Last Dose Taking? Auth Provider   amoxicillin-clavulanate (AUGMENTIN) 875-125 MG tablet Take 1 tablet by mouth 2 times daily for 7 days 2020 at Unknown time Yes Ward Veloz, BECKY   aspirin (ASA) 81 MG tablet Take 81 mg by mouth daily Past Week at Unknown time Yes Reported, Patient   atorvastatin (LIPITOR) 40 MG tablet Take 1 tablet (40 mg) by mouth daily 2020 Yes Gilson Tello MD   augmented betamethasone dipropionate (DIPROLENE) 0.05 % external lotion Apply topically 2 times daily 2020 Yes Gilson Tello MD   blood glucose (NO BRAND SPECIFIED) lancets standard Use to test blood sugar 2 times daily or as directed. 2020 Yes Gilson Tello MD   blood glucose (NO BRAND SPECIFIED) test strip Use to test blood sugar 2 times daily or as directed. 2020 Yes Gilson Tello MD   blood glucose calibration (NO BRAND SPECIFIED) solution Use to calibrate blood glucose monitor as needed as directed. 2020 Yes Gilson Tello MD   blood glucose monitoring (NO BRAND SPECIFIED) meter device kit Use to test blood sugar 2 times daily or as directed. 2020 Yes Gilson Tello MD   cetirizine (ZYRTEC) 10 MG tablet Take 10-20 mg by mouth daily Past Week at Unknown time Yes Reported, Patient   chlorhexidine (PERIDEX) 0.12 % solution Swish  and spit 15 mLs in mouth 2 times daily 8/22/2020 at Unknown time Yes Ward Veloz DDS   fluticasone (FLONASE) 50 MCG/ACT spray Spray 1 spray into both nostrils as needed for rhinitis or allergies Past Month at Unknown time Yes Reported, Patient   glipiZIDE (GLUCOTROL) 10 MG tablet Take 1 tablet (10 mg) by mouth 2 times daily (before meals) 8/23/2020 x1 Yes Gilson Tello MD   glucosamine-chondroitin 500-400 MG CAPS per capsule Take 2 capsules by mouth daily Past Week at Unknown time Yes Unknown, Entered By History   hydrOXYzine (ATARAX) 25 MG tablet Take 25 mg by mouth 3 times daily as needed  PRN Yes Reported, Patient   ibuprofen (ADVIL/MOTRIN) 600 MG tablet Take 1 tablet (600 mg) by mouth every 6 hours as needed for moderate pain 8/22/2020 at Unknown time Yes Ward Veloz DDS   insulin pen needle (31G X 8 MM) 31G X 8 MM miscellaneous Use 1 pen needles daily or as directed. 8/23/2020 Yes Gilson Tello MD   ipratropium (ATROVENT) 0.06 % spray Spray 2 sprays into both nostrils as needed for rhinitis PRN Yes Reported, Patient   liraglutide (VICTOZA) 18 MG/3ML solution Inject 1.2 mg Subcutaneous daily 8/23/2020 at Unknown time Yes Unknown, Entered By History   lisinopril-hydrochlorothiazide (PRINZIDE/ZESTORETIC) 20-25 MG tablet Take 2 tablets by mouth daily  Patient taking differently: Take 1 tablet by mouth daily  8/23/2020 Yes Gilson Tello MD   metFORMIN (GLUCOPHAGE) 1000 MG tablet Take 1 tablet (1,000 mg) by mouth 2 times daily (with meals) 8/23/2020 Yes Gilson Tello MD   oxyCODONE (ROXICODONE) 5 MG tablet Take 5 mg by mouth every 6 hours as needed for severe pain 8/23/2020 at Unknown time Yes Unknown, Entered By History   SENNA-docusate sodium (SENNA S) 8.6-50 MG tablet Take 1 tablet by mouth At Bedtime Not started Yes Ward Veloz DDS   sildenafil (VIAGRA) 100 MG tablet Take 1 tablet (100 mg) by mouth daily as needed PRN Yes Gilson Tello MD     Date completed: 08/23/20    Medication  history completed by: Paola Mendieta

## 2020-08-23 NOTE — BRIEF OP NOTE
[facility], [place]    Brief Operative Note    Pre-operative diagnosis: Submandibular abscess [K12.2]  Post-operative diagnosis Same as pre-operative diagnosis    Procedure: Procedure(s):  INCISION AND DRAINAGE, left SUBMANDIBLE space  Surgeon: Surgeon(s) and Role:     * Ward Veloz DDS - Primary     * Washington Lopez MD - Resident - Assisting         Manuel Trent DDS - Reisdent - Assisting        Karen Bonner DDS - Resident - Assisting     Anesthesia: General   Estimated blood loss: Less than 50 ml  Drains: Penrose drain x2 sutured in place left neck  Specimens:   ID Type Source Tests Collected by Time Destination   1 : Left Neck Swab Fluid Neck ANAEROBIC BACTERIAL CULTURE, FLUID CULTURE AEROBIC BACTERIAL Ward Veloz DDS 8/23/2020  2:48 PM      Findings:   Purluent drain expressed from submandibular, sublingual, pterygmandibular, and lateral pharyngeal spaces. Culture sent. .  Complications: None.  Implants: * No implants in log *

## 2020-08-23 NOTE — H&P
ORAL & MAXILLOFACIAL SURGERY   HISTORY & PHYSICAL  Low Juárez,  MRN: [id number],  : 1970        HPI  Low Juárez is a 50 year old male with a PMH for DM 2, hyperlipidemia, HTN, fatty liver, and cholesterol who presented to the [facility] ED with complaint of facial swelling.     He states he had right mandibular jesica removed last week near [place]. He was discharged home with penicillin and oxycodone for pain. Patient reports waking up on Saturday, 08/15/20, with his lower chin swollen and very hard.  [facility] and had one tooth (#18) extracted in OMFS clinic on 2020 and was discharged home on 2020.     Patient states his swelling improved after discharge and started having swelling again yesterday. We advised him to continue PO abx and if symptoms worsen, to present to the [facility] ED. Patient presents to the [facility] ED today 2020 at 9am and OMFS service was paged.     He states there is left sided swelling, his breathing is fine but he can't swallow, eat or sleep.      HISTORY  Past Medical History:   Past Medical History:   Diagnosis Date     Apnea, sleep      Fatty liver 2017     High cholesterol      Hyperlipidemia LDL goal <100 2017     Hypertension      Morbid obesity due to excess calories (H) 2017     Rhinitis      Type 2 diabetes mellitus without complication, without long-term current use of insulin (H) 2018     Past Surgical History:   Past Surgical History:   Procedure Laterality Date     CHOLECYSTECTOMY       CYSTECTOMY PILONIDAL       SEPTOPLASTY, TURBINOPLASTY, COMBINED  3/20/2012    Procedure:COMBINED SEPTOPLASTY, TURBINOPLASTY; COMBINED SEPTOPLASTY,  Grafts, Left Turbinate Reduction ; Surgeon:SARABJIT COREAS; Location: OR     Medications:   No current facility-administered medications on file prior to encounter.   amoxicillin-clavulanate (AUGMENTIN) 875-125 MG tablet, Take 1 tablet by  mouth 2 times daily for 7 days  aspirin (ASA) 81 MG tablet, Take 81 mg by mouth daily  atorvastatin (LIPITOR) 40 MG tablet, Take 1 tablet (40 mg) by mouth daily  augmented betamethasone dipropionate (DIPROLENE) 0.05 % external lotion, Apply topically 2 times daily  augmented betamethasone dipropionate (DIPROLENE-AF) 0.05 % external ointment, Apply topically 2 times daily  blood glucose (NO BRAND SPECIFIED) lancets standard, Use to test blood sugar 2 times daily or as directed.  blood glucose (NO BRAND SPECIFIED) test strip, Use to test blood sugar 2 times daily or as directed.  blood glucose calibration (NO BRAND SPECIFIED) solution, Use to calibrate blood glucose monitor as needed as directed.  blood glucose monitoring (NO BRAND SPECIFIED) meter device kit, Use to test blood sugar 2 times daily or as directed.  cetirizine (ZYRTEC) 10 MG tablet, Take 10-20 mg by mouth daily  chlorhexidine (PERIDEX) 0.12 % solution, Swish and spit 15 mLs in mouth 2 times daily  fish oil-omega-3 fatty acids 1000 MG capsule, Take 1 g by mouth daily  fluticasone (FLONASE) 50 MCG/ACT spray, Spray 1 spray into both nostrils as needed for rhinitis or allergies  glipiZIDE (GLUCOTROL) 10 MG tablet, Take 1 tablet (10 mg) by mouth 2 times daily (before meals)  hydrOXYzine (ATARAX) 25 MG tablet, Take 25 mg by mouth 3 times daily as needed   ibuprofen (ADVIL/MOTRIN) 600 MG tablet, Take 1 tablet (600 mg) by mouth every 6 hours as needed for moderate pain  insulin pen needle (31G X 8 MM) 31G X 8 MM miscellaneous, Use 1 pen needles daily or as directed.  ipratropium (ATROVENT) 0.06 % spray, Spray 2 sprays into both nostrils as needed for rhinitis  ivermectin (STROMECTOL) 3 MG TABS tablet, Take 24 mg by mouth every 14 days X 2 doses  liraglutide (VICTOZA) 18 MG/3ML solution, 0.6 mg once daily for 1 week, then increase to 1.2 mg once daily  lisinopril-hydrochlorothiazide (PRINZIDE/ZESTORETIC) 20-25 MG tablet, Take 2 tablets by mouth daily (Patient  taking differently: Take 1 tablet by mouth daily )  metFORMIN (GLUCOPHAGE) 1000 MG tablet, Take 1 tablet (1,000 mg) by mouth 2 times daily (with meals)  SENNA-docusate sodium (SENNA S) 8.6-50 MG tablet, Take 1 tablet by mouth At Bedtime  sildenafil (VIAGRA) 100 MG tablet, Take 1 tablet (100 mg) by mouth daily as needed  [] oxyCODONE (ROXICODONE) 5 MG tablet, Take 1 tablet (5 mg) by mouth every 6 hours as needed for pain         ampicillin-sulbactam  3 g Intravenous Once     sodium chloride (PF)  3 mL Intracatheter Q8H     Allergies:   Allergies   Allergen Reactions     Vicodin [Hydrocodone-Acetaminophen]      itch     Social History:   Social History     Socioeconomic History     Marital status:      Spouse name: Not on file     Number of children: Not on file     Years of education: Not on file     Highest education level: Not on file   Occupational History     Not on file   Social Needs     Financial resource strain: Not hard at all     Food insecurity     Worry: Never true     Inability: Never true     Transportation needs     Medical: No     Non-medical: No   Tobacco Use     Smoking status: Former Smoker     Smokeless tobacco: Never Used   Substance and Sexual Activity     Alcohol use: Yes     Comment: occas     Drug use: No     Sexual activity: Not Currently   Lifestyle     Physical activity     Days per week: Not on file     Minutes per session: Not on file     Stress: Not on file   Relationships     Social connections     Talks on phone: Not on file     Gets together: More than three times a week     Attends Anabaptism service: Not on file     Active member of club or organization: Not on file     Attends meetings of clubs or organizations: Not on file     Relationship status:      Intimate partner violence     Fear of current or ex partner: Not on file     Emotionally abused: Not on file     Physically abused: Not on file     Forced sexual activity: Not on file   Other Topics Concern      Not on file   Social History Narrative     Not on file       REVIEW OF SYSTEMS  ROS reviewed and negative aside from listed in HPI    PHYSICAL EXAM  Vital Signs:   Vitals:    08/23/20 0922 08/23/20 1047 08/23/20 1140   BP: (!) 165/96 (!) 155/78 (!) 160/88   Pulse: 87 91 88   Resp: 18 16 16   Temp: 98.8  F (37.1  C)     TempSrc: Oral     SpO2: 97% 95% 98%   Weight: 114.9 kg (253 lb 4.9 oz)     Height: 1.829 m (6')         GEN: WD/WN male, NAD  HEENT: NC/AT, EOMI, PERRL, FOM elevated, noted swelling in left mandible and anterior neck, inferior border of the mandible is palpable on the right but not on the left  CV: RRR, warm and well-perfused  PULM: CTAB, breathing comfortably on room air  GI: Soft, ND/NT  MSK: VALERA, no peripheral extremity edema  NEURO: AAOx4, CN II-XII intact bilaterally  PSYCH: Appropriate mood and affect              IMAGING RESULTS (Include outside hospital results)  CT scan from 8/23/2020 reveals  left submandibular, sublingual, pterygomandibular, and lateral pharyngeal abscess       ASSESSMENT   50 year old male presents with left submandibular, sublingual, pterygomandibular, and lateral pharyngeal abscess with deviated airway    PLAN  Transcervical incisional and drainage urgently in [facility] OR      Discussed with chief resident and staff.    Manuel Trent, BECKY  Oral & Maxillofacial Surgery, PGY-1

## 2020-08-23 NOTE — ED TRIAGE NOTES
Pt presents ambulatory to triage from home with mother. Pt states had oral surgery 8/13 and tooth extraction 8/20. Pt states was seen here for infection at site this past Wednesday. Pt states over past 2 days has had worsening of swelling, pain and pain with swallowing. Still on oral abx.

## 2020-08-23 NOTE — PROGRESS NOTES
Admitted to 7C at 1340 from ED with chirf complaint of neck pain, sore throat, hoarse voice. UAL, voided on admission, not saved.   awake and alert  VS /90, HR 82, O2 sats 97% on RA.  Voice hoarse, throat sore, especially with swallowing.  Airway patent.    Oriented to room/call light/unit routines.  Mother is designated visitor and is at the bedside  Admission Profile Done  Med Rec Done    To OR at 1350.  Mother will be the primary contact during surgery via cell phone.

## 2020-08-23 NOTE — ANESTHESIA PREPROCEDURE EVALUATION
Anesthesia Pre-Procedure Evaluation    Patient: Low Juárez   MRN:     [id number] Gender:   male   Age:    50 year old :      1970        Preoperative Diagnosis: Submandibular abscess [K12.2]   Procedure(s):  INCISION AND DRAINAGE, SUBMANDIBLE     LABS:  CBC:   Lab Results   Component Value Date    WBC 14.3 (H) 2020    WBC 14.4 (H) 2020    HGB 15.6 2020    HGB 14.5 2020    HCT 45.8 2020    HCT 43.0 2020     2020     2020     BMP:   Lab Results   Component Value Date     2020     2020    POTASSIUM 4.0 2020    POTASSIUM 4.7 2020    CHLORIDE 100 2020    CHLORIDE 105 2020    CO2 32 2020    CO2 25 2020    BUN 11 2020    BUN 18 2020    CR 0.90 2020    CR 0.84 2020     (H) 2020     (H) 2020     COAGS:   Lab Results   Component Value Date    PTT 27 2020    INR 0.95 2020     POC:   Lab Results   Component Value Date     (H) 2020     OTHER:   Lab Results   Component Value Date    LACT 1.3 2020    A1C 12.2 (H) 2020    DRAKE 10.2 (H) 2020    ALBUMIN 3.4 2020    PROTTOTAL 8.6 2020    ALT 22 2020    AST 7 2020    ALKPHOS 114 2020    BILITOTAL 0.5 2020    TSH 1.57 2020    .0 (H) 2020        Preop Vitals    BP Readings from Last 3 Encounters:   20 (!) 151/89   20 (!) 143/76   20 120/80    Pulse Readings from Last 3 Encounters:   20 85   20 103   20 90      Resp Readings from Last 3 Encounters:   20 16   20 18   12 19    SpO2 Readings from Last 3 Encounters:   20 97%   20 94%   20 97%      Temp Readings from Last 1 Encounters:   20 37.1  C (98.8  F) (Oral)    Ht Readings from Last 1 Encounters:   20 1.829 m (6')      Wt Readings from Last 1 Encounters:   20 114.9 kg (253 lb  4.9 oz)    Estimated body mass index is 34.35 kg/m  as calculated from the following:    Height as of this encounter: 1.829 m (6').    Weight as of this encounter: 114.9 kg (253 lb 4.9 oz).     LDA:  Peripheral IV 08/23/20 Left Upper forearm (Active)   Number of days: 0       ETT (Active)   Number of days: 0        Past Medical History:   Diagnosis Date     Apnea, sleep      Fatty liver 8/18/2017     High cholesterol      Hyperlipidemia LDL goal <100 8/18/2017     Hypertension      Morbid obesity due to excess calories (H) 8/18/2017     Rhinitis      Type 2 diabetes mellitus without complication, without long-term current use of insulin (H) 1/31/2018      Past Surgical History:   Procedure Laterality Date     CHOLECYSTECTOMY       CYSTECTOMY PILONIDAL       SEPTOPLASTY, TURBINOPLASTY, COMBINED  3/20/2012    Procedure:COMBINED SEPTOPLASTY, TURBINOPLASTY; COMBINED SEPTOPLASTY,  Grafts, Left Turbinate Reduction ; Surgeon:SARABJIT COREAS; Location:[facility] OR      Allergies   Allergen Reactions     Vicodin [Hydrocodone-Acetaminophen]      itch        Anesthesia Evaluation     .             ROS/MED HX    ENT/Pulmonary:     (+)other ENT- submandibular and paratracheal abscess, , . .    Neurologic:  - neg neurologic ROS     Cardiovascular:     (+) hypertension----. : . . . :. . No previous cardiac testing      (-) CAD, CHF and stent   METS/Exercise Tolerance:     Hematologic:         Musculoskeletal:         GI/Hepatic:  - neg GI/hepatic ROS       Renal/Genitourinary:         Endo:     (+) type II DM .      Psychiatric:         Infectious Disease:         Malignancy:         Other:                         PHYSICAL EXAM:   Mental Status/Neuro: A/A/O   Airway: Facies: Challenging  Mallampati: IV   Respiratory: Auscultation: CTAB     Resp. Rate: Normal     Resp. Effort: Normal      CV: Rhythm: Regular  Rate: Age appropriate  Heart: Normal Sounds  Edema: None   Comments: Thick neck, beard, able to open mouth only 2 cm,  neck full and warm to touch                     Assessment:   ASA SCORE: 3 emergent           Plan:   Anes. Type:  General   Pre-Medication: None   Induction:  IV (Standard)   Airway: ETT; Oral; FOB   Access/Monitoring: PIV   Maintenance: Balanced     Postop Plan:   Postop Pain: Opioids  Postop Sedation/Airway: Not planned  Disposition: Inpatient/Admit     PONV Management:   Adult Risk Factors:, Postop Opioids   Prevention: Ondansetron     CONSENT: Direct conversation      Blood Products: Consent Deferred (Minimal Blood Loss)       Comments for Plan/Consent:  Plan:  Awake fiberoptic intubation, routine monitors, Surgeons in room at induction    MD Clarence Anthony MD

## 2020-08-24 LAB
BACTERIA SPEC CULT: NO GROWTH
CRP SERPL-MCNC: 150 MG/L (ref 0–8)
ERYTHROCYTE [DISTWIDTH] IN BLOOD BY AUTOMATED COUNT: 13.1 % (ref 10–15)
GLUCOSE BLDC GLUCOMTR-MCNC: 260 MG/DL (ref 70–99)
GLUCOSE BLDC GLUCOMTR-MCNC: 284 MG/DL (ref 70–99)
GLUCOSE BLDC GLUCOMTR-MCNC: 294 MG/DL (ref 70–99)
GLUCOSE BLDC GLUCOMTR-MCNC: 427 MG/DL (ref 70–99)
GLUCOSE BLDC GLUCOMTR-MCNC: 437 MG/DL (ref 70–99)
HBA1C MFR BLD: 7.5 % (ref 0–5.6)
HCT VFR BLD AUTO: 40.5 % (ref 40–53)
HGB BLD-MCNC: 13.6 G/DL (ref 13.3–17.7)
MCH RBC QN AUTO: 30.4 PG (ref 26.5–33)
MCHC RBC AUTO-ENTMCNC: 33.6 G/DL (ref 31.5–36.5)
MCV RBC AUTO: 91 FL (ref 78–100)
PLATELET # BLD AUTO: 345 10E9/L (ref 150–450)
RBC # BLD AUTO: 4.47 10E12/L (ref 4.4–5.9)
SPECIMEN SOURCE: NORMAL
WBC # BLD AUTO: 11.5 10E9/L (ref 4–11)

## 2020-08-24 PROCEDURE — 12000001 ZZH R&B MED SURG/OB UMMC

## 2020-08-24 PROCEDURE — 25000132 ZZH RX MED GY IP 250 OP 250 PS 637: Performed by: DENTIST

## 2020-08-24 PROCEDURE — 83036 HEMOGLOBIN GLYCOSYLATED A1C: CPT | Performed by: DENTIST

## 2020-08-24 PROCEDURE — 00000146 ZZHCL STATISTIC GLUCOSE BY METER IP

## 2020-08-24 PROCEDURE — 36415 COLL VENOUS BLD VENIPUNCTURE: CPT | Performed by: STUDENT IN AN ORGANIZED HEALTH CARE EDUCATION/TRAINING PROGRAM

## 2020-08-24 PROCEDURE — 25000132 ZZH RX MED GY IP 250 OP 250 PS 637: Performed by: ANESTHESIOLOGY

## 2020-08-24 PROCEDURE — 86140 C-REACTIVE PROTEIN: CPT | Performed by: STUDENT IN AN ORGANIZED HEALTH CARE EDUCATION/TRAINING PROGRAM

## 2020-08-24 PROCEDURE — 25000128 H RX IP 250 OP 636: Performed by: STUDENT IN AN ORGANIZED HEALTH CARE EDUCATION/TRAINING PROGRAM

## 2020-08-24 PROCEDURE — 25800030 ZZH RX IP 258 OP 636: Performed by: DENTIST

## 2020-08-24 PROCEDURE — 25000132 ZZH RX MED GY IP 250 OP 250 PS 637: Performed by: STUDENT IN AN ORGANIZED HEALTH CARE EDUCATION/TRAINING PROGRAM

## 2020-08-24 PROCEDURE — 83036 HEMOGLOBIN GLYCOSYLATED A1C: CPT | Performed by: STUDENT IN AN ORGANIZED HEALTH CARE EDUCATION/TRAINING PROGRAM

## 2020-08-24 PROCEDURE — 85027 COMPLETE CBC AUTOMATED: CPT | Performed by: STUDENT IN AN ORGANIZED HEALTH CARE EDUCATION/TRAINING PROGRAM

## 2020-08-24 PROCEDURE — 25000128 H RX IP 250 OP 636

## 2020-08-24 RX ORDER — DEXTROSE MONOHYDRATE 25 G/50ML
25-50 INJECTION, SOLUTION INTRAVENOUS
Status: DISCONTINUED | OUTPATIENT
Start: 2020-08-24 | End: 2020-08-24

## 2020-08-24 RX ORDER — GLIPIZIDE 10 MG/1
10 TABLET ORAL
Status: DISCONTINUED | OUTPATIENT
Start: 2020-08-24 | End: 2020-08-24

## 2020-08-24 RX ORDER — NICOTINE POLACRILEX 4 MG
15-30 LOZENGE BUCCAL
Status: DISCONTINUED | OUTPATIENT
Start: 2020-08-24 | End: 2020-08-24

## 2020-08-24 RX ORDER — GLIPIZIDE 10 MG/1
10 TABLET ORAL
Status: DISCONTINUED | OUTPATIENT
Start: 2020-08-24 | End: 2020-08-26 | Stop reason: HOSPADM

## 2020-08-24 RX ADMIN — OXYCODONE HYDROCHLORIDE 5 MG: 5 TABLET ORAL at 12:47

## 2020-08-24 RX ADMIN — AMPICILLIN SODIUM AND SULBACTAM SODIUM 3 G: 2; 1 INJECTION, POWDER, FOR SOLUTION INTRAMUSCULAR; INTRAVENOUS at 10:27

## 2020-08-24 RX ADMIN — METFORMIN HYDROCHLORIDE 1000 MG: 500 TABLET, FILM COATED ORAL at 17:32

## 2020-08-24 RX ADMIN — KETOROLAC TROMETHAMINE 30 MG: 30 INJECTION, SOLUTION INTRAMUSCULAR at 04:43

## 2020-08-24 RX ADMIN — IPRATROPIUM BROMIDE 2 SPRAY: 42 SPRAY NASAL at 08:12

## 2020-08-24 RX ADMIN — AMPICILLIN SODIUM AND SULBACTAM SODIUM 3 G: 2; 1 INJECTION, POWDER, FOR SOLUTION INTRAMUSCULAR; INTRAVENOUS at 17:33

## 2020-08-24 RX ADMIN — CHLORHEXIDINE GLUCONATE 0.12% ORAL RINSE 15 ML: 1.2 LIQUID ORAL at 08:14

## 2020-08-24 RX ADMIN — ATORVASTATIN CALCIUM 40 MG: 40 TABLET, FILM COATED ORAL at 08:14

## 2020-08-24 RX ADMIN — SODIUM CHLORIDE, POTASSIUM CHLORIDE, SODIUM LACTATE AND CALCIUM CHLORIDE 1000 ML: 600; 310; 30; 20 INJECTION, SOLUTION INTRAVENOUS at 10:33

## 2020-08-24 RX ADMIN — ACETAMINOPHEN 650 MG: 325 TABLET, FILM COATED ORAL at 12:47

## 2020-08-24 RX ADMIN — HYDROCHLOROTHIAZIDE: 25 TABLET ORAL at 08:13

## 2020-08-24 RX ADMIN — IPRATROPIUM BROMIDE 2 SPRAY: 42 SPRAY NASAL at 19:02

## 2020-08-24 RX ADMIN — OXYCODONE HYDROCHLORIDE 5 MG: 5 TABLET ORAL at 08:23

## 2020-08-24 RX ADMIN — DEXAMETHASONE SODIUM PHOSPHATE 8 MG: 4 INJECTION, SOLUTION INTRAMUSCULAR; INTRAVENOUS at 04:43

## 2020-08-24 RX ADMIN — OXYCODONE HYDROCHLORIDE 5 MG: 5 TABLET ORAL at 21:37

## 2020-08-24 RX ADMIN — SODIUM CHLORIDE, POTASSIUM CHLORIDE, SODIUM LACTATE AND CALCIUM CHLORIDE 1000 ML: 600; 310; 30; 20 INJECTION, SOLUTION INTRAVENOUS at 21:51

## 2020-08-24 RX ADMIN — METFORMIN HYDROCHLORIDE 1000 MG: 500 TABLET, FILM COATED ORAL at 08:14

## 2020-08-24 RX ADMIN — ACETAMINOPHEN 650 MG: 325 TABLET, FILM COATED ORAL at 17:32

## 2020-08-24 RX ADMIN — GLIPIZIDE 10 MG: 10 TABLET ORAL at 16:12

## 2020-08-24 RX ADMIN — AMPICILLIN SODIUM AND SULBACTAM SODIUM 3 G: 2; 1 INJECTION, POWDER, FOR SOLUTION INTRAMUSCULAR; INTRAVENOUS at 04:43

## 2020-08-24 RX ADMIN — KETOROLAC TROMETHAMINE 30 MG: 30 INJECTION, SOLUTION INTRAMUSCULAR at 21:56

## 2020-08-24 RX ADMIN — KETOROLAC TROMETHAMINE 30 MG: 30 INJECTION, SOLUTION INTRAMUSCULAR at 16:12

## 2020-08-24 RX ADMIN — KETOROLAC TROMETHAMINE 30 MG: 30 INJECTION, SOLUTION INTRAMUSCULAR at 10:28

## 2020-08-24 RX ADMIN — OXYCODONE HYDROCHLORIDE 5 MG: 5 TABLET ORAL at 17:32

## 2020-08-24 RX ADMIN — DEXAMETHASONE SODIUM PHOSPHATE 8 MG: 4 INJECTION, SOLUTION INTRAMUSCULAR; INTRAVENOUS at 12:56

## 2020-08-24 RX ADMIN — DEXAMETHASONE SODIUM PHOSPHATE 8 MG: 4 INJECTION, SOLUTION INTRAMUSCULAR; INTRAVENOUS at 21:37

## 2020-08-24 RX ADMIN — CHLORHEXIDINE GLUCONATE 0.12% ORAL RINSE 15 ML: 1.2 LIQUID ORAL at 19:02

## 2020-08-24 ASSESSMENT — ACTIVITIES OF DAILY LIVING (ADL)
ADLS_ACUITY_SCORE: 12
ADLS_ACUITY_SCORE: 10
ADLS_ACUITY_SCORE: 12
ADLS_ACUITY_SCORE: 12

## 2020-08-24 ASSESSMENT — PAIN DESCRIPTION - DESCRIPTORS
DESCRIPTORS: ACHING;DISCOMFORT
DESCRIPTORS: ACHING

## 2020-08-24 NOTE — PLAN OF CARE
[id number]    BP (!) 163/88 (BP Location: Right arm)   Pulse 83   Temp 98.3  F (36.8  C) (Oral)   Resp 15   Ht 1.829 m (6')   Wt 114.2 kg (251 lb 12.8 oz)   SpO2 93%   BMI 34.15 kg/m      Reason for admission: Submandibular abscess  Activity: SBA  Pain: Pt reporting mouth, neck, face pain. Given PRN IV dilaudid, torodol with moderate effect.   Neuro: AxOx4. Neuros intact.   Cardiac: WDL  Respiratory: Non labored breathing on RA. Capno intact, WDL.   GI/: Abdomen rounded, soft, nt, +BS. Voiding spontaneously in bedside urinal.   Diet: Full liquid diet  Lines: PIV intact infusing MIVF, site WDL  Wounds: Neck incision with primrose x2 with moderate serosanguinous output. Gauze in head dressing changed x1.   Labs/imaging: Reviewed. See chart.        Continue to monitor and follow POC

## 2020-08-24 NOTE — PLAN OF CARE
BP (!) 150/81 (BP Location: Right arm)   Pulse 109   Temp 98.9  F (37.2  C) (Oral)   Resp 18   Ht 1.829 m (6')   Wt 114.2 kg (251 lb 12.8 oz)   SpO2 95%   BMI 34.15 kg/m      Neuro: A&Ox4.   Cardiac: Tachy 109, -160-80-90's.   Respiratory: Sats 95% on RA.  GI/: Adequate urine output. No BM  Diet/appetite: Tolerating regular diet. Eating soft foods.  Activity:  Up independently. Ambulates in halls x1  Pain: Left neck/jaw pain managed with oxycodone, toradol and tylenol   Skin: Left neck incision dressing changed x1 with moderate amounts of red bloody drainage  LDA's: Left PIV with LR infusing at 100ml/hr.     Blood sugar 284, 437- insulin given per sliding scale. MD's aware    Plan: Continue with POC. Notify primary team with changes.

## 2020-08-24 NOTE — PLAN OF CARE
POD 1 I&D of facial abscess. IV Dilaudid 0.5mg given for pain. Having throat pain/discomfort and not wanting to swallow pills until later this morning. Able to tolerate sips of clears. L facial head dressing w/ gauze changed as needed. Voiding adequately. PIV infusing LR @ 100. Up with SBA. Continue with post-op cares.

## 2020-08-24 NOTE — PROVIDER NOTIFICATION
Blood glucose 437 at 12pm. Oral Surgery team notified. MD changed insulin sliding scale from Medium to High. Will administer insulin once pharmacy verified.

## 2020-08-24 NOTE — PROGRESS NOTES
ORAL & MAXILLOFACIAL SURGERY   PROGRESS NOTE  Low Juárez  MRN: [id number],  : 1970           ASSESSMENT:  50 year old male s/p jesica removal 8/15/20, extraction tooth #18 on 20 and transcervical I&D on 20. Patient reports overall feeling much better today. Decreased left sided swelling, moderate amount of drainage present on dressing.       PLAN:  - Track CBC and CRP   - Monitor patient pain control  - Monitor PO intake  - Possible discharge this afternoon    Please contact the OMFS resident on-call with questions or concerns.    Discussed with chief resident and staff.    Karen Aguilar DDS  Oral & Maxillofacial Surgery, PGY-1  Pager: [phone]    ____________________________________      SUBJECTIVE:  Patient reports overall feeling much better, reports that pain is well managed and feels that swelling has decreased.     PHYSICAL EXAM:   GEN: WD/WN male, NAD  HEENT: NC/AT, EOMI, PERRL, left sided submandibular edema, decreased from yesterday, drains and dressing in place, moderate amount of drainage present on dressing, intraoral incision appears WNL, sutures in place  CV: RRR, no M/G/R, warm and well-perfused  PULM: CTAB, breathing comfortably on room air  GI: Soft, ND/NT  MSK: VALERA, no peripheral extremity edema  NEURO: AAOx4, CN II-XII intact bilaterally  PSYCH: Appropriate mood and affect     LABS:   Lab Results   Component Value Date    WBC 14.3 2020     Lab Results   Component Value Date    RBC 5.12 2020     Lab Results   Component Value Date    HGB 15.6 2020     Lab Results   Component Value Date    HCT 45.8 2020     No components found for: MCT  Lab Results   Component Value Date    MCV 90 2020     Lab Results   Component Value Date    MCH 30.5 2020     Lab Results   Component Value Date    MCHC 34.1 2020     Lab Results   Component Value Date    RDW 13.0 2020     Lab Results   Component Value Date     2020          RADIOLOGY:  Reviewed

## 2020-08-25 LAB
GLUCOSE BLDC GLUCOMTR-MCNC: 224 MG/DL (ref 70–99)
GLUCOSE BLDC GLUCOMTR-MCNC: 263 MG/DL (ref 70–99)
GLUCOSE BLDC GLUCOMTR-MCNC: 303 MG/DL (ref 70–99)
GLUCOSE BLDC GLUCOMTR-MCNC: 384 MG/DL (ref 70–99)

## 2020-08-25 PROCEDURE — 25000128 H RX IP 250 OP 636

## 2020-08-25 PROCEDURE — 25000132 ZZH RX MED GY IP 250 OP 250 PS 637: Performed by: STUDENT IN AN ORGANIZED HEALTH CARE EDUCATION/TRAINING PROGRAM

## 2020-08-25 PROCEDURE — 25000132 ZZH RX MED GY IP 250 OP 250 PS 637: Performed by: DENTIST

## 2020-08-25 PROCEDURE — 25000128 H RX IP 250 OP 636: Performed by: STUDENT IN AN ORGANIZED HEALTH CARE EDUCATION/TRAINING PROGRAM

## 2020-08-25 PROCEDURE — 25000132 ZZH RX MED GY IP 250 OP 250 PS 637: Performed by: ANESTHESIOLOGY

## 2020-08-25 PROCEDURE — 00000146 ZZHCL STATISTIC GLUCOSE BY METER IP

## 2020-08-25 PROCEDURE — 12000001 ZZH R&B MED SURG/OB UMMC

## 2020-08-25 PROCEDURE — 25000132 ZZH RX MED GY IP 250 OP 250 PS 637

## 2020-08-25 RX ORDER — IBUPROFEN 800 MG/1
800 TABLET, FILM COATED ORAL EVERY 6 HOURS PRN
Status: DISCONTINUED | OUTPATIENT
Start: 2020-08-25 | End: 2020-08-26 | Stop reason: HOSPADM

## 2020-08-25 RX ADMIN — CHLORHEXIDINE GLUCONATE 0.12% ORAL RINSE 15 ML: 1.2 LIQUID ORAL at 08:16

## 2020-08-25 RX ADMIN — ATORVASTATIN CALCIUM 40 MG: 40 TABLET, FILM COATED ORAL at 08:16

## 2020-08-25 RX ADMIN — IPRATROPIUM BROMIDE 2 SPRAY: 42 SPRAY NASAL at 13:27

## 2020-08-25 RX ADMIN — ACETAMINOPHEN 650 MG: 325 TABLET, FILM COATED ORAL at 17:20

## 2020-08-25 RX ADMIN — OXYCODONE HYDROCHLORIDE 5 MG: 5 TABLET ORAL at 06:36

## 2020-08-25 RX ADMIN — HYDROXYZINE HYDROCHLORIDE 25 MG: 25 TABLET, FILM COATED ORAL at 08:32

## 2020-08-25 RX ADMIN — OXYCODONE HYDROCHLORIDE 5 MG: 5 TABLET ORAL at 10:16

## 2020-08-25 RX ADMIN — KETOROLAC TROMETHAMINE 30 MG: 30 INJECTION, SOLUTION INTRAMUSCULAR at 15:40

## 2020-08-25 RX ADMIN — OXYCODONE HYDROCHLORIDE 5 MG: 5 TABLET ORAL at 20:28

## 2020-08-25 RX ADMIN — AMPICILLIN SODIUM AND SULBACTAM SODIUM 3 G: 2; 1 INJECTION, POWDER, FOR SOLUTION INTRAMUSCULAR; INTRAVENOUS at 04:24

## 2020-08-25 RX ADMIN — ACETAMINOPHEN 650 MG: 325 TABLET, FILM COATED ORAL at 06:36

## 2020-08-25 RX ADMIN — METFORMIN HYDROCHLORIDE 1000 MG: 500 TABLET, FILM COATED ORAL at 08:15

## 2020-08-25 RX ADMIN — GLIPIZIDE 10 MG: 10 TABLET ORAL at 15:40

## 2020-08-25 RX ADMIN — IBUPROFEN 800 MG: 800 TABLET, FILM COATED ORAL at 20:24

## 2020-08-25 RX ADMIN — KETOROLAC TROMETHAMINE 30 MG: 30 INJECTION, SOLUTION INTRAMUSCULAR at 04:24

## 2020-08-25 RX ADMIN — KETOROLAC TROMETHAMINE 30 MG: 30 INJECTION, SOLUTION INTRAMUSCULAR at 10:09

## 2020-08-25 RX ADMIN — METFORMIN HYDROCHLORIDE 1000 MG: 500 TABLET, FILM COATED ORAL at 17:20

## 2020-08-25 RX ADMIN — OXYCODONE HYDROCHLORIDE 5 MG: 5 TABLET ORAL at 15:39

## 2020-08-25 RX ADMIN — GLIPIZIDE 10 MG: 10 TABLET ORAL at 08:16

## 2020-08-25 RX ADMIN — AMPICILLIN SODIUM AND SULBACTAM SODIUM 3 G: 2; 1 INJECTION, POWDER, FOR SOLUTION INTRAMUSCULAR; INTRAVENOUS at 10:09

## 2020-08-25 RX ADMIN — AMPICILLIN SODIUM AND SULBACTAM SODIUM 3 G: 2; 1 INJECTION, POWDER, FOR SOLUTION INTRAMUSCULAR; INTRAVENOUS at 17:20

## 2020-08-25 RX ADMIN — OMEPRAZOLE 20 MG: 20 CAPSULE, DELAYED RELEASE ORAL at 17:20

## 2020-08-25 RX ADMIN — AMPICILLIN SODIUM AND SULBACTAM SODIUM 3 G: 2; 1 INJECTION, POWDER, FOR SOLUTION INTRAMUSCULAR; INTRAVENOUS at 00:10

## 2020-08-25 RX ADMIN — ACETAMINOPHEN 650 MG: 325 TABLET, FILM COATED ORAL at 12:35

## 2020-08-25 RX ADMIN — OXYCODONE HYDROCHLORIDE 5 MG: 5 TABLET ORAL at 01:21

## 2020-08-25 RX ADMIN — HYDROCHLOROTHIAZIDE: 25 TABLET ORAL at 08:31

## 2020-08-25 RX ADMIN — AMOXICILLIN AND CLAVULANATE POTASSIUM 1 TABLET: 875; 125 TABLET, FILM COATED ORAL at 20:24

## 2020-08-25 RX ADMIN — HYDROCHLOROTHIAZIDE: 25 TABLET ORAL at 10:09

## 2020-08-25 RX ADMIN — IPRATROPIUM BROMIDE 2 SPRAY: 42 SPRAY NASAL at 08:16

## 2020-08-25 RX ADMIN — CHLORHEXIDINE GLUCONATE 0.12% ORAL RINSE 15 ML: 1.2 LIQUID ORAL at 20:24

## 2020-08-25 RX ADMIN — ACETAMINOPHEN 650 MG: 325 TABLET, FILM COATED ORAL at 00:10

## 2020-08-25 ASSESSMENT — PAIN DESCRIPTION - DESCRIPTORS
DESCRIPTORS: ACHING;DISCOMFORT
DESCRIPTORS: ACHING;DISCOMFORT

## 2020-08-25 ASSESSMENT — ACTIVITIES OF DAILY LIVING (ADL)
ADLS_ACUITY_SCORE: 12

## 2020-08-25 NOTE — PLAN OF CARE
Afebrile. BP improved after taking scheduled BP med and another one time dose of lisinopril-hydrochlorothiazide. Reports mouth/facial pain and discomfort when swallowing foods. Pain managed with oxycodone q 4 hrs prn, scheduled toradol and tylenol. Voiding adequate amounts. Had 1 BM today. Left neck drains with 2 penrose secure in place with serosanguinous drainage, dressing saturated and changed x3. Still on iv antibiotic. Up walking in halls independently.

## 2020-08-25 NOTE — OP NOTE
Oral & Maxillofacial Surgery Operative Note:     Procedure:   Transcervical incision and drainage of submental, left submandibular, sublingual, pterygomandibular, lateral pharyngeal spaces    Pre-Operative Diagnosis:   Left submandibular space abscess with airway deviation    Post-Operative Diagnosis: Same     STAFF SURGEON: Ward Veloz DDS MD  RESIDENT SURGEON: Washington Lopez DDS  : Manuel Trent DDS    ESTIMATED BLOOD LOSS:  10 cc    FLUID REPLACEMENT:  See anesthesia records     URINE OUTPUT: None    LOCAL ANESTHESIA: 10 mL total of 0.25% Marcaine with 1:200,000 epinephrine delivered via local infiltration and left GIBSON, long buccal nerve blocks.     SPECIMENS:   ID Type Source Tests Collected by Time Destination   1 : Left Neck Swab Fluid Neck ANAEROBIC BACTERIAL CULTURE, FLUID CULTURE AEROBIC BACTERIAL Ward Veloz DDS 8/23/2020  2:48 PM        COMPLICATIONS: None    DRAINS: None    INDICATIONS FOR PROCEDURE: Low Juárez is a 51 yo male with PMH significant for DM2, HTN, HLD who presented to the [facility] ED 8/23 with increasing facial swelling and concern for worsening odontogenic infection. Patient was previously admitted to the [facility] OMS service on 8/18/2020 for left mandibular cellulitis and had tooth 18 extracted in OMS clinic on 8/19/20. He was discharged home with PO Augmentin which he reports he was adherently taking, but still noted increasing facial swelling and odynophagia/dysphagia. A CT Neck was obtained which demonstrated a large fluid collection in patient's left submandibular, sublingual, pterygomandibular, lateral pharyngeal spaces with mild area deviation. Given concern for impending airway, decision made to take patient urgently to the OR. Risks/benefits of the procedure including pain, bleeding, swelling, damage to adjacent structure/teeth, paresthesia temporary vs permanent of the lingual, GIBSON nerve distributions, worsening infection, possible need for additional procedures were  discussed with the patient. Patient stated understanding and agreement with plan and informed consent was obtained.    DESCRIPTION OF PROCEDURE: The patient was met preoperatively and the treatment plan was confirmed with the patient.  He was brought back to operating the room by the Anesthesia Service. He was transferred to the table and an awake fiberoptic intubation was performed by the anesthesia service. The oral endotracheal tube was secured by the anesthesia service. The patient's arms were tucked and padded, and the oral cavity was prepped. A throat pack was placed. Local anesthesia was used extraorally and intraorally via local infiltration and chlorhexidine rinse was used in the oral cavity. The oral cavity was suctioned. The face was painted in usual sterile fashion.  Surgeons stepped out to scrub and returned to don sterile gown and gloves. At that time, the surgical site was squared off and a split sheet drape was placed.     First attention to left mandible. An 18 gauge needle on an aspirating syringe was introduced into the left submandibular space from the left neck and aspiration was attempted. No culturable sample was obtained. Next, a 15 blade was used to make an approximately 4 cm incision through skin and into subcutaneous tissue along a pre-marked area of the left anterior neck 3 cm beneath the inferior border of the mandible to help avoid the marginal branch of the facial nerve and to help camoflauge the incision in a neck crease. Electrocautery was then utilized to achieve hemostasis. Blunt dissection was carried out with a curved winnie to the inferior border of the mandible. Blunt dissection was continued onto the lingual surface of the mandible and into the sublingual and submandibular spaces. Purulent drainage was encountered and a culture swab sample was obtained. Blunt dissection with continued into the submental space anteriorly and posteriorly into the pterygomandibular space with  additional seropurulent drainage encountered. Next, 15 blade utilized to create a sulcular incision from DB of site 18 to mesial of 20. FTMP with periosteal elevator. The same was performed along the lingual aspect of the mandible where the FTMP flap was carefully elevated with a periosteal elevator and connected to the previously made extraoral dissection on the lingual surface of the mandible in a subperiosteal plane. Previous extraction site 18 was curetted of existing granulation tissue. The extraction socket was curetted and irrigated with sterile saline. Next, through an intraoral approach, blunt dissection was carried out into the lateral pharyngeal space without any purulent drainage noted. Copious irrigation with sterile saline of the intraoral and extraoral dissections was completed until the irrigation solution was noted to run clear. Two 1/4 inch penrose drain were introduced into the submental space and the left submandibular space respectively through the left neck incision and sutured in place with 2-0 nylon suture. The intraoral incision was then loosely re-approximated with 3-0 chromic to facilitate any additional drainage.      The patient's oral cavity was suctioned. The throat pack was removed. All counts were noted to be correct. Fluffs and neuronetting were applied to the left neck as a dressing. The patient was turned over to the Anesthesia Service and was awakened in the OR and transferred stable to the PACU.    Dr. Veloz was present for all key and critical portions of the procedure.    Washington Lopez DDS

## 2020-08-25 NOTE — PROVIDER NOTIFICATION
. MD's notified. Insulin sliding scale changed from High to Very High. Left neck dressing changed 2 times this shift with moderate amount of serosang drainage.

## 2020-08-25 NOTE — PLAN OF CARE
Afebrile. BP remains elevated, FYI paged team this morning. OVSS on RA. Pt endorses mouth/facial pain. Well controlled with q4hr oxycodone (given x2) and scheduled toradol and tylenol. Pt reports slight nausea, declined medical interventions. Snack provided prior to pain meds, which helped settle stomach. Voiding adequately, no BM overnight. Drains secure in place with serosanguinous drainage, dressing saturated and changed x1. IVMF discontinued tonight, PIV running at TKO. Discharge once on PO antibiotics. Continue to monitor.     Problem: Adult Inpatient Plan of Care  Goal: Optimal Comfort and Wellbeing  8/25/2020 0536 by Otilia Mcneil, RN  Outcome: No Change     Problem: Pain (Surgery Nonspecified)  Goal: Acceptable Pain Control  8/25/2020 0536 by Otilia Mcneil, RN  Outcome: No Change     Problem: Postoperative Nausea and Vomiting (Surgery Nonspecified)  Goal: Nausea and Vomiting Relief  8/25/2020 0536 by Otilia Mcneil, RN  Outcome: No Change     Problem: Postoperative Urinary Retention (Surgery Nonspecified)  Goal: Effective Urinary Elimination  8/25/2020 0536 by Otilia Mcneil, RN  Outcome: No Change

## 2020-08-25 NOTE — PLAN OF CARE
HTN, within parameters, AOVSS on RA. A&Ox4, denies nausea. Pain in L side of face comfortably managed with PRN Oxy, scheduled Tylenol and scheduled Toradol. Drains in L face draining small amount of mucoid serosanguineous output, dressing changed x2, mesh covering holding in place. Pt reports some continued L ear discomfort. Up ad renay in room, voiding adequately. BM this shift. IVMF infusing into L PIV. Regular diet, Bedtime Blood sugar 294, sliding scale insulin given. Plan to switch to PO Abx soon then discharge to home. Cont POC.

## 2020-08-25 NOTE — PROGRESS NOTES
ORAL & MAXILLOFACIAL SURGERY   PROGRESS NOTE  Low Juárez,  MRN: [id number],  : 1970           ASSESSMENT:  50 year old male s/p jesica removal 8/15/20, extraction tooth #18 on 20 and transcervical I&D on 20, POD2. Patient reports overall feeling much better today, feels as if his pain is well controlled. Decreased left sided swelling, minimal amount of drainage present on dressing.         PLAN:  - Monitor patient's glucose and BP  - Antibiotics PO  - Continue to monitor pain control and drainage output   - Possible discharge this evening     Please contact the OMFS resident on-call with questions or concerns.     Discussed with chief resident and staff.     Karen Aguilar DDS  Oral & Maxillofacial Surgery, PGY-1  Pager: [phone]    ____________________________________      SUBJECTIVE:  Patient indicates that he continues to improve. He feels as if his pain is well controlled and has advanced to a regular diet, has some pain with eating chewy foods. Patient notes a decrease in discharge from drains and his concerned about the possibility of going home today.          PHYSICAL EXAM:   GEN: WD/WN male, NAD  HEENT: NC/AT, EOMI, PERRL, minimal left sided submandibular edema, drains and dressing in place, mild amount of drainage present on dressing. Intraoral incision appears WNL, sutures in place  CV: RRR, no M/G/R, warm and well-perfused  PULM: CTAB, breathing comfortably on room air  GI: Soft, ND/NT  MSK: VALERA, no peripheral extremity edema  NEURO: AAOx4, CN II-XII intact bilaterally  PSYCH: Appropriate mood and affect     LABS:   Lab Results   Component Value Date    WBC 11.5 2020     Lab Results   Component Value Date    RBC 4.47 2020     Lab Results   Component Value Date    HGB 13.6 2020     Lab Results   Component Value Date    HCT 40.5 2020     No components found for: MCT  Lab Results   Component Value Date    MCV 91 2020     Lab Results   Component Value  Date    MCH 30.4 08/24/2020     Lab Results   Component Value Date    MCHC 33.6 08/24/2020     Lab Results   Component Value Date    RDW 13.1 08/24/2020     Lab Results   Component Value Date     08/24/2020         RADIOLOGY:  Reviewed

## 2020-08-26 ENCOUNTER — PATIENT OUTREACH (OUTPATIENT)
Dept: CARE COORDINATION | Facility: CLINIC | Age: 50
End: 2020-08-26

## 2020-08-26 VITALS
WEIGHT: 251.8 LBS | TEMPERATURE: 97.8 F | DIASTOLIC BLOOD PRESSURE: 86 MMHG | HEIGHT: 72 IN | RESPIRATION RATE: 18 BRPM | HEART RATE: 73 BPM | OXYGEN SATURATION: 97 % | BODY MASS INDEX: 34.1 KG/M2 | SYSTOLIC BLOOD PRESSURE: 164 MMHG

## 2020-08-26 LAB
GLUCOSE BLDC GLUCOMTR-MCNC: 193 MG/DL (ref 70–99)
GLUCOSE BLDC GLUCOMTR-MCNC: 239 MG/DL (ref 70–99)
GLUCOSE BLDC GLUCOMTR-MCNC: 272 MG/DL (ref 70–99)
GLUCOSE BLDC GLUCOMTR-MCNC: 330 MG/DL (ref 70–99)

## 2020-08-26 PROCEDURE — 25000132 ZZH RX MED GY IP 250 OP 250 PS 637: Performed by: ANESTHESIOLOGY

## 2020-08-26 PROCEDURE — 25000132 ZZH RX MED GY IP 250 OP 250 PS 637: Performed by: DENTIST

## 2020-08-26 PROCEDURE — 25000132 ZZH RX MED GY IP 250 OP 250 PS 637: Performed by: STUDENT IN AN ORGANIZED HEALTH CARE EDUCATION/TRAINING PROGRAM

## 2020-08-26 PROCEDURE — 25000132 ZZH RX MED GY IP 250 OP 250 PS 637

## 2020-08-26 PROCEDURE — 00000146 ZZHCL STATISTIC GLUCOSE BY METER IP

## 2020-08-26 RX ORDER — CHLORHEXIDINE GLUCONATE ORAL RINSE 1.2 MG/ML
15 SOLUTION DENTAL 2 TIMES DAILY
Qty: 473 ML | Refills: 1 | Status: SHIPPED | OUTPATIENT
Start: 2020-08-26 | End: 2021-05-25

## 2020-08-26 RX ORDER — OXYCODONE HCL 5 MG/5 ML
5 SOLUTION, ORAL ORAL EVERY 6 HOURS PRN
Qty: 30 ML | Refills: 0 | Status: SHIPPED | OUTPATIENT
Start: 2020-08-26 | End: 2020-08-29

## 2020-08-26 RX ORDER — ACETAMINOPHEN 325 MG/1
650 TABLET ORAL EVERY 6 HOURS PRN
Qty: 56 TABLET | Refills: 0 | Status: SHIPPED | OUTPATIENT
Start: 2020-08-26 | End: 2020-09-02

## 2020-08-26 RX ORDER — IBUPROFEN 600 MG/1
600 TABLET, FILM COATED ORAL EVERY 6 HOURS PRN
Qty: 28 TABLET | Refills: 0 | Status: SHIPPED | OUTPATIENT
Start: 2020-08-26 | End: 2021-05-25 | Stop reason: ALTCHOICE

## 2020-08-26 RX ADMIN — ATORVASTATIN CALCIUM 40 MG: 40 TABLET, FILM COATED ORAL at 07:58

## 2020-08-26 RX ADMIN — GLIPIZIDE 10 MG: 10 TABLET ORAL at 07:58

## 2020-08-26 RX ADMIN — OXYCODONE HYDROCHLORIDE 5 MG: 5 TABLET ORAL at 06:05

## 2020-08-26 RX ADMIN — METFORMIN HYDROCHLORIDE 1000 MG: 500 TABLET, FILM COATED ORAL at 07:58

## 2020-08-26 RX ADMIN — OXYCODONE HYDROCHLORIDE 5 MG: 5 TABLET ORAL at 16:27

## 2020-08-26 RX ADMIN — IBUPROFEN 800 MG: 800 TABLET, FILM COATED ORAL at 16:16

## 2020-08-26 RX ADMIN — GLIPIZIDE 10 MG: 10 TABLET ORAL at 16:27

## 2020-08-26 RX ADMIN — CETIRIZINE HYDROCHLORIDE 20 MG: 10 TABLET, FILM COATED ORAL at 07:58

## 2020-08-26 RX ADMIN — ACETAMINOPHEN 650 MG: 325 TABLET, FILM COATED ORAL at 12:33

## 2020-08-26 RX ADMIN — ACETAMINOPHEN 650 MG: 325 TABLET, FILM COATED ORAL at 00:25

## 2020-08-26 RX ADMIN — OXYCODONE HYDROCHLORIDE 5 MG: 5 TABLET ORAL at 12:33

## 2020-08-26 RX ADMIN — OMEPRAZOLE 20 MG: 20 CAPSULE, DELAYED RELEASE ORAL at 07:58

## 2020-08-26 RX ADMIN — HYDROCHLOROTHIAZIDE: 25 TABLET ORAL at 07:58

## 2020-08-26 RX ADMIN — IBUPROFEN 800 MG: 800 TABLET, FILM COATED ORAL at 09:00

## 2020-08-26 RX ADMIN — CHLORHEXIDINE GLUCONATE 0.12% ORAL RINSE 15 ML: 1.2 LIQUID ORAL at 07:58

## 2020-08-26 RX ADMIN — IPRATROPIUM BROMIDE 2 SPRAY: 42 SPRAY NASAL at 07:58

## 2020-08-26 RX ADMIN — ACETAMINOPHEN 650 MG: 325 TABLET, FILM COATED ORAL at 06:05

## 2020-08-26 RX ADMIN — AMOXICILLIN AND CLAVULANATE POTASSIUM 1 TABLET: 875; 125 TABLET, FILM COATED ORAL at 07:58

## 2020-08-26 RX ADMIN — OXYCODONE HYDROCHLORIDE 5 MG: 5 TABLET ORAL at 00:25

## 2020-08-26 ASSESSMENT — PAIN DESCRIPTION - DESCRIPTORS
DESCRIPTORS: ACHING;DISCOMFORT
DESCRIPTORS: DISCOMFORT;ACHING

## 2020-08-26 ASSESSMENT — ACTIVITIES OF DAILY LIVING (ADL)
ADLS_ACUITY_SCORE: 12

## 2020-08-26 NOTE — PLAN OF CARE
"HTN, within parameters, AOVSS on RA. A&Ox4, denies nausea. Pain in L side of mouth/face controlled with PRN Oxycodone, PRN Ibuprofen, and scheduled Tylenol. Penrose drains x2 in L neck unclamped, having small/moderate amount of pink tinged, thick drainage, dressing changed x2. Swelling in L side of face/neck persists. Regular diet, appetite good. Blood sugars remain elevated, last 330, sliding scale insulin given. Up ad renay in room, voiding adequately, not always saving. Tolerating PO medication transition well, although reports he is having some trouble with this. Pt still reports having difficulty swallowing, and \"weird sensation in throat from drainage\", pt reports he does not feel ready to discharge this AM, Oral surgery team notified and awaiting team to come to bedside to discuss plan of care and possible discharge home this afternoon. Cont POC.  "

## 2020-08-26 NOTE — PLAN OF CARE
Went over discharge paperwork with patient and his mother; all questions answered. Ambulated to discharge pharm before going home with parents. PIV removed. All belongings taken with pt. Continue with POC.

## 2020-08-26 NOTE — DISCHARGE SUMMARY
[facility], [place]    Oral & Maxillofacial Surgery - Discharge Summary    Date of Admission:  8/23/2020  Date of Discharge:  8/26/2020  Discharging Attending Provider: Dr. Veloz  Discharge Service: Oral & Maxillofacial Surgery    Discharge Diagnoses   (K12.2) Submandibular abscess  (primary encounter diagnosis)  Plan: amoxicillin-clavulanate (AUGMENTIN) 875-125 MG         tablet, acetaminophen (TYLENOL) 325 MG tablet,         chlorhexidine (PERIDEX) 0.12 % solution,         ibuprofen (ADVIL/MOTRIN) 600 MG tablet    (K12.2) Submandibular space infection  Plan: SARS-CoV-2 COVID-19 Virus (Coronavirus) RT-PCR,        Glucose by meter, Glucose by meter, Glucose by         meter, Glucose by meter, Glucose by meter,         Glucose by meter, Glucose by meter, Hemoglobin         A1c, Glucose by meter, Glucose by meter,         Glucose by meter, Glucose by meter, Glucose by         meter, Glucose by meter, Glucose by meter,         Glucose by meter, Glucose by meter    (Z87.891) Personal history of tobacco use, presenting hazards to health    (Z03.818) Encntr for obs for susp expsr to ot biolg agents ruled out    Follow-ups Needed After Discharge   Follow up in [facility]th [facility] of [facility], on Friday 8/28/20 at 2:00pm    Hospital Course   Low Juárez was admitted on 8/23/2020 for submandibular, sublingual and lateral pharyngeal abscess. Patient was brought to the OR on 8/23/20 for transcervical incision and drainage. He was admitted after the procedure to monitor PO intake and pain control. On 8/24/20 patient endorsed overall feeling better but was anxious about ability to take PO medications, pain control and amount of physical activity that going home may require.  On 8/25/20 patient continues to improve but still expresses that he is anxious about going home and would like to trial taking PO medications before being discharged. Began PO acetaminophen, ibuprofen, oxycodone and  Augmentin on evening of 8/25/20. On 8/26/20 patient is tolerating PO pain medications and antibiotics well, discussed with patient that we would be following him outpatient closely. Patient discharged on 8/26/20 and planned for follow up in OM[facility] Clinic on Friday 8/28/20.  The following problems were addressed during his hospitalization:    # submandibular abscess    Consultations This Hospital Stay   MEDICATION HISTORY IP PHARMACY CONSULT    Code Status   Full Code       The patient was discussed with Dr. Magdiel Aguilar, YENNYS  Oral & Maxillofacial Surgery, PGY-1  ______________________________________________________________________    Physical Exam   Vital Signs: Temp: 97.7  F (36.5  C) Temp src: Oral BP: (!) 149/88 Pulse: 74   Resp: 14 SpO2: 97 % O2 Device: None (Room air)    Weight: 251 lbs 12.8 oz    GEN: WD/WN male, NAD  HEENT: NC/AT, EOMI, PERRL, minimal left sided submandibular edema, extraoral drains in place, minimal clear discharge, ADDISON 30mm, intraoral sutures in place  CV: RRR, no M/G/R  PULM: CTAB, breathing comfortably on room air  GI: Soft, ND/NT  MSK: VALERA, no peripheral extremity edema  NEURO: AAOx4, CN II-XII intact bilaterally  PSYCH: Appropriate mood and affect     Significant Results and Procedures   Most Recent 3 CBC's:  Recent Labs   Lab Test 08/24/20  0735 08/23/20  0942 08/18/20  1514   WBC 11.5* 14.3* 14.4*   HGB 13.6 15.6 14.5   MCV 91 90 91    381 253       Pending Results   These results will be followed up by   Unresulted Labs Ordered in the Past 30 Days of this Admission     Date and Time Order Name Status Description    8/23/2020 1451 Fluid Culture Aerobic Bacterial Preliminary     8/23/2020 1451 Anaerobic bacterial culture Preliminary     8/23/2020 0931 Blood culture Preliminary              Primary Care Physician   Gilson Tello    Discharge Disposition   Discharged to home  Condition at discharge: Stable    Discharge Orders      Reason for your hospital stay     You were in the hospital for management of symptoms after drainage of submandibular abscess in the operating room on 8/23/20     Activity    Your activity upon discharge: limit activity     Follow Up and recommended labs and tests    Follow up in [facility] on Friday 8/28/20 at 2:00pm     Wound care and dressings    You have an intraoral incision and dissolvable sutures that can be kept clean by using prescribed chlorhexidine mouth rinse     Tubes and drains    You are going home with extraoral drains (x2) on the left submandibular area. They are held in using sutures, can keep gauze and netting around drains to keep area clean.     Diet    Follow this diet upon discharge: soft diet for one week, can slowly advance after a week     Discharge Medications   Current Discharge Medication List      START taking these medications    Details   acetaminophen (TYLENOL) 325 MG tablet Take 2 tablets (650 mg) by mouth every 6 hours as needed for mild pain  Qty: 56 tablet, Refills: 0    Associated Diagnoses: Submandibular abscess         CONTINUE these medications which have CHANGED    Details   amoxicillin-clavulanate (AUGMENTIN) 875-125 MG tablet Take 1 tablet by mouth 2 times daily  Qty: 14 tablet, Refills: 0    Associated Diagnoses: Submandibular abscess      chlorhexidine (PERIDEX) 0.12 % solution Swish and spit 15 mLs in mouth 2 times daily  Qty: 473 mL, Refills: 1    Associated Diagnoses: Submandibular abscess      ibuprofen (ADVIL/MOTRIN) 600 MG tablet Take 1 tablet (600 mg) by mouth every 6 hours as needed for moderate pain or pain  Qty: 28 tablet, Refills: 0    Associated Diagnoses: Submandibular abscess         CONTINUE these medications which have NOT CHANGED    Details   aspirin (ASA) 81 MG tablet Take 81 mg by mouth daily      atorvastatin (LIPITOR) 40 MG tablet Take 1 tablet (40 mg) by mouth daily  Qty: 90 tablet, Refills: 3    Associated Diagnoses: Hyperlipidemia LDL goal <100       augmented betamethasone dipropionate (DIPROLENE) 0.05 % external lotion Apply topically 2 times daily  Qty: 60 mL, Refills: 6    Associated Diagnoses: Psoriasis      blood glucose (NO BRAND SPECIFIED) lancets standard Use to test blood sugar 2 times daily or as directed.  Qty: 200 each, Refills: 3    Associated Diagnoses: Type 2 diabetes mellitus without complication, without long-term current use of insulin (H)      blood glucose (NO BRAND SPECIFIED) test strip Use to test blood sugar 2 times daily or as directed.  Qty: 200 each, Refills: 3    Associated Diagnoses: Type 2 diabetes mellitus without complication, without long-term current use of insulin (H)      blood glucose calibration (NO BRAND SPECIFIED) solution Use to calibrate blood glucose monitor as needed as directed.  Qty: 1 each, Refills: 3    Associated Diagnoses: Type 2 diabetes mellitus without complication, without long-term current use of insulin (H)      blood glucose monitoring (NO BRAND SPECIFIED) meter device kit Use to test blood sugar 2 times daily or as directed.  Qty: 1 kit, Refills: 0    Associated Diagnoses: Type 2 diabetes mellitus without complication, without long-term current use of insulin (H)      cetirizine (ZYRTEC) 10 MG tablet Take 10-20 mg by mouth daily      fluticasone (FLONASE) 50 MCG/ACT spray Spray 1 spray into both nostrils as needed for rhinitis or allergies      glipiZIDE (GLUCOTROL) 10 MG tablet Take 1 tablet (10 mg) by mouth 2 times daily (before meals)  Qty: 180 tablet, Refills: 1    Associated Diagnoses: Type 2 diabetes mellitus without complication, without long-term current use of insulin (H)      glucosamine-chondroitin 500-400 MG CAPS per capsule Take 2 capsules by mouth daily      hydrOXYzine (ATARAX) 25 MG tablet Take 25 mg by mouth 3 times daily as needed       insulin pen needle (31G X 8 MM) 31G X 8 MM miscellaneous Use 1 pen needles daily or as directed.  Qty: 100 each, Refills: 3    Comments: Ok to  substitute with alternative pen needle size/gauge if needed.  Associated Diagnoses: Type 2 diabetes mellitus without complication, without long-term current use of insulin (H)      ipratropium (ATROVENT) 0.06 % spray Spray 2 sprays into both nostrils as needed for rhinitis      liraglutide (VICTOZA) 18 MG/3ML solution Inject 1.2 mg Subcutaneous daily      lisinopril-hydrochlorothiazide (PRINZIDE/ZESTORETIC) 20-25 MG tablet Take 2 tablets by mouth daily  Qty: 180 tablet, Refills: 3    Associated Diagnoses: Benign essential hypertension      metFORMIN (GLUCOPHAGE) 1000 MG tablet Take 1 tablet (1,000 mg) by mouth 2 times daily (with meals)  Qty: 180 tablet, Refills: 3    Associated Diagnoses: Type 2 diabetes mellitus without complication, without long-term current use of insulin (H)      SENNA-docusate sodium (SENNA S) 8.6-50 MG tablet Take 1 tablet by mouth At Bedtime  Qty: 20 tablet, Refills: 0    Associated Diagnoses: Submandibular space infection      sildenafil (VIAGRA) 100 MG tablet Take 1 tablet (100 mg) by mouth daily as needed  Qty: 20 tablet, Refills: 11    Associated Diagnoses: Male erectile disorder         STOP taking these medications       oxyCODONE (ROXICODONE) 5 MG tablet Comments:   Reason for Stopping:             Allergies   Allergies   Allergen Reactions     Vicodin [Hydrocodone-Acetaminophen]      itch

## 2020-08-26 NOTE — PROGRESS NOTES
ORAL & MAXILLOFACIAL SURGERY   PROGRESS NOTE  Low Juárez  MRN: [id number],  : 1970               ASSESSMENT:  50 year old male s/p jesica removal 8/15/20, extraction tooth #18 on 20 and transcervical I&D on 20, POD3. Patient continues to improve, discontinued IV medications, patient now on PO Augmentin, acetaminophen, ibuprofen and oxycodone. Decreased left sided swelling, minimal amount of clear drainage present on dressing.     PLAN:  - Continue PO medications  - Continue to monitor pain control and drainage output   - Possible discharge this afternoon     Please contact the OMFS resident on-call with questions or concerns.     Discussed with chief resident and staff.     Karen Aguilar DDS  Oral & Maxillofacial Surgery, PGY-1    ____________________________________       SUBJECTIVE:  Patient indicates that he feels well, notes that the oral medications are different than the IV medications but indicates that his pain is still controlled.       PHYSICAL EXAM:   GEN: WD/WN male, NAD  HEENT: NC/AT, EOMI, PERRL, ADDISON increased ~30mm, minimal left sided submandibular edema, drains and dressing in place, minimal amount of clear drainage present. FOM soft, intraoral sutures in place.   CV: RRR, no M/G/R, warm and well-perfused  PULM: CTAB, breathing comfortably on room air  GI: Soft, ND/NT  MSK: VALERA, no peripheral extremity edema  NEURO: AAOx4, CN II-XII intact bilaterally  PSYCH: Appropriate mood and affect     LABS:   Reviewed    RADIOLOGY:  Reviewed

## 2020-08-26 NOTE — PROGRESS NOTES
ORAL & MAXILLOFACIAL SURGERY   PROGRESS NOTE  Low Juárez  MRN: [id number],  : 1970           ASSESSMENT:  50 year old male s/p jesica removal 8/15/20, extraction tooth #18 on 20 and transcervical I&D on 20, POD2. Patient continues to endorse that he is overall feeling well, feels as if his pain is controlled. Decreased left sided swelling, minimal amount of drainage present on dressing.    PLAN:  - Discontinue IV antibiotics and pain medications  - Begin PO Augmentin and ibuprofen  - Continue to monitor pain control and drainage output   - Possible discharge tomorrow, 20     Please contact the OMFS resident on-call with questions or concerns.     Discussed with chief resident and staff.     Karen Aguilar DDS  Oral & Maxillofacial Surgery, PGY-1  ____________________________________      SUBJECTIVE:  Patient indicates that he continues to improve. He feels as if his pain is well controlled and has advanced to a regular diet, has some pain with larger/chewy foods. Patient notes a decrease in discharge from drains, would like to stay in the hospital to try PO antibiotics.      PHYSICAL EXAM:   GEN: WD/WN male, NAD  HEENT: NC/AT, EOMI, PERRL, minimal left sided submandibular edema, drains and dressing in place, mild amount of drainage present on dressing. Intraoral incision appears WNL, sutures in place  CV: RRR, no M/G/R, warm and well-perfused  PULM: CTAB, breathing comfortably on room air  GI: Soft, ND/NT  MSK: VALERA, no peripheral extremity edema  NEURO: AAOx4, CN II-XII intact bilaterally  PSYCH: Appropriate mood and affect     LABS:   Lab Results   Component Value Date    WBC 11.5 2020     Lab Results   Component Value Date    RBC 4.47 2020     Lab Results   Component Value Date    HGB 13.6 2020     Lab Results   Component Value Date    HCT 40.5 2020     No components found for: MCT  Lab Results   Component Value Date    MCV 91 2020     Lab Results    Component Value Date    MCH 30.4 08/24/2020     Lab Results   Component Value Date    MCHC 33.6 08/24/2020     Lab Results   Component Value Date    RDW 13.1 08/24/2020     Lab Results   Component Value Date     08/24/2020         RADIOLOGY:  Reviewed

## 2020-08-26 NOTE — PLAN OF CARE
BP (!) 144/90 (BP Location: Left arm)   Pulse 65   Temp 97.7  F (36.5  C) (Oral)   Resp 18   Ht 1.829 m (6')   Wt 114.2 kg (251 lb 12.8 oz)   SpO2 96%   BMI 34.15 kg/m    Assumed care from [id number]. Hypertensive but not within notifying parameters, all other VSS on RA. A&Ox4. Pain managed with scheduled tylenol and PRN oxycodone & Advil. Denies nausea, tolerating regular diet, pt states he prefers softer foods as they are easier to eat. BGs checked and corrected overnight. Left PIV SL. Oral penrose drains unclamped with good amount of thick milky serosanguinous drainage, dressing changed x2. Inside of oral cavity WNL. Pt states he's now able to taste more puss from inside of mouth and was worried about airway patency, MD notified & aware states this is to be expected. Mole removal site on back with mepilex, changed and site WNL. Passing gas, last BM 8/25. Voiding spont, not saving. Up ad renay. Possible discharge in AM if able to continue to tolerate oral meds. Continue POC.

## 2020-08-27 ENCOUNTER — PATIENT OUTREACH (OUTPATIENT)
Dept: CARE COORDINATION | Facility: CLINIC | Age: 50
End: 2020-08-27

## 2020-08-27 DIAGNOSIS — Z71.89 OTHER SPECIFIED COUNSELING: ICD-10-CM

## 2020-08-27 NOTE — LETTER
[place] CARE COORDINATION  [address] [facility] [address]  [place] [facility] [place] [address]  August 28, 2020      Low Juárez  [address]  [place] [facility] [facility] [address]      Dear Low,    I am a clinic community health worker who works with Gilson Tello MD at [facility]. I have been trying to reach you recently to introduce Clinic Care Coordination and to see if there was anything I could assist you with.  Below is a description of clinic care coordination and how I can further assist you.      The clinic care coordination team is made up of a registered nurse,  and community health worker who understand the health care system. The goal of clinic care coordination is to help you manage your health and improve access to the health care system in the most efficient manner. The team can assist you in meeting your health care goals by providing education, coordinating services, strengthening the communication among your providers and supporting you with any resource needs.    Please feel free to contact me at [phone] with any questions or concerns. We are focused on providing you with the highest-quality healthcare experience possible and that all starts with you.     Sincerely,     STEPHANIE Pozo  Clinic Care Coordination  [facility] : [place], [place], and [place]  Phone: [phone]

## 2020-08-27 NOTE — PROGRESS NOTES
Patient has clinic visit within 24-48 hours of Discharge so no post DC follow up call is needed    Follow up in [facility] on 7th Floor of [facility] on Friday 8/28/20 at 2:00pm

## 2020-08-27 NOTE — PROGRESS NOTES
Clinic Care Coordination Contact  [facility]/Voicemail       Clinical Data: Care Coordinator Outreach  Outreach attempted x 1.  Left message on patient's voicemail with call back information and requested return call.    Chart Review:  Referral made from discharge.  Admitted for swelling after recent surgery.  Follow up with [facility] if swelling continues    Plan:  Care Coordinator will try to reach patient again in 1-2 business days.    STEPHANIE Pozo  Clinic Care Coordination  [facility] : [place] [address], [place], and [place]  Phone: [phone]

## 2020-08-28 LAB
BACTERIA SPEC CULT: ABNORMAL
Lab: ABNORMAL
SPECIMEN SOURCE: ABNORMAL

## 2020-08-28 NOTE — PROGRESS NOTES
Clinic Care Coordination Contact  [facility]/Voicemail       Clinical Data: Care Coordinator Outreach  Outreach attempted x 2.  Left message on patient's voicemail with call back information and requested return call.    Plan: Care Coordinator will send care coordination introduction letter with care coordinator contact information and explanation of care coordination services via mail. Care Coordinator will do no further outreaches at this time.    STEPHANIE Pozo  Clinic Care Coordination  [facility] : [place] [address], [place], and [place]  Phone: [phone]

## 2020-08-29 LAB
BACTERIA SPEC CULT: NO GROWTH
SPECIMEN SOURCE: NORMAL

## 2020-08-30 LAB
BACTERIA SPEC CULT: ABNORMAL
Lab: ABNORMAL
SPECIMEN SOURCE: ABNORMAL

## 2020-09-17 ENCOUNTER — HOSPITAL ENCOUNTER (OUTPATIENT)
Dept: CT IMAGING | Facility: CLINIC | Age: 50
Discharge: HOME | End: 2020-09-17
Attending: DENTIST | Admitting: DENTIST
Payer: COMMERCIAL

## 2020-09-17 DIAGNOSIS — K12.2 SUBMANDIBULAR SPACE INFECTION: ICD-10-CM

## 2020-09-17 DIAGNOSIS — K04.7 DENTAL ABSCESS: Primary | ICD-10-CM

## 2020-09-17 PROCEDURE — 70491 CT SOFT TISSUE NECK W/DYE: CPT

## 2020-09-17 PROCEDURE — 25000128 H RX IP 250 OP 636: Performed by: DENTIST

## 2020-09-17 RX ORDER — IOPAMIDOL 755 MG/ML
100 INJECTION, SOLUTION INTRAVASCULAR ONCE
Status: COMPLETED | OUTPATIENT
Start: 2020-09-17 | End: 2020-09-17

## 2020-09-17 RX ADMIN — IOPAMIDOL 100 ML: 755 INJECTION, SOLUTION INTRAVENOUS at 09:52

## 2020-10-23 DIAGNOSIS — E78.5 HYPERLIPIDEMIA LDL GOAL <100: ICD-10-CM

## 2020-10-23 DIAGNOSIS — E11.9 TYPE 2 DIABETES MELLITUS WITHOUT COMPLICATION, WITHOUT LONG-TERM CURRENT USE OF INSULIN (H): ICD-10-CM

## 2020-10-26 RX ORDER — ATORVASTATIN CALCIUM 40 MG/1
40 TABLET, FILM COATED ORAL DAILY
Qty: 90 TABLET | Refills: 1 | Status: SHIPPED | OUTPATIENT
Start: 2020-10-26 | End: 2021-05-25

## 2020-10-26 RX ORDER — GLIPIZIDE 10 MG/1
10 TABLET ORAL
Qty: 180 TABLET | Refills: 0 | Status: SHIPPED | OUTPATIENT
Start: 2020-10-26 | End: 2021-04-01

## 2020-11-06 DIAGNOSIS — E11.9 TYPE 2 DIABETES MELLITUS WITHOUT COMPLICATION, WITHOUT LONG-TERM CURRENT USE OF INSULIN (H): ICD-10-CM

## 2021-04-01 DIAGNOSIS — E11.9 TYPE 2 DIABETES MELLITUS WITHOUT COMPLICATION, WITHOUT LONG-TERM CURRENT USE OF INSULIN (H): ICD-10-CM

## 2021-04-01 DIAGNOSIS — E11.9 TYPE 2 DIABETES MELLITUS WITHOUT COMPLICATIONS (H): ICD-10-CM

## 2021-04-01 RX ORDER — GLIPIZIDE 10 MG/1
TABLET ORAL
Qty: 60 TABLET | Refills: 0 | Status: SHIPPED | OUTPATIENT
Start: 2021-04-01 | End: 2021-05-25

## 2021-04-01 RX ORDER — LIRAGLUTIDE 6 MG/ML
1.2 INJECTION SUBCUTANEOUS DAILY
Qty: 6 ML | Refills: 0 | Status: SHIPPED | OUTPATIENT
Start: 2021-04-01 | End: 2021-05-03

## 2021-04-01 NOTE — TELEPHONE ENCOUNTER
"  Pt due for appointment - LVM for him to call back to schedule appt     Routing refill request to provider for review/approval because:    Victoza - historical on list     Glipizide - per last Rx date, break in medication     Na BASS RN      Requested Prescriptions   Pending Prescriptions Disp Refills     VICTOZA PEN 18 MG/3ML soln [Pharmacy Med Name: liraglutide 0.6 mg/0.1 mL (18 mg/3 mL) subcutaneous pen injector (VICTOZA)] 9 mL 11     Sig: Inject 0.6 mg under the skin once daily for 1 week, then increase to 1.2 mg once daily       GLP-1 Agonists Protocol Failed - 4/1/2021  4:19 PM        Failed - HgbA1C in past 3 or 6 months     If HgbA1C is 8 or greater, it needs to be on file within the past 3 months.  If less than 8, must be on file within the past 6 months.     Recent Labs   Lab Test 08/24/20  0735   A1C 7.5*             Failed - Recent (6 mo) or future (30 days) visit within the authorizing provider's specialty     Patient had office visit in the last 6 months or has a visit in the next 30 days with authorizing provider.  See \"Patient Info\" tab in inbasket, or \"Choose Columns\" in Meds & Orders section of the refill encounter.            Passed - Medication is active on med list        Passed - Patient is age 18 or older        Passed - Normal serum creatinine on file in past 12 months     Recent Labs   Lab Test 08/23/20  0942   CR 0.90       Ok to refill medication if creatinine is low             glipiZIDE (GLUCOTROL) 10 MG tablet [Pharmacy Med Name: glipiZIDE 10 mg tablet (GLUCOTROL)] 180 tablet 0     Sig: Take 1 tablet (10 mg) by mouth twice a day before meals. Due for appointment. Please schedule: [phone]       Sulfonylurea Agents Failed - 4/1/2021  4:19 PM        Failed - Patient has documented A1c within the specified period of time.     If HgbA1C is 8 or greater, it needs to be on file within the past 3 months.  If less than 8, must be on file within the past 6 months.     Recent Labs   Lab " "Test 08/24/20  0735   A1C 7.5*             Failed - Recent (6 mo) or future (30 days) visit within the authorizing provider's specialty     Patient had office visit in the last 6 months or has a visit in the next 30 days with authorizing provider or within the authorizing provider's specialty.  See \"Patient Info\" tab in inbasket, or \"Choose Columns\" in Meds & Orders section of the refill encounter.            Passed - Medication is active on med list        Passed - Patient is age 18 or older        Passed - Patient has a recent creatinine (normal) within the past 12 mos.     Recent Labs   Lab Test 08/23/20  0942   CR 0.90       Ok to refill medication if creatinine is low             Victoza is historical on list:   liraglutide (VICTOZA) 18 MG/3ML solution     --   Sig - Route: Inject 1.2 mg Subcutaneous daily - Subcutaneous   Class: Historical   Order: [id number]   Printout Tracking    External Result Report   Pharmacy    [facility] [id number] [place], [place] - [address]     "

## 2021-04-01 NOTE — TELEPHONE ENCOUNTER
Patient scheduled a med check appointment with Dr Tello on May 24th . Please send refills to last until patient's appointment.

## 2021-04-07 DIAGNOSIS — E78.5 HYPERLIPIDEMIA LDL GOAL <100: ICD-10-CM

## 2021-04-07 DIAGNOSIS — E11.9 TYPE 2 DIABETES MELLITUS WITHOUT COMPLICATION, WITHOUT LONG-TERM CURRENT USE OF INSULIN (H): ICD-10-CM

## 2021-04-07 DIAGNOSIS — Z12.5 PROSTATE CANCER SCREENING: ICD-10-CM

## 2021-04-07 LAB
ANION GAP SERPL CALCULATED.3IONS-SCNC: 3 MMOL/L (ref 3–14)
BUN SERPL-MCNC: 25 MG/DL (ref 7–30)
CALCIUM SERPL-MCNC: 9.3 MG/DL (ref 8.5–10.1)
CHLORIDE SERPL-SCNC: 101 MMOL/L (ref 94–109)
CHOLEST SERPL-MCNC: 166 MG/DL
CO2 SERPL-SCNC: 31 MMOL/L (ref 20–32)
CREAT SERPL-MCNC: 0.89 MG/DL (ref 0.66–1.25)
ERYTHROCYTE [DISTWIDTH] IN BLOOD BY AUTOMATED COUNT: 13.9 % (ref 10–15)
GFR SERPL CREATININE-BSD FRML MDRD: >90 ML/MIN/{1.73_M2}
GLUCOSE SERPL-MCNC: 202 MG/DL (ref 70–99)
HBA1C MFR BLD: 8.2 % (ref 0–5.6)
HCT VFR BLD AUTO: 45.6 % (ref 40–53)
HDLC SERPL-MCNC: 28 MG/DL
HGB BLD-MCNC: 16.2 G/DL (ref 13.3–17.7)
LDLC SERPL CALC-MCNC: 67 MG/DL
MCH RBC QN AUTO: 31.6 PG (ref 26.5–33)
MCHC RBC AUTO-ENTMCNC: 35.5 G/DL (ref 31.5–36.5)
MCV RBC AUTO: 89 FL (ref 78–100)
NONHDLC SERPL-MCNC: 138 MG/DL
PLATELET # BLD AUTO: 277 10E9/L (ref 150–450)
POTASSIUM SERPL-SCNC: 4.1 MMOL/L (ref 3.4–5.3)
RBC # BLD AUTO: 5.13 10E12/L (ref 4.4–5.9)
SODIUM SERPL-SCNC: 135 MMOL/L (ref 133–144)
TRIGL SERPL-MCNC: 357 MG/DL
WBC # BLD AUTO: 8.3 10E9/L (ref 4–11)

## 2021-04-07 PROCEDURE — 80061 LIPID PANEL: CPT | Performed by: INTERNAL MEDICINE

## 2021-04-07 PROCEDURE — 85027 COMPLETE CBC AUTOMATED: CPT | Performed by: INTERNAL MEDICINE

## 2021-04-07 PROCEDURE — G0103 PSA SCREENING: HCPCS | Performed by: INTERNAL MEDICINE

## 2021-04-07 PROCEDURE — 82043 UR ALBUMIN QUANTITATIVE: CPT | Performed by: INTERNAL MEDICINE

## 2021-04-07 PROCEDURE — 36415 COLL VENOUS BLD VENIPUNCTURE: CPT | Performed by: INTERNAL MEDICINE

## 2021-04-07 PROCEDURE — 80048 BASIC METABOLIC PNL TOTAL CA: CPT | Performed by: INTERNAL MEDICINE

## 2021-04-07 PROCEDURE — 83036 HEMOGLOBIN GLYCOSYLATED A1C: CPT | Performed by: INTERNAL MEDICINE

## 2021-04-07 NOTE — LETTER
"April 8, 2021      Low JEEVAN Juárez  [address] [place] [address]        Dear ,    The following letter pertains to your most recent diagnostic tests:     -Your micro albumin level is elevated. This means you have trace amounts of protein in the urine. It indicates that your kidneys are being affected by diabetes. Keeping blood pressure low is the best treatment in order to keep this stable. People with microalbuminuria should avoid anti-inflamatory agents such as motrin, alleve and ibuprofen as much as possible because excessive use of these medications can be harmful to the kidneys. Anybody that has microalbuminuria should be on lisinopril or losartan-as you already are-since these medications are protective for the kidney's in this situation.     -Your prostate specific antigen (PSA) test result returned normal.     -Your total cholesterol is 166 which is at your goal of total cholesterol less than 200.     -Your triglycerides are 357 which are above your goal of triglycerides less than 150.     -Your HDL or \"good cholesterol\" is 28 which is at your goal of HDL cholesterol greater than 40.     -Your LDL cholesterol or \"bad cholesterol\" is 67 which is at your goal of LDL cholesterol less than <100.  Your LDL goal is based on your risk factors for artery disease.     -Kidney function is normal for you (Creatinine, GFR), Sodium is normal for you, Potassium is normal for you, Calcium is normal for you, Glucose (blood sugar) is high.     -Your complete blood counts including your hemoglobin returned normal for you.       -Your hemoglobin A1c test which is a diabetes blood test that represents and average of your blood sugars over the last 3 months returned at 8.2 which is just above your goal of hemoglobin A1c less than 8.         Bottom line:  The blood sugar is too high.  Decreasing diet carbohydrate intake and increasing physical activity would improve blood sugar control as well as triglyceride " levels.  Another option, is to increase Victoza from 1.2 milligrams once daily to 1.8 mg once daily.  You have my permission to do so.       Follow up: I believe we have a follow-up appointment scheduled in May.  The hemoglobin A1c test should be rechecked in July at a 3-month interval.  Please call or return sooner if new problems or symptoms arise.     Resulted Orders   Albumin Random Urine Quantitative with Creat Ratio   Result Value Ref Range    Creatinine Urine 180 mg/dL    Albumin Urine mg/L 137 mg/L    Albumin Urine mg/g Cr 76.11 (H) 0 - 17 mg/g Cr   Hemoglobin A1c   Result Value Ref Range    Hemoglobin A1C 8.2 (H) 0 - 5.6 %      Comment:      Reviewed: OK with previous  Normal <5.7% Prediabetes 5.7-6.4%  Diabetes 6.5% or higher - adopted from ADA   consensus guidelines.     Prostate spec antigen screen   Result Value Ref Range    PSA 0.71 0 - 4 ug/L      Comment:      Assay Method:  Chemiluminescence using Siemens Vista analyzer   CBC with platelets   Result Value Ref Range    WBC 8.3 4.0 - 11.0 10e9/L    RBC Count 5.13 4.4 - 5.9 10e12/L    Hemoglobin 16.2 13.3 - 17.7 g/dL    Hematocrit 45.6 40.0 - 53.0 %    MCV 89 78 - 100 fl    MCH 31.6 26.5 - 33.0 pg    MCHC 35.5 31.5 - 36.5 g/dL    RDW 13.9 10.0 - 15.0 %    Platelet Count 277 150 - 450 10e9/L   Basic metabolic panel   Result Value Ref Range    Sodium 135 133 - 144 mmol/L    Potassium 4.1 3.4 - 5.3 mmol/L    Chloride 101 94 - 109 mmol/L    Carbon Dioxide 31 20 - 32 mmol/L    Anion Gap 3 3 - 14 mmol/L    Glucose 202 (H) 70 - 99 mg/dL    Urea Nitrogen 25 7 - 30 mg/dL    Creatinine 0.89 0.66 - 1.25 mg/dL    GFR Estimate >90 >60 mL/min/[1.73_m2]      Comment:      Non  GFR Calc  Starting 12/18/2018, serum creatinine based estimated GFR (eGFR) will be   calculated using the Chronic Kidney Disease Epidemiology Collaboration   (CKD-EPI) equation.      GFR Estimate If Black >90 >60 mL/min/[1.73_m2]      Comment:       GFR  Calc  Starting 12/18/2018, serum creatinine based estimated GFR (eGFR) will be   calculated using the Chronic Kidney Disease Epidemiology Collaboration   (CKD-EPI) equation.      Calcium 9.3 8.5 - 10.1 mg/dL   Lipid panel reflex to direct LDL Fasting   Result Value Ref Range    Cholesterol 166 <200 mg/dL    Triglycerides 357 (H) <150 mg/dL      Comment:      Borderline high:  150-199 mg/dl  High:             200-499 mg/dl  Very high:       >499 mg/dl      HDL Cholesterol 28 (L) >39 mg/dL    LDL Cholesterol Calculated 67 <100 mg/dL      Comment:      Desirable:       <100 mg/dl    Non HDL Cholesterol 138 (H) <130 mg/dL      Comment:      Above Desirable:  130-159 mg/dl  Borderline high:  160-189 mg/dl  High:             190-219 mg/dl  Very high:       >219 mg/dl         If you have any questions or concerns, please call the clinic at the number listed above.       Sincerely,      Gilson Tello MD      arya

## 2021-04-08 LAB
CREAT UR-MCNC: 180 MG/DL
MICROALBUMIN UR-MCNC: 137 MG/L
MICROALBUMIN/CREAT UR: 76.11 MG/G CR (ref 0–17)
PSA SERPL-ACNC: 0.71 UG/L (ref 0–4)

## 2021-04-08 NOTE — RESULT ENCOUNTER NOTE
"The following letter pertains to your most recent diagnostic tests:    -Your micro albumin level is elevated. This means you have trace amounts of protein in the urine. It indicates that your kidneys are being affected by diabetes. Keeping blood pressure low is the best treatment in order to keep this stable. People with microalbuminuria should avoid anti-inflamatory agents such as motrin, alleve and ibuprofen as much as possible because excessive use of these medications can be harmful to the kidneys. Anybody that has microalbuminuria should be on lisinopril or losartan-as you already are-since these medications are protective for the kidney's in this situation.     -Your prostate specific antigen (PSA) test result returned normal.     -Your total cholesterol is 166 which is at your goal of total cholesterol less than 200.    -Your triglycerides are 357 which are above your goal of triglycerides less than 150.    -Your HDL or \"good cholesterol\" is 28 which is at your goal of HDL cholesterol greater than 40.    -Your LDL cholesterol or \"bad cholesterol\" is 67 which is at your goal of LDL cholesterol less than <100.  Your LDL goal is based on your risk factors for artery disease.     -Kidney function is normal for you (Creatinine, GFR), Sodium is normal for you, Potassium is normal for you, Calcium is normal for you, Glucose (blood sugar) is high.     -Your complete blood counts including your hemoglobin returned normal for you.       -Your hemoglobin A1c test which is a diabetes blood test that represents and average of your blood sugars over the last 3 months returned at 8.2 which is just above your goal of hemoglobin A1c less than 8.        Bottom line:  The blood sugar is too high.  Decreasing diet carbohydrate intake and increasing physical activity would improve blood sugar control as well as triglyceride levels.  Another option, is to increase Victoza from 1.2 milligrams once daily to 1.8 mg once daily.  You " have my permission to do so.      Follow up: I believe we have a follow-up appointment scheduled in May.  The hemoglobin A1c test should be rechecked in July at a 3-month interval.  Please call or return sooner if new problems or symptoms arise.      Sincerely,    Dr. Tello

## 2021-04-16 ENCOUNTER — NURSE TRIAGE (OUTPATIENT)
Dept: FAMILY MEDICINE | Facility: CLINIC | Age: 51
End: 2021-04-16

## 2021-04-16 NOTE — TELEPHONE ENCOUNTER
Pt called     Lower abd pain - under belly button, all across from right to left    Comes/goes - worse in AM     Diarrhea   Ongoing 2 months   Stomach pains in AM     Recently incorporating imodium -a.d. and taken 2x    Loose stools for 2 months - about the same maybe a little worse    Taking metamucil     Family hx of GI disease     Denies red/black stools   Burning pain - unsure if having acidic urine  Yellowish stools     Eating bland/no spicy foods     No solid BM for a while   No fever/chills     Per protocol, recommended visit today. Pt states he's on his way to work and prefers next week instead, scheduled with team for Monday but agreed to be seen sooner if any worsening or new sx before then     Na BASS RN    Additional Information    Negative: Shock suspected (e.g., cold/pale/clammy skin, too weak to stand, low BP, rapid pulse)    Negative: Difficult to awaken or acting confused (e.g., disoriented, slurred speech)    Negative: Sounds like a life-threatening emergency to the triager    Negative: SEVERE abdominal pain (e.g., excruciating) and present > 1 hour    Negative: SEVERE abdominal pain and age > 60    Negative: Bloody, black, or tarry bowel movements    Negative: Vomiting also present and worse than the diarrhea    Negative: Blood in stool and without diarrhea    Negative: SEVERE diarrhea (e.g., 7 or more times / day more than normal) and age > 60 years    Negative: Constant abdominal pain lasting > 2 hours    Negative: Drinking very little and has signs of dehydration (e.g., no urine > 12 hours, very dry mouth, very lightheaded)    Negative: Patient sounds very sick or weak to the triager    Fever > 101 F (38.3 C)    Protocols used: DIARRHEA-A-OH

## 2021-04-30 DIAGNOSIS — E11.9 TYPE 2 DIABETES MELLITUS WITHOUT COMPLICATION, WITHOUT LONG-TERM CURRENT USE OF INSULIN (H): ICD-10-CM

## 2021-05-03 RX ORDER — LIRAGLUTIDE 6 MG/ML
INJECTION SUBCUTANEOUS
Qty: 6 ML | Refills: 0 | Status: SHIPPED | OUTPATIENT
Start: 2021-05-03 | End: 2021-05-25

## 2021-05-04 DIAGNOSIS — E11.9 TYPE 2 DIABETES MELLITUS WITHOUT COMPLICATION, WITHOUT LONG-TERM CURRENT USE OF INSULIN (H): ICD-10-CM

## 2021-05-04 NOTE — TELEPHONE ENCOUNTER
Pt would like refill on victoza pen needles. If you have any questions please call pt at [phone]  Thank you ,   Jessica Chandler

## 2021-05-25 ENCOUNTER — VIRTUAL VISIT (OUTPATIENT)
Dept: FAMILY MEDICINE | Facility: CLINIC | Age: 51
End: 2021-05-25
Payer: COMMERCIAL

## 2021-05-25 VITALS — BODY MASS INDEX: 33.23 KG/M2 | WEIGHT: 245 LBS

## 2021-05-25 DIAGNOSIS — I10 BENIGN ESSENTIAL HYPERTENSION: ICD-10-CM

## 2021-05-25 DIAGNOSIS — E11.9 TYPE 2 DIABETES MELLITUS WITHOUT COMPLICATION, WITHOUT LONG-TERM CURRENT USE OF INSULIN (H): Primary | ICD-10-CM

## 2021-05-25 DIAGNOSIS — J30.2 SEASONAL ALLERGIC RHINITIS, UNSPECIFIED TRIGGER: ICD-10-CM

## 2021-05-25 DIAGNOSIS — E66.01 MORBID OBESITY DUE TO EXCESS CALORIES (H): ICD-10-CM

## 2021-05-25 DIAGNOSIS — G47.33 OBSTRUCTIVE SLEEP APNEA SYNDROME: ICD-10-CM

## 2021-05-25 DIAGNOSIS — E78.5 HYPERLIPIDEMIA LDL GOAL <100: ICD-10-CM

## 2021-05-25 DIAGNOSIS — Z12.11 SCREENING FOR COLON CANCER: ICD-10-CM

## 2021-05-25 PROCEDURE — 99214 OFFICE O/P EST MOD 30 MIN: CPT | Mod: 95 | Performed by: INTERNAL MEDICINE

## 2021-05-25 RX ORDER — GLIPIZIDE 10 MG/1
TABLET ORAL
Qty: 180 TABLET | Refills: 3 | Status: SHIPPED | OUTPATIENT
Start: 2021-05-25 | End: 2021-10-25

## 2021-05-25 RX ORDER — LISINOPRIL AND HYDROCHLOROTHIAZIDE 20; 25 MG/1; MG/1
1 TABLET ORAL DAILY
Qty: 90 TABLET | Refills: 3 | Status: SHIPPED | OUTPATIENT
Start: 2021-05-25 | End: 2022-06-07

## 2021-05-25 RX ORDER — LIRAGLUTIDE 6 MG/ML
1.8 INJECTION SUBCUTANEOUS DAILY
Qty: 12 ML | Refills: 11 | Status: SHIPPED | OUTPATIENT
Start: 2021-05-25 | End: 2022-01-28

## 2021-05-25 RX ORDER — ATORVASTATIN CALCIUM 40 MG/1
40 TABLET, FILM COATED ORAL DAILY
Qty: 90 TABLET | Refills: 3 | Status: SHIPPED | OUTPATIENT
Start: 2021-05-25 | End: 2022-06-07

## 2021-05-25 RX ORDER — IPRATROPIUM BROMIDE 42 UG/1
2 SPRAY, METERED NASAL 4 TIMES DAILY PRN
Qty: 15 ML | Refills: 11 | Status: SHIPPED | OUTPATIENT
Start: 2021-05-25

## 2021-05-25 NOTE — PROGRESS NOTES
Low is a 51 year old who is being evaluated via a billable telephone visit.      What phone number would you like to be contacted at? [phone]  How would you like to obtain your AVS? Fany    Assessment & Plan     Type 2 diabetes mellitus without complication, without long-term current use of insulin (H)  Not well controlled, diet considerations and exercise considerations discussed in detail, continue higher dose of Victoza, refill medications, recheck A1c at 3-month interval followed by office visit for foot exam and further counseling and management  - **A1C FUTURE anytime; Future  - glipiZIDE (GLUCOTROL) 10 MG tablet; Take 1 tablet (10 mg) by mouth twice a day before meals.  - insulin pen needle (31G X 8 MM) 31G X 8 MM miscellaneous; Use 1 pen needles daily or as directed.  - metFORMIN (GLUCOPHAGE) 1000 MG tablet; Take 1 tablet (1,000 mg) by mouth 2 times daily (with meals)  - liraglutide (VICTOZA PEN) 18 MG/3ML solution; Inject 1.8 mg Subcutaneous daily    Morbid obesity due to excess calories (H)  Counseled on diet and exercise interventions to promote weight loss     Hyperlipidemia LDL goal <100  On statin therapy with okay control of LDL cholesterol.  Diet changes and weight loss could improve triglycerides.  Increasing physical activity can improve HDL  - atorvastatin (LIPITOR) 40 MG tablet; Take 1 tablet (40 mg) by mouth daily    Benign essential hypertension  Clarified that he is taking 20/25 mg dose of lisinopril hydrochlorothiazide.  Need face-to-face appointment to check blood pressure.  Recommended that he check some home blood pressure readings as well for review when he returns later this summer after his A1c.  Goal blood pressure less than 130/80  - lisinopril-hydrochlorothiazide (ZESTORETIC) 20-25 MG tablet; Take 1 tablet by mouth daily    Obstructive sleep apnea syndrome      Screening for colon cancer  Overdue for colonoscopy, referred for exam discussed  - GASTROENTEROLOGY ADULT REF  PROCEDURE ONLY; Future    Seasonal allergic rhinitis, unspecified trigger  Discussed strategies for management of nasal allergy symptoms including nasal steroids, over-the-counter antihistamines, allergy eyedrops and ipratropium hich she has used in the past with success  - ipratropium (ATROVENT) 0.06 % nasal spray; Spray 2 sprays into both nostrils 4 times daily as needed for rhinitis      31 minutes spent on the date of the encounter doing chart review, history and exam, documentation and further activities per the note       BMI:   Estimated body mass index is 33.23 kg/m  as calculated from the following:    Height as of 8/23/20: 1.829 m (6').    Weight as of this encounter: 111.1 kg (245 lb).   Weight management plan: Discussed healthy diet and exercise guidelines        Return in about 6 weeks (around 7/7/2021) for Diabetes Check, Pre-visit Non-fasting Lab.  Patient instructed to return to clinic or contact us sooner if symptoms worsen or new symptoms develop.     Gilson Tlelo MD  [facility] SEFERINO Kelsey is a 51 year old who presents for the following health issues     HPI     Med Check, colonoscopy order request, allergies/itchy eyes    Tolerating higher dose of Victoza without issues, trying to watch carbohydrate intake  Needs to do a better job with exercise but plans to do so  Has a lot of seasonal allergy symptoms which we spent a considerable amount of time discussing  He does not regularly check home blood pressure readings  He needs refills on his medications    Review of Systems         Objective           Vitals:  No vitals were obtained today due to virtual visit.    Physical Exam   healthy, alert and no distress  PSYCH: Alert and oriented times 3; coherent speech, normal   rate and volume, able to articulate logical thoughts, able   to abstract reason, no tangential thoughts, no hallucinations   or delusions  His affect is normal  RESP: No cough, no audible wheezing,  able to talk in full sentences  Remainder of exam unable to be completed due to telephone visits                Phone call duration: 28:51 minutes

## 2021-07-19 ENCOUNTER — OFFICE VISIT (OUTPATIENT)
Dept: FAMILY MEDICINE | Facility: CLINIC | Age: 51
End: 2021-07-19
Payer: COMMERCIAL

## 2021-07-19 VITALS
BODY MASS INDEX: 36.54 KG/M2 | DIASTOLIC BLOOD PRESSURE: 88 MMHG | RESPIRATION RATE: 12 BRPM | HEIGHT: 71 IN | SYSTOLIC BLOOD PRESSURE: 138 MMHG | TEMPERATURE: 98.7 F | WEIGHT: 261 LBS | HEART RATE: 64 BPM

## 2021-07-19 DIAGNOSIS — I10 BENIGN ESSENTIAL HYPERTENSION: ICD-10-CM

## 2021-07-19 DIAGNOSIS — E11.9 TYPE 2 DIABETES MELLITUS WITHOUT COMPLICATION, WITHOUT LONG-TERM CURRENT USE OF INSULIN (H): ICD-10-CM

## 2021-07-19 DIAGNOSIS — Z23 NEED FOR VACCINATION: ICD-10-CM

## 2021-07-19 DIAGNOSIS — H00.014 HORDEOLUM EXTERNUM OF LEFT UPPER EYELID: Primary | ICD-10-CM

## 2021-07-19 DIAGNOSIS — Z12.11 SCREEN FOR COLON CANCER: ICD-10-CM

## 2021-07-19 PROBLEM — E66.01 SEVERE OBESITY (BMI 35.0-39.9) WITH COMORBIDITY (H): Status: ACTIVE | Noted: 2017-08-18

## 2021-07-19 PROBLEM — L40.0 PSORIASIS VULGARIS: Status: ACTIVE | Noted: 2021-07-19

## 2021-07-19 LAB — HBA1C MFR BLD: 8.1 % (ref 0–5.6)

## 2021-07-19 PROCEDURE — 96127 BRIEF EMOTIONAL/BEHAV ASSMT: CPT | Performed by: FAMILY MEDICINE

## 2021-07-19 PROCEDURE — 83036 HEMOGLOBIN GLYCOSYLATED A1C: CPT | Performed by: FAMILY MEDICINE

## 2021-07-19 PROCEDURE — 36415 COLL VENOUS BLD VENIPUNCTURE: CPT | Performed by: FAMILY MEDICINE

## 2021-07-19 PROCEDURE — 99214 OFFICE O/P EST MOD 30 MIN: CPT | Mod: 25 | Performed by: FAMILY MEDICINE

## 2021-07-19 PROCEDURE — 90732 PPSV23 VACC 2 YRS+ SUBQ/IM: CPT | Performed by: FAMILY MEDICINE

## 2021-07-19 PROCEDURE — 90471 IMMUNIZATION ADMIN: CPT | Performed by: FAMILY MEDICINE

## 2021-07-19 RX ORDER — TRIAMCINOLONE ACETONIDE 1 MG/G
1 CREAM TOPICAL PRN
COMMUNITY
Start: 2020-04-14

## 2021-07-19 RX ORDER — KETOCONAZOLE 20 MG/G
1 CREAM TOPICAL PRN
COMMUNITY
Start: 2019-11-27

## 2021-07-19 RX ORDER — DOXYCYCLINE 100 MG/1
100 CAPSULE ORAL 2 TIMES DAILY
Qty: 20 CAPSULE | Refills: 0 | Status: SHIPPED | OUTPATIENT
Start: 2021-07-19 | End: 2021-07-29

## 2021-07-19 ASSESSMENT — MIFFLIN-ST. JEOR: SCORE: 2061.02

## 2021-07-19 NOTE — PROGRESS NOTES
Assessment & Plan   Problem List Items Addressed This Visit        Endocrine    Type 2 diabetes mellitus without complication, without long-term current use of insulin (H)     Not fully controlled.  Goal of less than 7.  We will continue current Metformin, glipizide and Victoza, and will restart education especially dietary site.  Follow-up in 3 months for recheck.  Given the report of the current diet I think with some education he will be able to change his diet well enough to bring under goal.         Relevant Orders    Hemoglobin A1c (Completed)    Adult Eye Referral    AMB Adult Diabetes Educator Referral       Circulatory    Benign essential hypertension     Controlled on current dosing of lisinopril and hydrochlorothiazide.  We will continue.  Check BMP as per orders.           Other Visit Diagnoses     Hordeolum externum of left upper eyelid    -  Primary    Given length of time, will treat with orals and send to ophthalmology for further evaluation and treatment.    Relevant Medications    doxycycline hyclate (VIBRAMYCIN) 100 MG capsule    Other Relevant Orders    Adult Eye Referral    Need for vaccination        Relevant Orders    PPSV23, IM/SUBQ (2+ YRS) - [id number] (Completed)    Screen for colon cancer                 Return in about 3 months (around 10/19/2021) for Diabetes.    Bernardino Ortiz DO  [facility]   Low is a 51 year old who presents for the following health issues     Stylish care and stye, as well as follow-up on diabetes.  Been taking his medications and not missing any doses.  However he does work late shift and has a scattered diet.  Does tend to eat a lot of rice as well as potatoes as they are bland and does seem to upset his stomach.  His Accu-Cheks in the morning are around 200-2 50, however after his sleep and waking he is running between 180 and 220 which is consistent with the A1c of 8.1.  He is only been on the elevated dose of  "Victoza for the past month.  But still only an improvement from a 2-day 1 with Victoza.  Has not had diabetic education in over 4 years.  Kaylie has been present for about 4 months.  He is trying to do numerous times on his own and it continues to return.  Is not getting in the way of his vision are causing irritation except when he closes his eyes he can feel its there.       Review of Systems   All other systems reviewed and are negative.           Objective    /88   Pulse 64   Temp 98.7  F (37.1  C) (Oral)   Resp 12   Ht 1.803 m (5' 11\")   Wt 118.4 kg (261 lb)   BMI 36.40 kg/m    Body mass index is 36.4 kg/m .  Physical Exam  Vitals and nursing note reviewed.   Constitutional:       General: He is not in acute distress.     Appearance: Normal appearance. He is not ill-appearing.   HENT:      Head: Normocephalic and atraumatic.      Right Ear: Tympanic membrane, ear canal and external ear normal.      Left Ear: Tympanic membrane, ear canal and external ear normal.      Nose: Nose normal.      Mouth/Throat:      Mouth: Mucous membranes are moist.      Pharynx: Oropharynx is clear.   Eyes:      Extraocular Movements: Extraocular movements intact.      Conjunctiva/sclera: Conjunctivae normal.      Comments: Bottom left eyelid near the center little bit towards midline has a 3 mm stye at the edge of of eyelid.   Neck:      Vascular: No carotid bruit.   Cardiovascular:      Rate and Rhythm: Normal rate and regular rhythm.   Pulmonary:      Effort: Pulmonary effort is normal.      Breath sounds: Normal breath sounds.   Musculoskeletal:      Cervical back: Normal range of motion.      Right lower leg: No edema.      Left lower leg: No edema.   Lymphadenopathy:      Cervical: No cervical adenopathy.   Skin:     Capillary Refill: Capillary refill takes less than 2 seconds.   Neurological:      Mental Status: He is alert and oriented to person, place, and time.   Psychiatric:         Attention and Perception: " Attention normal.         Mood and Affect: Mood normal.         Speech: Speech normal.         Thought Content: Thought content normal.     Diabetic foot exam: normal DP and PT pulses, no trophic changes or ulcerative lesions and normal sensory exam

## 2021-07-19 NOTE — ASSESSMENT & PLAN NOTE
Not fully controlled.  Goal of less than 7.  We will continue current Metformin, glipizide and Victoza, and will restart education especially dietary site.  Follow-up in 3 months for recheck.  Given the report of the current diet I think with some education he will be able to change his diet well enough to bring under goal.

## 2021-07-19 NOTE — ASSESSMENT & PLAN NOTE
Controlled on current dosing of lisinopril and hydrochlorothiazide.  We will continue.  Check BMP as per orders.

## 2021-08-23 ENCOUNTER — TRANSFERRED RECORDS (OUTPATIENT)
Dept: MULTI SPECIALTY CLINIC | Facility: CLINIC | Age: 51
End: 2021-08-23

## 2021-08-23 ENCOUNTER — TRANSFERRED RECORDS (OUTPATIENT)
Dept: HEALTH INFORMATION MANAGEMENT | Facility: CLINIC | Age: 51
End: 2021-08-23

## 2021-08-23 LAB — RETINOPATHY: NEGATIVE

## 2021-10-25 ENCOUNTER — OFFICE VISIT (OUTPATIENT)
Dept: FAMILY MEDICINE | Facility: CLINIC | Age: 51
End: 2021-10-25
Payer: COMMERCIAL

## 2021-10-25 VITALS
HEART RATE: 64 BPM | RESPIRATION RATE: 12 BRPM | BODY MASS INDEX: 34.26 KG/M2 | WEIGHT: 244.7 LBS | SYSTOLIC BLOOD PRESSURE: 138 MMHG | TEMPERATURE: 98.7 F | DIASTOLIC BLOOD PRESSURE: 88 MMHG | HEIGHT: 71 IN

## 2021-10-25 DIAGNOSIS — I10 BENIGN ESSENTIAL HYPERTENSION: ICD-10-CM

## 2021-10-25 DIAGNOSIS — E11.65 TYPE 2 DIABETES MELLITUS WITH HYPERGLYCEMIA, WITH LONG-TERM CURRENT USE OF INSULIN (H): Primary | ICD-10-CM

## 2021-10-25 DIAGNOSIS — Z23 NEED FOR VACCINATION: ICD-10-CM

## 2021-10-25 DIAGNOSIS — Z79.4 TYPE 2 DIABETES MELLITUS WITH HYPERGLYCEMIA, WITH LONG-TERM CURRENT USE OF INSULIN (H): Primary | ICD-10-CM

## 2021-10-25 LAB — HBA1C MFR BLD: 10 % (ref 0–5.6)

## 2021-10-25 PROCEDURE — 83036 HEMOGLOBIN GLYCOSYLATED A1C: CPT | Performed by: FAMILY MEDICINE

## 2021-10-25 PROCEDURE — 99214 OFFICE O/P EST MOD 30 MIN: CPT | Mod: 25 | Performed by: FAMILY MEDICINE

## 2021-10-25 PROCEDURE — 90682 RIV4 VACC RECOMBINANT DNA IM: CPT | Performed by: FAMILY MEDICINE

## 2021-10-25 PROCEDURE — 90471 IMMUNIZATION ADMIN: CPT | Performed by: FAMILY MEDICINE

## 2021-10-25 PROCEDURE — 36415 COLL VENOUS BLD VENIPUNCTURE: CPT | Performed by: FAMILY MEDICINE

## 2021-10-25 ASSESSMENT — MIFFLIN-ST. JEOR: SCORE: 1987.08

## 2021-10-25 NOTE — ASSESSMENT & PLAN NOTE
Continue Metformin and Victoza.  Stop glipizide.*2 long-acting insulin once a day.  Also reordered diabetic education to help with nutrition as well as long-acting insulin adjustments for A1c goal of less than 7.

## 2021-10-25 NOTE — PROGRESS NOTES
Problem List Items Addressed This Visit        Endocrine    Type 2 diabetes mellitus with hyperglycemia, with long-term current use of insulin (H) - Primary     Continue Metformin and Victoza.  Stop glipizide.*2 long-acting insulin once a day.  Also reordered diabetic education to help with nutrition as well as long-acting insulin adjustments for A1c goal of less than 7.         Relevant Medications    insulin glargine (LANTUS PEN) 100 UNIT/ML pen    Other Relevant Orders    Hemoglobin A1c (Completed)    AMB Adult Diabetes Educator Referral       Circulatory    Benign essential hypertension     Encouraged not to miss doses of medication for his blood pressure.           Other Visit Diagnoses     Need for vaccination        Relevant Orders    [place] RIV4 (FLUBLOK) VACCINE RECOMBINANT DNA PRSRV ANTIBIO FREE, IM ([id number]) (Completed)        Return in about 3 months (around 1/25/2022) for Diabetes.    Duke Kelsey is a 51 year old who presents for the following health issues   Follow-up on diabetes.  Patient states his home glucoses have been as low as 156 but normally running closer to 200.  States he has not missed any dose of medications but did not take his medications today.  Has had a weight loss since last visit which she is congratulated on.  Overall work is going well.  He is still frustrated that his blood sugar numbers are not coming down.  Occasionally still gets blurry vision.  Still have to wake at night to urinate.  And occasional abdominal cramping which he feels full and does not have bowel movements regularly.  Has had a history of an ileus previously.  Not feeling any symptoms of bowel obstruction at this time.  Denies any fevers chills, nausea or vomiting.  Denies any palpitations or chest pain.  Did see his eye doctor.       Review of Systems   All other systems reviewed and are negative.           Objective    /88   Pulse 64   Temp 98.7  F (37.1  C) (Oral)   Resp 12   Ht 1.803 m (5'  "11\")   Wt 111 kg (244 lb 11.2 oz)   BMI 34.13 kg/m    Body mass index is 34.13 kg/m .  Physical Exam  Vitals and nursing note reviewed.   Constitutional:       General: He is not in acute distress.     Appearance: Normal appearance. He is not ill-appearing.   HENT:      Head: Normocephalic and atraumatic.   Eyes:      Extraocular Movements: Extraocular movements intact.      Conjunctiva/sclera: Conjunctivae normal.   Pulmonary:      Effort: Pulmonary effort is normal.   Neurological:      Mental Status: He is alert and oriented to person, place, and time.   Psychiatric:         Attention and Perception: Attention normal.         Mood and Affect: Mood normal.         Speech: Speech normal.         Thought Content: Thought content normal.        Hemoglobin A1C   Date Value Ref Range Status   10/25/2021 10.0 (H) 0.0 - 5.6 % Final     Comment:     Normal <5.7%   Prediabetes 5.7-6.4%    Diabetes 6.5% or higher     Note: Adopted from ADA consensus guidelines.   04/07/2021 8.2 (H) 0 - 5.6 % Final     Comment:     Reviewed: OK with previous  Normal <5.7% Prediabetes 5.7-6.4%  Diabetes 6.5% or higher - adopted from ADA   consensus guidelines.                   "

## 2021-10-29 ENCOUNTER — TELEPHONE (OUTPATIENT)
Dept: FAMILY MEDICINE | Facility: CLINIC | Age: 51
End: 2021-10-29

## 2021-10-29 NOTE — TELEPHONE ENCOUNTER
Please notify patient that these blood sugars are still very high. Please increase to 10 units at bedtime daily. Please communicate blood sugars to Dr. Ortiz again in one week for further instructions.

## 2021-10-29 NOTE — TELEPHONE ENCOUNTER
Pt was started on Lantus 5 units at bed. below are his fasting number in the am.  Pt is wondering how much to increase.    BS numbers  10/26    275  10/27    274  10/28    268  10/29    280  His A1C on 10/25/21 was 10.0   Pt states he has never had numbers under 200.    Please advise as covering on increase pt will call back next week with another set of fasting numbers.

## 2021-11-05 ENCOUNTER — TELEPHONE (OUTPATIENT)
Dept: FAMILY MEDICINE | Facility: CLINIC | Age: 51
End: 2021-11-05

## 2021-11-05 DIAGNOSIS — E11.65 TYPE 2 DIABETES MELLITUS WITH HYPERGLYCEMIA, WITH LONG-TERM CURRENT USE OF INSULIN (H): ICD-10-CM

## 2021-11-05 DIAGNOSIS — Z79.4 TYPE 2 DIABETES MELLITUS WITH HYPERGLYCEMIA, WITH LONG-TERM CURRENT USE OF INSULIN (H): ICD-10-CM

## 2021-11-05 NOTE — TELEPHONE ENCOUNTER
Problem List Items Addressed This Visit        Endocrine    Type 2 diabetes mellitus with hyperglycemia, with long-term current use of insulin (H)     Increase Lantus to 15 units for 3 days, then to 20 units.         Relevant Medications    insulin glargine (LANTUS PEN) 100 UNIT/ML pen        Please call and let him know to increase to 15 units for 3 nights, then to 20 units and let me know the results as planned.

## 2021-11-05 NOTE — TELEPHONE ENCOUNTER
Pt is taking 10 units of lantus per night for 1 weeks am numbers are    10/30  280  10/31  283  11/1   236  11/2   289  11/3   251  11/4   270  11/5   286    Pt did state he is eating protein prior to bed but does not seems to be helping    Ok to leave detailed message with amount of increase in lantus.  Pt will plan to call back next Friday with numbers

## 2021-11-12 ENCOUNTER — TELEPHONE (OUTPATIENT)
Dept: FAMILY MEDICINE | Facility: CLINIC | Age: 51
End: 2021-11-12
Payer: COMMERCIAL

## 2021-11-12 DIAGNOSIS — E11.65 TYPE 2 DIABETES MELLITUS WITH HYPERGLYCEMIA, WITH LONG-TERM CURRENT USE OF INSULIN (H): ICD-10-CM

## 2021-11-12 DIAGNOSIS — Z79.4 TYPE 2 DIABETES MELLITUS WITH HYPERGLYCEMIA, WITH LONG-TERM CURRENT USE OF INSULIN (H): ICD-10-CM

## 2021-11-12 NOTE — ASSESSMENT & PLAN NOTE
Improving by home numbers but not yet at goal.    Increase Lantus (insulin glargine) to 25 units and if not at or below 130 in the morning after 3 days, then increase to 30 units. Report back numbers as planned.

## 2021-11-12 NOTE — TELEPHONE ENCOUNTER
Reason for Call:  Other weekly glucose readings    Detailed comments: Patient called and wanted to provide his weekly glucose readings with PCP or someone on the care team. Please call patient back to discuss his questions/concerns.    Phone Number Patient can be reached at: Home number on file [phone] (home)    Best Time: any    Can we leave a detailed message on this number? YES    Call taken on 11/12/2021 at 12:16 PM by Emma Ochoa

## 2021-11-12 NOTE — TELEPHONE ENCOUNTER
Problem List Items Addressed This Visit        Endocrine    Type 2 diabetes mellitus with hyperglycemia, with long-term current use of insulin (H)     Improving by home numbers but not yet at goal.    Increase Lantus (insulin glargine) to 25 units and if not at or below 130 in the morning after 3 days, then increase to 30 units. Report back numbers as planned.          Relevant Medications    insulin glargine (LANTUS PEN) 100 UNIT/ML pen

## 2021-11-12 NOTE — TELEPHONE ENCOUNTER
BS for week    11/6    246  11/7    211  11/8    276  11/9    226  11/10   205  11/11   209  11/12   242    Taking 20 units each night of lantus  Pt states he has switched his bed time snack from chips and popcorn to pork rinds this week,  I did suggest a more protein based snack at night after work if he is hungry such as low sodium beef jerky, hard boiled eggs or cheddar cheese serving.  We did discuss reading labels on packages for carb amounts and serving sizes.    Ok to leave a detailed message with insulin instructions if he does not  and he will call back next week with numbers.

## 2021-11-15 ENCOUNTER — TELEPHONE (OUTPATIENT)
Dept: FAMILY MEDICINE | Facility: CLINIC | Age: 51
End: 2021-11-15
Payer: COMMERCIAL

## 2021-11-15 DIAGNOSIS — E11.65 TYPE 2 DIABETES MELLITUS WITH HYPERGLYCEMIA, WITH LONG-TERM CURRENT USE OF INSULIN (H): ICD-10-CM

## 2021-11-15 DIAGNOSIS — Z79.4 TYPE 2 DIABETES MELLITUS WITH HYPERGLYCEMIA, WITH LONG-TERM CURRENT USE OF INSULIN (H): ICD-10-CM

## 2021-11-15 NOTE — TELEPHONE ENCOUNTER
"Per pharmacy fax:  \"Cannot break box open, comes in boxes of 5 pens each.  Adjust Rx and resubmit.\"    This is regarding the following Rx:     Disp Refills Start End JUANY   insulin glargine (LANTUS PEN) 100 UNIT/ML pen 27 mL 3 11/12/2021 11/12/2022 No   Sig - Route: Inject 30 Units Subcutaneous At Bedtime - Subcutaneous       "

## 2021-11-19 ENCOUNTER — TELEPHONE (OUTPATIENT)
Dept: FAMILY MEDICINE | Facility: CLINIC | Age: 51
End: 2021-11-19
Payer: COMMERCIAL

## 2021-11-19 NOTE — TELEPHONE ENCOUNTER
Calling with BS for this week    11/13   241  11/14   265  11/15   278  11/16   239  11/17   245  11/18   208  11/19   212    Currently using 30 units per night.   Pt seeing DM prem on 11/23.     Should he be testing more? If so he will need rx for them he uses contour next ez meter.     05-Mar-2019 15:33

## 2021-11-19 NOTE — TELEPHONE ENCOUNTER
Holding okay. Let's hold at 30 units for right now. Not optimal levels yet, but let's go through the Diabetic education nurse and see if they have other recommendations other than taking next step on the Lantus. If they don't, then will increase again.    Continue the once a day testing for right now.

## 2021-11-23 ENCOUNTER — ALLIED HEALTH/NURSE VISIT (OUTPATIENT)
Dept: EDUCATION SERVICES | Facility: CLINIC | Age: 51
End: 2021-11-23
Attending: FAMILY MEDICINE
Payer: COMMERCIAL

## 2021-11-23 VITALS — BODY MASS INDEX: 34.45 KG/M2 | WEIGHT: 247 LBS

## 2021-11-23 DIAGNOSIS — Z79.4 TYPE 2 DIABETES MELLITUS WITH HYPERGLYCEMIA, WITH LONG-TERM CURRENT USE OF INSULIN (H): ICD-10-CM

## 2021-11-23 DIAGNOSIS — E11.65 TYPE 2 DIABETES MELLITUS WITH HYPERGLYCEMIA, WITH LONG-TERM CURRENT USE OF INSULIN (H): ICD-10-CM

## 2021-11-23 PROCEDURE — G0108 DIAB MANAGE TRN  PER INDIV: HCPCS

## 2021-11-23 NOTE — LETTER
11/23/2021         RE: Low Juárez  [address] [facility] [id number]        Dear Colleague,    Thank you for referring your patient, Low Juárez, to the [facility]. Please see a copy of my visit note below.    Trying to eat sweets earlier in day

## 2021-11-29 DIAGNOSIS — E11.9 TYPE 2 DIABETES MELLITUS WITHOUT COMPLICATION, WITHOUT LONG-TERM CURRENT USE OF INSULIN (H): ICD-10-CM

## 2021-11-30 ENCOUNTER — TELEPHONE (OUTPATIENT)
Dept: EDUCATION SERVICES | Facility: CLINIC | Age: 51
End: 2021-11-30
Payer: COMMERCIAL

## 2021-11-30 ENCOUNTER — APPOINTMENT (OUTPATIENT)
Dept: LAB | Facility: CLINIC | Age: 51
End: 2021-11-30
Payer: COMMERCIAL

## 2021-11-30 ENCOUNTER — TELEPHONE (OUTPATIENT)
Dept: LAB | Facility: CLINIC | Age: 51
End: 2021-11-30

## 2021-11-30 NOTE — TELEPHONE ENCOUNTER
DM ED TEAM    Patient has a card that he should do labs today. Orders state that he should complete them in December.    Wants to confirm if he needs to do labs NOW or if he should wait until Dec?    Please call him back @ [phone]

## 2021-11-30 NOTE — CONFIDENTIAL NOTE
Diabetes Self-Management Education & Support    Presents for: Individual review    SUBJECTIVE/OBJECTIVE:  Presents for: Individual review  Accompanied by: Self  Diabetes education in the past 24mo: No  Focus of Visit: Taking Medication  Diabetes type: Type 2  Disease course: Worsening  Transportation concerns: No  Difficulty affording diabetes medication?: No  Difficulty affording diabetes testing supplies?: No  Other concerns:: None  Cultural Influences/Ethnic Background:  American     ASSESSMENT:  Low is here alone today for Diabetes education.  He has not had education before.  Stated he has had Diabetes for about 5 years.  His most recent A1c was 10.0% up from 8.1% three months ago.    He is currently taking Lantus 30 units daily, Victoza 1.8mg daily and Metformin 1000mg twice daily.  Stated he is good about taking his medications daily.  Stated he was on Jardiance before, this did not work well for him.  He does have a history of pancreatitis, about 10-15 years ago.    We reviewed his current meal plan, this is noted below under the Healthy Eating section.  Stated he will work on cooking more and bringing food to work when he is working long stretches.    We dicussed activity, this has waned over the last 6-8 months.  He understands the benefits of activity for glucose control as well as overall health.    Plan:  Increase Lantus to 40 units, send blood sugars in next week for review and possible adjustments. With  Increasing insulin needs without improvement would like additional labs for insulin function.  Will consult with endocrinology regarding GLP-1 use after pancreatitis and switching patient to Ozempic for better control.    Diabetes Symptoms & Complications:  Neuropathy: Sometimes  Polyuria: Sometimes  Visual change: Sometimes  Complications assessed today?: Yes    Patient Problem List and Family Medical History reviewed for relevant medical history, current medical status, and diabetes risk  "factors.    Vitals:  Wt 112 kg (247 lb)   BMI 34.45 kg/m    Estimated body mass index is 34.45 kg/m  as calculated from the following:    Height as of 10/25/21: 1.803 m (5' 11\").    Weight as of this encounter: 112 kg (247 lb).   Last 3 BP:   BP Readings from Last 3 Encounters:   10/25/21 138/88   07/19/21 138/88   08/26/20 (!) 164/86       History   Smoking Status     Former Smoker     Packs/day: 1.00     Years: 20.00     Types: Cigarettes   Smokeless Tobacco     Never Used       Labs:  Lab Results   Component Value Date    A1C 10.0 10/25/2021    A1C 8.2 04/07/2021     Lab Results   Component Value Date     04/07/2021     Lab Results   Component Value Date    LDL 67 04/07/2021     HDL Cholesterol   Date Value Ref Range Status   04/07/2021 28 (L) >39 mg/dL Final   ]  GFR Estimate   Date Value Ref Range Status   04/07/2021 >90 >60 mL/min/[1.73_m2] Final     Comment:     Non  GFR Calc  Starting 12/18/2018, serum creatinine based estimated GFR (eGFR) will be   calculated using the Chronic Kidney Disease Epidemiology Collaboration   (CKD-EPI) equation.       GFR Estimate If Black   Date Value Ref Range Status   04/07/2021 >90 >60 mL/min/[1.73_m2] Final     Comment:      GFR Calc  Starting 12/18/2018, serum creatinine based estimated GFR (eGFR) will be   calculated using the Chronic Kidney Disease Epidemiology Collaboration   (CKD-EPI) equation.       Lab Results   Component Value Date    CR 0.89 04/07/2021     No results found for: MICROALBUMIN    Healthy Eating:  Healthy Eating Assessed Today: Yes  Cultural/Congregation diet restrictions?: No  Meal planning/habits: Low carb  How many times a week on average do you eat food made away from home (restaurant/take-out)?: 5+  Meals include: Lunch,Dinner,Evening Snack  Lunch: zuchini noodles with sauce and parmesean cheese, apple.  Cafeteria food.  Dinner: Cooks dinner when home, tries to make healthy choices.  Snacks: Has snack before " going to bed, if not then FBG is high and hungry in the night.  parmesean crisps, beef jerky, nuts.  Tries to avoid sweets after 8pm  Beverages: Water,Diet soda  Has patient met with a dietitian in the past?: No    Being Active:  Being Active Assessed Today: Yes  Exercise:: Currently not exercising  Barrier to exercise: Time (Has been working more over the last 6-8 months.)    Monitoring:  Monitoring Assessed Today: Yes  Did patient bring glucose meter to appointment? : No  Times checking blood sugar at home (number): 1  Times checking blood sugar at home (per): Day  Blood glucose trend: No change    Low checks his blood sugar once daily fasting.  Readings have been runnin/204/200/213/247 since increasing to 30 units    Taking Medications:  Diabetes Medication(s)     Biguanides       metFORMIN (GLUCOPHAGE) 1000 MG tablet    Take 1 tablet (1,000 mg) by mouth 2 times daily (with meals)    Insulin       insulin glargine (LANTUS PEN) 100 UNIT/ML pen    Inject 30 Units Subcutaneous At Bedtime    Incretin Mimetic Agents (GLP-1 Receptor Agonists)       liraglutide (VICTOZA PEN) 18 MG/3ML solution    Inject 1.8 mg Subcutaneous daily          Taking Medication Assessed Today: Yes  Current Treatments: Insulin Injections,Non-insulin Injectables,Oral Medication (taken by mouth)  Dose schedule: At bedtime  Given by: Patient  Problems taking diabetes medications regularly?: No  Diabetes medication side effects?: No    Problem Solving:  Is the patient at risk for hypoglycemia?: No    Reducing Risks:  Has dilated eye exam at least once a year?: Yes  Sees dentist every 6 months?: Yes    Healthy Coping:  Emotional response to diabetes: Concern for health and well-being,Ready to learn  Patient Activation Measure Survey Score:  No flowsheet data found.    Diabetes knowledge and skills assessment:   Patient is knowledgeable in diabetes management concepts related to: Healthy Eating, Being Active, Monitoring and Taking  Medication    Patient needs further education on the following diabetes management concepts: Being Active and Taking Medication    Based on learning assessment above, most appropriate setting for further diabetes education would be: Individual setting.    INTERVENTIONS:    Education provided today on:  AADE Self-Care Behaviors:  Being Active: describe appropriate activity program and precautions to take  Monitoring: log and interpret results, individual blood glucose targets and frequency of monitoring  Taking Medication: action of prescribed medication, proper site selection and rotation for injections, side effects of prescribed medications and when to take medications  Problem Solving: high blood glucose - causes, signs/symptoms, treatment and prevention, low blood glucose - causes, signs/symptoms, treatment and prevention and carrying a carbohydrate source at all times    Opportunities for ongoing education and support in diabetes-self management were discussed.    Pt verbalized understanding of concepts discussed and recommendations provided today.       Education Materials Provided:  No new materials provided today        Patient's most recent   Lab Results   Component Value Date    A1C 10.0 10/25/2021    A1C 8.2 04/07/2021    is not meeting goal of <8.0    PLAN  See Patient Instructions for co-developed, patient-stated behavior change goals.  AVS printed and provided to patient today. See Follow-Up section for recommended follow-up.     The service provided today was under the supervising provider, Soha Calvillo, who was available if needed.     Time Spent: 60 minutes  Encounter Type: Individual    Any diabetes medication dose changes were made via the CDE Protocol and Collaborative Practice Agreement with the patient's primary care provider. A copy of this encounter was shared with the provider.

## 2021-11-30 NOTE — CONFIDENTIAL NOTE
Patient calling stating she has been having heavy bleeding and abdominal pain.    Spoke with Juany at [facility].  Labs were entered with expected date to be completed not date to be completed by.  Will contact Dr. Ortiz to override.  Will call patient to reschedule after.  Call Low and left message informing of error and follow-up that will happen.  Call back if needed.

## 2021-11-30 NOTE — PROGRESS NOTES
Dr. Ortiz, on your patient Low, the diabetic educator Mary Hickman wants the orders you placed expected on 12/23 to be collected sooner. Can we go ahead with drawing him this week sometime?

## 2021-12-02 ENCOUNTER — TELEPHONE (OUTPATIENT)
Dept: EDUCATION SERVICES | Facility: CLINIC | Age: 51
End: 2021-12-02
Payer: COMMERCIAL

## 2021-12-02 NOTE — CONFIDENTIAL NOTE
Spoke with Low.  He has been ctrying to eat low carb options after 8pm at night:  pork rinds  Cauliflower chips  Nuts  After 8pm  Dicussed goal is to get blood sugars around 150 consistently, increase to 46 units.  Will have labs drawn tomorrow, wait for results for additional changes to regimen.

## 2021-12-02 NOTE — TELEPHONE ENCOUNTER
Spoke w/ pt. Lantus was increased at visit on  with Mary. Pt is checking FBG only and reports the followin/2: 181  : 169  : 190  : 181  : 201  : 212  :190  : 230  : 223    Advised pt will get the message to Mary and she will call him back. He is also wondering when he can come in for labs.

## 2021-12-02 NOTE — TELEPHONE ENCOUNTER
Mary / [facility] ED TEAM    Patient calling in Blood Sugar numbers    Please call him back @ [phone]

## 2021-12-02 NOTE — TELEPHONE ENCOUNTER
Yes, you can complete those at anytime, just not the A1C which is the only one that will need to wait until the December timeframe

## 2021-12-03 ENCOUNTER — LAB (OUTPATIENT)
Dept: LAB | Facility: CLINIC | Age: 51
End: 2021-12-03
Payer: COMMERCIAL

## 2021-12-03 DIAGNOSIS — Z79.4 TYPE 2 DIABETES MELLITUS WITH HYPERGLYCEMIA, WITH LONG-TERM CURRENT USE OF INSULIN (H): ICD-10-CM

## 2021-12-03 DIAGNOSIS — E11.65 TYPE 2 DIABETES MELLITUS WITH HYPERGLYCEMIA, WITH LONG-TERM CURRENT USE OF INSULIN (H): ICD-10-CM

## 2021-12-03 DIAGNOSIS — E11.9 TYPE 2 DIABETES MELLITUS WITHOUT COMPLICATION, WITHOUT LONG-TERM CURRENT USE OF INSULIN (H): ICD-10-CM

## 2021-12-03 LAB
FASTING STATUS PATIENT QL REPORTED: YES
GLUCOSE BLD-MCNC: 183 MG/DL (ref 70–125)
HBA1C MFR BLD: 9.6 % (ref 0–5.6)

## 2021-12-03 PROCEDURE — 82947 ASSAY GLUCOSE BLOOD QUANT: CPT

## 2021-12-03 PROCEDURE — 36415 COLL VENOUS BLD VENIPUNCTURE: CPT

## 2021-12-03 PROCEDURE — 83525 ASSAY OF INSULIN: CPT

## 2021-12-03 PROCEDURE — 99000 SPECIMEN HANDLING OFFICE-LAB: CPT

## 2021-12-03 PROCEDURE — 84681 ASSAY OF C-PEPTIDE: CPT

## 2021-12-03 PROCEDURE — 86341 ISLET CELL ANTIBODY: CPT | Mod: 90

## 2021-12-03 PROCEDURE — 83036 HEMOGLOBIN GLYCOSYLATED A1C: CPT

## 2021-12-04 LAB — INSULIN SERPL-ACNC: 34.5 MU/L (ref 3–25)

## 2021-12-05 LAB — GAD65 AB SER IA-ACNC: 150.8 IU/ML

## 2021-12-06 DIAGNOSIS — E11.65 TYPE 2 DIABETES MELLITUS WITH HYPERGLYCEMIA, WITH LONG-TERM CURRENT USE OF INSULIN (H): ICD-10-CM

## 2021-12-06 DIAGNOSIS — Z79.4 TYPE 2 DIABETES MELLITUS WITH HYPERGLYCEMIA, WITH LONG-TERM CURRENT USE OF INSULIN (H): ICD-10-CM

## 2021-12-06 LAB — C PEPTIDE SERPL-MCNC: 3.2 NG/ML (ref 0.9–6.9)

## 2021-12-07 RX ORDER — INSULIN GLARGINE 100 [IU]/ML
INJECTION, SOLUTION SUBCUTANEOUS
Qty: 30 ML | Refills: 3 | Status: SHIPPED | OUTPATIENT
Start: 2021-12-07 | End: 2021-12-09

## 2021-12-08 ENCOUNTER — NURSE TRIAGE (OUTPATIENT)
Dept: NURSING | Facility: CLINIC | Age: 51
End: 2021-12-08
Payer: COMMERCIAL

## 2021-12-08 NOTE — TELEPHONE ENCOUNTER
"New Pharmacy.  Last Written Prescription Date:  11/15/2021  Last Fill Quantity: 30 mL,  # refills: 3   Last office visit provider:  10/25/2021 with Dr Ortiz.    Requested Prescriptions   Pending Prescriptions Disp Refills     LANTUS SOLOSTAR 100 UNIT/ML soln [Pharmacy Med Name: Lantus Solostar U-100 Insulin 100 unit/mL (3 mL) subcutaneous pen (insulin glargine (pen))] 15 mL 3     Sig: Inject 9 Units under the skin at bedtime.       Long Acting Insulin Protocol Passed - 12/7/2021  9:58 PM        Passed - Serum creatinine on file in past 12 months     Recent Labs   Lab Test 04/07/21  1158   CR 0.89       Ok to refill medication if creatinine is low          Passed - HgbA1C in past 3 or 6 months     If HgbA1C is 8 or greater, it needs to be on file within the past 3 months.  If less than 8, must be on file within the past 6 months.     Recent Labs   Lab Test 12/03/21  1108   A1C 9.6*             Passed - Medication is active on med list        Passed - Patient is age 18 or older        Passed - Recent (6 mo) or future (30 days) visit within the authorizing provider's specialty     Patient had office visit in the last 6 months or has a visit in the next 30 days with authorizing provider or within the authorizing provider's specialty.  See \"Patient Info\" tab in inbasket, or \"Choose Columns\" in Meds & Orders section of the refill encounter.                 Kat Toure 12/07/21 10:19 PM  "

## 2021-12-08 NOTE — TELEPHONE ENCOUNTER
Low pharmacist from [facility] pharmacy called stating he would like a clarification on pt' lantus bedtime Lantus dosage if it is 9 units?        He was informed Per yesterdays  Restad bedtime lantus order is 9 units at bedtime. He stated okay.       LANTUS SOLOSTAR 100 UNIT/ML soln 30 mL 3 12/7/2021  No   Sig: Inject 9 Units under the skin at bedtime.   Sent to pharmacy as: Lantus SoloStar 100 UNIT/ML Subcutaneous Solution Pen-injector (insulin glargine)   Class: E-Prescribe   Notes to Pharmacy: If Lantus is not covered by insurance, may substitute Basaglar at same dose and frequency.   Order: [id number]   E-Prescribing Status: Transmission to pharmacy failed (12/7/2021 10:22 PM CST)     Oswaldo Steiner RN  [facility]     Reason for Disposition    Pharmacy calling with prescription question and triager answers question    Protocols used: MEDICATION QUESTION CALL-A-

## 2021-12-09 ENCOUNTER — TELEPHONE (OUTPATIENT)
Dept: FAMILY MEDICINE | Facility: CLINIC | Age: 51
End: 2021-12-09
Payer: COMMERCIAL

## 2021-12-09 DIAGNOSIS — E11.9 TYPE 2 DIABETES MELLITUS WITHOUT COMPLICATION, WITHOUT LONG-TERM CURRENT USE OF INSULIN (H): ICD-10-CM

## 2021-12-09 DIAGNOSIS — Z79.4 TYPE 2 DIABETES MELLITUS WITH HYPERGLYCEMIA, WITH LONG-TERM CURRENT USE OF INSULIN (H): ICD-10-CM

## 2021-12-09 DIAGNOSIS — E11.65 TYPE 2 DIABETES MELLITUS WITH HYPERGLYCEMIA, WITH LONG-TERM CURRENT USE OF INSULIN (H): ICD-10-CM

## 2021-12-09 RX ORDER — INSULIN GLARGINE 100 [IU]/ML
INJECTION, SOLUTION SUBCUTANEOUS
Qty: 60 ML | Refills: 3 | Status: CANCELLED | OUTPATIENT
Start: 2021-12-09

## 2021-12-09 RX ORDER — INSULIN GLARGINE 100 [IU]/ML
INJECTION, SOLUTION SUBCUTANEOUS
Qty: 30 ML | Refills: 3 | Status: SHIPPED | OUTPATIENT
Start: 2021-12-09 | End: 2022-03-10

## 2021-12-09 NOTE — TELEPHONE ENCOUNTER
"Spoke with Low.  Reviewed recent labwork and that labs show he has a type of Diabetes called CAMDEN.  Discussed how this changes the options for medications we can use to control his blood sugars.  Currently taking Lantus 45 units, Victoza 1.8mg daily and Metformin 1000mg twice daily.  FB  188  181  227  244  242  225  192  Did consult with endocrinology who recommended start fast acting insulin and discontinue Victoza.  Based on protocol started patient on 5 units of Novolog before each meal.  Reviewed insulin time of action and hypoglycemia.  Rx for this, updated Lantus and updated pen needles sent to pharmacy.    We did discuss use of an SGLT-2 per endocrine recommendation.  He was on Jardiance previously and had itching and pain in lower belly.  Will wait on this, discuss further with endocrine when he sees them.    He is having \"knots\" under his skin at injection sites.  Stated it is happening when he injects Lantus and Victoza.  They do go away after awhile.  Discussed this could be due to fluid volume.  Heat packs can help.    Discussed checking blood sugars.  Asked Low to start checking fasting and before bed.  Briefly discussed idea of CGM.  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  Will need to discuss further with reps as to options.  "

## 2021-12-09 NOTE — TELEPHONE ENCOUNTER
Pt has increase lantus to 46 untis per night at this time but may go up.  Please review rx and send if agreed

## 2021-12-10 ENCOUNTER — NURSE TRIAGE (OUTPATIENT)
Dept: CALL CENTER | Age: 51
End: 2021-12-10
Payer: COMMERCIAL

## 2021-12-10 NOTE — TELEPHONE ENCOUNTER
Placed another call to Low but unable to reach him.  Requested he call us back Monday when he wakes up.

## 2021-12-10 NOTE — TELEPHONE ENCOUNTER
Received call from [facility] call center, pt calling; missed medication and now with high glucose, around 270 range.     Pt disconnect prior to warm transfer.  Message left on pt voice, [phone], pls call back or call PCP triage.  Pt is not established in the endocrine clinic. New appt 3-9-22, [facility] ENDOCRINOLOGY, Dr. Roy  PCP Dr. EDWARDO Ortiz @ [facility]    Will send high priority note onto PCP for follow up

## 2021-12-13 NOTE — TELEPHONE ENCOUNTER
Spoke with pt he missed does of lantus.  He will speak with DM educator on Thursday he wants a continuous monitor

## 2021-12-16 ENCOUNTER — TELEPHONE (OUTPATIENT)
Dept: FAMILY MEDICINE | Facility: CLINIC | Age: 51
End: 2021-12-16
Payer: COMMERCIAL

## 2021-12-16 DIAGNOSIS — Z79.4 TYPE 2 DIABETES MELLITUS WITH HYPERGLYCEMIA, WITH LONG-TERM CURRENT USE OF INSULIN (H): Primary | ICD-10-CM

## 2021-12-16 DIAGNOSIS — E11.65 TYPE 2 DIABETES MELLITUS WITH HYPERGLYCEMIA, WITH LONG-TERM CURRENT USE OF INSULIN (H): Primary | ICD-10-CM

## 2021-12-16 NOTE — TELEPHONE ENCOUNTER
Spoke with Low.  FBG this week-192/236/229/219/232/217/169/214  Increase Lantus to 55 units.    Will get rx for Freestyle Dara sent to pharmacy to get started on that.  If wishes for assistance with training let me know and will get him in.  Endo appointment is not until 3.09.22, is on cancellation list.

## 2022-01-06 ENCOUNTER — TELEPHONE (OUTPATIENT)
Dept: EDUCATION SERVICES | Facility: CLINIC | Age: 52
End: 2022-01-06

## 2022-01-06 NOTE — TELEPHONE ENCOUNTER
Patient called asking for Mary. He said that it's been a couple weeks since he last spoke with Mary because of the holidays and would like to discuss his bg readings with her.     Please call him @ [phone]

## 2022-01-07 DIAGNOSIS — L40.9 PSORIASIS: ICD-10-CM

## 2022-01-10 RX ORDER — BETAMETHASONE DIPROPIONATE 0.5 MG/G
OINTMENT, AUGMENTED TOPICAL 2 TIMES DAILY
Qty: 45 G | Refills: 11 | Status: SHIPPED | OUTPATIENT
Start: 2022-01-10 | End: 2022-09-07

## 2022-01-10 NOTE — TELEPHONE ENCOUNTER
"Routing refill request to provider for review/approval because:  A break in medication   script    Last Written Prescription Date:  20  Last Fill Quantity: 60 ml,  # refills: 6   Last office visit provider:  10/25/21     Requested Prescriptions   Pending Prescriptions Disp Refills     augmented betamethasone dipropionate (DIPROLENE-AF) 0.05 % external ointment [Pharmacy Med Name: betamethasone, augmented 0.05 % topical ointment (DIPROLENE AF)] 45 g 11     Sig: Apply topically 2 times daily       Topical Steroids and Nonsteroidals Protocol Passed - 2022  8:23 AM        Passed - Patient is age 6 or older        Passed - Authorizing prescriber's most recent note related to this medication read.     If refill request is for ophthalmic use, please forward request to provider for approval.          Passed - High potency steroid not ordered        Passed - Recent (12 mo) or future (30 days) visit within the authorizing provider's specialty     Patient has had an office visit with the authorizing provider or a provider within the authorizing providers department within the previous 12 mos or has a future within next 30 days. See \"Patient Info\" tab in inbasket, or \"Choose Columns\" in Meds & Orders section of the refill encounter.              Passed - Medication is active on med list             Romeo Munroe RN 01/10/22 9:07 AM  "

## 2022-01-11 ENCOUNTER — ALLIED HEALTH/NURSE VISIT (OUTPATIENT)
Dept: EDUCATION SERVICES | Facility: CLINIC | Age: 52
End: 2022-01-11
Payer: COMMERCIAL

## 2022-01-11 DIAGNOSIS — Z79.4 TYPE 2 DIABETES MELLITUS WITH HYPERGLYCEMIA, WITH LONG-TERM CURRENT USE OF INSULIN (H): Primary | ICD-10-CM

## 2022-01-11 DIAGNOSIS — E11.65 TYPE 2 DIABETES MELLITUS WITH HYPERGLYCEMIA, WITH LONG-TERM CURRENT USE OF INSULIN (H): Primary | ICD-10-CM

## 2022-01-11 DIAGNOSIS — L40.9 PSORIASIS: ICD-10-CM

## 2022-01-11 RX ORDER — DAPAGLIFLOZIN 5 MG/1
5 TABLET, FILM COATED ORAL DAILY
Qty: 30 TABLET | Refills: 1 | Status: SHIPPED | OUTPATIENT
Start: 2022-01-11 | End: 2022-03-11

## 2022-01-11 NOTE — LETTER
1/11/2022         RE: Low Juárez  [address] [place] [id number]        Dear Colleague,    Thank you for referring your patient, Low Juárez, to the [facility]. Please see a copy of my visit note below.    No notes on file

## 2022-01-11 NOTE — CONFIDENTIAL NOTE
Dejon is here alone today to get started on his freestyle kathleen 2 system.    We reviewed reader, how to check glucose, how to start new sensor, how to review history as well as how to set target range.  We did not turn on the alerts today.  eDjon was able to place the freestyle sensor on his upper right arm without any assistance.  He did not have any pain or discomfort.  Sensor was started, discussed 60-minute warm up period.    We did attempt to set up Low's cell phone to enable him to use this to scan his glucose.  Unfortunately we were not able to accomplish this at this time.  Stated he will work on this at a later time.  If he is able to set up an account he will let me know so that we are able to share data.    Did also discuss starting Farxiga 5 mg daily to see if he reacts to this as he did with the Jardiance.  Stated he is willing to try this and see if it improves his blood sugar control.  A 30-day prescription was sent to his local pharmacy.    Plan:  Contact me if able to set up account on phone.  Continue insulin doses as is, 54 units of Lantus daily and 4 units of Humalog before meals.  Start Farxiga 5 mg daily when you wake.  Follow-up with me via telephone on January 20, calling sooner with any questions or concerns.    Mary Hickman RN, [facility]  [phone]  In Clinic Tuesday, Wednesday, Thursday

## 2022-01-13 RX ORDER — BETAMETHASONE DIPROPIONATE 0.5 MG/ML
LOTION, AUGMENTED TOPICAL 2 TIMES DAILY
Qty: 60 ML | Refills: 6 | Status: SHIPPED | OUTPATIENT
Start: 2022-01-13 | End: 2023-06-08

## 2022-01-13 NOTE — TELEPHONE ENCOUNTER
Requesting refill for Diprolene Lotion.    augmented betamethasone dipropionate (DIPROLENE-AF) 0.05 % external ointment 45 g 11 1/10/2022  No   Sig - Route: Apply topically 2 times daily - Topical   Sent to pharmacy as: Betamethasone Dipropionate Aug 0.05 % External Ointment (DIPROLENE-AF)   Class: E-Prescribe     ON 1/10/22 DIPROLENE OINTMENT WAS REFILLED.    CALL TO PATIENT private VM AND MESSAGE LEFT TO RETURN CALL TO CLARIFY.  Patient returning call:  Patient uses BOTH ointment and lotion and has for past 25 years.    Refilled per [facility] protocol.  Ivett Coburn RN

## 2022-01-21 ENCOUNTER — TELEPHONE (OUTPATIENT)
Dept: EDUCATION SERVICES | Facility: CLINIC | Age: 52
End: 2022-01-21
Payer: COMMERCIAL

## 2022-01-21 NOTE — TELEPHONE ENCOUNTER
Patient calling to check in with Mary Hickman. He said that the new medication is working and it's keeping his number low. He has had only 2 readings above 200 and had a reading of 100 once.      He is on his way to work and will try calling back today or Monday. Oktldm if he doesn't answer.     Low @ [phone]

## 2022-01-28 NOTE — CONFIDENTIAL NOTE
"Spoke with Low.  He is currently taking Farxiga 5mg when he gets up for his day, Lantus 54 units at bedtime and Novolog 4-5 units with meals.  He is using a Freestyle Dara sensor to check his blood sugars.  He has not been able to work on getting the marily on his phone yet.  Stated he will work on this of the next two weeks.  Stated his 7 day average is 148 and 30 day average is 156.  Stated most of the time his readings are within range.  Highest reading this week was 259 and lowest was 111.    He is not having any itching or stomach pain like he associated with Jardiance.  Stated he is having some trouble with constipation.  Stated he is eating \"a lot\" of protein.  He is not eating vegetables daily and encouraged him to work on this.  Will call to follow up in 2 weeks.                                               "

## 2022-01-28 NOTE — TELEPHONE ENCOUNTER
Patient calling to check in with Mary. Per patient the new medication seems to be working.     Please have Mary call him back @ [phone]

## 2022-02-16 ENCOUNTER — OFFICE VISIT (OUTPATIENT)
Dept: FAMILY MEDICINE | Facility: CLINIC | Age: 52
End: 2022-02-16
Payer: COMMERCIAL

## 2022-02-16 VITALS
SYSTOLIC BLOOD PRESSURE: 124 MMHG | DIASTOLIC BLOOD PRESSURE: 68 MMHG | HEART RATE: 80 BPM | WEIGHT: 253.9 LBS | TEMPERATURE: 98.7 F | HEIGHT: 71 IN | BODY MASS INDEX: 35.55 KG/M2 | RESPIRATION RATE: 16 BRPM

## 2022-02-16 DIAGNOSIS — Z12.11 SCREEN FOR COLON CANCER: ICD-10-CM

## 2022-02-16 DIAGNOSIS — L08.9 LOCAL INFECTION OF SKIN AND SUBCUTANEOUS TISSUE: Primary | ICD-10-CM

## 2022-02-16 DIAGNOSIS — E11.65 TYPE 2 DIABETES MELLITUS WITH HYPERGLYCEMIA, WITH LONG-TERM CURRENT USE OF INSULIN (H): ICD-10-CM

## 2022-02-16 DIAGNOSIS — Z79.4 TYPE 2 DIABETES MELLITUS WITH HYPERGLYCEMIA, WITH LONG-TERM CURRENT USE OF INSULIN (H): ICD-10-CM

## 2022-02-16 PROCEDURE — 99214 OFFICE O/P EST MOD 30 MIN: CPT | Performed by: FAMILY MEDICINE

## 2022-02-16 RX ORDER — CEPHALEXIN 500 MG/1
500 CAPSULE ORAL 2 TIMES DAILY
Qty: 20 CAPSULE | Refills: 0 | Status: SHIPPED | OUTPATIENT
Start: 2022-02-16 | End: 2022-02-26

## 2022-02-17 NOTE — ASSESSMENT & PLAN NOTE
Improved control with the start of Farxiga.    Advised that in speaking with diabetic educators to learn how to do carb counting for meals which will be the base dose.  Then an additive dose of SSI of:   No addition  151-200: 3 units  201-250: 6 units  251-300: 8 units  301+: 10 units    Continue Lantus changes as well as Farxiga and Metformin

## 2022-02-17 NOTE — PROGRESS NOTES
Problem List Items Addressed This Visit        Endocrine    Type 2 diabetes mellitus with hyperglycemia, with long-term current use of insulin (H)     Improved control with the start of Farxiga.    Advised that in speaking with diabetic educators to learn how to do carb counting for meals which will be the base dose.  Then an additive dose of SSI of:   No addition  151-200: 3 units  201-250: 6 units  251-300: 8 units  301+: 10 units    Continue Lantus changes as well as Farxiga and Metformin           Other Visit Diagnoses     Local infection of skin and subcutaneous tissue    -  Primary    Treatment as per orders.  Continue compression stockings.  Continue general tattoo care.    Relevant Medications    cephALEXin (KEFLEX) 500 MG capsule    Screen for colon cancer        Relevant Orders    Adult Gastro Ref - Procedure Only        Return in about 4 months (around 6/16/2022) for Routine preventive, Diabetes.  Keep appointments scheduled w/ MTM, diabetic ed and endo as scheduled.    Duke Kelsey is a 51 year old who presents for the following health issues   Possible infection new tattoo to ankle region.  Has had tattoos prior.  This 1 though he is having redness 2 weeks afterwards as well as the redness is moving down into his ankle.  Is using his compression stockings.  No fevers or chills.  No numbness or tingling.    Excellent improvement in his blood glucose levels.  With the addition of Farxiga he is now running between 101 60.  Still having some spikes above 200 with meals.  He was advised to take a 5 unit base dose of insulin prior to meals bites gets worried that he is go to go hypoglycemic.  As such she is not using the base dose prior to meals on majority of occasions.  Does have upcoming visit with diabetic educators.  To help him feel more comfortable with his regular insulin I did give him a sliding scale insulin that will be an additional dosing.  Also advised him to discuss with the  "diabetic educators about carb counting to use as the base dose before each meal.    History of Present Illness     Reason for visit:  Infection  Symptom onset:  1-3 days ago    He eats 0-1 servings of fruits and vegetables daily.He exercises with enough effort to increase his heart rate 20 to 29 minutes per day.    He is taking medications regularly.       Review of Systems   All other systems reviewed and are negative.           Objective    /68 (BP Location: Left arm, Patient Position: Sitting, Cuff Size: Adult Large)   Pulse 80   Temp 98.7  F (37.1  C) (Oral)   Resp 16   Ht 1.803 m (5' 11\")   Wt 115.2 kg (253 lb 14.4 oz)   BMI 35.41 kg/m    Body mass index is 35.41 kg/m .  Physical Exam  Vitals and nursing note reviewed.   Constitutional:       General: He is not in acute distress.     Appearance: Normal appearance. He is not ill-appearing.   HENT:      Head: Normocephalic and atraumatic.   Eyes:      Extraocular Movements: Extraocular movements intact.      Conjunctiva/sclera: Conjunctivae normal.   Pulmonary:      Effort: Pulmonary effort is normal.   Skin:     Capillary Refill: Capillary refill takes less than 2 seconds.      Comments: Erythema mild induration noted around the anterior portion of the new tattoo.  Some scarring of the tattoo areas throughout.  No drainage.   Neurological:      Mental Status: He is alert and oriented to person, place, and time.   Psychiatric:         Attention and Perception: Attention normal.         Mood and Affect: Mood normal.         Speech: Speech normal.         Thought Content: Thought content normal.                  "

## 2022-02-23 ENCOUNTER — TELEPHONE (OUTPATIENT)
Dept: FAMILY MEDICINE | Facility: CLINIC | Age: 52
End: 2022-02-23
Payer: COMMERCIAL

## 2022-03-09 ENCOUNTER — OFFICE VISIT (OUTPATIENT)
Dept: ENDOCRINOLOGY | Facility: CLINIC | Age: 52
End: 2022-03-09
Attending: FAMILY MEDICINE
Payer: COMMERCIAL

## 2022-03-09 VITALS
HEIGHT: 71 IN | SYSTOLIC BLOOD PRESSURE: 130 MMHG | HEART RATE: 72 BPM | WEIGHT: 250.8 LBS | BODY MASS INDEX: 35.11 KG/M2 | DIASTOLIC BLOOD PRESSURE: 82 MMHG

## 2022-03-09 DIAGNOSIS — E11.65 TYPE 2 DIABETES MELLITUS WITH HYPERGLYCEMIA, WITH LONG-TERM CURRENT USE OF INSULIN (H): ICD-10-CM

## 2022-03-09 DIAGNOSIS — Z79.4 TYPE 2 DIABETES MELLITUS WITH HYPERGLYCEMIA, WITH LONG-TERM CURRENT USE OF INSULIN (H): ICD-10-CM

## 2022-03-09 PROCEDURE — 99205 OFFICE O/P NEW HI 60 MIN: CPT | Performed by: INTERNAL MEDICINE

## 2022-03-09 RX ORDER — METFORMIN HCL 500 MG
2000 TABLET, EXTENDED RELEASE 24 HR ORAL
Qty: 120 TABLET | Refills: 5 | Status: SHIPPED | OUTPATIENT
Start: 2022-03-09 | End: 2023-02-22

## 2022-03-09 RX ORDER — INSULIN DEGLUDEC 200 U/ML
50 INJECTION, SOLUTION SUBCUTANEOUS DAILY
Qty: 9 ML | Refills: 5 | Status: SHIPPED | OUTPATIENT
Start: 2022-03-09 | End: 2023-02-22

## 2022-03-09 NOTE — LETTER
3/9/2022         RE: Low Juárez  [address] [facility] [address]        Dear Colleague,    Thank you for referring your patient, Low Juárez, to the [facility]. Please see a copy of my visit note below.      ENDOCRINOLOGY NEW PATIENT VISIT        HISTORY OF PRESENT ILLNESS    Low Juárez is seen in consultation at the request of Bernardnio Ortiz DO for diabetes mellitus.  The patient would also like to discuss the other issues detailed below.    1.  Diabetes mellitus.  Diagnosed in 2017 when patient was noted to have an elevated random glucose to 349.  A1c was 10.4% around the time of diagnosis.    Over the past year, A1c has been rising, prompting referral to diabetes education and then to endocrinology.  Of note, in 12/2021 he had LATESHA antibody checked which returned positive, with C-peptide that was still measurable.    Initially on oral therapies, initiated on insulin in 10/2021.    Current diabetes regimen: Lantus 50 units daily (sometimes takes 54 units if glucose values are running higher).  Metformin 1000 mg twice daily (may forget second dose if he falls asleep at night).  Farxiga 5 mg daily (started in 1/2022, tolerating well outside of some lower abdominal discomfort which improves after he urinates, no  yeast infections).  NovoLog 5 units before each meal (started in 12/2021).    Previously on Victoza: Stopped this at the direction of one of his healthcare providers.  Previously on Jardiance, discontinued due to discomfort and itching in the abdomen.    Typically has 3 meals per day.  Sometimes has limited lunch time at work and may not be able to administer mealtime insulin 10 to 15 minutes before he starts eating.    The patient has history of pancreatitis (by his report, gallstone pancreatitis) approximately 15 years ago.  He is status post cholecystectomy.    Uses Freestyle kathleen.  The data was reviewed.    Interpretation of CGM    Patient Data  Type  of DM: CAMDEN   Last A1c:   Hemoglobin A1C POCT   Date Value Ref Range Status   04/07/2021 8.2 (H) 0 - 5.6 % Final     Comment:     Reviewed: OK with previous  Normal <5.7% Prediabetes 5.7-6.4%  Diabetes 6.5% or higher - adopted from ADA   consensus guidelines.       Hemoglobin A1C   Date Value Ref Range Status   12/03/2021 9.6 (H) 0.0 - 5.6 % Final     Comment:     Normal <5.7%   Prediabetes 5.7-6.4%    Diabetes 6.5% or higher     Note: Adopted from ADA consensus guidelines.     Current DM regimen: See above    Sensor Summary/ Accuracy Criteria  Number of days sensor worn: 54% over the past 14 days (was away on vacation part of this timeframe)  Calibration: CGM system does not require calibration.    Average (mean) sensor glucose: 180 mg/dL  Time in range: 58%  Duration below low limit: 0%  Coefficient of variation: 30%    Data Assessment  ~Fairly stable glucose values overnight, typically in the upper 100s  ~Hyperglycemia during the daytime hours, most often postprandial (although I question if basal insulin may be lasting a full 24 hours)  ~No recurrent hypoglycemia    Impression  -Postprandial hyperglycemia    Recommendations  -Please refer to assessment and plan      2.  Question low testosterone.  Patient wonders if he has low testosterone since he has had erectile dysfunction.  He has been prescribed Viagra but has not used it since he has not been sexually active recently.  Not getting early morning erections.  Has some libido.    Reports he used anabolic steroids 12 to 15 years ago when bodybuilding and wonders if this may have had longstanding impact on testosterone levels and erectile function.    Has biological children, no history of infertility.    Pertinent Social History: , works as a radiology technician.    PAST MEDICAL HISTORY  Past Medical History:   Diagnosis Date     Apnea, sleep      Fatty liver 8/18/2017     High cholesterol      Hyperlipidemia LDL goal <100 8/18/2017     Hypertension       Morbid obesity due to excess calories (H) 8/18/2017     Pancreatitis      Rhinitis      Type 2 diabetes mellitus without complication, without long-term current use of insulin (H) 1/31/2018       MEDICATIONS  Current Outpatient Medications   Medication Sig Dispense Refill     aspirin (ASA) 81 MG tablet Take 81 mg by mouth daily       atorvastatin (LIPITOR) 40 MG tablet Take 1 tablet (40 mg) by mouth daily 90 tablet 3     augmented betamethasone dipropionate (DIPROLENE) 0.05 % external lotion Apply topically 2 times daily 60 mL 6     augmented betamethasone dipropionate (DIPROLENE-AF) 0.05 % external ointment Apply topically 2 times daily 45 g 11     blood glucose (NO BRAND SPECIFIED) lancets standard Use to test blood sugar 2 times daily or as directed. 200 each 3     blood glucose (NO BRAND SPECIFIED) test strip Use to test blood sugar 2 times daily or as directed. 200 strip 3     blood glucose calibration (NO BRAND SPECIFIED) solution Use to calibrate blood glucose monitor as needed as directed. 1 each 3     blood glucose monitoring (NO BRAND SPECIFIED) meter device kit Use to test blood sugar 2 times daily or as directed. 1 kit 0     cetirizine (ZYRTEC) 10 MG tablet Take 10-20 mg by mouth daily       Continuous Blood Gluc Sensor (FREESTYLE ROSA 2 SENSOR) MISC 1 kit every 14 days Use per manufacture's instructions to check glucose daily. 6 each 1     dapagliflozin (FARXIGA) 5 MG TABS tablet Take 1 tablet (5 mg) by mouth daily 30 tablet 1     fluticasone (FLONASE) 50 MCG/ACT spray Spray 1 spray into both nostrils as needed for rhinitis or allergies       insulin aspart (NOVOLOG PEN) 100 UNIT/ML pen Take 5 units no more than 10 minutes before each meal daily.  Increase as directed.  Max daily dose 60 units. 18 mL 1     insulin glargine (LANTUS SOLOSTAR) 100 UNIT/ML pen Inject 50 units once daily.  Increase as directed.  Max daily dose 100 units. 30 mL 3     insulin pen needle (31G X 8 MM) 31G X 8 MM  "miscellaneous Use up to 4 pen needles daily or as directed. 100 each 11     ipratropium (ATROVENT) 0.06 % nasal spray Spray 2 sprays into both nostrils 4 times daily as needed for rhinitis 15 mL 11     ketoconazole (NIZORAL) 2 % external cream Apply 1 Application topically as needed       lisinopril-hydrochlorothiazide (ZESTORETIC) 20-25 MG tablet Take 1 tablet by mouth daily 90 tablet 3     metFORMIN (GLUCOPHAGE) 1000 MG tablet Take 1 tablet (1,000 mg) by mouth 2 times daily (with meals) 180 tablet 3     sildenafil (VIAGRA) 100 MG tablet Take 1 tablet (100 mg) by mouth daily as needed 20 tablet 11     triamcinolone (KENALOG) 0.1 % external cream Apply 1 Application topically as needed       glucosamine-chondroitin 500-400 MG CAPS per capsule Take 2 capsules by mouth daily (Patient not taking: Reported on 3/9/2022)       hydrOXYzine (ATARAX) 25 MG tablet Take 25 mg by mouth 3 times daily as needed  (Patient not taking: Reported on 3/9/2022)         Allergies, family, and social history were reviewed and documented as needed in EHR.     REVIEW OF SYSTEMS  A complete 10-point ROS was performed and pertinent positives and negatives are noted in the HPI. ROS is also positive for the following: Some change in visual acuity.    PHYSICAL EXAM  /82 (BP Location: Left arm, Patient Position: Sitting, Cuff Size: Adult Large)   Pulse 72   Ht 1.811 m (5' 11.3\")   Wt 113.8 kg (250 lb 12.8 oz)   BMI 34.69 kg/m    Body mass index is 34.69 kg/m .  Constitutional: Vital signs reviewed, as recorded above. Patient is alert, oriented and appears in no acute distress.  Eyes: PER, EOMI, no stare, lid lag, or retraction; no conjunctival injection.  ENMT: OP clear with moist MM. Lips are without lesions.   Cardiovascular: RRR, normal S1/S2, no audible murmurs, rubs or gallops.  Respiratory: CTAB, without wheezes, crackles or rhonchi; normal chest wall motion and respiratory effort.  MSK: No clubbing or cyanosis; normal muscle bulk " and tone.  Skin: Normal skin color, temperature, turgor and texture.  Neurological: Alert and oriented times 3.     DATA REVIEW  Each of the following laboratory and/or imaging studies were reviewed.    Lab on 12/03/2021   Component Date Value Ref Range Status     Insulin 12/03/2021 34.5 (A) 3.0 - 25.0 mU/L Final     Glucose 12/03/2021 183 (A) 70 - 125 mg/dL Final     Patient Fasting > 8hrs? 12/03/2021 Yes   Final     Glutamic Acid Decarboxylase Antibo* 12/03/2021 150.8 (A) 0.0 - 5.0 IU/mL Final    INTERPRETIVE INFORMATION:  Glutamic Acid Decarboxylase   Antibody    A value greater than 5.0 IU/mL is considered positive for   Glutamic Acid Decarboxylase Antibody (LATESHA Ab). This assay   is intended for the semi-quantitative determination of the   LATESHA Ab in human serum. Results should be interpreted within   the context of clinical symptoms.     C Peptide 12/03/2021 3.2  0.9 - 6.9 ng/mL Final     Hemoglobin A1C 12/03/2021 9.6 (A) 0.0 - 5.6 % Final    Normal <5.7%   Prediabetes 5.7-6.4%    Diabetes 6.5% or higher     Note: Adopted from ADA consensus guidelines.         ASSESSMENT  1.  Diabetes mellitus, CAMDEN with positive LATESHA antibody and normal range C-peptide.  We discussed potential for evolution of insulinopenia.  Would screen for growth hormone excess as a potential cause of diabetes (some frontal bossing, although unclear if this is patient's normal baseline).  Improving glycemia, although the patient still has hyperglycemia after meals.  Would adjust prandial insulin dose upward and use correction scale to treat hyperglycemia as needed.  Would maximize Farxiga dose if follow-up electrolyte panel is normal (in this case, would likely reduce basal insulin slightly downward).  Change Metformin to extended release formulation to enable more consistent use.  Would also change to Tresiba insulin for more consistent basal coverage, especially since patient sometimes has to take Lantus at variable times if he falls  asleep.  We will anticipate follow-up with diabetes educator to discuss carbohydrate counting so that we can transition to carbohydrate-based mealtime insulin dosing in the future.  Follow-up with me at my earliest opening.    2.  Diabetes preventive care.  -Foot exam at future visits, time did not allow today  -Urine microalbumin screen in 4/2021 showed microalbuminuria, check follow-up today (continue Farxiga and ACE inhibitor)  -Eye exam in 8/2021 showed no diabetic retinopathy  -We discussed management of hypoglycemia, symptoms and treatment in detail    3.  Concern for hypogonadism.  Primarily erectile dysfunction, subtle diminution in libido.  Since erectile dysfunction is prominent feature, may not be hypogonadal.  Check screening testosterone.  Check thyroid function tests.      PLAN  -8 AM fasting labs in the coming week  -Continue Lantus 50 units daily, change to Tresiba insulin once finished with current supply of Lantus  -Increase NovoLog to 8 units before each meal, plus correction scale if glucose is higher than 150  -Please use the following correction scale using NovoLog insulin before meals.    Blood sugar 150 - 200; take 2 units of insulin.  Blood sugar 201 - 250; take 4 units of insulin.  Blood sugar 251 - 300; take 6 units of insulin.  Blood sugar 301 - 350; take 8 units of insulin.  Blood sugar 351 - 400; take 10 units of insulin.  Blood sugar over 401;  take 12 units of insulin.    -Follow-up with educator to discuss carbohydrate counting  -Change metformin to extended release 2000 mg once daily  -Continue Farxiga at 5 mg daily--if labs are normal, we will plan to increase Farxiga dose and reduce long acting insulin dose  -Return for a follow-up visit earliest available  -We will communicate results by phone, or if needed by phone      Orders Placed This Encounter   Procedures     Insulin Growth Factor 1 by Immunoassay     Follicle stimulating hormone     Luteinizing Hormone, Adult     TSH      T4 free     CBC with platelets     Lipid Profile     Comprehensive metabolic panel     Hemoglobin A1c     Albumin Random Urine Quantitative with Creat Ratio     Testosterone Free and Total         I spent a total of 65 minutes on the date of encounter reviewing medical records, evaluating the patient, coordinating care and documenting in the EHR, as detailed above.    I spent an additional 5 minutes on the date of encounter reviewing and interpreting CGM data, as outlined above.      Remberto Roy MD   Division of Diabetes, Endocrinology and Metabolism  Department of Medicine      cc: Bernardino Ortiz DO; [facility]ick[facility]             Again, thank you for allowing me to participate in the care of your patient.        Sincerely,        REMBERTO Roy MD

## 2022-03-09 NOTE — PROGRESS NOTES
ENDOCRINOLOGY NEW PATIENT VISIT        HISTORY OF PRESENT ILLNESS    Low Juárez is seen in consultation at the request of Bernardino Ortiz DO for diabetes mellitus.  The patient would also like to discuss the other issues detailed below.    1.  Diabetes mellitus.  Diagnosed in 2017 when patient was noted to have an elevated random glucose to 349.  A1c was 10.4% around the time of diagnosis.    Over the past year, A1c has been rising, prompting referral to diabetes education and then to endocrinology.  Of note, in 12/2021 he had LATESHA antibody checked which returned positive, with C-peptide that was still measurable.    Initially on oral therapies, initiated on insulin in 10/2021.    Current diabetes regimen: Lantus 50 units daily (sometimes takes 54 units if glucose values are running higher).  Metformin 1000 mg twice daily (may forget second dose if he falls asleep at night).  Farxiga 5 mg daily (started in 1/2022, tolerating well outside of some lower abdominal discomfort which improves after he urinates, no  yeast infections).  NovoLog 5 units before each meal (started in 12/2021).    Previously on Victoza: Stopped this at the direction of one of his healthcare providers.  Previously on Jardiance, discontinued due to discomfort and itching in the abdomen.    Typically has 3 meals per day.  Sometimes has limited lunch time at work and may not be able to administer mealtime insulin 10 to 15 minutes before he starts eating.    The patient has history of pancreatitis (by his report, gallstone pancreatitis) approximately 15 years ago.  He is status post cholecystectomy.    Uses Freestyle kathleen.  The data was reviewed.    Interpretation of CGM    Patient Data  Type of DM: CAMDEN   Last A1c:   Hemoglobin A1C POCT   Date Value Ref Range Status   04/07/2021 8.2 (H) 0 - 5.6 % Final     Comment:     Reviewed: OK with previous  Normal <5.7% Prediabetes 5.7-6.4%  Diabetes 6.5% or higher - adopted from ADA   consensus  guidelines.       Hemoglobin A1C   Date Value Ref Range Status   12/03/2021 9.6 (H) 0.0 - 5.6 % Final     Comment:     Normal <5.7%   Prediabetes 5.7-6.4%    Diabetes 6.5% or higher     Note: Adopted from ADA consensus guidelines.     Current DM regimen: See above    Sensor Summary/ Accuracy Criteria  Number of days sensor worn: 54% over the past 14 days (was away on vacation part of this timeframe)  Calibration: CGM system does not require calibration.    Average (mean) sensor glucose: 180 mg/dL  Time in range: 58%  Duration below low limit: 0%  Coefficient of variation: 30%    Data Assessment  ~Fairly stable glucose values overnight, typically in the upper 100s  ~Hyperglycemia during the daytime hours, most often postprandial (although I question if basal insulin may be lasting a full 24 hours)  ~No recurrent hypoglycemia    Impression  -Postprandial hyperglycemia    Recommendations  -Please refer to assessment and plan      2.  Question low testosterone.  Patient wonders if he has low testosterone since he has had erectile dysfunction.  He has been prescribed Viagra but has not used it since he has not been sexually active recently.  Not getting early morning erections.  Has some libido.    Reports he used anabolic steroids 12 to 15 years ago when bodybuilding and wonders if this may have had longstanding impact on testosterone levels and erectile function.    Has biological children, no history of infertility.    Pertinent Social History: , works as a radiology technician.    PAST MEDICAL HISTORY  Past Medical History:   Diagnosis Date     Apnea, sleep      Fatty liver 8/18/2017     High cholesterol      Hyperlipidemia LDL goal <100 8/18/2017     Hypertension      Morbid obesity due to excess calories (H) 8/18/2017     Pancreatitis      Rhinitis      Type 2 diabetes mellitus without complication, without long-term current use of insulin (H) 1/31/2018       MEDICATIONS  Current Outpatient Medications    Medication Sig Dispense Refill     aspirin (ASA) 81 MG tablet Take 81 mg by mouth daily       atorvastatin (LIPITOR) 40 MG tablet Take 1 tablet (40 mg) by mouth daily 90 tablet 3     augmented betamethasone dipropionate (DIPROLENE) 0.05 % external lotion Apply topically 2 times daily 60 mL 6     augmented betamethasone dipropionate (DIPROLENE-AF) 0.05 % external ointment Apply topically 2 times daily 45 g 11     blood glucose (NO BRAND SPECIFIED) lancets standard Use to test blood sugar 2 times daily or as directed. 200 each 3     blood glucose (NO BRAND SPECIFIED) test strip Use to test blood sugar 2 times daily or as directed. 200 strip 3     blood glucose calibration (NO BRAND SPECIFIED) solution Use to calibrate blood glucose monitor as needed as directed. 1 each 3     blood glucose monitoring (NO BRAND SPECIFIED) meter device kit Use to test blood sugar 2 times daily or as directed. 1 kit 0     cetirizine (ZYRTEC) 10 MG tablet Take 10-20 mg by mouth daily       Continuous Blood Gluc Sensor (FREESTYLE ROSA 2 SENSOR) [facility] [place] 1 kit every 14 days Use per manufacture's instructions to check glucose daily. 6 each 1     dapagliflozin (FARXIGA) 5 MG TABS tablet Take 1 tablet (5 mg) by mouth daily 30 tablet 1     fluticasone (FLONASE) 50 MCG/ACT spray Spray 1 spray into both nostrils as needed for rhinitis or allergies       insulin aspart (NOVOLOG PEN) 100 UNIT/ML pen Take 5 units no more than 10 minutes before each meal daily.  Increase as directed.  Max daily dose 60 units. 18 mL 1     insulin glargine (LANTUS SOLOSTAR) 100 UNIT/ML pen Inject 50 units once daily.  Increase as directed.  Max daily dose 100 units. 30 mL 3     insulin pen needle (31G X 8 MM) 31G X 8 MM miscellaneous Use up to 4 pen needles daily or as directed. 100 each 11     ipratropium (ATROVENT) 0.06 % nasal spray Spray 2 sprays into both nostrils 4 times daily as needed for rhinitis 15 mL 11     ketoconazole (NIZORAL) 2 % external cream Apply 1  "Application topically as needed       lisinopril-hydrochlorothiazide (ZESTORETIC) 20-25 MG tablet Take 1 tablet by mouth daily 90 tablet 3     metFORMIN (GLUCOPHAGE) 1000 MG tablet Take 1 tablet (1,000 mg) by mouth 2 times daily (with meals) 180 tablet 3     sildenafil (VIAGRA) 100 MG tablet Take 1 tablet (100 mg) by mouth daily as needed 20 tablet 11     triamcinolone (KENALOG) 0.1 % external cream Apply 1 Application topically as needed       glucosamine-chondroitin 500-400 MG CAPS per capsule Take 2 capsules by mouth daily (Patient not taking: Reported on 3/9/2022)       hydrOXYzine (ATARAX) 25 MG tablet Take 25 mg by mouth 3 times daily as needed  (Patient not taking: Reported on 3/9/2022)         Allergies, family, and social history were reviewed and documented as needed in EHR.     REVIEW OF SYSTEMS  A complete 10-point ROS was performed and pertinent positives and negatives are noted in the HPI. ROS is also positive for the following: Some change in visual acuity.    PHYSICAL EXAM  /82 (BP Location: Left arm, Patient Position: Sitting, Cuff Size: Adult Large)   Pulse 72   Ht 1.811 m (5' 11.3\")   Wt 113.8 kg (250 lb 12.8 oz)   BMI 34.69 kg/m    Body mass index is 34.69 kg/m .  Constitutional: Vital signs reviewed, as recorded above. Patient is alert, oriented and appears in no acute distress.  Eyes: PER, EOMI, no stare, lid lag, or retraction; no conjunctival injection.  ENMT: OP clear with moist MM. Lips are without lesions.   Cardiovascular: RRR, normal S1/S2, no audible murmurs, rubs or gallops.  Respiratory: CTAB, without wheezes, crackles or rhonchi; normal chest wall motion and respiratory effort.  MSK: No clubbing or cyanosis; normal muscle bulk and tone.  Skin: Normal skin color, temperature, turgor and texture.  Neurological: Alert and oriented times 3.     DATA REVIEW  Each of the following laboratory and/or imaging studies were reviewed.    Lab on 12/03/2021   Component Date Value Ref " Range Status     Insulin 12/03/2021 34.5 (A) 3.0 - 25.0 mU/L Final     Glucose 12/03/2021 183 (A) 70 - 125 mg/dL Final     Patient Fasting > 8hrs? 12/03/2021 Yes   Final     Glutamic Acid Decarboxylase Antibo* 12/03/2021 150.8 (A) 0.0 - 5.0 IU/mL Final    INTERPRETIVE INFORMATION:  Glutamic Acid Decarboxylase   Antibody    A value greater than 5.0 IU/mL is considered positive for   Glutamic Acid Decarboxylase Antibody (ALTESHA Ab). This assay   is intended for the semi-quantitative determination of the   LATESHA Ab in human serum. Results should be interpreted within   the context of clinical symptoms.     C Peptide 12/03/2021 3.2  0.9 - 6.9 ng/mL Final     Hemoglobin A1C 12/03/2021 9.6 (A) 0.0 - 5.6 % Final    Normal <5.7%   Prediabetes 5.7-6.4%    Diabetes 6.5% or higher     Note: Adopted from ADA consensus guidelines.         ASSESSMENT  1.  Diabetes mellitus, CAMDEN with positive LATESHA antibody and normal range C-peptide.  We discussed potential for evolution of insulinopenia.  Would screen for growth hormone excess as a potential cause of diabetes (some frontal bossing, although unclear if this is patient's normal baseline).  Improving glycemia, although the patient still has hyperglycemia after meals.  Would adjust prandial insulin dose upward and use correction scale to treat hyperglycemia as needed.  Would maximize Farxiga dose if follow-up electrolyte panel is normal (in this case, would likely reduce basal insulin slightly downward).  Change Metformin to extended release formulation to enable more consistent use.  Would also change to Tresiba insulin for more consistent basal coverage, especially since patient sometimes has to take Lantus at variable times if he falls asleep.  We will anticipate follow-up with diabetes educator to discuss carbohydrate counting so that we can transition to carbohydrate-based mealtime insulin dosing in the future.  Follow-up with me at my earliest opening.    2.  Diabetes preventive  care.  -Foot exam at future visits, time did not allow today  -Urine microalbumin screen in 4/2021 showed microalbuminuria, check follow-up today (continue Farxiga and ACE inhibitor)  -Eye exam in 8/2021 showed no diabetic retinopathy  -We discussed management of hypoglycemia, symptoms and treatment in detail    3.  Concern for hypogonadism.  Primarily erectile dysfunction, subtle diminution in libido.  Since erectile dysfunction is prominent feature, may not be hypogonadal.  Check screening testosterone.  Check thyroid function tests.      PLAN  -8 AM fasting labs in the coming week  -Continue Lantus 50 units daily, change to Tresiba insulin once finished with current supply of Lantus  -Increase NovoLog to 8 units before each meal, plus correction scale if glucose is higher than 150  -Please use the following correction scale using NovoLog insulin before meals.    Blood sugar 150 - 200; take 2 units of insulin.  Blood sugar 201 - 250; take 4 units of insulin.  Blood sugar 251 - 300; take 6 units of insulin.  Blood sugar 301 - 350; take 8 units of insulin.  Blood sugar 351 - 400; take 10 units of insulin.  Blood sugar over 401;  take 12 units of insulin.    -Follow-up with educator to discuss carbohydrate counting  -Change metformin to extended release 2000 mg once daily  -Continue Farxiga at 5 mg daily--if labs are normal, we will plan to increase Farxiga dose and reduce long acting insulin dose  -Return for a follow-up visit earliest available  -We will communicate results by phone, or if needed by phone      Orders Placed This Encounter   Procedures     Insulin Growth Factor 1 by Immunoassay     Follicle stimulating hormone     Luteinizing Hormone, Adult     TSH     T4 free     CBC with platelets     Lipid Profile     Comprehensive metabolic panel     Hemoglobin A1c     Albumin Random Urine Quantitative with Creat Ratio     Testosterone Free and Total         I spent a total of 65 minutes on the date of encounter  reviewing medical records, evaluating the patient, coordinating care and documenting in the EHR, as detailed above.    I spent an additional 5 minutes on the date of encounter reviewing and interpreting CGM data, as outlined above.      Bushra Roy MD   Division of Diabetes, Endocrinology and Metabolism  Department of Medicine      cc: Bernardino Ortiz DO; Mary Hickman [facility]

## 2022-03-09 NOTE — PATIENT INSTRUCTIONS
-8 AM fasting labs in the coming week  -Continue Lantus 50 units daily, change to Tresiba insulin once you finish current supply  -Increase NovoLog to 8 units before each meal, plus correction scale if glucose is higher than 150  -Please use the following correction scale using NovoLog insulin before meals.    Blood sugar 150 - 200; take 2 units of insulin.  Blood sugar 201 - 250; take 4 units of insulin.  Blood sugar 251 - 300; take 6 units of insulin.  Blood sugar 301 - 350; take 8 units of insulin.  Blood sugar 351 - 400; take 10 units of insulin.  Blood sugar over 401;  take 12 units of insulin.    -Follow-up with educator to discuss carbohydrate counting  -Change metformin to extended release 2000 mg once daily  -Continue Farxiga at 5 mg daily--if labs are normal, we will plan to increase Farxiga dose and reduce long acting insulin dose  -Return for a follow-up visit earliest available  -We will communicate results by phone, or if needed by phone

## 2022-03-11 DIAGNOSIS — Z79.4 TYPE 2 DIABETES MELLITUS WITH HYPERGLYCEMIA, WITH LONG-TERM CURRENT USE OF INSULIN (H): ICD-10-CM

## 2022-03-11 DIAGNOSIS — E11.65 TYPE 2 DIABETES MELLITUS WITH HYPERGLYCEMIA, WITH LONG-TERM CURRENT USE OF INSULIN (H): ICD-10-CM

## 2022-03-11 RX ORDER — DAPAGLIFLOZIN 5 MG/1
5 TABLET, FILM COATED ORAL DAILY
Qty: 30 TABLET | Refills: 0 | Status: SHIPPED | OUTPATIENT
Start: 2022-03-11 | End: 2022-03-23

## 2022-03-11 NOTE — TELEPHONE ENCOUNTER
Reason for Call:  Medication or medication refill:    Do you use a [facility]?  Name of the pharmacy and phone number for the current request:  [facility] [place] - [address]    Name of the medication requested: dapagliflozin (FARXIGA) 5 MG TABS tablet    Other request: Pt is out of medication; pharmacy closes at 5 today  pt requesting a one month refill until his labs for endocrinology are redrawn then Endocrinology will manage     Can we leave a detailed message on this number? YES    Phone number patient can be reached at: Cell number on file:    Telephone Information:   Mobile [phone]       Best Time: na    Call taken on 3/11/2022 at 2:45 PM by Renetta Ambriz

## 2022-03-14 ENCOUNTER — TELEPHONE (OUTPATIENT)
Dept: ENDOCRINOLOGY | Facility: CLINIC | Age: 52
End: 2022-03-14

## 2022-03-14 ENCOUNTER — LAB (OUTPATIENT)
Dept: LAB | Facility: CLINIC | Age: 52
End: 2022-03-14
Payer: COMMERCIAL

## 2022-03-14 ENCOUNTER — TELEPHONE (OUTPATIENT)
Dept: EDUCATION SERVICES | Facility: CLINIC | Age: 52
End: 2022-03-14

## 2022-03-14 DIAGNOSIS — E11.65 TYPE 2 DIABETES MELLITUS WITH HYPERGLYCEMIA, WITH LONG-TERM CURRENT USE OF INSULIN (H): ICD-10-CM

## 2022-03-14 DIAGNOSIS — Z79.4 TYPE 2 DIABETES MELLITUS WITH HYPERGLYCEMIA, WITH LONG-TERM CURRENT USE OF INSULIN (H): ICD-10-CM

## 2022-03-14 LAB
ALBUMIN SERPL-MCNC: 4.2 G/DL (ref 3.5–5)
ALP SERPL-CCNC: 84 U/L (ref 45–120)
ALT SERPL W P-5'-P-CCNC: 35 U/L (ref 0–45)
ANION GAP SERPL CALCULATED.3IONS-SCNC: 12 MMOL/L (ref 5–18)
AST SERPL W P-5'-P-CCNC: 18 U/L (ref 0–40)
BILIRUB SERPL-MCNC: 0.8 MG/DL (ref 0–1)
BUN SERPL-MCNC: 20 MG/DL (ref 8–22)
CALCIUM SERPL-MCNC: 10 MG/DL (ref 8.5–10.5)
CHLORIDE BLD-SCNC: 100 MMOL/L (ref 98–107)
CHOLEST SERPL-MCNC: 134 MG/DL
CO2 SERPL-SCNC: 28 MMOL/L (ref 22–31)
CREAT SERPL-MCNC: 1.02 MG/DL (ref 0.7–1.3)
ERYTHROCYTE [DISTWIDTH] IN BLOOD BY AUTOMATED COUNT: 13 % (ref 10–15)
FASTING STATUS PATIENT QL REPORTED: YES
FSH SERPL-ACNC: 7.2 MIU/ML (ref 0–12)
GFR SERPL CREATININE-BSD FRML MDRD: 89 ML/MIN/1.73M2
GLUCOSE BLD-MCNC: 118 MG/DL (ref 70–125)
HBA1C MFR BLD: 7.2 % (ref 0–5.6)
HCT VFR BLD AUTO: 47.4 % (ref 40–53)
HDLC SERPL-MCNC: 34 MG/DL
HGB BLD-MCNC: 16.4 G/DL (ref 13.3–17.7)
LDLC SERPL CALC-MCNC: 61 MG/DL
LH SERPL-ACNC: 3.4 MIU/ML
MCH RBC QN AUTO: 30.9 PG (ref 26.5–33)
MCHC RBC AUTO-ENTMCNC: 34.6 G/DL (ref 31.5–36.5)
MCV RBC AUTO: 89 FL (ref 78–100)
PLATELET # BLD AUTO: 252 10E3/UL (ref 150–450)
POTASSIUM BLD-SCNC: 4.4 MMOL/L (ref 3.5–5)
PROT SERPL-MCNC: 6.9 G/DL (ref 6–8)
RBC # BLD AUTO: 5.3 10E6/UL (ref 4.4–5.9)
SODIUM SERPL-SCNC: 140 MMOL/L (ref 136–145)
T4 FREE SERPL-MCNC: 1.09 NG/DL (ref 0.7–1.8)
TRIGL SERPL-MCNC: 194 MG/DL
TSH SERPL DL<=0.005 MIU/L-ACNC: 2.39 UIU/ML (ref 0.3–5)
WBC # BLD AUTO: 7.1 10E3/UL (ref 4–11)

## 2022-03-14 PROCEDURE — 36415 COLL VENOUS BLD VENIPUNCTURE: CPT

## 2022-03-14 PROCEDURE — 85027 COMPLETE CBC AUTOMATED: CPT

## 2022-03-14 PROCEDURE — 84403 ASSAY OF TOTAL TESTOSTERONE: CPT

## 2022-03-14 PROCEDURE — 84270 ASSAY OF SEX HORMONE GLOBUL: CPT

## 2022-03-14 PROCEDURE — 80061 LIPID PANEL: CPT

## 2022-03-14 PROCEDURE — 84439 ASSAY OF FREE THYROXINE: CPT

## 2022-03-14 PROCEDURE — 80053 COMPREHEN METABOLIC PANEL: CPT

## 2022-03-14 PROCEDURE — 83001 ASSAY OF GONADOTROPIN (FSH): CPT

## 2022-03-14 PROCEDURE — 83036 HEMOGLOBIN GLYCOSYLATED A1C: CPT

## 2022-03-14 PROCEDURE — 83002 ASSAY OF GONADOTROPIN (LH): CPT

## 2022-03-14 PROCEDURE — 84305 ASSAY OF SOMATOMEDIN: CPT

## 2022-03-14 PROCEDURE — 84443 ASSAY THYROID STIM HORMONE: CPT

## 2022-03-14 NOTE — TELEPHONE ENCOUNTER
LMTCB to verify with patient.    Spoke with pharmacy. Pt was given 28 day supply (2 sensors) on 3/7/22.    Patient should have one left.     Pt also has refills on file, would need to contact pharmacy to discuss what happened to get override to refill.

## 2022-03-14 NOTE — TELEPHONE ENCOUNTER
Spoke with patient, he stated he put one on last night and he bumped something and pulled it off. Patient put the other one on and the sensor didn't read. The needle wasn't under the skin correctly.     Patient informed to contact pharmacy to request an override with insurance.

## 2022-03-14 NOTE — TELEPHONE ENCOUNTER
Patient called asking for Mary Hickman to call him back. He said that he met with an Endocrinologist and would like to touch base with her on how the visit went.

## 2022-03-14 NOTE — TELEPHONE ENCOUNTER
[facility] Call Center    Phone Message    May a detailed message be left on voicemail: yes     Reason for Call: Medication Question or concern regarding medication   Prescription Clarification    Name of Medication:   * Continuous Blood Gluc Sensor (FREESTYLE ROSA 2 SENSOR) [facility]    Prescribing Provider: Kirill     Pharmacy: [address]     What on the order needs clarification? Per Patient states at work last night his sensor got pulled out. Patient states he is out of sensors and needing a refill ASAP. Patient is wanting to get a call back when this has been sent to the pharmacy to be aware. Please advise.     Action Taken: Message routed to:  [facility] ([facility]): Endo    Travel Screening: Not Applicable

## 2022-03-15 LAB
IGF-I BLD-MCNC: 103 NG/ML (ref 66–225)
SHBG SERPL-SCNC: 21 NMOL/L (ref 11–80)

## 2022-03-16 LAB
TESTOST FREE SERPL-MCNC: 8.47 NG/DL
TESTOST SERPL-MCNC: 336 NG/DL (ref 240–950)

## 2022-03-17 NOTE — CONFIDENTIAL NOTE
Left message to call back.  Saw tino's note.  Please schedule appointment to go over carb counting.

## 2022-03-23 ENCOUNTER — OFFICE VISIT (OUTPATIENT)
Dept: ENDOCRINOLOGY | Facility: CLINIC | Age: 52
End: 2022-03-23
Payer: COMMERCIAL

## 2022-03-23 VITALS
SYSTOLIC BLOOD PRESSURE: 122 MMHG | WEIGHT: 256.5 LBS | BODY MASS INDEX: 35.48 KG/M2 | DIASTOLIC BLOOD PRESSURE: 86 MMHG | HEART RATE: 76 BPM

## 2022-03-23 DIAGNOSIS — Z79.4 TYPE 2 DIABETES MELLITUS WITH HYPERGLYCEMIA, WITH LONG-TERM CURRENT USE OF INSULIN (H): ICD-10-CM

## 2022-03-23 DIAGNOSIS — E11.65 TYPE 2 DIABETES MELLITUS WITH HYPERGLYCEMIA, WITH LONG-TERM CURRENT USE OF INSULIN (H): ICD-10-CM

## 2022-03-23 PROCEDURE — 95251 CONT GLUC MNTR ANALYSIS I&R: CPT | Performed by: INTERNAL MEDICINE

## 2022-03-23 PROCEDURE — 99215 OFFICE O/P EST HI 40 MIN: CPT | Performed by: INTERNAL MEDICINE

## 2022-03-23 RX ORDER — DAPAGLIFLOZIN 10 MG/1
10 TABLET, FILM COATED ORAL DAILY
Qty: 30 TABLET | Refills: 5 | Status: SHIPPED | OUTPATIENT
Start: 2022-03-23 | End: 2022-11-16

## 2022-03-23 NOTE — PATIENT INSTRUCTIONS
-8 AM labs at convenience  -Increase Farxiga to 10 mg daily (let me know if worsening abdominal discomfort with dose adjustment so we can decide whether to reduce Farxiga and refer to urology)  -Reduce Lantus (and later Tresiba) to 48 units daily  -Continue NovoLog without changes  -Follow-up with educator on carb counting  -Return for a follow-up visit in 6-8 weeks  -We will communicate results by phone

## 2022-03-23 NOTE — PROGRESS NOTES
ENDOCRINOLOGY FOLLOW-UP         HISTORY OF PRESENT ILLNESS    Low Juárez is seen in follow-up for the issues outlined below.     1.  Diabetes mellitus.    Current diabetes regimen: Lantus 50 to 54 units daily (we planned to transition to Tresiba insulin but since he has not finished his supply of Lantus, patient has not yet started).  Metformin extended release 2000 mg daily (has not yet started extended release formulation until he completes his current supply of short acting Metformin), Farxiga 5 mg daily, NovoLog 8 units before each meal plus correction scale of 2 units for every 50 mg/dL over 150.    Continues to describe intermittent lower abdominal discomfort when he wakes up in the morning, improves somewhat after emptying his bladder although it does not resolve entirely for hours afterwards.    Plans to see DCES to review carb counting.      Interpretation of CGM    Patient Data  Type of DM: CAMDEN   Last A1c:   Lab Results   Component Value Date    A1C 7.2 03/14/2022    A1C 9.6 12/03/2021     Current DM regimen: See above    Sensor Summary/ Accuracy Criteria  Number of days sensor worn: 82% over the past 14 days   Calibration: CGM system does not require calibration    Average (mean) sensor glucose: 172 mg/dL  Time in range: 65% (last visit, 58%)  Duration below low limit: 0%  Coefficient of variation: 29%    Data Assessment  ~Overall optimal glucose values  ~Hyperglycemia occurring intermittently, most often postprandial after higher carbohydrate meals  ~No recurrent hypoglycemia    Impression  -Occasional postprandial hyperglycemia, otherwise improved glycemic control    Recommendations  -Please refer to assessment and plan    2.  Concern for hypogonadism.  Patient had early morning testosterone level checked prior to this visit which remains in normal range.  He continues to report perhaps a diminution in libido, certainly erectile dysfunction which is very bothersome to him.  Has not yet tried PDE  inhibitor (for which she has a prescription) since he is not sexually active at present.    Pertinent endocrine and related history:  1.  Diabetes mellitus.  Diagnosed in 2017 when patient was noted to have an elevated random glucose to 349.  A1c was 10.4% around the time of diagnosis.  -In 2021, A1c chacorta, prompting referral to diabetes education and then to endocrinology.  Of note, in 12/2021 he had LATESHA antibody checked which returned positive, with C-peptide that was still measurable.  -Initially on oral therapies, initiated on insulin in 10/2021.  -Previously on Victoza: Stopped this at the direction of one of his healthcare providers. Has history of pancreatitis (by his report, gallstone pancreatitis), status post cholecystectomy.  -Previously on Jardiance, discontinued due to discomfort and itching in the abdomen.  2.  Concern for low testosterone.  Patient has wondered if he has low testosterone since he has had erectile dysfunction.  He has been prescribed Viagra but has not used it since he has not been sexually active. Has some libido. Reports he used anabolic steroids 12 to 15 years ago when bodybuilding and wonders if this may have had longstanding impact on testosterone levels and erectile function.  -Has biological children, no history of infertility.    Pertinent Social History: , works as a radiology technician.    PAST MEDICAL HISTORY  Past Medical History:   Diagnosis Date     Apnea, sleep      Fatty liver 8/18/2017     High cholesterol      Hyperlipidemia LDL goal <100 8/18/2017     Hypertension      Morbid obesity due to excess calories (H) 8/18/2017     Pancreatitis      Rhinitis      Type 2 diabetes mellitus without complication, without long-term current use of insulin (H) 1/31/2018       MEDICATIONS  Current Outpatient Medications   Medication Sig Dispense Refill     aspirin (ASA) 81 MG tablet Take 81 mg by mouth daily       atorvastatin (LIPITOR) 40 MG tablet Take 1 tablet (40 mg) by  mouth daily 90 tablet 3     augmented betamethasone dipropionate (DIPROLENE) 0.05 % external lotion Apply topically 2 times daily 60 mL 6     augmented betamethasone dipropionate (DIPROLENE-AF) 0.05 % external ointment Apply topically 2 times daily 45 g 11     blood glucose (NO BRAND SPECIFIED) lancets standard Use to test blood sugar 2 times daily or as directed. 200 each 3     blood glucose (NO BRAND SPECIFIED) test strip Use to test blood sugar 2 times daily or as directed. 200 strip 3     blood glucose calibration (NO BRAND SPECIFIED) solution Use to calibrate blood glucose monitor as needed as directed. 1 each 3     blood glucose monitoring (NO BRAND SPECIFIED) meter device kit Use to test blood sugar 2 times daily or as directed. 1 kit 0     cetirizine (ZYRTEC) 10 MG tablet Take 10-20 mg by mouth daily       Continuous Blood Gluc Sensor (FREESTYLE ROSA 2 SENSOR) MISC 1 kit every 14 days Use per manufacture's instructions to check glucose daily. 6 each 1     dapagliflozin (FARXIGA) 5 MG TABS tablet Take 1 tablet (5 mg) by mouth daily 30 tablet 0     fluticasone (FLONASE) 50 MCG/ACT spray Spray 1 spray into both nostrils as needed for rhinitis or allergies       insulin aspart (NOVOLOG PEN) 100 UNIT/ML pen Take 5 units no more than 10 minutes before each meal daily.  Increase as directed.  Max daily dose 60 units. (Patient taking differently: Take 8 units no more than 10 minutes before each meal daily.  Increase as directed.  Max daily dose 60 units.) 18 mL 1     insulin degludec (TRESIBA FLEXTOUCH) 200 UNIT/ML pen Inject 50 Units Subcutaneous daily 9 mL 5     insulin pen needle (31G X 8 MM) 31G X 8 MM miscellaneous Use up to 4 pen needles daily or as directed. 100 each 11     ipratropium (ATROVENT) 0.06 % nasal spray Spray 2 sprays into both nostrils 4 times daily as needed for rhinitis 15 mL 11     ketoconazole (NIZORAL) 2 % external cream Apply 1 Application topically as needed        lisinopril-hydrochlorothiazide (ZESTORETIC) 20-25 MG tablet Take 1 tablet by mouth daily 90 tablet 3     metFORMIN (GLUCOPHAGE-XR) 500 MG 24 hr tablet Take 4 tablets (2,000 mg) by mouth daily (with dinner) 120 tablet 5     sildenafil (VIAGRA) 100 MG tablet Take 1 tablet (100 mg) by mouth daily as needed 20 tablet 11     triamcinolone (KENALOG) 0.1 % external cream Apply 1 Application topically as needed       glucosamine-chondroitin 500-400 MG CAPS per capsule Take 2 capsules by mouth daily (Patient not taking: Reported on 3/9/2022)       hydrOXYzine (ATARAX) 25 MG tablet Take 25 mg by mouth 3 times daily as needed  (Patient not taking: Reported on 3/9/2022)         Allergies, family, and social history were reviewed and documented as needed in EHR.     REVIEW OF SYSTEMS  A focused ROS was performed, with pertinent positives and negatives as noted in the HPI.    PHYSICAL EXAM  /86 (BP Location: Left arm, Patient Position: Sitting, Cuff Size: Adult Large)   Pulse 76   Wt 116.3 kg (256 lb 8 oz)   BMI 35.48 kg/m    Body mass index is 35.48 kg/m .  Constitutional: Vital signs reviewed, as recorded above. Patient is alert, oriented and appears in no acute distress.  Eyes: PER, EOMI, no stare, lid lag, or retraction; no conjunctival injection.  MSK: No clubbing or cyanosis; normal muscle bulk and tone.  Neurological: Alert and oriented times 3.     Foot exam: DP and PT pulses present and symmetric.  Bunion on right medial midfoot.  No other lesions.    DATA REVIEW  Each of the following laboratory and/or imaging studies were reviewed.    Lab on 03/14/2022   Component Date Value Ref Range Status     Insulin Like Growth Factor 1 03/14/2022 103  66 - 225 ng/mL Final     FSH 03/14/2022 7.2  0.0 - 12.0 mIU/mL Final     Lutropin 03/14/2022 3.4  mIU/mL Final     TSH 03/14/2022 2.39  0.30 - 5.00 uIU/mL Final     Free T4 03/14/2022 1.09  0.70 - 1.80 ng/dL Final    Performance of the Free T4 test has not been established  with  specimens (<= 2 months of age).       WBC Count 2022 7.1  4.0 - 11.0 10e3/uL Final     RBC Count 2022 5.30  4.40 - 5.90 10e6/uL Final     Hemoglobin 2022 16.4  13.3 - 17.7 g/dL Final     Hematocrit 2022 47.4  40.0 - 53.0 % Final     MCV 2022 89  78 - 100 fL Final     MCH 2022 30.9  26.5 - 33.0 pg Final     MCHC 2022 34.6  31.5 - 36.5 g/dL Final     RDW 2022 13.0  10.0 - 15.0 % Final     Platelet Count 2022 252  150 - 450 10e3/uL Final     Cholesterol 2022 134  <=199 mg/dL Final     Triglycerides 2022 194 (A) <=149 mg/dL Final     Direct Measure HDL 2022 34 (A) >=40 mg/dL Final    HDL Cholesterol Reference Range:     0-2 years:   No reference ranges established for patients under 2 years old  at [facility] for lipid analytes.    2-8 years:  Greater than 45 mg/dL     18 years and older:   Female: Greater than or equal to 50 mg/dL   Male:   Greater than or equal to 40 mg/dL     LDL Cholesterol Calculated 2022 61  <=129 mg/dL Final     Patient Fasting > 8hrs? 2022 Yes   Final     Sodium 2022 140  136 - 145 mmol/L Final     Potassium 2022 4.4  3.5 - 5.0 mmol/L Final     Chloride 2022 100  98 - 107 mmol/L Final     Carbon Dioxide (CO2) 2022 28  22 - 31 mmol/L Final     Anion Gap 2022 12  5 - 18 mmol/L Final     Urea Nitrogen 2022 20  8 - 22 mg/dL Final     Creatinine 2022 1.02  0.70 - 1.30 mg/dL Final     Calcium 2022 10.0  8.5 - 10.5 mg/dL Final     Glucose 2022 118  70 - 125 mg/dL Final     Alkaline Phosphatase 2022 84  45 - 120 U/L Final     AST 2022 18  0 - 40 U/L Final     ALT 2022 35  0 - 45 U/L Final     Protein Total 2022 6.9  6.0 - 8.0 g/dL Final     Albumin 2022 4.2  3.5 - 5.0 g/dL Final     Bilirubin Total 2022 0.8  0.0 - 1.0 mg/dL Final     GFR Estimate 2022 89  >60 mL/min/1.73m2 Final    Effective December  21, 2021 eGFRcr in adults is calculated using the 2021 CKD-EPI creatinine equation which includes age and gender (Sreedhar greenberg al., [place], DOI: 10.1056/YXMOkn8854615)     Hemoglobin A1C 03/14/2022 7.2 (A) 0.0 - 5.6 % Final    Normal <5.7%   Prediabetes 5.7-6.4%    Diabetes 6.5% or higher     Note: Adopted from ADA consensus guidelines.     Sex Hormone Binding Globulin 03/14/2022 21  11 - 80 nmol/L Final     Free Testosterone Calculated 03/14/2022 8.47  ng/dL Final    Male Warren Ranges:  Warren Stage I: Less than or equal to 0.37 ng/dL  Warren Stage II: 0.03-2.1 ng/dL  Warren Stage III: 0.10-9.8 ng/dL  Warren Stage IV: 3.5-16.9 ng/dL  Warren Stage V: 4.1-23.9 ng/dL     Testosterone Total 03/14/2022 336  240 - 950 ng/dL Final         ASSESSMENT  1.  Diabetes mellitus, CAMDEN with positive LATESHA antibody and normal range C-peptide.  Glycemia continues to improve.  Has occasional hypoglycemia after higher carbohydrate meals: This should hopefully improve once he refined carbohydrate counting skills and weight are able to change to a regimen with insulin dosed based on carbohydrate content of meals.  He will be meeting with diabetes care and .  Would maximize Farxiga (although I have asked him to update me if he notices worsening abdominal discomfort with higher dose) and make a slight reduction in basal insulin dose.    2.  Diabetes preventive care.  -Foot exam reveals bunion on the medial right forefoot, painful; will refer to podiatry  -Urine microalbumin screen in 4/2021 showed microalbuminuria, was not able to give urine sample for testing earlier this month, recheck at convenience; continue Farxiga and ACE inhibitor  -Eye exam in 8/2021 showed no diabetic retinopathy  -Acceptable lipid profile, on statin therapy    3.  Concern for hypogonadism.  Primarily erectile dysfunction, lesser diminution in libido.  Testosterone checked prior to this visit was normal.  Can recheck early morning testosterone  again.    PLAN  -8 AM labs at convenience  -Increase Farxiga to 10 mg daily (let me know if worsening abdominal discomfort with dose adjustment so we can decide whether to reduce Farxiga and refer to urology)  -Reduce Lantus (and later Tresiba) to 48 units daily  -Continue NovoLog without changes  -Follow-up with educator on carb counting  -Referral to podiatry  -Return for a follow-up visit in 6-8 weeks  -We will communicate results by phone      Orders Placed This Encounter   Procedures     Luteinizing Hormone, Adult     Follicle stimulating hormone     Orthopedic  Referral     Testosterone Free and Total     I spent a total of 41 minutes on the date of encounter reviewing medical records, evaluating the patient, coordinating care and documenting in the EHR, as detailed above.    I spent an additional 3 minutes on review of CGM data.      Bushra Roy MD   Division of Diabetes, Endocrinology and Metabolism  Department of Medicine

## 2022-03-23 NOTE — LETTER
3/23/2022         RE: Low Juárez  [address] [facility] [address]        Dear Colleague,    Thank you for referring your patient, Low Juárez, to the [facility]. Please see a copy of my visit note below.      ENDOCRINOLOGY FOLLOW-UP         HISTORY OF PRESENT ILLNESS    Low Juárez is seen in follow-up for the issues outlined below.     1.  Diabetes mellitus.    Current diabetes regimen: Lantus 50 to 54 units daily (we planned to transition to Tresiba insulin but since he has not finished his supply of Lantus, patient has not yet started).  Metformin extended release 2000 mg daily (has not yet started extended release formulation until he completes his current supply of short acting Metformin), Farxiga 5 mg daily, NovoLog 8 units before each meal plus correction scale of 2 units for every 50 mg/dL over 150.    Continues to describe intermittent lower abdominal discomfort when he wakes up in the morning, improves somewhat after emptying his bladder although it does not resolve entirely for hours afterwards.    Plans to see DCES to review carb counting.      Interpretation of CGM    Patient Data  Type of DM: CAMDEN   Last A1c:   Lab Results   Component Value Date    A1C 7.2 03/14/2022    A1C 9.6 12/03/2021     Current DM regimen: See above    Sensor Summary/ Accuracy Criteria  Number of days sensor worn: 82% over the past 14 days   Calibration: CGM system does not require calibration    Average (mean) sensor glucose: 172 mg/dL  Time in range: 65% (last visit, 58%)  Duration below low limit: 0%  Coefficient of variation: 29%    Data Assessment  ~Overall optimal glucose values  ~Hyperglycemia occurring intermittently, most often postprandial after higher carbohydrate meals  ~No recurrent hypoglycemia    Impression  -Occasional postprandial hyperglycemia, otherwise improved glycemic control    Recommendations  -Please refer to assessment and plan    2.  Concern for hypogonadism.   Patient had early morning testosterone level checked prior to this visit which remains in normal range.  He continues to report perhaps a diminution in libido, certainly erectile dysfunction which is very bothersome to him.  Has not yet tried PDE inhibitor (for which she has a prescription) since he is not sexually active at present.    Pertinent endocrine and related history:  1.  Diabetes mellitus.  Diagnosed in 2017 when patient was noted to have an elevated random glucose to 349.  A1c was 10.4% around the time of diagnosis.  -In 2021, A1c chacorta, prompting referral to diabetes education and then to endocrinology.  Of note, in 12/2021 he had LATESHA antibody checked which returned positive, with C-peptide that was still measurable.  -Initially on oral therapies, initiated on insulin in 10/2021.  -Previously on Victoza: Stopped this at the direction of one of his healthcare providers. Has history of pancreatitis (by his report, gallstone pancreatitis), status post cholecystectomy.  -Previously on Jardiance, discontinued due to discomfort and itching in the abdomen.  2.  Concern for low testosterone.  Patient has wondered if he has low testosterone since he has had erectile dysfunction.  He has been prescribed Viagra but has not used it since he has not been sexually active. Has some libido. Reports he used anabolic steroids 12 to 15 years ago when bodybuilding and wonders if this may have had longstanding impact on testosterone levels and erectile function.  -Has biological children, no history of infertility.    Pertinent Social History: , works as a radiology technician.    PAST MEDICAL HISTORY  Past Medical History:   Diagnosis Date     Apnea, sleep      Fatty liver 8/18/2017     High cholesterol      Hyperlipidemia LDL goal <100 8/18/2017     Hypertension      Morbid obesity due to excess calories (H) 8/18/2017     Pancreatitis      Rhinitis      Type 2 diabetes mellitus without complication, without  long-term current use of insulin (H) 1/31/2018       MEDICATIONS  Current Outpatient Medications   Medication Sig Dispense Refill     aspirin (ASA) 81 MG tablet Take 81 mg by mouth daily       atorvastatin (LIPITOR) 40 MG tablet Take 1 tablet (40 mg) by mouth daily 90 tablet 3     augmented betamethasone dipropionate (DIPROLENE) 0.05 % external lotion Apply topically 2 times daily 60 mL 6     augmented betamethasone dipropionate (DIPROLENE-AF) 0.05 % external ointment Apply topically 2 times daily 45 g 11     blood glucose (NO BRAND SPECIFIED) lancets standard Use to test blood sugar 2 times daily or as directed. 200 each 3     blood glucose (NO BRAND SPECIFIED) test strip Use to test blood sugar 2 times daily or as directed. 200 strip 3     blood glucose calibration (NO BRAND SPECIFIED) solution Use to calibrate blood glucose monitor as needed as directed. 1 each 3     blood glucose monitoring (NO BRAND SPECIFIED) meter device kit Use to test blood sugar 2 times daily or as directed. 1 kit 0     cetirizine (ZYRTEC) 10 MG tablet Take 10-20 mg by mouth daily       Continuous Blood Gluc Sensor (FREESTYLE ROSA 2 SENSOR) MISC 1 kit every 14 days Use per manufacture's instructions to check glucose daily. 6 each 1     dapagliflozin (FARXIGA) 5 MG TABS tablet Take 1 tablet (5 mg) by mouth daily 30 tablet 0     fluticasone (FLONASE) 50 MCG/ACT spray Spray 1 spray into both nostrils as needed for rhinitis or allergies       insulin aspart (NOVOLOG PEN) 100 UNIT/ML pen Take 5 units no more than 10 minutes before each meal daily.  Increase as directed.  Max daily dose 60 units. (Patient taking differently: Take 8 units no more than 10 minutes before each meal daily.  Increase as directed.  Max daily dose 60 units.) 18 mL 1     insulin degludec (TRESIBA FLEXTOUCH) 200 UNIT/ML pen Inject 50 Units Subcutaneous daily 9 mL 5     insulin pen needle (31G X 8 MM) 31G X 8 MM miscellaneous Use up to 4 pen needles daily or as directed.  100 each 11     ipratropium (ATROVENT) 0.06 % nasal spray Spray 2 sprays into both nostrils 4 times daily as needed for rhinitis 15 mL 11     ketoconazole (NIZORAL) 2 % external cream Apply 1 Application topically as needed       lisinopril-hydrochlorothiazide (ZESTORETIC) 20-25 MG tablet Take 1 tablet by mouth daily 90 tablet 3     metFORMIN (GLUCOPHAGE-XR) 500 MG 24 hr tablet Take 4 tablets (2,000 mg) by mouth daily (with dinner) 120 tablet 5     sildenafil (VIAGRA) 100 MG tablet Take 1 tablet (100 mg) by mouth daily as needed 20 tablet 11     triamcinolone (KENALOG) 0.1 % external cream Apply 1 Application topically as needed       glucosamine-chondroitin 500-400 MG CAPS per capsule Take 2 capsules by mouth daily (Patient not taking: Reported on 3/9/2022)       hydrOXYzine (ATARAX) 25 MG tablet Take 25 mg by mouth 3 times daily as needed  (Patient not taking: Reported on 3/9/2022)         Allergies, family, and social history were reviewed and documented as needed in EHR.     REVIEW OF SYSTEMS  A focused ROS was performed, with pertinent positives and negatives as noted in the HPI.    PHYSICAL EXAM  /86 (BP Location: Left arm, Patient Position: Sitting, Cuff Size: Adult Large)   Pulse 76   Wt 116.3 kg (256 lb 8 oz)   BMI 35.48 kg/m    Body mass index is 35.48 kg/m .  Constitutional: Vital signs reviewed, as recorded above. Patient is alert, oriented and appears in no acute distress.  Eyes: PER, EOMI, no stare, lid lag, or retraction; no conjunctival injection.  MSK: No clubbing or cyanosis; normal muscle bulk and tone.  Neurological: Alert and oriented times 3.     Foot exam: DP and PT pulses present and symmetric.  Bunion on right medial midfoot.  No other lesions.    DATA REVIEW  Each of the following laboratory and/or imaging studies were reviewed.    Lab on 03/14/2022   Component Date Value Ref Range Status     Insulin Like Growth Factor 1 03/14/2022 103  66 - 225 ng/mL Final     FSH 03/14/2022 7.2   0.0 - 12.0 mIU/mL Final     Lutropin 2022 3.4  mIU/mL Final     TSH 2022 2.39  0.30 - 5.00 uIU/mL Final     Free T4 2022 1.09  0.70 - 1.80 ng/dL Final    Performance of the Free T4 test has not been established with  specimens (<= 2 months of age).       WBC Count 2022 7.1  4.0 - 11.0 10e3/uL Final     RBC Count 2022 5.30  4.40 - 5.90 10e6/uL Final     Hemoglobin 2022 16.4  13.3 - 17.7 g/dL Final     Hematocrit 2022 47.4  40.0 - 53.0 % Final     MCV 2022 89  78 - 100 fL Final     MCH 2022 30.9  26.5 - 33.0 pg Final     MCHC 2022 34.6  31.5 - 36.5 g/dL Final     RDW 2022 13.0  10.0 - 15.0 % Final     Platelet Count 2022 252  150 - 450 10e3/uL Final     Cholesterol 2022 134  <=199 mg/dL Final     Triglycerides 2022 194 (A) <=149 mg/dL Final     Direct Measure HDL 2022 34 (A) >=40 mg/dL Final    HDL Cholesterol Reference Range:     0-2 years:   No reference ranges established for patients under 2 years old  at Health system [facility] for lipid analytes.    2-8 years:  Greater than 45 mg/dL     18 years and older:   Female: Greater than or equal to 50 mg/dL   Male:   Greater than or equal to 40 mg/dL     LDL Cholesterol Calculated 2022 61  <=129 mg/dL Final     Patient Fasting > 8hrs? 2022 Yes   Final     Sodium 2022 140  136 - 145 mmol/L Final     Potassium 2022 4.4  3.5 - 5.0 mmol/L Final     Chloride 2022 100  98 - 107 mmol/L Final     Carbon Dioxide (CO2) 2022 28  22 - 31 mmol/L Final     Anion Gap 2022 12  5 - 18 mmol/L Final     Urea Nitrogen 2022 20  8 - 22 mg/dL Final     Creatinine 2022 1.02  0.70 - 1.30 mg/dL Final     Calcium 2022 10.0  8.5 - 10.5 mg/dL Final     Glucose 2022 118  70 - 125 mg/dL Final     Alkaline Phosphatase 2022 84  45 - 120 U/L Final     AST 2022 18  0 - 40 U/L Final     ALT 2022 35  0 - 45 U/L Final      Protein Total 03/14/2022 6.9  6.0 - 8.0 g/dL Final     Albumin 03/14/2022 4.2  3.5 - 5.0 g/dL Final     Bilirubin Total 03/14/2022 0.8  0.0 - 1.0 mg/dL Final     GFR Estimate 03/14/2022 89  >60 mL/min/1.73m2 Final    Effective December 21, 2021 eGFRcr in adults is calculated using the 2021 CKD-EPI creatinine equation which includes age and gender (Sreedhar et al., NE, DOI: 10.1056/WWWRma5781852)     Hemoglobin A1C 03/14/2022 7.2 (A) 0.0 - 5.6 % Final    Normal <5.7%   Prediabetes 5.7-6.4%    Diabetes 6.5% or higher     Note: Adopted from ADA consensus guidelines.     Sex Hormone Binding Globulin 03/14/2022 21  11 - 80 nmol/L Final     Free Testosterone Calculated 03/14/2022 8.47  ng/dL Final    Male Warren Ranges:  Warren Stage I: Less than or equal to 0.37 ng/dL  Warren Stage II: 0.03-2.1 ng/dL  Warren Stage III: 0.10-9.8 ng/dL  Warren Stage IV: 3.5-16.9 ng/dL  Warren Stage V: 4.1-23.9 ng/dL     Testosterone Total 03/14/2022 336  240 - 950 ng/dL Final         ASSESSMENT  1.  Diabetes mellitus, CAMDEN with positive LATESHA antibody and normal range C-peptide.  Glycemia continues to improve.  Has occasional hypoglycemia after higher carbohydrate meals: This should hopefully improve once he refined carbohydrate counting skills and weight are able to change to a regimen with insulin dosed based on carbohydrate content of meals.  He will be meeting with diabetes care and .  Would maximize Farxiga (although I have asked him to update me if he notices worsening abdominal discomfort with higher dose) and make a slight reduction in basal insulin dose.    2.  Diabetes preventive care.  -Foot exam reveals bunion on the medial right forefoot, painful; will refer to podiatry  -Urine microalbumin screen in 4/2021 showed microalbuminuria, was not able to give urine sample for testing earlier this month, recheck at convenience; continue Farxiga and ACE inhibitor  -Eye exam in 8/2021 showed no diabetic  retinopathy  -Acceptable lipid profile, on statin therapy    3.  Concern for hypogonadism.  Primarily erectile dysfunction, lesser diminution in libido.  Testosterone checked prior to this visit was normal.  Can recheck early morning testosterone again.    PLAN  -8 AM labs at convenience  -Increase Farxiga to 10 mg daily (let me know if worsening abdominal discomfort with dose adjustment so we can decide whether to reduce Farxiga and refer to urology)  -Reduce Lantus (and later Tresiba) to 48 units daily  -Continue NovoLog without changes  -Follow-up with educator on carb counting  -Referral to podiatry  -Return for a follow-up visit in 6-8 weeks  -We will communicate results by phone      Orders Placed This Encounter   Procedures     Luteinizing Hormone, Adult     Follicle stimulating hormone     Orthopedic  Referral     Testosterone Free and Total     I spent a total of 41 minutes on the date of encounter reviewing medical records, evaluating the patient, coordinating care and documenting in the EHR, as detailed above.    I spent an additional 3 minutes on review of CGM data.      Remberto Roy MD   Division of Diabetes, Endocrinology and Metabolism  Department of Medicine                Again, thank you for allowing me to participate in the care of your patient.        Sincerely,        REMBERTO Roy MD

## 2022-03-29 ENCOUNTER — OFFICE VISIT (OUTPATIENT)
Dept: PODIATRY | Facility: CLINIC | Age: 52
End: 2022-03-29
Attending: INTERNAL MEDICINE
Payer: COMMERCIAL

## 2022-03-29 VITALS — OXYGEN SATURATION: 99 % | HEIGHT: 72 IN | HEART RATE: 72 BPM | BODY MASS INDEX: 33.86 KG/M2 | WEIGHT: 250 LBS

## 2022-03-29 DIAGNOSIS — Z79.4 TYPE 2 DIABETES MELLITUS WITH HYPERGLYCEMIA, WITH LONG-TERM CURRENT USE OF INSULIN (H): ICD-10-CM

## 2022-03-29 DIAGNOSIS — M20.5X1 HALLUX LIMITUS OF RIGHT FOOT: Primary | ICD-10-CM

## 2022-03-29 DIAGNOSIS — E11.65 TYPE 2 DIABETES MELLITUS WITH HYPERGLYCEMIA, WITH LONG-TERM CURRENT USE OF INSULIN (H): ICD-10-CM

## 2022-03-29 PROCEDURE — 99204 OFFICE O/P NEW MOD 45 MIN: CPT | Performed by: PODIATRIST

## 2022-03-29 ASSESSMENT — PAIN SCALES - GENERAL: PAINLEVEL: SEVERE PAIN (6)

## 2022-03-29 NOTE — PROGRESS NOTES
FOOT AND ANKLE SURGERY/PODIATRY CONSULT NOTE         ASSESSMENT:   Hallux limitus right foot      TREATMENT:  An x-ray of the right foot was taken today.  The x-rays were read and interpreted by this physician today.  I have recommended a first metatarsal phalangeal joint implant arthroplasty of the right foot.  The patient was informed the procedure is performed on an outpatient basis under local anesthesia with IV sedation.  He was told the procedure takes approximately 30 minutes to perform.  He would then be discharged weightbearing in a postop shoe for 2 weeks.  The patient was asked to go n.p.o. at midnight prior to the procedure and he was asked to see his primary care physician for preoperative consultation.  All pertinent questions were invited and answered.        HPI: I was asked to see Low Juárez today to evaluate and treat a painful big toe joint of the right foot.  The patient indicated he was recently diagnosed with diabetes.  He is taking insulin.  He stated that he has a painful big toe joint which is aggravated by his shoe gear.  There are only certain shoes that he can wear.  He wears Birkenstocks because it will allow the right foot to fit in the shoe properly.  There is a large raised area on the top of the big toe joint which is quite painful with pressure.  He denies any trauma to the right foot.  He has not had any associated redness or swelling.  The pain is only relieved with nonweightbearing.  He denies any other previous treatment.  Postop shoe    Past Medical History:   Diagnosis Date     Apnea, sleep      Fatty liver 8/18/2017     High cholesterol      Hyperlipidemia LDL goal <100 8/18/2017     Hypertension      Morbid obesity due to excess calories (H) 8/18/2017     Pancreatitis      Rhinitis      Type 2 diabetes mellitus without complication, without long-term current use of insulin (H) 1/31/2018       Social History     Socioeconomic History     Marital status:      Spouse  name: Not on file     Number of children: 2     Years of education: 22     Highest education level: Associate degree: academic program   Occupational History     Occupation: [employer]   Tobacco Use     Smoking status: Former Smoker     Packs/day: 1.00     Years: 20.00     Pack years: 20.00     Types: Cigarettes     Smokeless tobacco: Never Used   Vaping Use     Vaping Use: Never used   Substance and Sexual Activity     Alcohol use: Yes     Comment: Occasional     Drug use: No     Sexual activity: Not Currently     Partners: Female     Birth control/protection: Surgical     Comment: Vasectomy   Other Topics Concern     Not on file   Social History Narrative     Not on file     Social Determinants of Health     Financial Resource Strain: Low Risk      Difficulty of Paying Living Expenses: Not hard at all   Food Insecurity: No Food Insecurity     Worried About Running Out of Food in the Last Year: Never true     Ran Out of Food in the Last Year: Never true   Transportation Needs: No Transportation Needs     Lack of Transportation (Medical): No     Lack of Transportation (Non-Medical): No   Physical Activity: Not on file   Stress: Not on file   Social Connections: Not on file   Intimate Partner Violence: Not on file   Housing Stability: Not on file          Allergies   Allergen Reactions     Morphine Hives     Jardiance [Empagliflozin] Itching     Hydrocodone-Acetaminophen Rash     itch  Other reaction(s): hives          Current Outpatient Medications:      aspirin (ASA) 81 MG tablet, Take 81 mg by mouth daily, Disp: , Rfl:      atorvastatin (LIPITOR) 40 MG tablet, Take 1 tablet (40 mg) by mouth daily, Disp: 90 tablet, Rfl: 3     augmented betamethasone dipropionate (DIPROLENE) 0.05 % external lotion, Apply topically 2 times daily, Disp: 60 mL, Rfl: 6     augmented betamethasone dipropionate (DIPROLENE-AF) 0.05 % external ointment, Apply topically 2 times daily, Disp: 45 g, Rfl: 11     blood glucose (NO BRAND SPECIFIED)  lancets standard, Use to test blood sugar 2 times daily or as directed., Disp: 200 each, Rfl: 3     blood glucose (NO BRAND SPECIFIED) test strip, Use to test blood sugar 2 times daily or as directed., Disp: 200 strip, Rfl: 3     blood glucose calibration (NO BRAND SPECIFIED) solution, Use to calibrate blood glucose monitor as needed as directed., Disp: 1 each, Rfl: 3     blood glucose monitoring (NO BRAND SPECIFIED) meter device kit, Use to test blood sugar 2 times daily or as directed., Disp: 1 kit, Rfl: 0     cetirizine (ZYRTEC) 10 MG tablet, Take 10-20 mg by mouth daily, Disp: , Rfl:      Continuous Blood Gluc Sensor (FREESTYLE ROSA 2 SENSOR) MISC, 1 kit every 14 days Use per manufacture's instructions to check glucose daily., Disp: 6 each, Rfl: 1     dapagliflozin (FARXIGA) 10 MG TABS tablet, Take 1 tablet (10 mg) by mouth daily, Disp: 30 tablet, Rfl: 5     fluticasone (FLONASE) 50 MCG/ACT spray, Spray 1 spray into both nostrils as needed for rhinitis or allergies, Disp: , Rfl:      glucosamine-chondroitin 500-400 MG CAPS per capsule, Take 2 capsules by mouth daily , Disp: , Rfl:      hydrOXYzine (ATARAX) 25 MG tablet, Take 25 mg by mouth 3 times daily as needed , Disp: , Rfl:      insulin aspart (NOVOLOG PEN) 100 UNIT/ML pen, Take 5 units no more than 10 minutes before each meal daily.  Increase as directed.  Max daily dose 60 units. (Patient taking differently: Take 8 units no more than 10 minutes before each meal daily.  Increase as directed.  Max daily dose 60 units.), Disp: 18 mL, Rfl: 1     insulin degludec (TRESIBA FLEXTOUCH) 200 UNIT/ML pen, Inject 50 Units Subcutaneous daily, Disp: 9 mL, Rfl: 5     insulin pen needle (31G X 8 MM) 31G X 8 MM miscellaneous, Use up to 4 pen needles daily or as directed., Disp: 100 each, Rfl: 11     ipratropium (ATROVENT) 0.06 % nasal spray, Spray 2 sprays into both nostrils 4 times daily as needed for rhinitis, Disp: 15 mL, Rfl: 11     ketoconazole (NIZORAL) 2 % external  cream, Apply 1 Application topically as needed, Disp: , Rfl:      lisinopril-hydrochlorothiazide (ZESTORETIC) 20-25 MG tablet, Take 1 tablet by mouth daily, Disp: 90 tablet, Rfl: 3     metFORMIN (GLUCOPHAGE-XR) 500 MG 24 hr tablet, Take 4 tablets (2,000 mg) by mouth daily (with dinner), Disp: 120 tablet, Rfl: 5     sildenafil (VIAGRA) 100 MG tablet, Take 1 tablet (100 mg) by mouth daily as needed, Disp: 20 tablet, Rfl: 11     triamcinolone (KENALOG) 0.1 % external cream, Apply 1 Application topically as needed, Disp: , Rfl:      Family History   Problem Relation Age of Onset     Diverticulitis Mother      Hypertension Father      Hypertension Maternal Grandmother      No Known Problems Maternal Grandfather      No Known Problems Paternal Grandmother      No Known Problems Paternal Grandfather      No Known Problems Sister      No Known Problems Sister         Social History     Socioeconomic History     Marital status:      Spouse name: Not on file     Number of children: 2     Years of education: 22     Highest education level: Associate degree: academic program   Occupational History     Occupation: [employer]   Tobacco Use     Smoking status: Former Smoker     Packs/day: 1.00     Years: 20.00     Pack years: 20.00     Types: Cigarettes     Smokeless tobacco: Never Used   Vaping Use     Vaping Use: Never used   Substance and Sexual Activity     Alcohol use: Yes     Comment: Occasional     Drug use: No     Sexual activity: Not Currently     Partners: Female     Birth control/protection: Surgical     Comment: Vasectomy   Other Topics Concern     Not on file   Social History Narrative     Not on file     Social Determinants of Health     Financial Resource Strain: Low Risk      Difficulty of Paying Living Expenses: Not hard at all   Food Insecurity: No Food Insecurity     Worried About Running Out of Food in the Last Year: Never true     Ran Out of Food in the Last Year: Never true   Transportation Needs: No  "Transportation Needs     Lack of Transportation (Medical): No     Lack of Transportation (Non-Medical): No   Physical Activity: Not on file   Stress: Not on file   Social Connections: Not on file   Intimate Partner Violence: Not on file   Housing Stability: Not on file        Review of Systems - Patient denies fever, chills, rash, wound, stiffness, numbness, weakness, heart burn, blood in stool, chest pain with activity, calf pain when walking, shortness of breath with activity, chronic cough, easy bleeding/bruising, swelling of ankles, excessive thirst, fatigue, depression, anxiety.  Patient admits to painful big toe joint right foot.      OBJECTIVE:  Appearance: alert, well appearing, and in no distress.    Pulse 72   Ht 1.816 m (5' 11.5\")   Wt 113.4 kg (250 lb)   SpO2 99%   BMI 34.38 kg/m       Body mass index is 34.38 kg/m .     General appearance: Patient is alert and fully cooperative with history & exam.  No sign of distress is noted during the visit.  Psychiatric: Affect is pleasant & appropriate.  Patient appears motivated to improve health.  Respiratory: Breathing is regular & unlabored while sitting.  HEENT: Hearing is intact to spoken word.  Speech is clear.  No gross evidence of visual impairment that would impact ambulation.    Vascular: Dorsalis pedis and posterior tibial pulses are palpable. There is pedal hair growth bilaterally.  CFT < 3 sec from anterior tibial surface to distal digits bilaterally. There is no appreciable edema noted.  Dermatologic: There is a large firm palpable subcutaneous mass on the dorsal aspect of the head of the first metatarsal right foot.  Turgor and texture are within normal limits. No coloration or temperature changes. No primary or secondary lesions noted.  Neurologic: All epicritic and proprioceptive sensations are grossly intact bilaterally.  Musculoskeletal: All active and passive ankle, subtalar, midtarsal joint range of motion are grossly intact without pain " or crepitus, with the exception of the first metatarsophalangeal joint right foot. Manual muscle strength is within normal limits bilaterally. All dorsiflexors, plantarflexors, invertors, evertors are intact bilaterally. Tenderness present to the first MPJ right foot on palpation. Tenderness to the first MPJ right foot with range of motion.  There is limited range of motion at the first metatarsophalangeal joint right foot.  Calf is soft/non-tender with/without warmth/induration    Imaging:       No images are attached to the encounter or orders placed in the encounter.     No results found.   No results found.       Dean Noble DPM  [facility] Foot & Ankle Surgery/Podiatry

## 2022-03-29 NOTE — PATIENT INSTRUCTIONS
Low,      Your surgery with [facility] has been scheduled. Please read thoroughly and follow instructions.     Procedure:    first metatarsal phalangeal joint implant arthroplasty of the right foot    Procedure Date :     *A surgery nurse will call you 1-2 days before surgery to inform you of the time of arrival for surgery.    Surgeon:   MD Dean Brunner    Admission Type:   Outpatient    Procedure Location:   [facility]:  [address] [place] [address] (Fax: [phone])    Covid Test:     Post Operative Appointment:       Preparation Instructions to complete:    []  You will need a Pre-op Physical within 30 days before surgery with your primary care provider. This exam is required by the anesthesiologist to ensure a safe surgical experience.      Failure  to obtain your pre-op physical will lead to cancellation of your surgery    Call them right away to schedule this. Please ensure your Preoperative Physical is faxed to the Hospital (numbers listed above)    [] Preoperative Medication Instructions    We would like you to stop your anticoagulation medications 3-5 days before surgery HOWEVER contact your prescribing provider for instructions before discontinuing any medications. (Examples: Coumadin, Plavix, Xarelto, Eliquis, Pradaxa, Effient, Brilinta) If you are on Coumadin, we would like the goal INR ? 1.2.    IT IS OK TO STAY ON ASPIRIN PRIOR TO SURGERY.     Hold Ibuprofen, Herbal Supplements and Vitamin E 7 days before    Stop Cialis, Levitra and Viagra 2-3 days before surgery    [] Fasting Requirements    Nothing to eat for 8 hours before surgery unless instructed differently by the surgery nurse.    You may have clear liquids up to two hours before your arrival time (coffee, tea, water, or Gatorade. No cream or milk)    If your primary care provider has instructed you to continue oral medications, you may take them on the morning of surgery with  "a small sip of water.      No alcohol or smoking after 12:00am the day of surgery    [] PCR COVID-19 test is required within 4 days of surgery    If your test is positive or you fail to get tested, your surgery will be postponed.     [] Contact your insurance regarding coverage    If you would like a Good Joellen Estimate for your upcoming procedure at [facility] Location, contact Cost of Care Estimates     Advocates are available Monday through Friday 8am - 5pm   [phone]    You may also submit a request online at [email or web address]  - Complete the secure online form found under \"Services and Procedure Pricing\"     If your procedure is at [facility] please contact the numbers below for Cost of Care Estimates.   - Facility Charge: [phone]    Anesthesiology charge:  [phone]      [] DO NOT BRING FMLA WITH TO SURGERY.  These should be sent to the provider's office by fax to [phone].    [] Day of Surgery    Medications - Take as indicated with sips of water.     Wear comfortable loose fitting clothes. Wear your glasses-Not contacts. Do not wear jewelry and remove body piercing's. Surgery may be cancelled if they are not removed.     You may have 1 family member wait in the lobby during your surgery. Visitor restrictions are subject to change. Please verify with the surgery nurse when they call.     If same day surgery-Have a someone come with you to surgery that can help you understand the surgeon's instructions, drive you home and stay with you overnight the first night.    [] If the community sees a new COVID-19 surge, your procedure may need to be postponed. We will contact you if this happens.    [] You will receive a call from a surgery nurse 1-3 days prior to surgery. They will go over more details with you.         Before Your Procedure or Hospital Admission  Testing for COVID-19 (Coronavirus)    Thank you for choosing [facility] for your health care " needs. The COVID-19 pandemic is a very challenging time for everyone. Our goal is to keep you and our team here at [facility] safe and healthy.       Before you come in, All patients must get tested for COVID-19. Your test needs to happen 2 to 4 days before you check in to the hospital or surgery site.    Note: If you go to a clinic or pharmacy like [facility] or [facility] for your test, make sure you get a test inside the nose. Tests inside the nose are:  - A naso-pharyngeal (NP) RT-PCR test  - An anterior nares RT-PCR test    Do NOT get a  rapid  test or a saliva (spit) test.    After the test, please stay at home and stay out of contact with other people. It will be harder for you to recover if you get COVID-19 before your treatment.    Please follow all current safety guidelines, including:    Limit trips outside your home.    Limit the number of people you see.    Always wear a mask outside your home.    Use social distancing. Stay 6 feet away from others whenever you can.    Wash your hands often.    If your test shows you have COVID-19  If your test is positive, we ll let you know. A positive test means that you have the virus.  We ll probably have to postpone your admission, surgery or procedure. Your doctor will discuss this with you. After that, we ll let you know what to do and when you can re-schedule.  We may need to cancel your treatment on short notice for other reasons, too.    If your test shows you DON T have COVID-19  Even if your test is negative, you can still get COVID-19. It s rare but, sometimes, the test result is wrong. You could also catch the virus after taking the test.  There s a very small chance that you could catch COVID-19 in the hospital or surgery center.    Day of your surgery or procedure    Please come wearing a face covering that covers both your nose and mouth.    When you arrive, we ll ask you some questions to find out if you have any signs or symptoms of COVID-19.    Ask  your care team if you can have visitors. All visitors must wear face coverings and will be screened for signs of COVID-19.    Even if no visitors are allowed, you can still have with you:  - Your legal guardian or legal decision maker  - A parent and one other visitor, if you are  younger than 18 years old  - A partner and a , if you are in labor    We might need to teach you about taking care of yourself after surgery. If so, a visitor can come into the hospital to learn about it, too.    The rules for visitors change often, depending on how much the virus is spreading. To learn more, see Visiting a Loved One in the Hospital during the COVID-19 Outbreak. Please call your care team, hospital or surgery center if you have any questions. We thank you for your understanding and for choosing [facility] for your care.    Questions and Answers  Does it matter where I get tested for COVID-19?  Yes. We urge you to get tested at one of our [facility] COVID-19 testing sites. We process these tests in our lab and can get the results quickly. Your [facility] care team needs to get your results before you check in.    What should I do if I can t get tested at [facility]?  You can get tested somewhere else, but you ll need to take these extra steps:  1. Contact your family doctor or clinic to arrange your test.  2. Take the test within 4 days of your surgery or procedure. We can t accept tests older than 4 days.  3. Make sure you re getting a test inside the nose.  Tests inside the nose are:  - A naso-pharyngeal (NP) RT-PCR test  - An anterior nares RT-PCR test  -Many pharmacies use  rapid  tests or saliva (spit) tests. We do NOT accept those tests before surgery or procedures. Tests from inside the nose are the most accurate tests.  4. Make sure your doctor or clinic faxes your results to [facility] at [phone].    If we don t get your results in time, we may have to delay or cancel  your treatment.      LENORA FROST EBAY, [employer]        Frequently Asked Questions    WHAT DO I DO WHEN I COME HOME FROM SURGERY?    After surgery and/ or your hospital stay you may be tired and drowsy. Rest, but make sure to get up and walk around every couple of hours to circulate your blood. If you are non-weight bearing do not put any weight on your foot.  Be sure to take your medications as directed. Try to get a restful sleep and begin more normal activities as tolerated.    HOW MUCH PAIN SHOULD I EXPECT AND WILL I HAVE PAIN MEDICATION?    Everyone tolerates pain differently. Still, each type of surgery involves a certain level and type of pain. Generally most people feel better within a few days but keep in mind that everyone has a different tolerance to pain. All medications should be taken as directed. All medications can have side effects such as bleeding, upset stomach, headaches, dizziness, constipation, etc. If you have any major problems or allergic reaction, stop the medication and call the office. If you only have a little pain, you may simply take Tylenol or Ibuprofen as directed on the label.     WHAT ABOUT MY DRESSING AND WHEN DO I COME BACK TO THE OFFICE?    The outer dressing stays in place for 7 days and when you come in for your first post-op we will remove it.  Some patients may have staples, zipper or sutures; these stay in for 10-14 days and will be removed at your 2nd post-op visit. After 24 hours you may shower using shower precautions.    WHAT ABOUT SWELLING, REDNESS, BRUISING OR DRAINAGE?    It is normal to have some swelling, and bruising after surgery. It gradually subsides but may be present for several weeks. Elevation and icing will help to reduce the swelling more rapidly.  If your bandage is really tight after 24 hours you may cut the bandage an inch by the toes or under your foot and by your ankle.  Ice therapy often works better than oral pain medication. Using cold  therapy is recommended every hour for 20 minutes.    WHEN CAN I TAKE A BATH?    You can take a bath as long as the wound has no drainage and is fully healed without a scab. This generally takes about 2 weeks.  Unless you get the bandaged protector from above then you can take a shower when you feel up to it.    WHEN CAN I RETURN TO WORK?    You may return to work to an office-type job whenever you feel comfortable enough. The only restriction would be your own motivation and pain tolerance. If your job requires vigorous activities you may be off 1-4 weeks which is determined by what surgery you have and your operating physician.     WHAT ABOUT DRIVING OR FLYING?    As a general rule, you may resume driving as soon as you are comfortable and fully alert and off narcotic pain medications. If you are traveling by plane within 4 weeks of surgery, perform range of motion exercises of the legs and feet during the flight. Get up and walk around frequently to prevent blood clots.      WHEN CAN I EXERSICE?    You may return to normal activities such as exercising when your surgeon tells you usually 2 weeks. Walking, sitting, standing and going up the stairs are all fine after the 2-week kemar. You may have restrictions for 1-4 weeks of no lifting, pushing, pulling in excess of 20 lbs. This is determined by what surgery you have and your surgeon.    WILL I BECOME ADDICTED TO MY PAIN MEDICATION?    Pain medications are safe and effective when used as directed. However, misuse of these products can be extremely harmful and even deadly. Pain medications must be taken only as directed by your surgeon. Do not change the dose of your pain medication without talking to your operating physician first.    POSTOPERATIVE INFORMATION: MANAGING PAIN  Measuring Your Pain    A pain scale helps you rate pain intensity. In the scale, 0 means no pain, and 10 is the worst pain possible.  You should rate your pain every 4-6 hours. You may feel  some pain even with medications. It is important to tell your health care provider if medications don't reduce the pain.        Managing Post-Op Pain at Home: Medications    Pain after an operation (post-op pain) is common and expected. These guidelines can help you stay as comfortable as possible.    Taking Pain Medications    ? Take any prescribed pain medications as directed by your doctor.  ? Take pain medications with some food to avoid an upset stomach.  ? Be aware that narcotic pain medications cause constipation. We recommend taking Milk of Magnesia   ? Eating high-fiber foods and drink plenty of water.  ? Don t drink alcohol while using pain medications.  ? Possible side effects include stomach upset, nausea, and itching.    Alternating Ibuprofen with your pain medication    Ibuprofen has three actions: it reduces fever, relieves pain and fights inflammation. Alternate between your prescribed pain medication and Ibuprofen every three hours (i.e., take a dose of Ibuprofen then three hours later take your prescribed pain medication then three hours later Ibuprofen, ect.) These two medications are safe to use together like this.    POSTOPERATIVE INFORMATION: CONSTIPATION    Constipation after surgery is a common problem and is often related to taking post-operative narcotic pain medication. Signs that you may be constipated include bloating, abdominal distention and/or pain.    Constipation Prevention & Treatment    Drink plenty of fluids! This is the most important thing you can do to help prevent constipation.  Increase physical activity as recommended by your surgeon.  Consume a high fiber diet. We recommend introducing high fiber foods pre-operatively. Some foods high in fiber include:      Oatmeal Bran  Flaxseed Dried Fruit      Carrots   Bananas Strawberries Oranges Whole Grain Bread       Bananas Prunes  Pears  Raspberries     Over the counter medication/supplements - in order of recommended  treatment:   (Be sure to follow instructions on product packaging)    ? Fiber supplement     Bulk forming laxative - Can be used to prevent constipation    Great food sources of fiber include but are not limited to:  ? Colace (docusate sodium)    Osmotic stool softener - Can be used 2-3 times per day to prevent constipation  ? Milk of Magnesia  ? MIRALAX  ? Enema/Suppository    Patient can also  some probiotics such as Culturelle to help prevent Antibiotic associated diarrhea. They can take this 1 hour before or 2 hours after taking their antibiotic should not be taken at the same time as they can cancel out the effects.                          ** AFTER SURGERY INSTRUCTIONS **                          [] You are to remain Weightbearing on your right foot.    [] After surgery you will be given a Post op shoe which you will need to Wear this anytime you are up and out of bed, it is okay to remove when you are sleeping. Please bring this with you on the day of surgery.      [] During surgery   will apply a gauze dressing to your foot. This will remain intact until you are seen in clinic for follow up    [] It is NOT OKAY to get your surgical site wet while bathing, you will need to purchase a cast cover to protect your foot from getting wet. You can purchase this at [facility] or your local pharmacy.    [] It is important that you elevate your affected foot after surgery. Proper elevation is raising your foot above your waist. The fluid in your lower extremities needs to get back to your heart so it can get pumped to your kidneys and expelled through urination. So if you notice you have to go to the bathroom more frequently when you are elevating your leg this is a good sign that it is working.     [] It is important that you increase your protein intake after surgery. Protein is essential for wound healing. We recommend you take a protein supplement twice per day. This is in addition to  your normal diet. Examples of protein supplements are Ensure, Boost, Glucerna (if you are diabetic), or protein powder. You can purchase these at your local retailer or grocery store.       Notify our office right away, if you have any changes in your health status, or if you develop a cold, flu, diarrhea, infection, fever or sore throat before your scheduled surgery date. We can be reached at [phone] Monday-Friday 8 am-4:30 pm if you have any questions.     Thank you for trusting us with your care!      For informational purposes only. Not to replace the advice of your health care provider. Copyright   2020 [facility]. All rights reserved. Clinically reviewed by Infection Prevention and the [facility] COVID-19 Clinical Team.  [employer] 984109 - Rev 09/09/21.

## 2022-03-29 NOTE — LETTER
3/29/2022         RE: Low Juárez  [address] [facility] [address]        Dear Colleague,    Thank you for referring your patient, Low Juárez, to the [facility]. Please see a copy of my visit note below.    FOOT AND ANKLE SURGERY/PODIATRY CONSULT NOTE         ASSESSMENT:   Hallux limitus right foot      TREATMENT:  An x-ray of the right foot was taken today.  The x-rays were read and interpreted by this physician today.  I have recommended a first metatarsal phalangeal joint implant arthroplasty of the right foot.  The patient was informed the procedure is performed on an outpatient basis under local anesthesia with IV sedation.  He was told the procedure takes approximately 30 minutes to perform.  He would then be discharged weightbearing in a postop shoe for 2 weeks.  The patient was asked to go n.p.o. at midnight prior to the procedure and he was asked to see his primary care physician for preoperative consultation.  All pertinent questions were invited and answered.        HPI: I was asked to see Low Juárez today to evaluate and treat a painful big toe joint of the right foot.  The patient indicated he was recently diagnosed with diabetes.  He is taking insulin.  He stated that he has a painful big toe joint which is aggravated by his shoe gear.  There are only certain shoes that he can wear.  He wears Birkenstocks because it will allow the right foot to fit in the shoe properly.  There is a large raised area on the top of the big toe joint which is quite painful with pressure.  He denies any trauma to the right foot.  He has not had any associated redness or swelling.  The pain is only relieved with nonweightbearing.  He denies any other previous treatment.  Postop shoe    Past Medical History:   Diagnosis Date     Apnea, sleep      Fatty liver 8/18/2017     High cholesterol      Hyperlipidemia LDL goal <100 8/18/2017     Hypertension      Morbid obesity due to  excess calories (H) 8/18/2017     Pancreatitis      Rhinitis      Type 2 diabetes mellitus without complication, without long-term current use of insulin (H) 1/31/2018       Social History     Socioeconomic History     Marital status:      Spouse name: Not on file     Number of children: 2     Years of education: 22     Highest education level: Associate degree: academic program   Occupational History     Occupation: [employer]   Tobacco Use     Smoking status: Former Smoker     Packs/day: 1.00     Years: 20.00     Pack years: 20.00     Types: Cigarettes     Smokeless tobacco: Never Used   Vaping Use     Vaping Use: Never used   Substance and Sexual Activity     Alcohol use: Yes     Comment: Occasional     Drug use: No     Sexual activity: Not Currently     Partners: Female     Birth control/protection: Surgical     Comment: Vasectomy   Other Topics Concern     Not on file   Social History Narrative     Not on file     Social Determinants of Health     Financial Resource Strain: Low Risk      Difficulty of Paying Living Expenses: Not hard at all   Food Insecurity: No Food Insecurity     Worried About Running Out of Food in the Last Year: Never true     Ran Out of Food in the Last Year: Never true   Transportation Needs: No Transportation Needs     Lack of Transportation (Medical): No     Lack of Transportation (Non-Medical): No   Physical Activity: Not on file   Stress: Not on file   Social Connections: Not on file   Intimate Partner Violence: Not on file   Housing Stability: Not on file          Allergies   Allergen Reactions     Morphine Hives     Jardiance [Empagliflozin] Itching     Hydrocodone-Acetaminophen Rash     itch  Other reaction(s): hives          Current Outpatient Medications:      aspirin (ASA) 81 MG tablet, Take 81 mg by mouth daily, Disp: , Rfl:      atorvastatin (LIPITOR) 40 MG tablet, Take 1 tablet (40 mg) by mouth daily, Disp: 90 tablet, Rfl: 3     augmented betamethasone dipropionate  (DIPROLENE) 0.05 % external lotion, Apply topically 2 times daily, Disp: 60 mL, Rfl: 6     augmented betamethasone dipropionate (DIPROLENE-AF) 0.05 % external ointment, Apply topically 2 times daily, Disp: 45 g, Rfl: 11     blood glucose (NO BRAND SPECIFIED) lancets standard, Use to test blood sugar 2 times daily or as directed., Disp: 200 each, Rfl: 3     blood glucose (NO BRAND SPECIFIED) test strip, Use to test blood sugar 2 times daily or as directed., Disp: 200 strip, Rfl: 3     blood glucose calibration (NO BRAND SPECIFIED) solution, Use to calibrate blood glucose monitor as needed as directed., Disp: 1 each, Rfl: 3     blood glucose monitoring (NO BRAND SPECIFIED) meter device kit, Use to test blood sugar 2 times daily or as directed., Disp: 1 kit, Rfl: 0     cetirizine (ZYRTEC) 10 MG tablet, Take 10-20 mg by mouth daily, Disp: , Rfl:      Continuous Blood Gluc Sensor (FREESTYLE ROSA 2 SENSOR) [facility] [place] 1 kit every 14 days Use per manufacture's instructions to check glucose daily., Disp: 6 each, Rfl: 1     dapagliflozin (FARXIGA) 10 MG TABS tablet, Take 1 tablet (10 mg) by mouth daily, Disp: 30 tablet, Rfl: 5     fluticasone (FLONASE) 50 MCG/ACT spray, Spray 1 spray into both nostrils as needed for rhinitis or allergies, Disp: , Rfl:      glucosamine-chondroitin 500-400 MG CAPS per capsule, Take 2 capsules by mouth daily , Disp: , Rfl:      hydrOXYzine (ATARAX) 25 MG tablet, Take 25 mg by mouth 3 times daily as needed , Disp: , Rfl:      insulin aspart (NOVOLOG PEN) 100 UNIT/ML pen, Take 5 units no more than 10 minutes before each meal daily.  Increase as directed.  Max daily dose 60 units. (Patient taking differently: Take 8 units no more than 10 minutes before each meal daily.  Increase as directed.  Max daily dose 60 units.), Disp: 18 mL, Rfl: 1     insulin degludec (TRESIBA FLEXTOUCH) 200 UNIT/ML pen, Inject 50 Units Subcutaneous daily, Disp: 9 mL, Rfl: 5     insulin pen needle (31G X 8 MM) 31G X 8 MM  miscellaneous, Use up to 4 pen needles daily or as directed., Disp: 100 each, Rfl: 11     ipratropium (ATROVENT) 0.06 % nasal spray, Spray 2 sprays into both nostrils 4 times daily as needed for rhinitis, Disp: 15 mL, Rfl: 11     ketoconazole (NIZORAL) 2 % external cream, Apply 1 Application topically as needed, Disp: , Rfl:      lisinopril-hydrochlorothiazide (ZESTORETIC) 20-25 MG tablet, Take 1 tablet by mouth daily, Disp: 90 tablet, Rfl: 3     metFORMIN (GLUCOPHAGE-XR) 500 MG 24 hr tablet, Take 4 tablets (2,000 mg) by mouth daily (with dinner), Disp: 120 tablet, Rfl: 5     sildenafil (VIAGRA) 100 MG tablet, Take 1 tablet (100 mg) by mouth daily as needed, Disp: 20 tablet, Rfl: 11     triamcinolone (KENALOG) 0.1 % external cream, Apply 1 Application topically as needed, Disp: , Rfl:      Family History   Problem Relation Age of Onset     Diverticulitis Mother      Hypertension Father      Hypertension Maternal Grandmother      No Known Problems Maternal Grandfather      No Known Problems Paternal Grandmother      No Known Problems Paternal Grandfather      No Known Problems Sister      No Known Problems Sister         Social History     Socioeconomic History     Marital status:      Spouse name: Not on file     Number of children: 2     Years of education: 22     Highest education level: Associate degree: academic program   Occupational History     Occupation: [employer]   Tobacco Use     Smoking status: Former Smoker     Packs/day: 1.00     Years: 20.00     Pack years: 20.00     Types: Cigarettes     Smokeless tobacco: Never Used   Vaping Use     Vaping Use: Never used   Substance and Sexual Activity     Alcohol use: Yes     Comment: Occasional     Drug use: No     Sexual activity: Not Currently     Partners: Female     Birth control/protection: Surgical     Comment: Vasectomy   Other Topics Concern     Not on file   Social History Narrative     Not on file     Social Determinants of Health     Financial  "Resource Strain: Low Risk      Difficulty of Paying Living Expenses: Not hard at all   Food Insecurity: No Food Insecurity     Worried About Running Out of Food in the Last Year: Never true     Ran Out of Food in the Last Year: Never true   Transportation Needs: No Transportation Needs     Lack of Transportation (Medical): No     Lack of Transportation (Non-Medical): No   Physical Activity: Not on file   Stress: Not on file   Social Connections: Not on file   Intimate Partner Violence: Not on file   Housing Stability: Not on file        Review of Systems - Patient denies fever, chills, rash, wound, stiffness, numbness, weakness, heart burn, blood in stool, chest pain with activity, calf pain when walking, shortness of breath with activity, chronic cough, easy bleeding/bruising, swelling of ankles, excessive thirst, fatigue, depression, anxiety.  Patient admits to painful big toe joint right foot.      OBJECTIVE:  Appearance: alert, well appearing, and in no distress.    Pulse 72   Ht 1.816 m (5' 11.5\")   Wt 113.4 kg (250 lb)   SpO2 99%   BMI 34.38 kg/m       Body mass index is 34.38 kg/m .     General appearance: Patient is alert and fully cooperative with history & exam.  No sign of distress is noted during the visit.  Psychiatric: Affect is pleasant & appropriate.  Patient appears motivated to improve health.  Respiratory: Breathing is regular & unlabored while sitting.  HEENT: Hearing is intact to spoken word.  Speech is clear.  No gross evidence of visual impairment that would impact ambulation.    Vascular: Dorsalis pedis and posterior tibial pulses are palpable. There is pedal hair growth bilaterally.  CFT < 3 sec from anterior tibial surface to distal digits bilaterally. There is no appreciable edema noted.  Dermatologic: There is a large firm palpable subcutaneous mass on the dorsal aspect of the head of the first metatarsal right foot.  Turgor and texture are within normal limits. No coloration or " temperature changes. No primary or secondary lesions noted.  Neurologic: All epicritic and proprioceptive sensations are grossly intact bilaterally.  Musculoskeletal: All active and passive ankle, subtalar, midtarsal joint range of motion are grossly intact without pain or crepitus, with the exception of the first metatarsophalangeal joint right foot. Manual muscle strength is within normal limits bilaterally. All dorsiflexors, plantarflexors, invertors, evertors are intact bilaterally. Tenderness present to the first MPJ right foot on palpation. Tenderness to the first MPJ right foot with range of motion.  There is limited range of motion at the first metatarsophalangeal joint right foot.  Calf is soft/non-tender with/without warmth/induration    Imaging:       No images are attached to the encounter or orders placed in the encounter.     No results found.   No results found.       Dean Noble DPM  [facility] Foot & Ankle Surgery/Podiatry         Again, thank you for allowing me to participate in the care of your patient.        Sincerely,        Dean Brunner DPM

## 2022-03-31 ENCOUNTER — TELEPHONE (OUTPATIENT)
Dept: PODIATRY | Facility: CLINIC | Age: 52
End: 2022-03-31
Payer: COMMERCIAL

## 2022-05-14 ENCOUNTER — HEALTH MAINTENANCE LETTER (OUTPATIENT)
Age: 52
End: 2022-05-14

## 2022-05-19 ENCOUNTER — LAB (OUTPATIENT)
Dept: LAB | Facility: CLINIC | Age: 52
End: 2022-05-19
Payer: COMMERCIAL

## 2022-05-19 DIAGNOSIS — E11.65 TYPE 2 DIABETES MELLITUS WITH HYPERGLYCEMIA, WITH LONG-TERM CURRENT USE OF INSULIN (H): ICD-10-CM

## 2022-05-19 DIAGNOSIS — Z79.4 TYPE 2 DIABETES MELLITUS WITH HYPERGLYCEMIA, WITH LONG-TERM CURRENT USE OF INSULIN (H): ICD-10-CM

## 2022-05-19 LAB
CREAT UR-MCNC: 103 MG/DL
FSH SERPL-ACNC: 8 MIU/ML (ref 0–12)
LH SERPL-ACNC: 3.1 MIU/ML
MICROALBUMIN UR-MCNC: 5.39 MG/DL (ref 0–1.99)
MICROALBUMIN/CREAT UR: 52.3 MG/G CR

## 2022-05-19 PROCEDURE — 83001 ASSAY OF GONADOTROPIN (FSH): CPT

## 2022-05-19 PROCEDURE — 36415 COLL VENOUS BLD VENIPUNCTURE: CPT

## 2022-05-19 PROCEDURE — 82043 UR ALBUMIN QUANTITATIVE: CPT

## 2022-05-19 PROCEDURE — 80048 BASIC METABOLIC PNL TOTAL CA: CPT

## 2022-05-19 PROCEDURE — 84403 ASSAY OF TOTAL TESTOSTERONE: CPT

## 2022-05-19 PROCEDURE — 83002 ASSAY OF GONADOTROPIN (LH): CPT

## 2022-05-19 PROCEDURE — 84270 ASSAY OF SEX HORMONE GLOBUL: CPT

## 2022-05-20 ENCOUNTER — OFFICE VISIT (OUTPATIENT)
Dept: ENDOCRINOLOGY | Facility: CLINIC | Age: 52
End: 2022-05-20
Payer: COMMERCIAL

## 2022-05-20 VITALS
HEART RATE: 84 BPM | WEIGHT: 249 LBS | BODY MASS INDEX: 34.24 KG/M2 | DIASTOLIC BLOOD PRESSURE: 84 MMHG | SYSTOLIC BLOOD PRESSURE: 142 MMHG

## 2022-05-20 DIAGNOSIS — E11.65 TYPE 2 DIABETES MELLITUS WITH HYPERGLYCEMIA, WITH LONG-TERM CURRENT USE OF INSULIN (H): Primary | ICD-10-CM

## 2022-05-20 DIAGNOSIS — Z79.4 TYPE 2 DIABETES MELLITUS WITH HYPERGLYCEMIA, WITH LONG-TERM CURRENT USE OF INSULIN (H): Primary | ICD-10-CM

## 2022-05-20 LAB
ANION GAP SERPL CALCULATED.3IONS-SCNC: 11 MMOL/L (ref 5–18)
BUN SERPL-MCNC: 23 MG/DL (ref 8–22)
CALCIUM SERPL-MCNC: 10.1 MG/DL (ref 8.5–10.5)
CHLORIDE BLD-SCNC: 102 MMOL/L (ref 98–107)
CO2 SERPL-SCNC: 27 MMOL/L (ref 22–31)
CREAT SERPL-MCNC: 1.07 MG/DL (ref 0.7–1.3)
GFR SERPL CREATININE-BSD FRML MDRD: 83 ML/MIN/1.73M2
GLUCOSE BLD-MCNC: 133 MG/DL (ref 70–125)
POTASSIUM BLD-SCNC: 4.5 MMOL/L (ref 3.5–5)
SHBG SERPL-SCNC: 26 NMOL/L (ref 11–80)
SODIUM SERPL-SCNC: 140 MMOL/L (ref 136–145)

## 2022-05-20 PROCEDURE — 95251 CONT GLUC MNTR ANALYSIS I&R: CPT | Performed by: INTERNAL MEDICINE

## 2022-05-20 PROCEDURE — 99215 OFFICE O/P EST HI 40 MIN: CPT | Performed by: INTERNAL MEDICINE

## 2022-05-20 NOTE — PATIENT INSTRUCTIONS
-Reduce Tresiba to to 48 units daily  -Continue remainder of medication regimen  -I will follow-up pending testosterone level; if normal, we can refer to urology  -I will ask Mary to reach out about appointment for carb counting  -If foot tenderness persists, please contact podiatry clinic  -Lab draw in 1 month  -Follow-up in 3 months  -Return for a follow-up visit in 3 months, with another set of labs before visit  -We will communicate results via [employer], or if needed by phone

## 2022-05-20 NOTE — LETTER
5/20/2022         RE: Low Juárez  [address] [facility] [address]        Dear Colleague,    Thank you for referring your patient, Low Juárez, to the [facility]. Please see a copy of my visit note below.      ENDOCRINOLOGY FOLLOW-UP         HISTORY OF PRESENT ILLNESS    Low Juárez is seen in follow-up for the issues outlined below.     1.  Diabetes mellitus.  Recently seen in urgent care and prescribed cephalexin for cellulitis after a branch gouged his foot while he was working in his garden in flip-flops.  Has noted improvement in erythema and tenderness although tenderness persists to a milder degree.    Current diabetes regimen:   -Transitioned from Lantus to Tresiba as we planned, taking 50 units daily  -Still taking metformin 1000 mg twice daily (has not finished his current formulation to transition to extended release)  -Farxiga 10 mg daily; we increased this dose last visit and he is tolerating without side effect  -NovoLog as prescribed is 8 units 3 times daily before meals plus correction scale of 2 units for every 50 mg/dL over 150; patient actually takes about 10 to 15 units before 2 larger meals each day (most often 10 units, but may take up to 15 units if he is having a slice of cake or other carbohydrate rich food)    Largest meal is typically around 7 PM: Often has more carbohydrate rich foods since he is at work and trying to get through a busy workday.    Has not yet been able to follow-up with diabetes care and  for carb counting education.      Interpretation of CGM    Patient Data  Type of DM: CAMDEN   Last A1c:   Lab Results   Component Value Date    A1C 7.2 03/14/2022    A1C 9.6 12/03/2021     Current DM regimen: See above    Sensor Summary/ Accuracy Criteria  Number of days sensor worn: 79% over the past 14 days   Calibration: CGM system does not require calibration    Average (mean) sensor glucose: 157 mg/dL (last visit 172  mg/dL)  Time in range: 77% (65% last visit)  Duration below low limit: 0%  Coefficient of variation: 30%    Data Assessment  ~Overall optimal glucose values  ~Borderline low and rarely hypoglycemic values overnight (patient notes this usually occurs if he has been busy at work and goes to bed without having a meal when he returns home)  ~Occasional hypoglycemia after 7 PM meal, not occurring consistently (patient notes this tends to occur when he eats desserts or higher carbohydrate meals)    Impression  -Occasional hypoglycemia overnight and postprandial hyperglycemia, otherwise much improved glycemic control    Recommendations  -Please refer to assessment and plan    2.  Concern for hypogonadism.  Remains concerned about possibility of low testosterone: Has had erectile dysfunction and although libido is intact feels it is less pronounced than in the past.  As noted before, he has not yet tried PDE inhibitor (for which she has a prescription) since he is not sexually active at present.    Pertinent endocrine and related history:  1.  Diabetes mellitus.  Diagnosed in 2017 when patient was noted to have an elevated random glucose to 349.  A1c was 10.4% around the time of diagnosis.  -In 2021, A1c chacorta, prompting referral to diabetes education and then to endocrinology.  Of note, in 12/2021 he had LATESHA antibody checked which returned positive, with C-peptide that was still measurable.  -Initially on oral therapies, initiated on insulin in 10/2021.  -Previously on Victoza: Stopped this at the direction of one of his healthcare providers. Has history of pancreatitis (by his report, gallstone pancreatitis), status post cholecystectomy.  -Previously on Jardiance, discontinued due to discomfort and itching in the abdomen.  2.  Concern for low testosterone.  Patient has wondered if he has low testosterone since he has had erectile dysfunction.  He has been prescribed Viagra but has not used it since he has not been sexually  active. Has some libido. Reports he used anabolic steroids 12 to 15 years ago when bodybuilding and wonders if this may have had longstanding impact on testosterone levels and erectile function.  -Has biological children, no history of infertility.    Pertinent Social History: , works as a radiology technician.    PAST MEDICAL HISTORY  Past Medical History:   Diagnosis Date     Apnea, sleep      Fatty liver 8/18/2017     High cholesterol      Hyperlipidemia LDL goal <100 8/18/2017     Hypertension      Morbid obesity due to excess calories (H) 8/18/2017     Pancreatitis      Rhinitis      Type 2 diabetes mellitus without complication, without long-term current use of insulin (H) 1/31/2018       MEDICATIONS  Current Outpatient Medications   Medication Sig Dispense Refill     aspirin (ASA) 81 MG tablet Take 81 mg by mouth daily       atorvastatin (LIPITOR) 40 MG tablet Take 1 tablet (40 mg) by mouth daily 90 tablet 3     augmented betamethasone dipropionate (DIPROLENE) 0.05 % external lotion Apply topically 2 times daily 60 mL 6     augmented betamethasone dipropionate (DIPROLENE-AF) 0.05 % external ointment Apply topically 2 times daily 45 g 11     blood glucose (NO BRAND SPECIFIED) lancets standard Use to test blood sugar 2 times daily or as directed. 200 each 3     blood glucose (NO BRAND SPECIFIED) test strip Use to test blood sugar 2 times daily or as directed. 200 strip 3     blood glucose calibration (NO BRAND SPECIFIED) solution Use to calibrate blood glucose monitor as needed as directed. 1 each 3     blood glucose monitoring (NO BRAND SPECIFIED) meter device kit Use to test blood sugar 2 times daily or as directed. 1 kit 0     cetirizine (ZYRTEC) 10 MG tablet Take 10-20 mg by mouth daily       Continuous Blood Gluc Sensor (FREESTYLE ROSA 2 SENSOR) [facility] 1 kit every 14 days Use per manufacture's instructions to check glucose daily. 6 each 1     dapagliflozin (FARXIGA) 10 MG TABS tablet Take 1 tablet  (10 mg) by mouth daily 30 tablet 5     fluticasone (FLONASE) 50 MCG/ACT spray Spray 1 spray into both nostrils as needed for rhinitis or allergies       glucosamine-chondroitin 500-400 MG CAPS per capsule Take 2 capsules by mouth daily        hydrOXYzine (ATARAX) 25 MG tablet Take 25 mg by mouth 3 times daily as needed        insulin aspart (NOVOLOG PEN) 100 UNIT/ML pen Take 5 units no more than 10 minutes before each meal daily.  Increase as directed.  Max daily dose 60 units. (Patient taking differently: Take 10 units no more than 10 minutes before each meal daily.  Increase as directed.  Max daily dose 60 units.) 18 mL 1     insulin degludec (TRESIBA FLEXTOUCH) 200 UNIT/ML pen Inject 50 Units Subcutaneous daily 9 mL 5     insulin pen needle (31G X 8 MM) 31G X 8 MM miscellaneous Use up to 4 pen needles daily or as directed. 100 each 11     ipratropium (ATROVENT) 0.06 % nasal spray Spray 2 sprays into both nostrils 4 times daily as needed for rhinitis 15 mL 11     ketoconazole (NIZORAL) 2 % external cream Apply 1 Application topically as needed       lisinopril-hydrochlorothiazide (ZESTORETIC) 20-25 MG tablet Take 1 tablet by mouth daily 90 tablet 3     metFORMIN (GLUCOPHAGE-XR) 500 MG 24 hr tablet Take 4 tablets (2,000 mg) by mouth daily (with dinner) 120 tablet 5     sildenafil (VIAGRA) 100 MG tablet Take 1 tablet (100 mg) by mouth daily as needed 20 tablet 11     triamcinolone (KENALOG) 0.1 % external cream Apply 1 Application topically as needed         Allergies, family, and social history were reviewed and documented as needed in EHR.     REVIEW OF SYSTEMS  A focused ROS was performed, with pertinent positives and negatives as noted in the HPI.    PHYSICAL EXAM  BP (!) 142/84 (BP Location: Left arm, Patient Position: Sitting, Cuff Size: Adult Large)   Pulse 84   Wt 112.9 kg (249 lb)   BMI 34.24 kg/m    Body mass index is 34.24 kg/m .  Constitutional: Vital signs reviewed, as recorded above. Patient is  alert, oriented and appears in no acute distress.  Eyes: PER, EOMI, no stare, lid lag, or retraction; no conjunctival injection.  MSK: No clubbing or cyanosis; normal muscle bulk and tone.  Skin: Shallow ulcerated areas (2) on the medial aspect of the left ankle; no purulent discharge.  Some mild tenderness with palpation over the heel.   Neurological: Alert and oriented times 3. No tremor.    DATA REVIEW  Each of the following laboratory and/or imaging studies were reviewed.    Lab on 05/19/2022   Component Date Value Ref Range Status     Microalbumin Urine mg/dL 05/19/2022 5.39 (A) 0.00 - 1.99 mg/dL Final     Creatinine Urine mg/dL 05/19/2022 103  mg/dL Final     Microalbumin Urine mg/g Cr 05/19/2022 52.3 (A) <=19.9 mg/g Cr Final     Lutropin 05/19/2022 3.1  mIU/mL Final     FSH 05/19/2022 8.0  0.0 - 12.0 mIU/mL Final     Sex Hormone Binding Globulin 05/19/2022 26  11 - 80 nmol/L Final         ASSESSMENT  1.  Diabetes mellitus, CAMDEN with positive LATESHA antibody and normal range C-peptide.  Overall very good diabetes control.  Borderline glucose values overnight, rarely overtly hypoglycemic; would reduce basal insulin dose slightly.  Has hyperglycemia after his 7 PM evening meal, although it occurs intermittently only with higher carbohydrate meals.  Therefore, would continue the remainder of his insulin regimen without changes and refer back to diabetes care and  to refine carbohydrate counting skills: Dosing insulin based on carbohydrate intake will in turn help to minimize glycemic excursions after higher carbohydrate meals.  Tolerating Farxiga without side effect--add BMP to labs drawn yesterday.    2.  Diabetes preventive care.  -Foot exam on 3/23/2022 revealed painful bunion on the medial right forefoot, painful--evaluated in podiatry and anticipating arthroplasty  -Urine microalbumin screen checked 5/19/2022 shows microalbuminuria; continue Farxiga and ACE inhibitor  -Eye exam in 8/2021  showed no diabetic retinopathy  -Acceptable lipid profile, on statin therapy    3.  Recent cellulitis.  Improving erythema, ulcerated areas on the medial aspect of left foot do not appear to be actively purulent and the patient reports ulcerations are improving.  Tenderness in the heel is improving, although not quite resolved.  Would advise follow-up with podiatry if tenderness in heel persists (since this may imply a deeper tissue infection or osteomyelitis).    4.  Concern for hypogonadism.  Primarily erectile dysfunction, lesser diminution in libido.  Testosterone checked in 3/2022 was normal.  Recheck testosterone drawn prior to this visit is pending.  We had an extensive discussion on effects of testosterone on libido and erectile function.  If testosterone is normal, I would consider referral to urology since I would be suspicious for an underlying neurologic or vascular issue contributing to erectile dysfunction given his coexisting diabetes.  We discussed limited evidence surrounding benefit of testosterone in the setting of normal testosterone levels.    PLAN  -Reduce Tresiba to 48 units daily  -Continue remainder of medication regimen  -I will follow-up pending testosterone level; if normal, we can refer to urology  -I will ask CASTRO Lipscomb to reach out about appointment for carb counting  -If foot tenderness persists, please contact podiatry clinic  -Lab draw in 1 month  -Follow-up in 3 months  -Return for a follow-up visit in 3 months, with another set of labs before visit  -We will communicate results via [employer], or if needed by phone      Orders Placed This Encounter   Procedures     Hemoglobin A1c         I spent a total of 43 minutes on the date of encounter reviewing medical records, evaluating the patient, coordinating care and documenting in the EHR, as detailed above.    I spent an additional 4 minutes on the date of encounter reviewing and interpreting CGM data, as outlined  above.        Remberto Roy MD   Division of Diabetes, Endocrinology and Metabolism  Department of Medicine              Again, thank you for allowing me to participate in the care of your patient.        Sincerely,        REMBERTO Roy MD

## 2022-05-20 NOTE — PROGRESS NOTES
ENDOCRINOLOGY FOLLOW-UP         HISTORY OF PRESENT ILLNESS    Low Juárez is seen in follow-up for the issues outlined below.     1.  Diabetes mellitus.  Recently seen in urgent care and prescribed cephalexin for cellulitis after a branch gouged his foot while he was working in his garden in flip-flops.  Has noted improvement in erythema and tenderness although tenderness persists to a milder degree.    Current diabetes regimen:   -Transitioned from Lantus to Tresiba as we planned, taking 50 units daily  -Still taking metformin 1000 mg twice daily (has not finished his current formulation to transition to extended release)  -Farxiga 10 mg daily; we increased this dose last visit and he is tolerating without side effect  -NovoLog as prescribed is 8 units 3 times daily before meals plus correction scale of 2 units for every 50 mg/dL over 150; patient actually takes about 10 to 15 units before 2 larger meals each day (most often 10 units, but may take up to 15 units if he is having a slice of cake or other carbohydrate rich food)    Largest meal is typically around 7 PM: Often has more carbohydrate rich foods since he is at work and trying to get through a busy workday.    Has not yet been able to follow-up with diabetes care and  for carb counting education.      Interpretation of CGM    Patient Data  Type of DM: CAMDEN   Last A1c:   Lab Results   Component Value Date    A1C 7.2 03/14/2022    A1C 9.6 12/03/2021     Current DM regimen: See above    Sensor Summary/ Accuracy Criteria  Number of days sensor worn: 79% over the past 14 days   Calibration: CGM system does not require calibration    Average (mean) sensor glucose: 157 mg/dL (last visit 172 mg/dL)  Time in range: 77% (65% last visit)  Duration below low limit: 0%  Coefficient of variation: 30%    Data Assessment  ~Overall optimal glucose values  ~Borderline low and rarely hypoglycemic values overnight (patient notes this usually occurs if  he has been busy at work and goes to bed without having a meal when he returns home)  ~Occasional hypoglycemia after 7 PM meal, not occurring consistently (patient notes this tends to occur when he eats desserts or higher carbohydrate meals)    Impression  -Occasional hypoglycemia overnight and postprandial hyperglycemia, otherwise much improved glycemic control    Recommendations  -Please refer to assessment and plan    2.  Concern for hypogonadism.  Remains concerned about possibility of low testosterone: Has had erectile dysfunction and although libido is intact feels it is less pronounced than in the past.  As noted before, he has not yet tried PDE inhibitor (for which she has a prescription) since he is not sexually active at present.    Pertinent endocrine and related history:  1.  Diabetes mellitus.  Diagnosed in 2017 when patient was noted to have an elevated random glucose to 349.  A1c was 10.4% around the time of diagnosis.  -In 2021, A1c chacorta, prompting referral to diabetes education and then to endocrinology.  Of note, in 12/2021 he had LATESHA antibody checked which returned positive, with C-peptide that was still measurable.  -Initially on oral therapies, initiated on insulin in 10/2021.  -Previously on Victoza: Stopped this at the direction of one of his healthcare providers. Has history of pancreatitis (by his report, gallstone pancreatitis), status post cholecystectomy.  -Previously on Jardiance, discontinued due to discomfort and itching in the abdomen.  2.  Concern for low testosterone.  Patient has wondered if he has low testosterone since he has had erectile dysfunction.  He has been prescribed Viagra but has not used it since he has not been sexually active. Has some libido. Reports he used anabolic steroids 12 to 15 years ago when bodybuilding and wonders if this may have had longstanding impact on testosterone levels and erectile function.  -Has biological children, no history of  infertility.    Pertinent Social History: , works as a radiology technician.    PAST MEDICAL HISTORY  Past Medical History:   Diagnosis Date     Apnea, sleep      Fatty liver 8/18/2017     High cholesterol      Hyperlipidemia LDL goal <100 8/18/2017     Hypertension      Morbid obesity due to excess calories (H) 8/18/2017     Pancreatitis      Rhinitis      Type 2 diabetes mellitus without complication, without long-term current use of insulin (H) 1/31/2018       MEDICATIONS  Current Outpatient Medications   Medication Sig Dispense Refill     aspirin (ASA) 81 MG tablet Take 81 mg by mouth daily       atorvastatin (LIPITOR) 40 MG tablet Take 1 tablet (40 mg) by mouth daily 90 tablet 3     augmented betamethasone dipropionate (DIPROLENE) 0.05 % external lotion Apply topically 2 times daily 60 mL 6     augmented betamethasone dipropionate (DIPROLENE-AF) 0.05 % external ointment Apply topically 2 times daily 45 g 11     blood glucose (NO BRAND SPECIFIED) lancets standard Use to test blood sugar 2 times daily or as directed. 200 each 3     blood glucose (NO BRAND SPECIFIED) test strip Use to test blood sugar 2 times daily or as directed. 200 strip 3     blood glucose calibration (NO BRAND SPECIFIED) solution Use to calibrate blood glucose monitor as needed as directed. 1 each 3     blood glucose monitoring (NO BRAND SPECIFIED) meter device kit Use to test blood sugar 2 times daily or as directed. 1 kit 0     cetirizine (ZYRTEC) 10 MG tablet Take 10-20 mg by mouth daily       Continuous Blood Gluc Sensor (FREESTYLE ROSA 2 SENSOR) MISC 1 kit every 14 days Use per manufacture's instructions to check glucose daily. 6 each 1     dapagliflozin (FARXIGA) 10 MG TABS tablet Take 1 tablet (10 mg) by mouth daily 30 tablet 5     fluticasone (FLONASE) 50 MCG/ACT spray Spray 1 spray into both nostrils as needed for rhinitis or allergies       glucosamine-chondroitin 500-400 MG CAPS per capsule Take 2 capsules by mouth daily         hydrOXYzine (ATARAX) 25 MG tablet Take 25 mg by mouth 3 times daily as needed        insulin aspart (NOVOLOG PEN) 100 UNIT/ML pen Take 5 units no more than 10 minutes before each meal daily.  Increase as directed.  Max daily dose 60 units. (Patient taking differently: Take 10 units no more than 10 minutes before each meal daily.  Increase as directed.  Max daily dose 60 units.) 18 mL 1     insulin degludec (TRESIBA FLEXTOUCH) 200 UNIT/ML pen Inject 50 Units Subcutaneous daily 9 mL 5     insulin pen needle (31G X 8 MM) 31G X 8 MM miscellaneous Use up to 4 pen needles daily or as directed. 100 each 11     ipratropium (ATROVENT) 0.06 % nasal spray Spray 2 sprays into both nostrils 4 times daily as needed for rhinitis 15 mL 11     ketoconazole (NIZORAL) 2 % external cream Apply 1 Application topically as needed       lisinopril-hydrochlorothiazide (ZESTORETIC) 20-25 MG tablet Take 1 tablet by mouth daily 90 tablet 3     metFORMIN (GLUCOPHAGE-XR) 500 MG 24 hr tablet Take 4 tablets (2,000 mg) by mouth daily (with dinner) 120 tablet 5     sildenafil (VIAGRA) 100 MG tablet Take 1 tablet (100 mg) by mouth daily as needed 20 tablet 11     triamcinolone (KENALOG) 0.1 % external cream Apply 1 Application topically as needed         Allergies, family, and social history were reviewed and documented as needed in EHR.     REVIEW OF SYSTEMS  A focused ROS was performed, with pertinent positives and negatives as noted in the HPI.    PHYSICAL EXAM  BP (!) 142/84 (BP Location: Left arm, Patient Position: Sitting, Cuff Size: Adult Large)   Pulse 84   Wt 112.9 kg (249 lb)   BMI 34.24 kg/m    Body mass index is 34.24 kg/m .  Constitutional: Vital signs reviewed, as recorded above. Patient is alert, oriented and appears in no acute distress.  Eyes: PER, EOMI, no stare, lid lag, or retraction; no conjunctival injection.  MSK: No clubbing or cyanosis; normal muscle bulk and tone.  Skin: Shallow ulcerated areas (2) on the medial  aspect of the left ankle; no purulent discharge.  Some mild tenderness with palpation over the heel.   Neurological: Alert and oriented times 3. No tremor.    DATA REVIEW  Each of the following laboratory and/or imaging studies were reviewed.    Lab on 05/19/2022   Component Date Value Ref Range Status     Microalbumin Urine mg/dL 05/19/2022 5.39 (A) 0.00 - 1.99 mg/dL Final     Creatinine Urine mg/dL 05/19/2022 103  mg/dL Final     Microalbumin Urine mg/g Cr 05/19/2022 52.3 (A) <=19.9 mg/g Cr Final     Lutropin 05/19/2022 3.1  mIU/mL Final     FSH 05/19/2022 8.0  0.0 - 12.0 mIU/mL Final     Sex Hormone Binding Globulin 05/19/2022 26  11 - 80 nmol/L Final         ASSESSMENT  1.  Diabetes mellitus, CAMDEN with positive LATESHA antibody and normal range C-peptide.  Overall very good diabetes control.  Borderline glucose values overnight, rarely overtly hypoglycemic; would reduce basal insulin dose slightly.  Has hyperglycemia after his 7 PM evening meal, although it occurs intermittently only with higher carbohydrate meals.  Therefore, would continue the remainder of his insulin regimen without changes and refer back to diabetes care and  to refine carbohydrate counting skills: Dosing insulin based on carbohydrate intake will in turn help to minimize glycemic excursions after higher carbohydrate meals.  Tolerating Farxiga without side effect--add BMP to labs drawn yesterday.    2.  Diabetes preventive care.  -Foot exam on 3/23/2022 revealed painful bunion on the medial right forefoot, painful--evaluated in podiatry and anticipating arthroplasty  -Urine microalbumin screen checked 5/19/2022 shows microalbuminuria; continue Farxiga and ACE inhibitor  -Eye exam in 8/2021 showed no diabetic retinopathy  -Acceptable lipid profile, on statin therapy    3.  Recent cellulitis.  Improving erythema, ulcerated areas on the medial aspect of left foot do not appear to be actively purulent and the patient reports  ulcerations are improving.  Tenderness in the heel is improving, although not quite resolved.  Would advise follow-up with podiatry if tenderness in heel persists (since this may imply a deeper tissue infection or osteomyelitis).    4.  Concern for hypogonadism.  Primarily erectile dysfunction, lesser diminution in libido.  Testosterone checked in 3/2022 was normal.  Recheck testosterone drawn prior to this visit is pending.  We had an extensive discussion on effects of testosterone on libido and erectile function.  If testosterone is normal, I would consider referral to urology since I would be suspicious for an underlying neurologic or vascular issue contributing to erectile dysfunction given his coexisting diabetes.  We discussed limited evidence surrounding benefit of testosterone in the setting of normal testosterone levels.    PLAN  -Reduce Tresiba to 48 units daily  -Continue remainder of medication regimen  -I will follow-up pending testosterone level; if normal, we can refer to urology  -I will ask CASTRO Lipscomb to reach out about appointment for carb counting  -If foot tenderness persists, please contact podiatry clinic  -Lab draw in 1 month  -Follow-up in 3 months  -Return for a follow-up visit in 3 months, with another set of labs before visit  -We will communicate results via Polimet[employer], or if needed by phone      Orders Placed This Encounter   Procedures     Hemoglobin A1c         I spent a total of 43 minutes on the date of encounter reviewing medical records, evaluating the patient, coordinating care and documenting in the EHR, as detailed above.    I spent an additional 4 minutes on the date of encounter reviewing and interpreting CGM data, as outlined above.        Bushra Roy MD   Division of Diabetes, Endocrinology and Metabolism  Department of Medicine

## 2022-05-23 LAB
TESTOST FREE SERPL-MCNC: 9.23 NG/DL
TESTOST SERPL-MCNC: 398 NG/DL (ref 240–950)

## 2022-05-25 ENCOUNTER — TELEPHONE (OUTPATIENT)
Dept: EDUCATION SERVICES | Facility: CLINIC | Age: 52
End: 2022-05-25

## 2022-05-25 NOTE — TELEPHONE ENCOUNTER
Per Mary Hickman- patient should schedule an appointment for Diabetes Education to learn/refresh on carb counting.

## 2022-06-03 DIAGNOSIS — E78.5 HYPERLIPIDEMIA LDL GOAL <100: ICD-10-CM

## 2022-06-03 DIAGNOSIS — I10 BENIGN ESSENTIAL HYPERTENSION: ICD-10-CM

## 2022-06-07 DIAGNOSIS — Z79.4 TYPE 2 DIABETES MELLITUS WITH HYPERGLYCEMIA, WITH LONG-TERM CURRENT USE OF INSULIN (H): ICD-10-CM

## 2022-06-07 DIAGNOSIS — E11.65 TYPE 2 DIABETES MELLITUS WITH HYPERGLYCEMIA, WITH LONG-TERM CURRENT USE OF INSULIN (H): ICD-10-CM

## 2022-06-07 RX ORDER — LISINOPRIL AND HYDROCHLOROTHIAZIDE 20; 25 MG/1; MG/1
1 TABLET ORAL DAILY
Qty: 90 TABLET | Refills: 3 | Status: SHIPPED | OUTPATIENT
Start: 2022-06-07 | End: 2023-09-13

## 2022-06-07 RX ORDER — ATORVASTATIN CALCIUM 40 MG/1
40 TABLET, FILM COATED ORAL DAILY
Qty: 90 TABLET | Refills: 3 | Status: SHIPPED | OUTPATIENT
Start: 2022-06-07 | End: 2023-09-13

## 2022-06-07 NOTE — TELEPHONE ENCOUNTER
No longer patient of Dr Tello, routing to current PCP     Na BASS, Triage RN  [facility]

## 2022-06-14 ENCOUNTER — ALLIED HEALTH/NURSE VISIT (OUTPATIENT)
Dept: EDUCATION SERVICES | Facility: CLINIC | Age: 52
End: 2022-06-14
Payer: COMMERCIAL

## 2022-06-14 VITALS — WEIGHT: 255 LBS | BODY MASS INDEX: 35.07 KG/M2

## 2022-06-14 DIAGNOSIS — E11.65 TYPE 2 DIABETES MELLITUS WITH HYPERGLYCEMIA, WITH LONG-TERM CURRENT USE OF INSULIN (H): Primary | ICD-10-CM

## 2022-06-14 DIAGNOSIS — Z79.4 TYPE 2 DIABETES MELLITUS WITH HYPERGLYCEMIA, WITH LONG-TERM CURRENT USE OF INSULIN (H): Primary | ICD-10-CM

## 2022-06-14 PROCEDURE — 99207 PR DROP WITH A PROCEDURE: CPT

## 2022-06-14 PROCEDURE — G0108 DIAB MANAGE TRN  PER INDIV: HCPCS

## 2022-06-14 NOTE — CONFIDENTIAL NOTE
Not usually below 120  Eating at cafeteria a lot  doesn't pre plan meals  Fast food  Stress level higher   Transitioning back to [employer]  Both kids back in house  Both have trouble managing money  Eating out more because of this    Wanting to get back into exercise  Out on boat occasionally    Salads at work  Trying to make better choices when eating out    2 low alerts after working doubles  Pasta-15 v. 10  If salad-no insulin  B-king eggs english muffin  Corned beef hash    Vegetable, potato and meat    Trying to work less    Cottage cheese  Salad    Working on healthier habits  Trying to stay away from corn

## 2022-06-14 NOTE — PROGRESS NOTES
"Diabetes Self-Management Education & Support    Presents for:      SUBJECTIVE/OBJECTIVE:  Diabetes education in the past 24mo: Yes  Diabetes type: Other  Disease course: Stable  How confident are you filling out medical forms by yourself:: Extremely  Diabetes management related comments/concerns: Carb counting  Cultural Influences/Ethnic Background:  Not  or       Diabetes Symptoms & Complications:  Fatigue: Sometimes  Neuropathy: No  Polydipsia: No  Polyphagia: Sometimes  Polyuria: Yes  Visual change: Sometimes  Slow healing wounds: Yes  Autonomic neuropathy: No  CVA: No  Heart disease: No  Nephropathy: No  Peripheral neuropathy: No  Peripheral Vascular Disease: No  Retinopathy: No  Sexual dysfunction: Other    Patient Problem List and Family Medical History reviewed for relevant medical history, current medical status, and diabetes risk factors.    Vitals:  Wt 115.7 kg (255 lb)   BMI 35.07 kg/m    Estimated body mass index is 35.07 kg/m  as calculated from the following:    Height as of 3/29/22: 1.816 m (5' 11.5\").    Weight as of this encounter: 115.7 kg (255 lb).   Last 3 BP:   BP Readings from Last 3 Encounters:   05/20/22 (!) 142/84   03/23/22 122/86   03/09/22 130/82       History   Smoking Status    Former Smoker    Packs/day: 1.00    Years: 20.00    Types: Cigarettes   Smokeless Tobacco    Never Used       Labs:  Lab Results   Component Value Date    A1C 7.2 03/14/2022    A1C 8.2 04/07/2021     Lab Results   Component Value Date     05/19/2022     04/07/2021     Lab Results   Component Value Date    LDL 61 03/14/2022    LDL 67 04/07/2021     HDL Cholesterol   Date Value Ref Range Status   04/07/2021 28 (L) >39 mg/dL Final     Direct Measure HDL   Date Value Ref Range Status   03/14/2022 34 (L) >=40 mg/dL Final     Comment:     HDL Cholesterol Reference Range:     0-2 years:   No reference ranges established for patients under 2 years old  at fitogram for lipid " analytes.    2-8 years:  Greater than 45 mg/dL     18 years and older:   Female: Greater than or equal to 50 mg/dL   Male:   Greater than or equal to 40 mg/dL   ]  GFR Estimate   Date Value Ref Range Status   05/19/2022 83 >60 mL/min/1.73m2 Final     Comment:     Effective December 21, 2021 eGFRcr in adults is calculated using the 2021 CKD-EPI creatinine equation which includes age and gender (Sreedhar et al., NEJM, DOI: 10.1056/IPYCmg0665638)   04/07/2021 >90 >60 mL/min/[1.73_m2] Final     Comment:     Non  GFR Calc  Starting 12/18/2018, serum creatinine based estimated GFR (eGFR) will be   calculated using the Chronic Kidney Disease Epidemiology Collaboration   (CKD-EPI) equation.       GFR Estimate If Black   Date Value Ref Range Status   04/07/2021 >90 >60 mL/min/[1.73_m2] Final     Comment:      GFR Calc  Starting 12/18/2018, serum creatinine based estimated GFR (eGFR) will be   calculated using the Chronic Kidney Disease Epidemiology Collaboration   (CKD-EPI) equation.       Lab Results   Component Value Date    CR 1.07 05/19/2022    CR 0.89 04/07/2021     No results found for: MICROALBUMIN    Healthy Eating:  Cultural/Lutheran diet restrictions?: No  Meal planning/habits: Avoiding sweets  How many times a week on average do you eat food made away from home (restaurant/take-out)?: 5+  Meals include: Lunch, Dinner  Beverages: Water, Tea, Coffee, Diet soda    Being Active:  Barrier to exercise: None    Monitoring:  Blood Glucose Meter: FreeStyle  Times checking blood sugar at home (number): 5+  Times checking blood sugar at home (per): Day  Blood glucose trend: Fluctuating        Taking Medications:  Diabetes Medication(s)       Biguanides       metFORMIN (GLUCOPHAGE-XR) 500 MG 24 hr tablet    Take 4 tablets (2,000 mg) by mouth daily (with dinner)      Insulin       insulin aspart (NOVOLOG PEN) 100 UNIT/ML pen    Take 5 units no more than 10 minutes before each meal daily.  Increase  as directed.  Max daily dose 60 units.     Patient taking differently: 10-15 Units Take 10 units no more than 10 minutes before each meal daily.  Increase as directed.  Max daily dose 60 units.     insulin degludec (TRESIBA FLEXTOUCH) 200 UNIT/ML pen    Inject 50 Units Subcutaneous daily     Patient taking differently: Inject 48 Units Subcutaneous daily      Sodium-Glucose Co-Transporter 2 (SGLT2) Inhibitors       dapagliflozin (FARXIGA) 10 MG TABS tablet    Take 1 tablet (10 mg) by mouth daily            Current Treatments: Diet, Insulin Injections, Oral Medication (taken by mouth)    Problem Solving:                 Reducing Risks:  CAD Risks: Diabetes Mellitus, Family history, Hypertension, Stress  Has dilated eye exam at least once a year?: Yes  Sees dentist every 6 months?: Yes  Feet checked by healthcare provider in the last year?: Yes    Healthy Coping:  Informal Support system:: Family  Patient Activation Measure Survey Score:  No flowsheet data found.    Diabetes knowledge and skills assessment:   Patient is knowledgeable in diabetes management concepts related to: Being Active, Monitoring and Taking Medication    Patient needs further education on the following diabetes management concepts: Healthy Eating, Being Active, Problem Solving and Reducing Risks    Based on learning assessment above, most appropriate setting for further diabetes education would be: Individual setting.      INTERVENTIONS:    Education provided today on:  AADE Self-Care Behaviors:  Healthy Eating: carbohydrate counting, consistency in amount, composition, and timing of food intake, heart healthy diet, eating out, portion control and label reading  Being Active: relationship to blood glucose and describe appropriate activity program  Monitoring: individual blood glucose targets and frequency of monitoring  Taking Medication: when to take medications and adjusting dose appropriately  Problem Solving: high blood glucose - causes,  signs/symptoms, treatment and prevention, low blood glucose - causes, signs/symptoms, treatment and prevention and carrying a carbohydrate source at all times    Opportunities for ongoing education and support in diabetes-self management were discussed.    Pt verbalized understanding of concepts discussed and recommendations provided today.       Education Materials Provided:  Carbohydrate Counting and nutrition label      ASSESSMENT:  Low is here alone today to get some help with his carb counting and meal planning.  His most recent A1c came back at 7.2%.    He is currently taking Tresiba 48 units daily, he is taking this in the evening.  He is also taking Farxiga 10 mg in the morning and Metformin XR 1000mg twice daily.  He is taking Novolog 10 units before meals.  Stated if he knows his meal is going to be higher carb then he will take 15 units.  If he is having a salad or some other meal that is low carb he will take no insulin.  Questions about taking insulin after if his readings are high.            Patient's most recent   Lab Results   Component Value Date    A1C 7.2 03/14/2022    A1C 8.2 04/07/2021    is not meeting goal of <7.0    PLAN  See Patient Instructions for co-developed, patient-stated behavior change goals.  AVS printed and provided to patient today. See Follow-Up section for recommended follow-up.     The service provided today was under the supervising provider, Soha Calvillo, who was available if needed.     Time Spent: 60 minutes  Encounter Type: Individual    Any diabetes medication dose changes were made via the CDE Protocol and Collaborative Practice Agreement with the patient's primary care provider and endocrinology provider. A copy of this encounter was shared with the provider.

## 2022-06-14 NOTE — LETTER
"    6/14/2022         RE: Low Juárez  [address]  [facility] [address]        Dear Colleague,    Thank you for referring your patient, Low Juárez, to the [facility]. Please see a copy of my visit note below.    Diabetes Self-Management Education & Support    Presents for:      SUBJECTIVE/OBJECTIVE:  Diabetes education in the past 24mo: Yes  Diabetes type: Other  Disease course: Stable  How confident are you filling out medical forms by yourself:: Extremely  Diabetes management related comments/concerns: Carb counting  Cultural Influences/Ethnic Background:  Not  or       Diabetes Symptoms & Complications:  Fatigue: Sometimes  Neuropathy: No  Polydipsia: No  Polyphagia: Sometimes  Polyuria: Yes  Visual change: Sometimes  Slow healing wounds: Yes  Autonomic neuropathy: No  CVA: No  Heart disease: No  Nephropathy: No  Peripheral neuropathy: No  Peripheral Vascular Disease: No  Retinopathy: No  Sexual dysfunction: Other    Patient Problem List and Family Medical History reviewed for relevant medical history, current medical status, and diabetes risk factors.    Vitals:  Wt 115.7 kg (255 lb)   BMI 35.07 kg/m    Estimated body mass index is 35.07 kg/m  as calculated from the following:    Height as of 3/29/22: 1.816 m (5' 11.5\").    Weight as of this encounter: 115.7 kg (255 lb).   Last 3 BP:   BP Readings from Last 3 Encounters:   05/20/22 (!) 142/84   03/23/22 122/86   03/09/22 130/82       History   Smoking Status     Former Smoker     Packs/day: 1.00     Years: 20.00     Types: Cigarettes   Smokeless Tobacco     Never Used       Labs:  Lab Results   Component Value Date    A1C 7.2 03/14/2022    A1C 8.2 04/07/2021     Lab Results   Component Value Date     05/19/2022     04/07/2021     Lab Results   Component Value Date    LDL 61 03/14/2022    LDL 67 04/07/2021     HDL Cholesterol   Date Value Ref Range Status   04/07/2021 28 (L) >39 mg/dL Final "     Direct Measure HDL   Date Value Ref Range Status   03/14/2022 34 (L) >=40 mg/dL Final     Comment:     HDL Cholesterol Reference Range:     0-2 years:   No reference ranges established for patients under 2 years old  at [facility] for lipid analytes.    2-8 years:  Greater than 45 mg/dL     18 years and older:   Female: Greater than or equal to 50 mg/dL   Male:   Greater than or equal to 40 mg/dL   ]  GFR Estimate   Date Value Ref Range Status   05/19/2022 83 >60 mL/min/1.73m2 Final     Comment:     Effective December 21, 2021 eGFRcr in adults is calculated using the 2021 CKD-EPI creatinine equation which includes age and gender (Sreedhar et al., NEJM, DOI: 10.1056/HNZCki7130570)   04/07/2021 >90 >60 mL/min/[1.73_m2] Final     Comment:     Non  GFR Calc  Starting 12/18/2018, serum creatinine based estimated GFR (eGFR) will be   calculated using the Chronic Kidney Disease Epidemiology Collaboration   (CKD-EPI) equation.       GFR Estimate If Black   Date Value Ref Range Status   04/07/2021 >90 >60 mL/min/[1.73_m2] Final     Comment:      GFR Calc  Starting 12/18/2018, serum creatinine based estimated GFR (eGFR) will be   calculated using the Chronic Kidney Disease Epidemiology Collaboration   (CKD-EPI) equation.       Lab Results   Component Value Date    CR 1.07 05/19/2022    CR 0.89 04/07/2021     No results found for: MICROALBUMIN    Healthy Eating:  Cultural/Confucianist diet restrictions?: No  Meal planning/habits: Avoiding sweets  How many times a week on average do you eat food made away from home (restaurant/take-out)?: 5+  Meals include: Lunch, Dinner  Beverages: Water, Tea, Coffee, Diet soda    Being Active:  Barrier to exercise: None    Monitoring:  Blood Glucose Meter: FreeStyle  Times checking blood sugar at home (number): 5+  Times checking blood sugar at home (per): Day  Blood glucose trend: Fluctuating        Taking Medications:  Diabetes Medication(s)        Biguanides       metFORMIN (GLUCOPHAGE-XR) 500 MG 24 hr tablet    Take 4 tablets (2,000 mg) by mouth daily (with dinner)      Insulin       insulin aspart (NOVOLOG PEN) 100 UNIT/ML pen    Take 5 units no more than 10 minutes before each meal daily.  Increase as directed.  Max daily dose 60 units.     Patient taking differently: 10-15 Units Take 10 units no more than 10 minutes before each meal daily.  Increase as directed.  Max daily dose 60 units.     insulin degludec (TRESIBA FLEXTOUCH) 200 UNIT/ML pen    Inject 50 Units Subcutaneous daily     Patient taking differently: Inject 48 Units Subcutaneous daily      Sodium-Glucose Co-Transporter 2 (SGLT2) Inhibitors       dapagliflozin (FARXIGA) 10 MG TABS tablet    Take 1 tablet (10 mg) by mouth daily            Current Treatments: Diet, Insulin Injections, Oral Medication (taken by mouth)    Problem Solving:                 Reducing Risks:  CAD Risks: Diabetes Mellitus, Family history, Hypertension, Stress  Has dilated eye exam at least once a year?: Yes  Sees dentist every 6 months?: Yes  Feet checked by healthcare provider in the last year?: Yes    Healthy Coping:  Informal Support system:: Family  Patient Activation Measure Survey Score:  No flowsheet data found.    Diabetes knowledge and skills assessment:   Patient is knowledgeable in diabetes management concepts related to: Being Active, Monitoring and Taking Medication    Patient needs further education on the following diabetes management concepts: Healthy Eating, Being Active, Problem Solving and Reducing Risks    Based on learning assessment above, most appropriate setting for further diabetes education would be: Individual setting.      INTERVENTIONS:    Education provided today on:  AADE Self-Care Behaviors:  Healthy Eating: carbohydrate counting, consistency in amount, composition, and timing of food intake, heart healthy diet, eating out, portion control and label reading  Being Active: relationship to  blood glucose and describe appropriate activity program  Monitoring: individual blood glucose targets and frequency of monitoring  Taking Medication: when to take medications and adjusting dose appropriately  Problem Solving: high blood glucose - causes, signs/symptoms, treatment and prevention, low blood glucose - causes, signs/symptoms, treatment and prevention and carrying a carbohydrate source at all times    Opportunities for ongoing education and support in diabetes-self management were discussed.    Pt verbalized understanding of concepts discussed and recommendations provided today.       Education Materials Provided:  Carbohydrate Counting and nutrition label      ASSESSMENT:  Low is here alone today to get some help with his carb counting and meal planning.  His most recent A1c came back at 7.2%.    He is currently taking Tresiba 48 units daily, he is taking this in the evening.  He is also taking Farxiga 10 mg in the morning and Metformin XR 1000mg twice daily.  He is taking Novolog 10 units before meals.  Stated if he knows his meal is going to be higher carb then he will take 15 units.  If he is having a salad or some other meal that is low carb he will take no insulin.  Questions about taking insulin after if his readings are high.            Patient's most recent   Lab Results   Component Value Date    A1C 7.2 03/14/2022    A1C 8.2 04/07/2021    is not meeting goal of <7.0    PLAN  See Patient Instructions for co-developed, patient-stated behavior change goals.  AVS printed and provided to patient today. See Follow-Up section for recommended follow-up.     The service provided today was under the supervising provider, Soha Calvillo, who was available if needed.     Time Spent: 60 minutes  Encounter Type: Individual    Any diabetes medication dose changes were made via the CDE Protocol and Collaborative Practice Agreement with the patient's primary care provider and endocrinology provider. A copy of  this encounter was shared with the provider.

## 2022-06-18 DIAGNOSIS — Z79.4 TYPE 2 DIABETES MELLITUS WITH HYPERGLYCEMIA, WITH LONG-TERM CURRENT USE OF INSULIN (H): ICD-10-CM

## 2022-06-18 DIAGNOSIS — E11.65 TYPE 2 DIABETES MELLITUS WITH HYPERGLYCEMIA, WITH LONG-TERM CURRENT USE OF INSULIN (H): ICD-10-CM

## 2022-06-19 NOTE — TELEPHONE ENCOUNTER
"Routing refill request to provider for review/approval because:  Sig clarification    Last Written Prescription Date:  12/9/21  Last Fill Quantity: 18 mL,  # refills: 1   Last office visit provider:  2/16/22     Requested Prescriptions   Pending Prescriptions Disp Refills     Insulin Aspart FlexPen 100 UNIT/ML SOPN [Pharmacy Med Name: insulin aspart (U-100) 100 unit/mL (3 mL) subcutaneous pen (NovoLOG)] 15 mL 1     Sig: Inject 5 Units under the skin no more than 10 minutes before each meal. Increase as directed. Max of 60 units daily       Short Acting Insulin Protocol Passed - 6/18/2022 12:20 PM        Passed - Serum creatinine on file in past 12 months     Recent Labs   Lab Test 05/19/22  0838   CR 1.07       Ok to refill medication if creatinine is low          Passed - HgbA1C in past 3 or 6 months     If HgbA1C is 8 or greater, it needs to be on file within the past 3 months.  If less than 8, must be on file within the past 6 months.     Recent Labs   Lab Test 03/14/22  0812   A1C 7.2*             Passed - Medication is active on med list        Passed - Patient is age 18 or older        Passed - Recent (6 mo) or future (30 days) visit within the authorizing provider's specialty     Patient had office visit in the last 6 months or has a visit in the next 30 days with authorizing provider or within the authorizing provider's specialty.  See \"Patient Info\" tab in inbasket, or \"Choose Columns\" in Meds & Orders section of the refill encounter.                 Indy Newby 06/19/22 7:29 AM  "

## 2022-06-20 RX ORDER — INSULIN ASPART 100 [IU]/ML
10-15 INJECTION, SOLUTION INTRAVENOUS; SUBCUTANEOUS
Qty: 15 ML | Refills: 1 | Status: SHIPPED | OUTPATIENT
Start: 2022-06-20 | End: 2022-06-21

## 2022-06-21 ENCOUNTER — TELEPHONE (OUTPATIENT)
Dept: FAMILY MEDICINE | Facility: CLINIC | Age: 52
End: 2022-06-21

## 2022-06-21 ENCOUNTER — LAB (OUTPATIENT)
Dept: LAB | Facility: CLINIC | Age: 52
End: 2022-06-21
Payer: COMMERCIAL

## 2022-06-21 DIAGNOSIS — E11.65 TYPE 2 DIABETES MELLITUS WITH HYPERGLYCEMIA, WITH LONG-TERM CURRENT USE OF INSULIN (H): ICD-10-CM

## 2022-06-21 DIAGNOSIS — Z79.4 TYPE 2 DIABETES MELLITUS WITH HYPERGLYCEMIA, WITH LONG-TERM CURRENT USE OF INSULIN (H): ICD-10-CM

## 2022-06-21 LAB — HBA1C MFR BLD: 7.5 % (ref 0–5.6)

## 2022-06-21 PROCEDURE — 83036 HEMOGLOBIN GLYCOSYLATED A1C: CPT

## 2022-06-21 PROCEDURE — 36415 COLL VENOUS BLD VENIPUNCTURE: CPT

## 2022-06-21 RX ORDER — INSULIN ASPART 100 [IU]/ML
10-15 INJECTION, SOLUTION INTRAVENOUS; SUBCUTANEOUS
Qty: 54 ML | Refills: 1 | Status: SHIPPED | OUTPATIENT
Start: 2022-06-21 | End: 2023-02-22

## 2022-06-21 NOTE — TELEPHONE ENCOUNTER
Medication Question    Who is calling: Pharmacy Fax    What medication are you calling about (include dose and sig)?: Insulin Aspart FlexPen 100 UNIT/ML SOPN    Who prescribed the medication?: Dr. Ortiz    Do you have any questions: Patient is requesting a 3 month supply authorization    Requested Pharmacy: [facility]

## 2022-06-30 DIAGNOSIS — Z79.4 TYPE 2 DIABETES MELLITUS WITH HYPERGLYCEMIA, WITH LONG-TERM CURRENT USE OF INSULIN (H): Primary | ICD-10-CM

## 2022-06-30 DIAGNOSIS — E11.65 TYPE 2 DIABETES MELLITUS WITH HYPERGLYCEMIA, WITH LONG-TERM CURRENT USE OF INSULIN (H): Primary | ICD-10-CM

## 2022-07-19 DIAGNOSIS — E11.65 TYPE 2 DIABETES MELLITUS WITH HYPERGLYCEMIA, WITH LONG-TERM CURRENT USE OF INSULIN (H): Primary | ICD-10-CM

## 2022-07-19 DIAGNOSIS — Z79.4 TYPE 2 DIABETES MELLITUS WITH HYPERGLYCEMIA, WITH LONG-TERM CURRENT USE OF INSULIN (H): Primary | ICD-10-CM

## 2022-07-20 RX ORDER — INSULIN GLARGINE-YFGN 100 [IU]/ML
INJECTION, SOLUTION SUBCUTANEOUS
Qty: 30 ML | Refills: 1 | Status: SHIPPED | OUTPATIENT
Start: 2022-07-20 | End: 2022-09-07

## 2022-07-20 NOTE — TELEPHONE ENCOUNTER
Routing refill request to provider for review/approval because:  Drug not on the [facility] refill protocol   Drug not active on patient's medication list    Last Written Prescription Date:  ???  Last Fill Quantity: ???,  # refills: ???   Last office visit provider:  2/16/22     Requested Prescriptions   Pending Prescriptions Disp Refills     SEMGLEE (YFGN) 100 UNIT/ML SOPN [Pharmacy Med Name: insulin glargine-yfgn (U-100) 100 unit/mL (3 mL) subcutaneous pen (Semglee(insulin glarg-yfgn)Pen)] 30 mL 1     Sig: Inject 50 Units under the skin once daily. Increase as directed. Max of 100 units daily       There is no refill protocol information for this order          Romeo Munroe RN 07/20/22 7:11 AM

## 2022-09-01 ENCOUNTER — LAB (OUTPATIENT)
Dept: LAB | Facility: CLINIC | Age: 52
End: 2022-09-01
Payer: COMMERCIAL

## 2022-09-01 DIAGNOSIS — E11.65 TYPE 2 DIABETES MELLITUS WITH HYPERGLYCEMIA, WITH LONG-TERM CURRENT USE OF INSULIN (H): ICD-10-CM

## 2022-09-01 DIAGNOSIS — Z79.4 TYPE 2 DIABETES MELLITUS WITH HYPERGLYCEMIA, WITH LONG-TERM CURRENT USE OF INSULIN (H): ICD-10-CM

## 2022-09-01 LAB
ALBUMIN SERPL BCG-MCNC: 4.2 G/DL (ref 3.5–5.2)
ALP SERPL-CCNC: 82 U/L (ref 40–129)
ALT SERPL W P-5'-P-CCNC: 43 U/L (ref 10–50)
ANION GAP SERPL CALCULATED.3IONS-SCNC: 9 MMOL/L (ref 7–15)
AST SERPL W P-5'-P-CCNC: 35 U/L (ref 10–50)
BILIRUB SERPL-MCNC: 0.4 MG/DL
BUN SERPL-MCNC: 15.6 MG/DL (ref 6–20)
CALCIUM SERPL-MCNC: 9.3 MG/DL (ref 8.6–10)
CHLORIDE SERPL-SCNC: 100 MMOL/L (ref 98–107)
CREAT SERPL-MCNC: 1.13 MG/DL (ref 0.67–1.17)
DEPRECATED HCO3 PLAS-SCNC: 30 MMOL/L (ref 22–29)
GFR SERPL CREATININE-BSD FRML MDRD: 78 ML/MIN/1.73M2
GLUCOSE SERPL-MCNC: 146 MG/DL (ref 70–99)
HBA1C MFR BLD: 7.2 % (ref 0–5.6)
POTASSIUM SERPL-SCNC: 4 MMOL/L (ref 3.4–5.3)
PROT SERPL-MCNC: 7 G/DL (ref 6.4–8.3)
SODIUM SERPL-SCNC: 139 MMOL/L (ref 136–145)

## 2022-09-01 PROCEDURE — 80053 COMPREHEN METABOLIC PANEL: CPT

## 2022-09-01 PROCEDURE — 36415 COLL VENOUS BLD VENIPUNCTURE: CPT

## 2022-09-01 PROCEDURE — 83036 HEMOGLOBIN GLYCOSYLATED A1C: CPT

## 2022-09-03 ENCOUNTER — HEALTH MAINTENANCE LETTER (OUTPATIENT)
Age: 52
End: 2022-09-03

## 2022-09-06 ENCOUNTER — TELEPHONE (OUTPATIENT)
Dept: ENDOCRINOLOGY | Facility: CLINIC | Age: 52
End: 2022-09-06

## 2022-09-06 NOTE — TELEPHONE ENCOUNTER
[facility] Call Center    Phone Message    May a detailed message be left on voicemail: no     Reason for Call: Medication Question or concern regarding medication   Prescription Clarification  Name of Medication: insulin degludec (TRESIBA FLEXTOUCH) 200 UNIT/ML pen     SEMGLEE, YFGN, 100 UNIT/ML SOPN     Prescribing Provider: Kirill/ Restabecky    What on the order needs clarification? [facility] wanting to know if pt should be taking both Semglee and Tresiba or if it is just to be one of them. Please call to follow up. Thank you      Action Taken: Message routed to:  Other: endo    Travel Screening: Not Applicable

## 2022-09-07 ENCOUNTER — OFFICE VISIT (OUTPATIENT)
Dept: ENDOCRINOLOGY | Facility: CLINIC | Age: 52
End: 2022-09-07
Payer: COMMERCIAL

## 2022-09-07 VITALS
HEART RATE: 80 BPM | SYSTOLIC BLOOD PRESSURE: 138 MMHG | WEIGHT: 263.5 LBS | DIASTOLIC BLOOD PRESSURE: 82 MMHG | BODY MASS INDEX: 36.24 KG/M2

## 2022-09-07 DIAGNOSIS — E11.65 TYPE 2 DIABETES MELLITUS WITH HYPERGLYCEMIA, WITH LONG-TERM CURRENT USE OF INSULIN (H): Primary | ICD-10-CM

## 2022-09-07 DIAGNOSIS — Z79.4 TYPE 2 DIABETES MELLITUS WITH HYPERGLYCEMIA, WITH LONG-TERM CURRENT USE OF INSULIN (H): Primary | ICD-10-CM

## 2022-09-07 PROCEDURE — 99215 OFFICE O/P EST HI 40 MIN: CPT | Performed by: INTERNAL MEDICINE

## 2022-09-07 PROCEDURE — 95251 CONT GLUC MNTR ANALYSIS I&R: CPT | Performed by: INTERNAL MEDICINE

## 2022-09-07 NOTE — PROGRESS NOTES
ENDOCRINOLOGY FOLLOW-UP         HISTORY OF PRESENT ILLNESS    Low Juárez is seen in follow-up for the issues outlined below.     1.  Diabetes mellitus.  Just transitioned jobs: Now working at [employer] instead of [employer].  Has been busy with change in work and adjusting to new environment.  Therefore, has been less cognizant of diabetes management/glucose monitoring or diet.  Moreover, he is remodeling his kitchen and has had limited ability to prepare food.    It appears that Semglee was prescribed by his PCP: Unclear if insurance required this change.  The patient has a supply of Tresiba and Semglee at home.    Current diabetes regimen:   -Tresiba 48 units daily  -Transitioned to metformin extended release 2000 mg daily as we planned after last visit  -Farxiga 10 mg daily  -NovoLog 10 to 15 units before each meal, prescribed correction scale of 2 units for every 50 mg/dL over 150 but not taking this as regularly    Saw diabetes care and  in 6/2022.  Carbohydrate counting was reviewed.  The patient finds he is able to count carbohydrates, just may not have the time to commit to counting carbohydrates before his meals.        Interpretation of CGM    Patient Data  Type of DM: CAMDEN   Last A1c: 7.2%    Current DM regimen: See above    Sensor Summary/ Accuracy Criteria  Number of days sensor worn: 36% over the past 14 days--having some difficulty with sensor CGM connecting with reader or cell phone  Calibration: CGM system does not require calibration    Average (mean) sensor glucose: 174 mg/dL  Time in range: 60% (last visit 77%)  Duration below low limit: 0%  Coefficient of variation: 24%    Data Assessment  ~Overall fair glucose values, although limited data over the past 2 weeks  ~Some elevation in glucose values in the evening hours, potentially after dinner; this is not a consistent pattern  ~No recurrent hypoglycemia    Impression  -Fair glycemic control, although limited  data    Recommendations  -Please refer to assessment and plan    Pertinent endocrine and related history:  1.  Diabetes mellitus.  Diagnosed in 2017 when patient was noted to have an elevated random glucose to 349.  A1c was 10.4% around the time of diagnosis.  -In 2021, A1c chacorta, prompting referral to diabetes education and then to endocrinology.  Of note, in 12/2021 he had LATESHA antibody checked which returned positive, with C-peptide that was still measurable.  -Initially on oral therapies, initiated on insulin in 10/2021.  -Previously on Victoza: Stopped this at the direction of one of his healthcare providers. Has history of pancreatitis (by his report, gallstone pancreatitis), status post cholecystectomy.  -Previously on Jardiance, discontinued due to discomfort and itching in the abdomen.  2.  Concern for low testosterone.  Patient has wondered if he has low testosterone since he has had erectile dysfunction.  He has been prescribed Viagra but has not used it since he has not been sexually active. Has some libido. Reports he used anabolic steroids 12 to 15 years ago when bodybuilding and wonders if this may have had longstanding impact on testosterone levels and erectile function.  -Has biological children, no history of infertility.    Pertinent Social History: , works as a radiology technician.    PAST MEDICAL HISTORY  Past Medical History:   Diagnosis Date     Apnea, sleep      Fatty liver 8/18/2017     High cholesterol      Hyperlipidemia LDL goal <100 8/18/2017     Hypertension      Morbid obesity due to excess calories (H) 8/18/2017     Pancreatitis      Rhinitis      Type 2 diabetes mellitus without complication, without long-term current use of insulin (H) 1/31/2018       MEDICATIONS  Current Outpatient Medications   Medication Sig Dispense Refill     aspirin (ASA) 81 MG tablet Take 81 mg by mouth daily       atorvastatin (LIPITOR) 40 MG tablet Take 1 tablet (40 mg) by mouth daily 90 tablet 3      augmented betamethasone dipropionate (DIPROLENE) 0.05 % external lotion Apply topically 2 times daily 60 mL 6     blood glucose (NO BRAND SPECIFIED) lancets standard Use to test blood sugar 2 times daily or as directed. 200 each 3     blood glucose (NO BRAND SPECIFIED) test strip Use to test blood sugar 2 times daily or as directed. 200 strip 3     blood glucose calibration (NO BRAND SPECIFIED) solution Use to calibrate blood glucose monitor as needed as directed. 1 each 3     blood glucose monitoring (NO BRAND SPECIFIED) meter device kit Use to test blood sugar 2 times daily or as directed. 1 kit 0     Continuous Blood Gluc Sensor (FREESTYLE ROSA 2 SENSOR) MISC 1 kit every 14 days Use per manufacture's instructions to check glucose daily. 6 each 1     dapagliflozin (FARXIGA) 10 MG TABS tablet Take 1 tablet (10 mg) by mouth daily 30 tablet 5     fluticasone (FLONASE) 50 MCG/ACT spray Spray 1 spray into both nostrils as needed for rhinitis or allergies       Insulin Aspart FlexPen 100 UNIT/ML SOPN Inject 10-15 Units Subcutaneous 4 times daily (before meals and nightly) Not to exceed 60 units per day 54 mL 1     insulin degludec (TRESIBA FLEXTOUCH) 200 UNIT/ML pen Inject 50 Units Subcutaneous daily (Patient taking differently: Inject 48 Units Subcutaneous daily) 9 mL 5     insulin pen needle (31G X 8 MM) 31G X 8 MM miscellaneous Use up to 4 pen needles daily or as directed. 100 each 11     ipratropium (ATROVENT) 0.06 % nasal spray Spray 2 sprays into both nostrils 4 times daily as needed for rhinitis 15 mL 11     ketoconazole (NIZORAL) 2 % external cream Apply 1 Application topically as needed       lisinopril-hydrochlorothiazide (ZESTORETIC) 20-25 MG tablet Take 1 tablet by mouth daily 90 tablet 3     metFORMIN (GLUCOPHAGE-XR) 500 MG 24 hr tablet Take 4 tablets (2,000 mg) by mouth daily (with dinner) 120 tablet 5     sildenafil (VIAGRA) 100 MG tablet Take 1 tablet (100 mg) by mouth daily as needed 20 tablet 11      triamcinolone (KENALOG) 0.1 % external cream Apply 1 Application topically as needed       cetirizine (ZYRTEC) 10 MG tablet Take 10-20 mg by mouth daily (Patient not taking: Reported on 9/7/2022)       glucosamine-chondroitin 500-400 MG CAPS per capsule Take 2 capsules by mouth daily  (Patient not taking: Reported on 9/7/2022)       hydrOXYzine (ATARAX) 25 MG tablet Take 25 mg by mouth 3 times daily as needed  (Patient not taking: Reported on 9/7/2022)         Allergies, family, and social history were reviewed and documented as needed in EHR.     REVIEW OF SYSTEMS  A focused ROS was performed, with pertinent positives and negatives as noted in the HPI.    PHYSICAL EXAM  /82   Pulse 80   Wt 119.5 kg (263 lb 8 oz)   BMI 36.24 kg/m    Body mass index is 36.24 kg/m .  Constitutional: Vital signs reviewed, as recorded above. Patient is alert, oriented and appears in no acute distress.  Eyes: PER, EOMI, no stare, lid lag, or retraction; no conjunctival injection.  Respiratory: Normal chest wall motion and respiratory effort.  MSK: No clubbing or cyanosis; normal muscle bulk and tone.  Skin: No lesions on visible skin,  Neurological: Alert and oriented times 3. No tremor.    DATA REVIEW  Each of the following laboratory and/or imaging studies were reviewed.    Lab on 09/01/2022   Component Date Value Ref Range Status     Hemoglobin A1C 09/01/2022 7.2 (A) 0.0 - 5.6 % Final    Normal <5.7%   Prediabetes 5.7-6.4%    Diabetes 6.5% or higher     Note: Adopted from ADA consensus guidelines.     Sodium 09/01/2022 139  136 - 145 mmol/L Final     Potassium 09/01/2022 4.0  3.4 - 5.3 mmol/L Final     Creatinine 09/01/2022 1.13  0.67 - 1.17 mg/dL Final     Urea Nitrogen 09/01/2022 15.6  6.0 - 20.0 mg/dL Final     Chloride 09/01/2022 100  98 - 107 mmol/L Final     Carbon Dioxide (CO2) 09/01/2022 30 (A) 22 - 29 mmol/L Final     Anion Gap 09/01/2022 9  7 - 15 mmol/L Final     Glucose 09/01/2022 146 (A) 70 - 99 mg/dL Final      Calcium 09/01/2022 9.3  8.6 - 10.0 mg/dL Final     Protein Total 09/01/2022 7.0  6.4 - 8.3 g/dL Final     Albumin 09/01/2022 4.2  3.5 - 5.2 g/dL Final     Bilirubin Total 09/01/2022 0.4  <=1.2 mg/dL Final     Alkaline Phosphatase 09/01/2022 82  40 - 129 U/L Final     AST 09/01/2022 35  10 - 50 U/L Final     ALT 09/01/2022 43  10 - 50 U/L Final     GFR Estimate 09/01/2022 78  >60 mL/min/1.73m2 Final    Effective December 21, 2021 eGFRcr in adults is calculated using the 2021 CKD-EPI creatinine equation which includes age and gender (Sreedhar et al., NEJ, DOI: 10.1056/WUSZfk1390139)         ASSESSMENT  1.  Diabetes mellitus, CAMDEN with positive LATESHA antibody and normal range C-peptide.  We can recheck C-peptide with future lab draws in the near term.  Overall, fair diabetes control: More hyperglycemia since last visit, likely in large part due to change in lifestyle with adjustment to new job and kitchen renovation.  No change to insulin dosages.  Patient will work on carbohydrate counting once able to: We will start with a more conservative dose, but I suspect his carbohydrate ratio will be closer to 1:5.  Have encouraged him to use correction scale consistently (could take photo on his cell phone of the correction scale I have written in his instructions so that he has it accessible for reference).  Follow-up with me earliest available to reevaluate glycemia after these changes.  I would also consider transition to freestyle kathleen 3 system: Patient will look into insurance coverage for this.    2.  Diabetes preventive care.  -Foot exam on 3/23/2022 revealed painful bunion on the medial right forefoot, painful--evaluated in podiatry and anticipating arthroplasty  -Urine microalbumin screen checked 5/19/2022 showed microalbuminuria; continue Farxiga and ACE inhibitor  -Eye exam in 8/2021 showed no diabetic retinopathy, will need to follow-up on exams at future visit  -Acceptable lipid profile, on statin therapy    3.   Concern for hypogonadism.  Primarily erectile dysfunction, lesser diminution in libido.  Testosterone checked in 3/2022 was normal.  Recheck testosterone level has been in normal range.  No indication for treatment at present, can continue periodic monitoring.    PLAN  -Continue Tresiba 48 units daily; Tresiba is preferred so please check with pharmacy if prior authorization is needed and let us know (we discussed that he can use the limited supply of Semglee he has already picked up from pharmacy for the short-term)  -When ready, can start using NovoLog 1 units for every 8 grams of carbohydrate  Please use the following correction scale using NovoLog insulin before meals.    Blood sugar 150 - 200; take 2 units of insulin.  Blood sugar 201 - 250; take 4 units of insulin.  Blood sugar 251 - 300; take 6 units of insulin.  Blood sugar 301 - 350; take 8 units of insulin.  Blood sugar 351 - 400; take 10 units of insulin.  Blood sugar over 401;  take 12 units of insulin.    -Continue remainder of medication regimen  -Look into where [facility] prefers to fill Freestyle Dara 3  -Follow-up earliest available  -We will communicate results via [email or web address], or if needed by phone    I spent a total of 44 minutes on the date of encounter reviewing medical records, evaluating the patient, coordinating care and documenting in the EHR, as detailed above.    I spent an additional 5 minutes on the date of encounter reviewing and interpreting CGM data, as outlined above.        Bushra Roy MD   Division of Diabetes, Endocrinology and Metabolism  Department of Medicine

## 2022-09-07 NOTE — PATIENT INSTRUCTIONS
-Continue Tresiba 48 units daily; Tresiba is preferred so please check with pharmacy if prior authorization is needed and let us know  -When ready, can start using NovoLog 1 units for every 8 grams of carb  Please use the following correction scale using NovoLog insulin before meals.    Blood sugar 150 - 200; take 2 units of insulin.  Blood sugar 201 - 250; take 4 units of insulin.  Blood sugar 251 - 300; take 6 units of insulin.  Blood sugar 301 - 350; take 8 units of insulin.  Blood sugar 351 - 400; take 10 units of insulin.  Blood sugar over 401;  take 12 units of insulin.    -Continue remainder of medication regimen  -Look into where [facility] prefers to fill Freestyle Dara 3  -Follow-up earliest available  -We will communicate results via [email or web address], or if needed by phone

## 2022-09-07 NOTE — LETTER
9/7/2022         RE: Low Juárez  [address] [facility] [address]        Dear Colleague,    Thank you for referring your patient, Low Juárez, to the [facility]. Please see a copy of my visit note below.      ENDOCRINOLOGY FOLLOW-UP         HISTORY OF PRESENT ILLNESS    Low Juárez is seen in follow-up for the issues outlined below.     1.  Diabetes mellitus.  Just transitioned jobs: Now working at [employer] instead of [employer].  Has been busy with change in work and adjusting to new environment.  Therefore, has been less cognizant of diabetes management/glucose monitoring or diet.  Moreover, he is remodeling his kitchen and has had limited ability to prepare food.    It appears that Semglee was prescribed by his PCP: Unclear if insurance required this change.  The patient has a supply of Tresiba and Semglee at home.    Current diabetes regimen:   -Tresiba 48 units daily  -Transitioned to metformin extended release 2000 mg daily as we planned after last visit  -Farxiga 10 mg daily  -NovoLog 10 to 15 units before each meal, prescribed correction scale of 2 units for every 50 mg/dL over 150 but not taking this as regularly    Saw diabetes care and  in 6/2022.  Carbohydrate counting was reviewed.  The patient finds he is able to count carbohydrates, just may not have the time to commit to counting carbohydrates before his meals.        Interpretation of CGM    Patient Data  Type of DM: CAMDEN   Last A1c: 7.2%    Current DM regimen: See above    Sensor Summary/ Accuracy Criteria  Number of days sensor worn: 36% over the past 14 days--having some difficulty with sensor CGM connecting with reader or cell phone  Calibration: CGM system does not require calibration    Average (mean) sensor glucose: 174 mg/dL  Time in range: 60% (last visit 77%)  Duration below low limit: 0%  Coefficient of variation: 24%    Data Assessment  ~Overall fair glucose  values, although limited data over the past 2 weeks  ~Some elevation in glucose values in the evening hours, potentially after dinner; this is not a consistent pattern  ~No recurrent hypoglycemia    Impression  -Fair glycemic control, although limited data    Recommendations  -Please refer to assessment and plan    Pertinent endocrine and related history:  1.  Diabetes mellitus.  Diagnosed in 2017 when patient was noted to have an elevated random glucose to 349.  A1c was 10.4% around the time of diagnosis.  -In 2021, A1c chacorta, prompting referral to diabetes education and then to endocrinology.  Of note, in 12/2021 he had LATESHA antibody checked which returned positive, with C-peptide that was still measurable.  -Initially on oral therapies, initiated on insulin in 10/2021.  -Previously on Victoza: Stopped this at the direction of one of his healthcare providers. Has history of pancreatitis (by his report, gallstone pancreatitis), status post cholecystectomy.  -Previously on Jardiance, discontinued due to discomfort and itching in the abdomen.  2.  Concern for low testosterone.  Patient has wondered if he has low testosterone since he has had erectile dysfunction.  He has been prescribed Viagra but has not used it since he has not been sexually active. Has some libido. Reports he used anabolic steroids 12 to 15 years ago when bodybuilding and wonders if this may have had longstanding impact on testosterone levels and erectile function.  -Has biological children, no history of infertility.    Pertinent Social History: , works as a radiology technician.    PAST MEDICAL HISTORY  Past Medical History:   Diagnosis Date     Apnea, sleep      Fatty liver 8/18/2017     High cholesterol      Hyperlipidemia LDL goal <100 8/18/2017     Hypertension      Morbid obesity due to excess calories (H) 8/18/2017     Pancreatitis      Rhinitis      Type 2 diabetes mellitus without complication, without long-term current use of  insulin (H) 1/31/2018       MEDICATIONS  Current Outpatient Medications   Medication Sig Dispense Refill     aspirin (ASA) 81 MG tablet Take 81 mg by mouth daily       atorvastatin (LIPITOR) 40 MG tablet Take 1 tablet (40 mg) by mouth daily 90 tablet 3     augmented betamethasone dipropionate (DIPROLENE) 0.05 % external lotion Apply topically 2 times daily 60 mL 6     blood glucose (NO BRAND SPECIFIED) lancets standard Use to test blood sugar 2 times daily or as directed. 200 each 3     blood glucose (NO BRAND SPECIFIED) test strip Use to test blood sugar 2 times daily or as directed. 200 strip 3     blood glucose calibration (NO BRAND SPECIFIED) solution Use to calibrate blood glucose monitor as needed as directed. 1 each 3     blood glucose monitoring (NO BRAND SPECIFIED) meter device kit Use to test blood sugar 2 times daily or as directed. 1 kit 0     Continuous Blood Gluc Sensor (FREESTYLE ROSA 2 SENSOR) MISC 1 kit every 14 days Use per manufacture's instructions to check glucose daily. 6 each 1     dapagliflozin (FARXIGA) 10 MG TABS tablet Take 1 tablet (10 mg) by mouth daily 30 tablet 5     fluticasone (FLONASE) 50 MCG/ACT spray Spray 1 spray into both nostrils as needed for rhinitis or allergies       Insulin Aspart FlexPen 100 UNIT/ML SOPN Inject 10-15 Units Subcutaneous 4 times daily (before meals and nightly) Not to exceed 60 units per day 54 mL 1     insulin degludec (TRESIBA FLEXTOUCH) 200 UNIT/ML pen Inject 50 Units Subcutaneous daily (Patient taking differently: Inject 48 Units Subcutaneous daily) 9 mL 5     insulin pen needle (31G X 8 MM) 31G X 8 MM miscellaneous Use up to 4 pen needles daily or as directed. 100 each 11     ipratropium (ATROVENT) 0.06 % nasal spray Spray 2 sprays into both nostrils 4 times daily as needed for rhinitis 15 mL 11     ketoconazole (NIZORAL) 2 % external cream Apply 1 Application topically as needed       lisinopril-hydrochlorothiazide (ZESTORETIC) 20-25 MG tablet Take 1  tablet by mouth daily 90 tablet 3     metFORMIN (GLUCOPHAGE-XR) 500 MG 24 hr tablet Take 4 tablets (2,000 mg) by mouth daily (with dinner) 120 tablet 5     sildenafil (VIAGRA) 100 MG tablet Take 1 tablet (100 mg) by mouth daily as needed 20 tablet 11     triamcinolone (KENALOG) 0.1 % external cream Apply 1 Application topically as needed       cetirizine (ZYRTEC) 10 MG tablet Take 10-20 mg by mouth daily (Patient not taking: Reported on 9/7/2022)       glucosamine-chondroitin 500-400 MG CAPS per capsule Take 2 capsules by mouth daily  (Patient not taking: Reported on 9/7/2022)       hydrOXYzine (ATARAX) 25 MG tablet Take 25 mg by mouth 3 times daily as needed  (Patient not taking: Reported on 9/7/2022)         Allergies, family, and social history were reviewed and documented as needed in EHR.     REVIEW OF SYSTEMS  A focused ROS was performed, with pertinent positives and negatives as noted in the HPI.    PHYSICAL EXAM  /82   Pulse 80   Wt 119.5 kg (263 lb 8 oz)   BMI 36.24 kg/m    Body mass index is 36.24 kg/m .  Constitutional: Vital signs reviewed, as recorded above. Patient is alert, oriented and appears in no acute distress.  Eyes: PER, EOMI, no stare, lid lag, or retraction; no conjunctival injection.  Respiratory: Normal chest wall motion and respiratory effort.  MSK: No clubbing or cyanosis; normal muscle bulk and tone.  Skin: No lesions on visible skin,  Neurological: Alert and oriented times 3. No tremor.    DATA REVIEW  Each of the following laboratory and/or imaging studies were reviewed.    Lab on 09/01/2022   Component Date Value Ref Range Status     Hemoglobin A1C 09/01/2022 7.2 (A) 0.0 - 5.6 % Final    Normal <5.7%   Prediabetes 5.7-6.4%    Diabetes 6.5% or higher     Note: Adopted from ADA consensus guidelines.     Sodium 09/01/2022 139  136 - 145 mmol/L Final     Potassium 09/01/2022 4.0  3.4 - 5.3 mmol/L Final     Creatinine 09/01/2022 1.13  0.67 - 1.17 mg/dL Final     Urea Nitrogen  09/01/2022 15.6  6.0 - 20.0 mg/dL Final     Chloride 09/01/2022 100  98 - 107 mmol/L Final     Carbon Dioxide (CO2) 09/01/2022 30 (A) 22 - 29 mmol/L Final     Anion Gap 09/01/2022 9  7 - 15 mmol/L Final     Glucose 09/01/2022 146 (A) 70 - 99 mg/dL Final     Calcium 09/01/2022 9.3  8.6 - 10.0 mg/dL Final     Protein Total 09/01/2022 7.0  6.4 - 8.3 g/dL Final     Albumin 09/01/2022 4.2  3.5 - 5.2 g/dL Final     Bilirubin Total 09/01/2022 0.4  <=1.2 mg/dL Final     Alkaline Phosphatase 09/01/2022 82  40 - 129 U/L Final     AST 09/01/2022 35  10 - 50 U/L Final     ALT 09/01/2022 43  10 - 50 U/L Final     GFR Estimate 09/01/2022 78  >60 mL/min/1.73m2 Final    Effective December 21, 2021 eGFRcr in adults is calculated using the 2021 CKD-EPI creatinine equation which includes age and gender (Sreedhar et al., NEJM, DOI: 10.1056/NUJHwj1414843)         ASSESSMENT  1.  Diabetes mellitus, CAMDEN with positive LATESHA antibody and normal range C-peptide.  We can recheck C-peptide with future lab draws in the near term.  Overall, fair diabetes control: More hyperglycemia since last visit, likely in large part due to change in lifestyle with adjustment to new job and kitchen renovation.  No change to insulin dosages.  Patient will work on carbohydrate counting once able to: We will start with a more conservative dose, but I suspect his carbohydrate ratio will be closer to 1:5.  Have encouraged him to use correction scale consistently (could take photo on his cell phone of the correction scale I have written in his instructions so that he has it accessible for reference).  Follow-up with me earliest available to reevaluate glycemia after these changes.  I would also consider transition to freestyle kathleen 3 system: Patient will look into insurance coverage for this.    2.  Diabetes preventive care.  -Foot exam on 3/23/2022 revealed painful bunion on the medial right forefoot, painful--evaluated in podiatry and anticipating  arthroplasty  -Urine microalbumin screen checked 5/19/2022 showed microalbuminuria; continue Farxiga and ACE inhibitor  -Eye exam in 8/2021 showed no diabetic retinopathy, will need to follow-up on exams at future visit  -Acceptable lipid profile, on statin therapy    3.  Concern for hypogonadism.  Primarily erectile dysfunction, lesser diminution in libido.  Testosterone checked in 3/2022 was normal.  Recheck testosterone level has been in normal range.  No indication for treatment at present, can continue periodic monitoring.    PLAN  -Continue Tresiba 48 units daily; Tresiba is preferred so please check with pharmacy if prior authorization is needed and let us know (we discussed that he can use the limited supply of Semglee he has already picked up from pharmacy for the short-term)  -When ready, can start using NovoLog 1 units for every 8 grams of carbohydrate  Please use the following correction scale using NovoLog insulin before meals.    Blood sugar 150 - 200; take 2 units of insulin.  Blood sugar 201 - 250; take 4 units of insulin.  Blood sugar 251 - 300; take 6 units of insulin.  Blood sugar 301 - 350; take 8 units of insulin.  Blood sugar 351 - 400; take 10 units of insulin.  Blood sugar over 401;  take 12 units of insulin.    -Continue remainder of medication regimen  -Look into where [employer] prefers to fill Freestyle Dara 3  -Follow-up earliest available  -We will communicate results via [employer], or if needed by phone    I spent a total of 44 minutes on the date of encounter reviewing medical records, evaluating the patient, coordinating care and documenting in the EHR, as detailed above.    I spent an additional 5 minutes on the date of encounter reviewing and interpreting CGM data, as outlined above.        Bushra Roy MD   Division of Diabetes, Endocrinology and Metabolism  Department of Medicine              Again, thank you for allowing me to participate in the care of your patient.         Sincerely,        REMBERTO Roy MD

## 2022-10-17 ENCOUNTER — TELEPHONE (OUTPATIENT)
Dept: ENDOCRINOLOGY | Facility: CLINIC | Age: 52
End: 2022-10-17

## 2022-10-17 NOTE — TELEPHONE ENCOUNTER
dapagliflozin (FARXIGA) 10 MG TABS tablet 30 tablet 5 3/23/2022  No   Sig - Route: Take 1 tablet (10 mg) by mouth daily - Oral   Sent to pharmacy as: Dapagliflozin Propanediol 10 MG Oral Tablet (Farxiga)   Class: E-Prescribe   Notes to Pharmacy: Dose change   Order: [id number]   E-Prescribing Status: Receipt confirmed by pharmacy (3/23/2022 11:10 AM CDT     Please submit a PA to [employer].

## 2022-10-18 NOTE — TELEPHONE ENCOUNTER
PA Initiation    Medication: dapagliflozin (FARXIGA) 10 MG TABS tablet   Insurance Company: [employer] - Phone [phone] Fax [phone]  Pharmacy Filling the Rx: [facility] [place] - [address]  Filling Pharmacy Phone: [phone]  Filling Pharmacy Fax: [phone]  Start Date: 10/18/2022

## 2022-10-18 NOTE — TELEPHONE ENCOUNTER
Prior Authorization Approval    Authorization Effective Date: 9/18/2022  Authorization Expiration Date: 10/18/2025  Medication: dapagliflozin (FARXIGA) 10 MG TABS tablet--APPROVED  Approved Dose/Quantity:   Reference #:     Insurance Company: [employer] - Phone [phone] Fax [phone]  Expected CoPay:       CoPay Card Available:      Foundation Assistance Needed:    Which Pharmacy is filling the prescription (Not needed for infusion/clinic administered): [address]  Pharmacy Notified: Yes  Patient Notified: Yes **Instructed pharmacy to notify patient when script is ready to /ship.**

## 2022-10-21 ENCOUNTER — TRANSFERRED RECORDS (OUTPATIENT)
Dept: HEALTH INFORMATION MANAGEMENT | Facility: CLINIC | Age: 52
End: 2022-10-21

## 2022-10-21 LAB — RETINOPATHY: NEGATIVE

## 2022-11-03 ENCOUNTER — TELEPHONE (OUTPATIENT)
Dept: FAMILY MEDICINE | Facility: CLINIC | Age: 52
End: 2022-11-03

## 2022-11-03 DIAGNOSIS — M25.562 CHRONIC PAIN OF LEFT KNEE: ICD-10-CM

## 2022-11-03 DIAGNOSIS — G89.29 CHRONIC PAIN OF LEFT KNEE: ICD-10-CM

## 2022-11-03 NOTE — TELEPHONE ENCOUNTER
Order/Referral Request    Who is requesting: Patient     Orders being requested: Cortisone Injection for knee pain    Reason service is needed/diagnosis: history of left knee pain    When are orders needed by: asap    Has this been discussed with Provider: No, patient said he had a scar tissue scope done at [facility] 10+ years ago and that he has a bone spur on medial condyle of his left femur.     Does patient have a preference on a Group/Provider/Facility? [facility]    Does patient have an appointment scheduled?: Scheduled patient for 12/21/22 for O.V. with Dr. Ortiz.    Patient said pain is getting pretty bad and hopes to get in asap, requesting a same-day appointment if possible.      Could we send this information to you in [employer] or would you prefer to receive a phone call?:   Patient would prefer a phone call     Okay to leave a detailed message?: Yes at Cell number on file:    Telephone Information:   Mobile [phone]     Please advise on patient requests above.

## 2022-11-04 PROBLEM — M25.562 CHRONIC PAIN OF LEFT KNEE: Status: ACTIVE | Noted: 2022-11-04

## 2022-11-04 PROBLEM — G89.29 CHRONIC PAIN OF LEFT KNEE: Status: ACTIVE | Noted: 2022-11-04

## 2022-11-04 NOTE — ASSESSMENT & PLAN NOTE
Has responded well to injections prior. Will send back to ortho for further eval and possible reinstitution of injections.

## 2022-11-04 NOTE — TELEPHONE ENCOUNTER
Called and left message that we do not do the injections here in clinic.  I did place a referral to orthopedics.  And I did call and leave a message that patient can go to [facility] or [facility] and even go to one of their walk-in clinics and let them know I placed a referral and they know how to get that for insurance purposes and they should be able to help out more immediately than waiting for the phone call.

## 2022-11-16 ENCOUNTER — TELEPHONE (OUTPATIENT)
Dept: ENDOCRINOLOGY | Facility: CLINIC | Age: 52
End: 2022-11-16

## 2022-11-16 ENCOUNTER — OFFICE VISIT (OUTPATIENT)
Dept: ENDOCRINOLOGY | Facility: CLINIC | Age: 52
End: 2022-11-16
Payer: COMMERCIAL

## 2022-11-16 VITALS
SYSTOLIC BLOOD PRESSURE: 130 MMHG | HEART RATE: 84 BPM | BODY MASS INDEX: 35.32 KG/M2 | DIASTOLIC BLOOD PRESSURE: 88 MMHG | WEIGHT: 256.8 LBS

## 2022-11-16 DIAGNOSIS — E11.65 TYPE 2 DIABETES MELLITUS WITH HYPERGLYCEMIA, WITH LONG-TERM CURRENT USE OF INSULIN (H): Primary | ICD-10-CM

## 2022-11-16 DIAGNOSIS — Z79.4 TYPE 2 DIABETES MELLITUS WITH HYPERGLYCEMIA, WITH LONG-TERM CURRENT USE OF INSULIN (H): Primary | ICD-10-CM

## 2022-11-16 PROCEDURE — 99215 OFFICE O/P EST HI 40 MIN: CPT | Mod: 25 | Performed by: INTERNAL MEDICINE

## 2022-11-16 PROCEDURE — 95251 CONT GLUC MNTR ANALYSIS I&R: CPT | Performed by: INTERNAL MEDICINE

## 2022-11-16 RX ORDER — DAPAGLIFLOZIN 10 MG/1
10 TABLET, FILM COATED ORAL DAILY
Qty: 90 TABLET | Refills: 3 | Status: SHIPPED | OUTPATIENT
Start: 2022-11-16 | End: 2023-10-13

## 2022-11-16 RX ORDER — BLOOD-GLUCOSE SENSOR
1 EACH MISCELLANEOUS
Qty: 2 EACH | Refills: 11 | Status: SHIPPED | OUTPATIENT
Start: 2022-11-16 | End: 2023-07-05

## 2022-11-16 NOTE — TELEPHONE ENCOUNTER
PA Initiation    Medication: Dara 3 sensors pa pending  Insurance Company: [employer] - Phone [phone] Fax [phone]  Pharmacy Filling the Rx:    Filling Pharmacy Phone:    Filling Pharmacy Fax:    Start Date: 11/16/2022

## 2022-11-16 NOTE — PATIENT INSTRUCTIONS
-Continue Tresiba 50 units daily  -Start taking NovoLog 5 units before small meal before work; otherwise continue mealtime insulin and correction scale without changes  -Continue remainder of medication regimen  -I have sent prescription for Freestyle Dara 3  -Lab draw in 1 month (nonfasting)  -Eye exam as scheduled; please have records forwarded over  -Follow-up in 3 months  -We will communicate results via [employer], or if needed by phone

## 2022-11-16 NOTE — LETTER
11/16/2022         RE: Low Juárez  [address] [facility] [address]        Dear Colleague,    Thank you for referring your patient, Low Juárez, to the [facility]. Please see a copy of my visit note below.      ENDOCRINOLOGY FOLLOW-UP         HISTORY OF PRESENT ILLNESS    Low Juárez is seen in follow-up for the issues outlined below.     1.  Diabetes mellitus.    Although the kitchen renovation is almost complete, the patient notes that he still is eating out fairly regularly or eating at hospital cafeteria at work.    Will be joining a gym soon.    Has not started carbohydrate counting yet: Found it would be too labor-intensive to work on this.  Therefore, taking 10 to 15 units of NovoLog before each meal if it is a higher carbohydrate meal (for instance pasta or rice).  For some meals, if they are smaller or lower in carbohydrate content he may not take NovoLog at all.    Current diabetes regimen:   -Tresiba 50 units daily (self adjusted dose)  -Metformin extended release 2000 mg daily   -Farxiga 10 mg daily  -NovoLog 10 to 15 units before each meal, plus correction scale of 2 units for every 50 mg/dL over 150       Interpretation of CGM    Patient Data  Type of DM: CAMDEN   Last A1c: 7.2%    Current DM regimen: See above    Sensor Summary/ Accuracy Criteria  Number of days sensor worn: 64% over the past 14 days  Calibration: CGM system does not require calibration    Average (mean) sensor glucose: 167 mg/dL  Time in range: 63% (last visit 60%)  Duration below low limit: 0%  Coefficient of variation: 31%    Data Assessment  ~Fair glycemia overnight, rare hypoglycemia if he does not have dinner  ~Pattern of hyperglycemia between 3 PM and 6 PM (he often has a small meal before he goes to work at 4 PM and does not usually take NovoLog)  ~No recurrent hypoglycemia    Impression  -Pattern of hyperglycemia in the mid afternoon, likely after small meal before  work    Recommendations  -Please refer to assessment and plan    Pertinent endocrine and related history:  1.  Diabetes mellitus.  Diagnosed in 2017 when patient was noted to have an elevated random glucose to 349.  A1c was 10.4% around the time of diagnosis.  -In 2021, A1c chacorta, prompting referral to diabetes education and then to endocrinology.  Of note, in 12/2021 he had LATESHA antibody checked which returned positive, with C-peptide that was still measurable.  -Initially on oral therapies, initiated on insulin in 10/2021.  -Previously on Victoza: Stopped this at the direction of one of his healthcare providers. Has history of pancreatitis (by his report, gallstone pancreatitis), status post cholecystectomy.  -Previously on Jardiance, discontinued due to discomfort and itching in the abdomen.  2.  Concern for low testosterone.  Patient has wondered if he has low testosterone since he has had erectile dysfunction.  He has been prescribed Viagra but has not used it since he has not been sexually active. Has some libido. Reports he used anabolic steroids 12 to 15 years ago when bodybuilding and wonders if this may have had longstanding impact on testosterone levels and erectile function.  -Has biological children, no history of infertility.    Pertinent Social History: , works as a radiology technician.    PAST MEDICAL HISTORY  Past Medical History:   Diagnosis Date     Apnea, sleep      Fatty liver 8/18/2017     High cholesterol      Hyperlipidemia LDL goal <100 8/18/2017     Hypertension      Morbid obesity due to excess calories (H) 8/18/2017     Pancreatitis      Rhinitis      Type 2 diabetes mellitus without complication, without long-term current use of insulin (H) 1/31/2018       MEDICATIONS  Current Outpatient Medications   Medication Sig Dispense Refill     aspirin (ASA) 81 MG tablet Take 81 mg by mouth daily       atorvastatin (LIPITOR) 40 MG tablet Take 1 tablet (40 mg) by mouth daily 90 tablet 3      augmented betamethasone dipropionate (DIPROLENE) 0.05 % external lotion Apply topically 2 times daily 60 mL 6     blood glucose (NO BRAND SPECIFIED) lancets standard Use to test blood sugar 2 times daily or as directed. 200 each 3     blood glucose (NO BRAND SPECIFIED) test strip Use to test blood sugar 2 times daily or as directed. 200 strip 3     blood glucose calibration (NO BRAND SPECIFIED) solution Use to calibrate blood glucose monitor as needed as directed. 1 each 3     blood glucose monitoring (NO BRAND SPECIFIED) meter device kit Use to test blood sugar 2 times daily or as directed. 1 kit 0     cetirizine (ZYRTEC) 10 MG tablet Take 10-20 mg by mouth as needed       dapagliflozin (FARXIGA) 10 MG TABS tablet Take 1 tablet (10 mg) by mouth daily 30 tablet 5     fluticasone (FLONASE) 50 MCG/ACT spray Spray 1 spray into both nostrils as needed for rhinitis or allergies       Insulin Aspart FlexPen 100 UNIT/ML SOPN Inject 10-15 Units Subcutaneous 4 times daily (before meals and nightly) Not to exceed 60 units per day 54 mL 1     insulin degludec (TRESIBA FLEXTOUCH) 200 UNIT/ML pen Inject 50 Units Subcutaneous daily (Patient taking differently: Inject 48 Units Subcutaneous daily) 9 mL 5     insulin pen needle (31G X 8 MM) 31G X 8 MM miscellaneous Use up to 4 pen needles daily or as directed. 100 each 11     ketoconazole (NIZORAL) 2 % external cream Apply 1 Application topically as needed       lisinopril-hydrochlorothiazide (ZESTORETIC) 20-25 MG tablet Take 1 tablet by mouth daily 90 tablet 3     metFORMIN (GLUCOPHAGE-XR) 500 MG 24 hr tablet Take 4 tablets (2,000 mg) by mouth daily (with dinner) 120 tablet 5     sildenafil (VIAGRA) 100 MG tablet Take 1 tablet (100 mg) by mouth daily as needed 20 tablet 11     triamcinolone (KENALOG) 0.1 % external cream Apply 1 Application topically as needed       Continuous Blood Gluc Sensor (FREESTYLE ROSA 2 SENSOR) MISC 1 kit every 14 days Use per manufacture's instructions  to check glucose daily. 6 each 1     glucosamine-chondroitin 500-400 MG CAPS per capsule Take 2 capsules by mouth daily  (Patient not taking: Reported on 9/7/2022)       hydrOXYzine (ATARAX) 25 MG tablet Take 25 mg by mouth 3 times daily as needed  (Patient not taking: Reported on 9/7/2022)       ipratropium (ATROVENT) 0.06 % nasal spray Spray 2 sprays into both nostrils 4 times daily as needed for rhinitis (Patient not taking: Reported on 11/16/2022) 15 mL 11       Allergies, family, and social history were reviewed and documented as needed in EHR.     REVIEW OF SYSTEMS  A focused ROS was performed, with pertinent positives and negatives as noted in the HPI.    PHYSICAL EXAM  /88   Pulse 84   Wt 116.5 kg (256 lb 12.8 oz)   BMI 35.32 kg/m    Body mass index is 35.32 kg/m .  Constitutional: Vital signs reviewed, as recorded above. Patient is alert, oriented and appears in no acute distress.  Eyes: PER, EOMI, no stare, lid lag, or retraction; no conjunctival injection.  Respiratory: Normal chest wall motion and respiratory effort.  MSK: No clubbing or cyanosis; normal muscle bulk and tone.  Skin: No lesions on visible skin,  Neurological: Alert and oriented times 3. No tremor.    DATA REVIEW  Each of the following laboratory and/or imaging studies were reviewed.  No new lab data    ASSESSMENT  1.  Diabetes mellitus, CAMDEN with positive LATESHA antibody and normal range C-peptide.  We will recheck C-peptide with next lab draw.  Fair glycemic control overnight with a pattern of hyperglycemia in the latter part of afternoon, after small meal before work.  Discussed taking a small dose of NovoLog before his meal before work; otherwise, does not appear to have marked hyperglycemia at the times that he may skip a dose of NovoLog with low carbohydrate meals.  Does not yet feel ready to start carbohydrate counting with meals.  We will therefore continue remainder of regimen without changes.  We discussed transition to  Freestyle dara 3 system and patient is interested: I have sent prescription to diabetes supply pharmacy.    2.  Diabetes preventive care.  -Foot exam on 3/23/2022 revealed painful bunion on the medial right forefoot, painful--evaluated in podiatry   -Urine microalbumin screen checked 5/19/2022 showed microalbuminuria; continue Farxiga and ACE inhibitor  -Eye exam in 8/2021 showed no diabetic retinopathy, has another eye exam scheduled on 12/3/2022 (have given patient my card so records can be forwarded to our clinic)  -Acceptable lipid profile in 3/2022, on statin therapy    3.  Concern for hypogonadism.  Primarily erectile dysfunction, lesser diminution in libido.  Testosterone checked in 3/2022 was normal.  Recheck testosterone level has been in normal range in 5/2022.  No indication for treatment at present, can continue periodic monitoring.    PLAN  -Continue Tresiba 50 units daily  -Start taking NovoLog 5 units before small meal before work; otherwise continue mealtime insulin and correction scale without changes  -Continue remainder of medication regimen  -I have sent prescription for Freestyle Dara 3  -Lab draw in 1 month (nonfasting)  -Eye exam as scheduled; please have records forwarded over  -Follow-up in 3 months  -We will communicate results via [facility], or if needed by phone    Orders Placed This Encounter   Procedures     GLUCOSE MONITOR, 72 HOUR, PHYS INTERP     Hemoglobin A1c     Comprehensive metabolic panel     C-peptide     I spent a total of 40 minutes on the date of encounter reviewing medical records, evaluating the patient, coordinating care and documenting in the EHR, as detailed above.    I spent additional time on the date of encounter reviewing and interpreting CGM data, as outlined above.    Bushra Roy MD   Division of Diabetes, Endocrinology and Metabolism  Department of Medicine              Again, thank you for allowing me to participate in the care of your patient.         Sincerely,        REMBERTO Roy MD

## 2022-11-16 NOTE — PROGRESS NOTES
ENDOCRINOLOGY FOLLOW-UP         HISTORY OF PRESENT ILLNESS    Low Juárez is seen in follow-up for the issues outlined below.     1.  Diabetes mellitus.    Although the kitchen renovation is almost complete, the patient notes that he still is eating out fairly regularly or eating at hospital cafeteria at work.    Will be joining a gym soon.    Has not started carbohydrate counting yet: Found it would be too labor-intensive to work on this.  Therefore, taking 10 to 15 units of NovoLog before each meal if it is a higher carbohydrate meal (for instance pasta or rice).  For some meals, if they are smaller or lower in carbohydrate content he may not take NovoLog at all.    Current diabetes regimen:   -Tresiba 50 units daily (self adjusted dose)  -Metformin extended release 2000 mg daily   -Farxiga 10 mg daily  -NovoLog 10 to 15 units before each meal, plus correction scale of 2 units for every 50 mg/dL over 150       Interpretation of CGM    Patient Data  Type of DM: CAMDEN   Last A1c: 7.2%    Current DM regimen: See above    Sensor Summary/ Accuracy Criteria  Number of days sensor worn: 64% over the past 14 days  Calibration: CGM system does not require calibration    Average (mean) sensor glucose: 167 mg/dL  Time in range: 63% (last visit 60%)  Duration below low limit: 0%  Coefficient of variation: 31%    Data Assessment  ~Fair glycemia overnight, rare hypoglycemia if he does not have dinner  ~Pattern of hyperglycemia between 3 PM and 6 PM (he often has a small meal before he goes to work at 4 PM and does not usually take NovoLog)  ~No recurrent hypoglycemia    Impression  -Pattern of hyperglycemia in the mid afternoon, likely after small meal before work    Recommendations  -Please refer to assessment and plan    Pertinent endocrine and related history:  1.  Diabetes mellitus.  Diagnosed in 2017 when patient was noted to have an elevated random glucose to 349.  A1c was 10.4% around the time of diagnosis.  -In 2021,  A1c chacorta, prompting referral to diabetes education and then to endocrinology.  Of note, in 12/2021 he had LATESHA antibody checked which returned positive, with C-peptide that was still measurable.  -Initially on oral therapies, initiated on insulin in 10/2021.  -Previously on Victoza: Stopped this at the direction of one of his healthcare providers. Has history of pancreatitis (by his report, gallstone pancreatitis), status post cholecystectomy.  -Previously on Jardiance, discontinued due to discomfort and itching in the abdomen.  2.  Concern for low testosterone.  Patient has wondered if he has low testosterone since he has had erectile dysfunction.  He has been prescribed Viagra but has not used it since he has not been sexually active. Has some libido. Reports he used anabolic steroids 12 to 15 years ago when bodybuilding and wonders if this may have had longstanding impact on testosterone levels and erectile function.  -Has biological children, no history of infertility.    Pertinent Social History: , works as a radiology technician.    PAST MEDICAL HISTORY  Past Medical History:   Diagnosis Date     Apnea, sleep      Fatty liver 8/18/2017     High cholesterol      Hyperlipidemia LDL goal <100 8/18/2017     Hypertension      Morbid obesity due to excess calories (H) 8/18/2017     Pancreatitis      Rhinitis      Type 2 diabetes mellitus without complication, without long-term current use of insulin (H) 1/31/2018       MEDICATIONS  Current Outpatient Medications   Medication Sig Dispense Refill     aspirin (ASA) 81 MG tablet Take 81 mg by mouth daily       atorvastatin (LIPITOR) 40 MG tablet Take 1 tablet (40 mg) by mouth daily 90 tablet 3     augmented betamethasone dipropionate (DIPROLENE) 0.05 % external lotion Apply topically 2 times daily 60 mL 6     blood glucose (NO BRAND SPECIFIED) lancets standard Use to test blood sugar 2 times daily or as directed. 200 each 3     blood glucose (NO BRAND SPECIFIED)  test strip Use to test blood sugar 2 times daily or as directed. 200 strip 3     blood glucose calibration (NO BRAND SPECIFIED) solution Use to calibrate blood glucose monitor as needed as directed. 1 each 3     blood glucose monitoring (NO BRAND SPECIFIED) meter device kit Use to test blood sugar 2 times daily or as directed. 1 kit 0     cetirizine (ZYRTEC) 10 MG tablet Take 10-20 mg by mouth as needed       dapagliflozin (FARXIGA) 10 MG TABS tablet Take 1 tablet (10 mg) by mouth daily 30 tablet 5     fluticasone (FLONASE) 50 MCG/ACT spray Spray 1 spray into both nostrils as needed for rhinitis or allergies       Insulin Aspart FlexPen 100 UNIT/ML SOPN Inject 10-15 Units Subcutaneous 4 times daily (before meals and nightly) Not to exceed 60 units per day 54 mL 1     insulin degludec (TRESIBA FLEXTOUCH) 200 UNIT/ML pen Inject 50 Units Subcutaneous daily (Patient taking differently: Inject 48 Units Subcutaneous daily) 9 mL 5     insulin pen needle (31G X 8 MM) 31G X 8 MM miscellaneous Use up to 4 pen needles daily or as directed. 100 each 11     ketoconazole (NIZORAL) 2 % external cream Apply 1 Application topically as needed       lisinopril-hydrochlorothiazide (ZESTORETIC) 20-25 MG tablet Take 1 tablet by mouth daily 90 tablet 3     metFORMIN (GLUCOPHAGE-XR) 500 MG 24 hr tablet Take 4 tablets (2,000 mg) by mouth daily (with dinner) 120 tablet 5     sildenafil (VIAGRA) 100 MG tablet Take 1 tablet (100 mg) by mouth daily as needed 20 tablet 11     triamcinolone (KENALOG) 0.1 % external cream Apply 1 Application topically as needed       Continuous Blood Gluc Sensor (FREESTYLE ROSA 2 SENSOR) MISC 1 kit every 14 days Use per manufacture's instructions to check glucose daily. 6 each 1     glucosamine-chondroitin 500-400 MG CAPS per capsule Take 2 capsules by mouth daily  (Patient not taking: Reported on 9/7/2022)       hydrOXYzine (ATARAX) 25 MG tablet Take 25 mg by mouth 3 times daily as needed  (Patient not taking:  Reported on 9/7/2022)       ipratropium (ATROVENT) 0.06 % nasal spray Spray 2 sprays into both nostrils 4 times daily as needed for rhinitis (Patient not taking: Reported on 11/16/2022) 15 mL 11       Allergies, family, and social history were reviewed and documented as needed in EHR.     REVIEW OF SYSTEMS  A focused ROS was performed, with pertinent positives and negatives as noted in the HPI.    PHYSICAL EXAM  /88   Pulse 84   Wt 116.5 kg (256 lb 12.8 oz)   BMI 35.32 kg/m    Body mass index is 35.32 kg/m .  Constitutional: Vital signs reviewed, as recorded above. Patient is alert, oriented and appears in no acute distress.  Eyes: PER, EOMI, no stare, lid lag, or retraction; no conjunctival injection.  Respiratory: Normal chest wall motion and respiratory effort.  MSK: No clubbing or cyanosis; normal muscle bulk and tone.  Skin: No lesions on visible skin,  Neurological: Alert and oriented times 3. No tremor.    DATA REVIEW  Each of the following laboratory and/or imaging studies were reviewed.  No new lab data    ASSESSMENT  1.  Diabetes mellitus, CAMDEN with positive LATESHA antibody and normal range C-peptide.  We will recheck C-peptide with next lab draw.  Fair glycemic control overnight with a pattern of hyperglycemia in the latter part of afternoon, after small meal before work.  Discussed taking a small dose of NovoLog before his meal before work; otherwise, does not appear to have marked hyperglycemia at the times that he may skip a dose of NovoLog with low carbohydrate meals.  Does not yet feel ready to start carbohydrate counting with meals.  We will therefore continue remainder of regimen without changes.  We discussed transition to Freestyle kathleen 3 system and patient is interested: I have sent prescription to diabetes supply pharmacy.    2.  Diabetes preventive care.  -Foot exam on 3/23/2022 revealed painful bunion on the medial right forefoot, painful--evaluated in podiatry   -Urine microalbumin  screen checked 5/19/2022 showed microalbuminuria; continue Farxiga and ACE inhibitor  -Eye exam in 8/2021 showed no diabetic retinopathy, has another eye exam scheduled on 12/3/2022 (have given patient my card so records can be forwarded to our clinic)  -Acceptable lipid profile in 3/2022, on statin therapy    3.  Concern for hypogonadism.  Primarily erectile dysfunction, lesser diminution in libido.  Testosterone checked in 3/2022 was normal.  Recheck testosterone level has been in normal range in 5/2022.  No indication for treatment at present, can continue periodic monitoring.    PLAN  -Continue Tresiba 50 units daily  -Start taking NovoLog 5 units before small meal before work; otherwise continue mealtime insulin and correction scale without changes  -Continue remainder of medication regimen  -I have sent prescription for Freestyle Dara 3  -Lab draw in 1 month (nonfasting)  -Eye exam as scheduled; please have records forwarded over  -Follow-up in 3 months  -We will communicate results via [email or web address], or if needed by phone    Orders Placed This Encounter   Procedures     GLUCOSE MONITOR, 72 HOUR, PHYS INTERP     Hemoglobin A1c     Comprehensive metabolic panel     C-peptide     I spent a total of 40 minutes on the date of encounter reviewing medical records, evaluating the patient, coordinating care and documenting in the EHR, as detailed above.    I spent additional time on the date of encounter reviewing and interpreting CGM data, as outlined above.    Bushra Roy MD   Division of Diabetes, Endocrinology and Metabolism  Department of Medicine

## 2022-11-17 NOTE — TELEPHONE ENCOUNTER
Prior Authorization Approval    Authorization Effective Date: 10/17/2022  Authorization Expiration Date: 11/17/2025  Medication: Dara 3 sensors pa approved  Approved Dose/Quantity: 2  Reference #: [id number]   Insurance Company: [employer] - Phone [phone] Fax [phone]  Expected CoPay:       CoPay Card Available:      Foundation Assistance Needed:    Which Pharmacy is filling the prescription (Not needed for infusion/clinic administered):    Pharmacy Notified:    Patient Notified:

## 2022-12-03 ENCOUNTER — TRANSFERRED RECORDS (OUTPATIENT)
Dept: HEALTH INFORMATION MANAGEMENT | Facility: CLINIC | Age: 52
End: 2022-12-03

## 2022-12-03 LAB
RETINOPATHY: NEGATIVE
RETINOPATHY: NEGATIVE

## 2022-12-20 ENCOUNTER — LAB (OUTPATIENT)
Dept: LAB | Facility: CLINIC | Age: 52
End: 2022-12-20
Payer: COMMERCIAL

## 2022-12-20 DIAGNOSIS — E11.65 TYPE 2 DIABETES MELLITUS WITH HYPERGLYCEMIA, WITH LONG-TERM CURRENT USE OF INSULIN (H): ICD-10-CM

## 2022-12-20 DIAGNOSIS — Z79.4 TYPE 2 DIABETES MELLITUS WITH HYPERGLYCEMIA, WITH LONG-TERM CURRENT USE OF INSULIN (H): ICD-10-CM

## 2022-12-20 LAB — HBA1C MFR BLD: 7.7 % (ref 0–5.6)

## 2022-12-20 PROCEDURE — 80053 COMPREHEN METABOLIC PANEL: CPT

## 2022-12-20 PROCEDURE — 36415 COLL VENOUS BLD VENIPUNCTURE: CPT

## 2022-12-20 PROCEDURE — 83036 HEMOGLOBIN GLYCOSYLATED A1C: CPT

## 2022-12-20 PROCEDURE — 84681 ASSAY OF C-PEPTIDE: CPT

## 2022-12-21 LAB
ALBUMIN SERPL BCG-MCNC: 4.6 G/DL (ref 3.5–5.2)
ALP SERPL-CCNC: 81 U/L (ref 40–129)
ALT SERPL W P-5'-P-CCNC: 42 U/L (ref 10–50)
ANION GAP SERPL CALCULATED.3IONS-SCNC: 19 MMOL/L (ref 7–15)
AST SERPL W P-5'-P-CCNC: 25 U/L (ref 10–50)
BILIRUB SERPL-MCNC: 0.4 MG/DL
BUN SERPL-MCNC: 20.9 MG/DL (ref 6–20)
C PEPTIDE SERPL-MCNC: 4.7 NG/ML (ref 0.9–6.9)
CALCIUM SERPL-MCNC: 9.9 MG/DL (ref 8.6–10)
CHLORIDE SERPL-SCNC: 99 MMOL/L (ref 98–107)
CREAT SERPL-MCNC: 1.2 MG/DL (ref 0.67–1.17)
DEPRECATED HCO3 PLAS-SCNC: 22 MMOL/L (ref 22–29)
GFR SERPL CREATININE-BSD FRML MDRD: 73 ML/MIN/1.73M2
GLUCOSE SERPL-MCNC: 138 MG/DL (ref 70–99)
POTASSIUM SERPL-SCNC: 4.2 MMOL/L (ref 3.4–5.3)
PROT SERPL-MCNC: 7.3 G/DL (ref 6.4–8.3)
SODIUM SERPL-SCNC: 140 MMOL/L (ref 136–145)

## 2022-12-22 ENCOUNTER — TELEPHONE (OUTPATIENT)
Dept: ENDOCRINOLOGY | Facility: CLINIC | Age: 52
End: 2022-12-22

## 2022-12-22 DIAGNOSIS — Z79.4 TYPE 2 DIABETES MELLITUS WITH HYPERGLYCEMIA, WITH LONG-TERM CURRENT USE OF INSULIN (H): ICD-10-CM

## 2022-12-22 DIAGNOSIS — E11.65 TYPE 2 DIABETES MELLITUS WITH HYPERGLYCEMIA, WITH LONG-TERM CURRENT USE OF INSULIN (H): ICD-10-CM

## 2022-12-22 NOTE — TELEPHONE ENCOUNTER
[facility] Call Center    Phone Message    May a detailed message be left on voicemail: yes     Reason for Call: Medication Refill Request    Has the patient contacted the pharmacy for the refill? Yes   Name of medication being requested: Freestyle dara 2 sensors  Provider who prescribed the medication: Kirill  Pharmacy: [facility]   Date medication is needed: Patient states he needs freestyle dara 2 sent to [facility]. Patient states they do not have 3 or they would have to special order. Patient states he will call [facility] to check status on Freestyle Dara 3 sensors but needs something until they arrive. Please call patient to discuss.     Action Taken: Other: Endo     Travel Screening: Not Applicable

## 2022-12-27 ENCOUNTER — MYC MEDICAL ADVICE (OUTPATIENT)
Dept: ENDOCRINOLOGY | Facility: CLINIC | Age: 52
End: 2022-12-27

## 2022-12-27 DIAGNOSIS — E11.65 TYPE 2 DIABETES MELLITUS WITH HYPERGLYCEMIA, WITH LONG-TERM CURRENT USE OF INSULIN (H): Primary | ICD-10-CM

## 2022-12-27 DIAGNOSIS — Z79.4 TYPE 2 DIABETES MELLITUS WITH HYPERGLYCEMIA, WITH LONG-TERM CURRENT USE OF INSULIN (H): Primary | ICD-10-CM

## 2023-02-20 ENCOUNTER — LAB (OUTPATIENT)
Dept: LAB | Facility: CLINIC | Age: 53
End: 2023-02-20
Payer: COMMERCIAL

## 2023-02-20 DIAGNOSIS — Z79.4 TYPE 2 DIABETES MELLITUS WITH HYPERGLYCEMIA, WITH LONG-TERM CURRENT USE OF INSULIN (H): ICD-10-CM

## 2023-02-20 DIAGNOSIS — E11.65 TYPE 2 DIABETES MELLITUS WITH HYPERGLYCEMIA, WITH LONG-TERM CURRENT USE OF INSULIN (H): ICD-10-CM

## 2023-02-20 LAB
ANION GAP SERPL CALCULATED.3IONS-SCNC: 13 MMOL/L (ref 7–15)
BUN SERPL-MCNC: 22.1 MG/DL (ref 6–20)
CALCIUM SERPL-MCNC: 10.2 MG/DL (ref 8.6–10)
CHLORIDE SERPL-SCNC: 98 MMOL/L (ref 98–107)
CREAT SERPL-MCNC: 1.12 MG/DL (ref 0.67–1.17)
DEPRECATED HCO3 PLAS-SCNC: 27 MMOL/L (ref 22–29)
GFR SERPL CREATININE-BSD FRML MDRD: 79 ML/MIN/1.73M2
GLUCOSE SERPL-MCNC: 166 MG/DL (ref 70–99)
HBA1C MFR BLD: 6.8 % (ref 0–5.6)
POTASSIUM SERPL-SCNC: 4.3 MMOL/L (ref 3.4–5.3)
SODIUM SERPL-SCNC: 138 MMOL/L (ref 136–145)

## 2023-02-20 PROCEDURE — 84270 ASSAY OF SEX HORMONE GLOBUL: CPT

## 2023-02-20 PROCEDURE — 36415 COLL VENOUS BLD VENIPUNCTURE: CPT

## 2023-02-20 PROCEDURE — 83036 HEMOGLOBIN GLYCOSYLATED A1C: CPT

## 2023-02-20 PROCEDURE — 80048 BASIC METABOLIC PNL TOTAL CA: CPT

## 2023-02-20 PROCEDURE — 84403 ASSAY OF TOTAL TESTOSTERONE: CPT

## 2023-02-21 LAB — SHBG SERPL-SCNC: 21 NMOL/L (ref 11–80)

## 2023-02-22 ENCOUNTER — TELEPHONE (OUTPATIENT)
Dept: ENDOCRINOLOGY | Facility: CLINIC | Age: 53
End: 2023-02-22

## 2023-02-22 ENCOUNTER — OFFICE VISIT (OUTPATIENT)
Dept: ENDOCRINOLOGY | Facility: CLINIC | Age: 53
End: 2023-02-22
Payer: COMMERCIAL

## 2023-02-22 VITALS
DIASTOLIC BLOOD PRESSURE: 84 MMHG | SYSTOLIC BLOOD PRESSURE: 140 MMHG | WEIGHT: 244 LBS | HEART RATE: 80 BPM | BODY MASS INDEX: 33.56 KG/M2

## 2023-02-22 DIAGNOSIS — E11.65 TYPE 2 DIABETES MELLITUS WITH HYPERGLYCEMIA, WITH LONG-TERM CURRENT USE OF INSULIN (H): ICD-10-CM

## 2023-02-22 DIAGNOSIS — Z79.4 TYPE 2 DIABETES MELLITUS WITH HYPERGLYCEMIA, WITH LONG-TERM CURRENT USE OF INSULIN (H): ICD-10-CM

## 2023-02-22 DIAGNOSIS — E11.69 TYPE 2 DIABETES MELLITUS WITH OTHER SPECIFIED COMPLICATION, WITH LONG-TERM CURRENT USE OF INSULIN (H): Primary | ICD-10-CM

## 2023-02-22 DIAGNOSIS — E11.9 TYPE 2 DIABETES MELLITUS WITHOUT COMPLICATION, WITHOUT LONG-TERM CURRENT USE OF INSULIN (H): ICD-10-CM

## 2023-02-22 DIAGNOSIS — Z79.4 TYPE 2 DIABETES MELLITUS WITH HYPERGLYCEMIA, WITH LONG-TERM CURRENT USE OF INSULIN (H): Primary | ICD-10-CM

## 2023-02-22 DIAGNOSIS — E11.65 TYPE 2 DIABETES MELLITUS WITH HYPERGLYCEMIA, WITH LONG-TERM CURRENT USE OF INSULIN (H): Primary | ICD-10-CM

## 2023-02-22 DIAGNOSIS — Z79.4 TYPE 2 DIABETES MELLITUS WITH OTHER SPECIFIED COMPLICATION, WITH LONG-TERM CURRENT USE OF INSULIN (H): Primary | ICD-10-CM

## 2023-02-22 LAB
TESTOST FREE SERPL-MCNC: 12.8 NG/DL
TESTOST SERPL-MCNC: 486 NG/DL (ref 240–950)

## 2023-02-22 PROCEDURE — 95251 CONT GLUC MNTR ANALYSIS I&R: CPT | Performed by: INTERNAL MEDICINE

## 2023-02-22 PROCEDURE — 99215 OFFICE O/P EST HI 40 MIN: CPT | Mod: 25 | Performed by: INTERNAL MEDICINE

## 2023-02-22 RX ORDER — INSULIN ASPART 100 [IU]/ML
10-15 INJECTION, SOLUTION INTRAVENOUS; SUBCUTANEOUS
Qty: 54 ML | Refills: 3 | OUTPATIENT
Start: 2023-02-22 | End: 2023-02-22

## 2023-02-22 RX ORDER — INSULIN DEGLUDEC 200 U/ML
46 INJECTION, SOLUTION SUBCUTANEOUS DAILY
Qty: 27 ML | Refills: 3 | Status: SHIPPED | OUTPATIENT
Start: 2023-02-22 | End: 2024-02-16

## 2023-02-22 RX ORDER — INSULIN LISPRO 100 [IU]/ML
INJECTION, SOLUTION INTRAVENOUS; SUBCUTANEOUS
Qty: 60 ML | Refills: 1 | Status: SHIPPED | OUTPATIENT
Start: 2023-02-22 | End: 2023-10-13

## 2023-02-22 RX ORDER — METFORMIN HCL 500 MG
2000 TABLET, EXTENDED RELEASE 24 HR ORAL
Qty: 360 TABLET | Refills: 3 | Status: SHIPPED | OUTPATIENT
Start: 2023-02-22 | End: 2024-02-16

## 2023-02-22 NOTE — PATIENT INSTRUCTIONS
-Reduce Tresiba to 46 units daily  -Continue without changes  -Make appointment with educator to set up Dara 3 on phone  -Keep well hydrated  -Follow-up in June or July, with fasting labs before visit  -We will communicate results via [facility], or if needed by phone

## 2023-02-22 NOTE — PROGRESS NOTES
ENDOCRINOLOGY FOLLOW-UP         HISTORY OF PRESENT ILLNESS    Low Juárez is seen in follow-up for the issues outlined below.     1.  Diabetes mellitus.      At last visit in 11/2022, we planned to start a 5 units dose of NovoLog before his small meal, which he ate prior to going to work.    He notes today that he has not needed to take as much NovoLog because his work schedule changed (no longer working overnights).  He has also modified his diet and has having a carbohydrate rich meal perhaps once a day.  Therefore taking NovoLog once a day usually.    Does find that he snacks at bedtime, primarily due to concerns that he may become hypoglycemic overnight.    He has lost 12 pounds since last visit.  Working on maintaining regular physical activity, including walking regularly at work.    Current diabetes regimen:   -Tresiba 50 units daily   -Metformin extended release 2000 mg daily   -Farxiga 10 mg daily  -NovoLog 10 to 15 units before each meal for meals with carbohydrates, plus correction scale of 2 units for every 50 mg/dL over 150       We are only able to access CGM data from 1/30/2023 to 2/12/2023.  He has also received freestyle kathleen 3 but has not been able to get it connected to his phone.      Interpretation of CGM    Patient Data  Type of DM: CAMDEN   Last A1c: 6.8%    Current DM regimen: See above    Sensor Summary/ Accuracy Criteria  Number of days sensor worn: 25% over 14-day period between 1/30/2023 to 2/12/2023.  Calibration: CGM system does not require calibration    Average (mean) sensor glucose: 168 mg/dL  Time in range: 65%  Duration below low limit: 0%  Coefficient of variation: 24%    Data Assessment  ~Limited CGM data, indicating fair glycemic control  ~Some hyperglycemia after meals  ~No recurrent hypoglycemia    Impression  -Fair glycemic control, limited data    Recommendations  -Please refer to assessment and plan    Pertinent endocrine and related history:  1.  Diabetes mellitus.   Diagnosed in 2017 when patient was noted to have an elevated random glucose to 349.  A1c was 10.4% around the time of diagnosis.  -In 2021, A1c chacorta, prompting referral to diabetes education and then to endocrinology.  Of note, in 12/2021 he had LATESHA antibody checked which returned positive, with C-peptide that was still measurable.  -Initially on oral therapies, initiated on insulin in 10/2021.  -Previously on Victoza: Stopped this at the direction of one of his healthcare providers. Has history of pancreatitis (by his report, gallstone pancreatitis), status post cholecystectomy.  -Previously on Jardiance, discontinued due to discomfort and itching in the abdomen.  2.  Concern for low testosterone.  Patient has wondered if he has low testosterone since he has had erectile dysfunction.  He has been prescribed Viagra but has not used it since he has not been sexually active. Has some libido. Reports he used anabolic steroids 12 to 15 years ago when bodybuilding and wonders if this may have had longstanding impact on testosterone levels and erectile function.  -Has biological children, no history of infertility.    Pertinent Social History: , works as a radiology technician.    PAST MEDICAL HISTORY  Past Medical History:   Diagnosis Date     Apnea, sleep      Fatty liver 8/18/2017     High cholesterol      Hyperlipidemia LDL goal <100 8/18/2017     Hypertension      Morbid obesity due to excess calories (H) 8/18/2017     Pancreatitis      Rhinitis      Type 2 diabetes mellitus without complication, without long-term current use of insulin (H) 1/31/2018       MEDICATIONS  Current Outpatient Medications   Medication Sig Dispense Refill     aspirin (ASA) 81 MG tablet Take 81 mg by mouth daily       atorvastatin (LIPITOR) 40 MG tablet Take 1 tablet (40 mg) by mouth daily 90 tablet 3     augmented betamethasone dipropionate (DIPROLENE) 0.05 % external lotion Apply topically 2 times daily 60 mL 6     blood glucose  (NO BRAND SPECIFIED) lancets standard Use to test blood sugar 2 times daily or as directed. 200 each 3     blood glucose (NO BRAND SPECIFIED) test strip Use to test blood sugar 2 times daily or as directed. 200 strip 3     blood glucose calibration (NO BRAND SPECIFIED) solution Use to calibrate blood glucose monitor as needed as directed. 1 each 3     blood glucose monitoring (NO BRAND SPECIFIED) meter device kit Use to test blood sugar 2 times daily or as directed. 1 kit 0     cetirizine (ZYRTEC) 10 MG tablet Take 10-20 mg by mouth as needed       Continuous Blood Gluc Sensor (FREESTYLE ROSA 2 SENSOR) MISC 1 kit every 14 days Use per manufacture's instructions to check glucose daily. 6 each 0     Continuous Blood Gluc Sensor (FREESTYLE ROSA 3 SENSOR) MISC 1 each every 14 days 2 each 11     dapagliflozin (FARXIGA) 10 MG TABS tablet Take 1 tablet (10 mg) by mouth daily 90 tablet 3     fluticasone (FLONASE) 50 MCG/ACT spray Spray 1 spray into both nostrils as needed for rhinitis or allergies       glucosamine-chondroitin 500-400 MG CAPS per capsule Take 2 capsules by mouth daily  (Patient not taking: Reported on 9/7/2022)       hydrOXYzine (ATARAX) 25 MG tablet Take 25 mg by mouth 3 times daily as needed  (Patient not taking: Reported on 9/7/2022)       Insulin Aspart FlexPen 100 UNIT/ML SOPN Inject 10-15 Units Subcutaneous 4 times daily (before meals and nightly) Not to exceed 60 units per day 54 mL 1     insulin degludec (TRESIBA FLEXTOUCH) 200 UNIT/ML pen Inject 50 Units Subcutaneous daily (Patient taking differently: Inject 48 Units Subcutaneous daily) 9 mL 5     insulin pen needle (31G X 8 MM) 31G X 8 MM miscellaneous Use up to 4 pen needles daily or as directed. 100 each 11     ipratropium (ATROVENT) 0.06 % nasal spray Spray 2 sprays into both nostrils 4 times daily as needed for rhinitis (Patient not taking: Reported on 11/16/2022) 15 mL 11     ketoconazole (NIZORAL) 2 % external cream Apply 1 Application  topically as needed       lisinopril-hydrochlorothiazide (ZESTORETIC) 20-25 MG tablet Take 1 tablet by mouth daily 90 tablet 3     metFORMIN (GLUCOPHAGE-XR) 500 MG 24 hr tablet Take 4 tablets (2,000 mg) by mouth daily (with dinner) 120 tablet 5     sildenafil (VIAGRA) 100 MG tablet Take 1 tablet (100 mg) by mouth daily as needed 20 tablet 11     triamcinolone (KENALOG) 0.1 % external cream Apply 1 Application topically as needed         Allergies, family, and social history were reviewed and documented as needed in EHR.     REVIEW OF SYSTEMS  A focused ROS was performed, with pertinent positives and negatives as noted in the HPI.    PHYSICAL EXAM  BP (!) 140/84 (BP Location: Right arm, Patient Position: Sitting, Cuff Size: Adult Large)   Pulse 80   Wt 110.7 kg (244 lb)   BMI 33.56 kg/m    Body mass index is 33.56 kg/m .  Constitutional: Vital signs reviewed, as recorded above. Patient is alert, oriented and appears in no acute distress.  Eyes: PER, EOMI, no stare, lid lag, or retraction; no conjunctival injection.  Respiratory: Normal chest wall motion and respiratory effort.  MSK: No clubbing or cyanosis; normal muscle bulk and tone.  Skin: No lesions on visible skin,  Neurological: Alert and oriented times 3. No tremor.      DATA REVIEW  Each of the following laboratory and/or imaging studies were reviewed.      Component      Latest Ref Rng & Units 12/20/2022 2/20/2023   Sodium      136 - 145 mmol/L 140 138   Potassium      3.4 - 5.3 mmol/L 4.2 4.3   Chloride      98 - 107 mmol/L 99 98   Carbon Dioxide (CO2)      22 - 29 mmol/L 22 27   Anion Gap      7 - 15 mmol/L 19 (H) 13   Urea Nitrogen      6.0 - 20.0 mg/dL 20.9 (H) 22.1 (H)   Creatinine      0.67 - 1.17 mg/dL 1.20 (H) 1.12   Calcium      8.6 - 10.0 mg/dL 9.9 10.2 (H)   Glucose      70 - 99 mg/dL 138 (H) 166 (H)   Alkaline Phosphatase      40 - 129 U/L 81    AST      10 - 50 U/L 25    ALT      10 - 50 U/L 42    Protein Total      6.4 - 8.3 g/dL 7.3     Albumin      3.5 - 5.2 g/dL 4.6    Bilirubin Total      <=1.2 mg/dL 0.4    GFR Estimate      >60 mL/min/1.73m2 73 79   Free Testosterone Calculated      ng/dL  12.80   Testosterone Total      240 - 950 ng/dL  486   Hemoglobin A1C      0.0 - 5.6 % 7.7 (H) 6.8 (H)   C-Peptide      0.9 - 6.9 ng/mL 4.7    Sex Hormone Binding Globulin      11 - 80 nmol/L  21       ASSESSMENT  1.  Diabetes mellitus, CAMDEN with positive LATESHA antibody and normal range C-peptide.  Improved glycemic control based on A1c and limited CGM data.  However, eating at bedtime to avoid hypoglycemia: Would therefore reduce basal insulin dose slightly downward.  We will have patient make appointment with diabetes education to help with transition to [employer] CGM.  Follow-up in the summer, although I encouraged patient to contact us sooner if noting consistent hyper- or hypoglycemia.    2.  Diabetes preventive care.  -Foot exam on 3/23/2022 revealed painful bunion on the medial right forefoot, painful--evaluated in podiatry   -Urine microalbumin screen checked 5/19/2022 showed microalbuminuria; continue Farxiga and ACE inhibitor, check urine microalbumin screen prior to next visit  -Eye exam in 8/2021 showed no diabetic retinopathy, the patient had a follow-up eye exam in 12/2022: We will request records  -Acceptable lipid profile in 3/2022, on statin therapy; check fasting lipid profile prior to next visit  -Upcoming travel to [place]: Travel letter was written for patient    3.  Concern for hypogonadism.  Primarily erectile dysfunction, lesser diminution in libido.  Testosterone checked in 3/2022 was normal.  Recheck testosterone level has been in normal range in 5/2022.  Testosterone level checked prior to this visit, even at close to noon time, is in normal range and improving.  No indication for treatment at present, can continue periodic monitoring.  We discussed that erectile dysfunction could be related to neurovascular changes related  to diabetes: Continue sildenafil as needed or we can place referral to urology (patient would like to defer this for now).    PLAN  -Reduce Tresiba to 46 units daily  -Continue without changes  -Make appointment with educator to set up Dara 3 on phone  -Keep well hydrated  -Follow-up in June or July, with fasting labs before visit  -We will communicate results via [employer], or if needed by phone    Orders Placed This Encounter   Procedures     Hemoglobin A1c     Comprehensive metabolic panel     Albumin Random Urine Quantitative with Creat Ratio     Lipid Profile     I spent a total of 49 minutes on the date of encounter reviewing medical records, evaluating the patient, coordinating care and documenting in the EHR, as detailed above.    I spent additional time on the date of encounter reviewing and interpreting CGM data, as outlined above.    Bushra Roy MD   Division of Diabetes, Endocrinology and Metabolism  Department of Medicine

## 2023-02-22 NOTE — TELEPHONE ENCOUNTER
Novolog is no longer covered and Humalog is. Could the medication be switched or should we start a prior authorization?    The same issue with Tresiba. It is no longer covered but Lantus is. Please advise on switch or prior authorization.    Thanks!  Antoine [facility]   Specialty/Endo Pharmacy Liaison Float  P [phone]  F [phone]

## 2023-02-22 NOTE — LETTER
2/22/2023         RE: Low Juárez  [address] [facility] [place] [address]        Dear Colleague,    Thank you for referring your patient, Low Juárez, to the [facility]. Please see a copy of my visit note below.      ENDOCRINOLOGY FOLLOW-UP         HISTORY OF PRESENT ILLNESS    Low Juárez is seen in follow-up for the issues outlined below.     1.  Diabetes mellitus.      At last visit in 11/2022, we planned to start a 5 units dose of NovoLog before his small meal, which he ate prior to going to work.    He notes today that he has not needed to take as much NovoLog because his work schedule changed (no longer working overnights).  He has also modified his diet and has having a carbohydrate rich meal perhaps once a day.  Therefore taking NovoLog once a day usually.    Does find that he snacks at bedtime, primarily due to concerns that he may become hypoglycemic overnight.    He has lost 12 pounds since last visit.  Working on maintaining regular physical activity, including walking regularly at work.    Current diabetes regimen:   -Tresiba 50 units daily   -Metformin extended release 2000 mg daily   -Farxiga 10 mg daily  -NovoLog 10 to 15 units before each meal for meals with carbohydrates, plus correction scale of 2 units for every 50 mg/dL over 150       We are only able to access CGM data from 1/30/2023 to 2/12/2023.  He has also received freestyle kathleen 3 but has not been able to get it connected to his phone.      Interpretation of CGM    Patient Data  Type of DM: CAMDEN   Last A1c: 6.8%    Current DM regimen: See above    Sensor Summary/ Accuracy Criteria  Number of days sensor worn: 25% over 14-day period between 1/30/2023 to 2/12/2023.  Calibration: CGM system does not require calibration    Average (mean) sensor glucose: 168 mg/dL  Time in range: 65%  Duration below low limit: 0%  Coefficient of variation: 24%    Data Assessment  ~Limited CGM data, indicating fair glycemic  control  ~Some hyperglycemia after meals  ~No recurrent hypoglycemia    Impression  -Fair glycemic control, limited data    Recommendations  -Please refer to assessment and plan    Pertinent endocrine and related history:  1.  Diabetes mellitus.  Diagnosed in 2017 when patient was noted to have an elevated random glucose to 349.  A1c was 10.4% around the time of diagnosis.  -In 2021, A1c chacorta, prompting referral to diabetes education and then to endocrinology.  Of note, in 12/2021 he had LATESHA antibody checked which returned positive, with C-peptide that was still measurable.  -Initially on oral therapies, initiated on insulin in 10/2021.  -Previously on Victoza: Stopped this at the direction of one of his healthcare providers. Has history of pancreatitis (by his report, gallstone pancreatitis), status post cholecystectomy.  -Previously on Jardiance, discontinued due to discomfort and itching in the abdomen.  2.  Concern for low testosterone.  Patient has wondered if he has low testosterone since he has had erectile dysfunction.  He has been prescribed Viagra but has not used it since he has not been sexually active. Has some libido. Reports he used anabolic steroids 12 to 15 years ago when bodybuilding and wonders if this may have had longstanding impact on testosterone levels and erectile function.  -Has biological children, no history of infertility.    Pertinent Social History: , works as a radiology technician.    PAST MEDICAL HISTORY  Past Medical History:   Diagnosis Date     Apnea, sleep      Fatty liver 8/18/2017     High cholesterol      Hyperlipidemia LDL goal <100 8/18/2017     Hypertension      Morbid obesity due to excess calories (H) 8/18/2017     Pancreatitis      Rhinitis      Type 2 diabetes mellitus without complication, without long-term current use of insulin (H) 1/31/2018       MEDICATIONS  Current Outpatient Medications   Medication Sig Dispense Refill     aspirin (ASA) 81 MG tablet Take  81 mg by mouth daily       atorvastatin (LIPITOR) 40 MG tablet Take 1 tablet (40 mg) by mouth daily 90 tablet 3     augmented betamethasone dipropionate (DIPROLENE) 0.05 % external lotion Apply topically 2 times daily 60 mL 6     blood glucose (NO BRAND SPECIFIED) lancets standard Use to test blood sugar 2 times daily or as directed. 200 each 3     blood glucose (NO BRAND SPECIFIED) test strip Use to test blood sugar 2 times daily or as directed. 200 strip 3     blood glucose calibration (NO BRAND SPECIFIED) solution Use to calibrate blood glucose monitor as needed as directed. 1 each 3     blood glucose monitoring (NO BRAND SPECIFIED) meter device kit Use to test blood sugar 2 times daily or as directed. 1 kit 0     cetirizine (ZYRTEC) 10 MG tablet Take 10-20 mg by mouth as needed       Continuous Blood Gluc Sensor (FREESTYLE ROSA 2 SENSOR) MISC 1 kit every 14 days Use per manufacture's instructions to check glucose daily. 6 each 0     Continuous Blood Gluc Sensor (FREESTYLE ROSA 3 SENSOR) MISC 1 each every 14 days 2 each 11     dapagliflozin (FARXIGA) 10 MG TABS tablet Take 1 tablet (10 mg) by mouth daily 90 tablet 3     fluticasone (FLONASE) 50 MCG/ACT spray Spray 1 spray into both nostrils as needed for rhinitis or allergies       glucosamine-chondroitin 500-400 MG CAPS per capsule Take 2 capsules by mouth daily  (Patient not taking: Reported on 9/7/2022)       hydrOXYzine (ATARAX) 25 MG tablet Take 25 mg by mouth 3 times daily as needed  (Patient not taking: Reported on 9/7/2022)       Insulin Aspart FlexPen 100 UNIT/ML SOPN Inject 10-15 Units Subcutaneous 4 times daily (before meals and nightly) Not to exceed 60 units per day 54 mL 1     insulin degludec (TRESIBA FLEXTOUCH) 200 UNIT/ML pen Inject 50 Units Subcutaneous daily (Patient taking differently: Inject 48 Units Subcutaneous daily) 9 mL 5     insulin pen needle (31G X 8 MM) 31G X 8 MM miscellaneous Use up to 4 pen needles daily or as directed. 100 each  11     ipratropium (ATROVENT) 0.06 % nasal spray Spray 2 sprays into both nostrils 4 times daily as needed for rhinitis (Patient not taking: Reported on 11/16/2022) 15 mL 11     ketoconazole (NIZORAL) 2 % external cream Apply 1 Application topically as needed       lisinopril-hydrochlorothiazide (ZESTORETIC) 20-25 MG tablet Take 1 tablet by mouth daily 90 tablet 3     metFORMIN (GLUCOPHAGE-XR) 500 MG 24 hr tablet Take 4 tablets (2,000 mg) by mouth daily (with dinner) 120 tablet 5     sildenafil (VIAGRA) 100 MG tablet Take 1 tablet (100 mg) by mouth daily as needed 20 tablet 11     triamcinolone (KENALOG) 0.1 % external cream Apply 1 Application topically as needed         Allergies, family, and social history were reviewed and documented as needed in EHR.     REVIEW OF SYSTEMS  A focused ROS was performed, with pertinent positives and negatives as noted in the HPI.    PHYSICAL EXAM  BP (!) 140/84 (BP Location: Right arm, Patient Position: Sitting, Cuff Size: Adult Large)   Pulse 80   Wt 110.7 kg (244 lb)   BMI 33.56 kg/m    Body mass index is 33.56 kg/m .  Constitutional: Vital signs reviewed, as recorded above. Patient is alert, oriented and appears in no acute distress.  Eyes: PER, EOMI, no stare, lid lag, or retraction; no conjunctival injection.  Respiratory: Normal chest wall motion and respiratory effort.  MSK: No clubbing or cyanosis; normal muscle bulk and tone.  Skin: No lesions on visible skin,  Neurological: Alert and oriented times 3. No tremor.      DATA REVIEW  Each of the following laboratory and/or imaging studies were reviewed.      Component      Latest Ref Rng & Units 12/20/2022 2/20/2023   Sodium      136 - 145 mmol/L 140 138   Potassium      3.4 - 5.3 mmol/L 4.2 4.3   Chloride      98 - 107 mmol/L 99 98   Carbon Dioxide (CO2)      22 - 29 mmol/L 22 27   Anion Gap      7 - 15 mmol/L 19 (H) 13   Urea Nitrogen      6.0 - 20.0 mg/dL 20.9 (H) 22.1 (H)   Creatinine      0.67 - 1.17 mg/dL 1.20 (H)  1.12   Calcium      8.6 - 10.0 mg/dL 9.9 10.2 (H)   Glucose      70 - 99 mg/dL 138 (H) 166 (H)   Alkaline Phosphatase      40 - 129 U/L 81    AST      10 - 50 U/L 25    ALT      10 - 50 U/L 42    Protein Total      6.4 - 8.3 g/dL 7.3    Albumin      3.5 - 5.2 g/dL 4.6    Bilirubin Total      <=1.2 mg/dL 0.4    GFR Estimate      >60 mL/min/1.73m2 73 79   Free Testosterone Calculated      ng/dL  12.80   Testosterone Total      240 - 950 ng/dL  486   Hemoglobin A1C      0.0 - 5.6 % 7.7 (H) 6.8 (H)   C-Peptide      0.9 - 6.9 ng/mL 4.7    Sex Hormone Binding Globulin      11 - 80 nmol/L  21       ASSESSMENT  1.  Diabetes mellitus, CAMDEN with positive LATESHA antibody and normal range C-peptide.  Improved glycemic control based on A1c and limited CGM data.  However, eating at bedtime to avoid hypoglycemia: Would therefore reduce basal insulin dose slightly downward.  We will have patient make appointment with diabetes education to help with transition to freestyle kathleen 3 CGM.  Follow-up in the summer, although I encouraged patient to contact us sooner if noting consistent hyper- or hypoglycemia.    2.  Diabetes preventive care.  -Foot exam on 3/23/2022 revealed painful bunion on the medial right forefoot, painful--evaluated in podiatry   -Urine microalbumin screen checked 5/19/2022 showed microalbuminuria; continue Farxiga and ACE inhibitor, check urine microalbumin screen prior to next visit  -Eye exam in 8/2021 showed no diabetic retinopathy, the patient had a follow-up eye exam in 12/2022: We will request records  -Acceptable lipid profile in 3/2022, on statin therapy; check fasting lipid profile prior to next visit  -Upcoming travel to [place]: Travel letter was written for patient    3.  Concern for hypogonadism.  Primarily erectile dysfunction, lesser diminution in libido.  Testosterone checked in 3/2022 was normal.  Recheck testosterone level has been in normal range in 5/2022.  Testosterone level checked prior to this  visit, even at close to noon time, is in normal range and improving.  No indication for treatment at present, can continue periodic monitoring.  We discussed that erectile dysfunction could be related to neurovascular changes related to diabetes: Continue sildenafil as needed or we can place referral to urology (patient would like to defer this for now).    PLAN  -Reduce Tresiba to 46 units daily  -Continue without changes  -Make appointment with educator to set up Dara 3 on phone  -Keep well hydrated  -Follow-up in June or July, with fasting labs before visit  -We will communicate results via [employer], or if needed by phone    Orders Placed This Encounter   Procedures     Hemoglobin A1c     Comprehensive metabolic panel     Albumin Random Urine Quantitative with Creat Ratio     Lipid Profile     I spent a total of 49 minutes on the date of encounter reviewing medical records, evaluating the patient, coordinating care and documenting in the EHR, as detailed above.    I spent additional time on the date of encounter reviewing and interpreting CGM data, as outlined above.    Remberto Roy MD   Division of Diabetes, Endocrinology and Metabolism  Department of Medicine              Again, thank you for allowing me to participate in the care of your patient.        Sincerely,        REMBERTO Roy MD

## 2023-02-22 NOTE — TELEPHONE ENCOUNTER
PA Initiation    Medication: Tresiba PA - pending  Insurance Company: [employer] - Phone [phone] Fax [phone]  Pharmacy Filling the Rx: [facility] - [place], [place] - [address] [place] [address]  Filling Pharmacy Phone:    Filling Pharmacy Fax:    Start Date: 2/22/2023    [id number]

## 2023-02-22 NOTE — TELEPHONE ENCOUNTER
Prior Authorization Approval    Authorization Effective Date: 2/22/2023  Authorization Expiration Date: 2/22/2026  Medication: Tresiba PA - Approved  Approved Dose/Quantity: 9ml per 30 days  Reference #: [id number]   Insurance Company: [employer] - Phone [phone] Fax [phone]  Expected CoPay:       CoPay Card Available:      Foundation Assistance Needed:    Which Pharmacy is filling the prescription (Not needed for infusion/clinic administered): [facility] - [place], [place] - [address] [place] [address]  Pharmacy Notified: Yes  Patient Notified: Yes

## 2023-04-07 ENCOUNTER — ALLIED HEALTH/NURSE VISIT (OUTPATIENT)
Dept: EDUCATION SERVICES | Facility: CLINIC | Age: 53
End: 2023-04-07
Payer: COMMERCIAL

## 2023-04-07 DIAGNOSIS — Z79.4 TYPE 2 DIABETES MELLITUS WITH HYPERGLYCEMIA, WITH LONG-TERM CURRENT USE OF INSULIN (H): Primary | ICD-10-CM

## 2023-04-07 DIAGNOSIS — E11.65 TYPE 2 DIABETES MELLITUS WITH HYPERGLYCEMIA, WITH LONG-TERM CURRENT USE OF INSULIN (H): Primary | ICD-10-CM

## 2023-04-07 PROCEDURE — 99207 PR NO BILLABLE SERVICE THIS VISIT: CPT

## 2023-04-07 NOTE — Clinical Note
4/7/2023         RE: Low Juárez  [address] [place] [id number]        Dear Colleague,    Thank you for referring your patient, Low Juárez, to the [facility]. Please see a copy of my visit note below.    No notes on file

## 2023-06-03 ENCOUNTER — HEALTH MAINTENANCE LETTER (OUTPATIENT)
Age: 53
End: 2023-06-03

## 2023-06-07 DIAGNOSIS — L40.9 PSORIASIS: ICD-10-CM

## 2023-06-08 RX ORDER — BETAMETHASONE DIPROPIONATE 0.5 MG/ML
LOTION, AUGMENTED TOPICAL 2 TIMES DAILY
Qty: 60 ML | Refills: 6 | Status: SHIPPED | OUTPATIENT
Start: 2023-06-08

## 2023-06-28 ENCOUNTER — LAB (OUTPATIENT)
Dept: LAB | Facility: CLINIC | Age: 53
End: 2023-06-28
Payer: COMMERCIAL

## 2023-06-28 DIAGNOSIS — E11.69 TYPE 2 DIABETES MELLITUS WITH OTHER SPECIFIED COMPLICATION, WITH LONG-TERM CURRENT USE OF INSULIN (H): ICD-10-CM

## 2023-06-28 DIAGNOSIS — Z79.4 TYPE 2 DIABETES MELLITUS WITH OTHER SPECIFIED COMPLICATION, WITH LONG-TERM CURRENT USE OF INSULIN (H): ICD-10-CM

## 2023-06-28 LAB
ALBUMIN SERPL BCG-MCNC: 4.6 G/DL (ref 3.5–5.2)
ALP SERPL-CCNC: 91 U/L (ref 40–129)
ALT SERPL W P-5'-P-CCNC: 27 U/L (ref 0–70)
ANION GAP SERPL CALCULATED.3IONS-SCNC: 10 MMOL/L (ref 7–15)
AST SERPL W P-5'-P-CCNC: 22 U/L (ref 0–45)
BILIRUB SERPL-MCNC: 0.8 MG/DL
BUN SERPL-MCNC: 15.3 MG/DL (ref 6–20)
CALCIUM SERPL-MCNC: 9.7 MG/DL (ref 8.6–10)
CHLORIDE SERPL-SCNC: 96 MMOL/L (ref 98–107)
CHOLEST SERPL-MCNC: 111 MG/DL
CREAT SERPL-MCNC: 1.06 MG/DL (ref 0.67–1.17)
CREAT UR-MCNC: 113 MG/DL
DEPRECATED HCO3 PLAS-SCNC: 31 MMOL/L (ref 22–29)
GFR SERPL CREATININE-BSD FRML MDRD: 84 ML/MIN/1.73M2
GLUCOSE SERPL-MCNC: 120 MG/DL (ref 70–99)
HBA1C MFR BLD: 7.5 % (ref 0–5.6)
HDLC SERPL-MCNC: 27 MG/DL
LDLC SERPL CALC-MCNC: 57 MG/DL
MICROALBUMIN UR-MCNC: 52.9 MG/L
MICROALBUMIN/CREAT UR: 46.81 MG/G CR (ref 0–17)
NONHDLC SERPL-MCNC: 84 MG/DL
POTASSIUM SERPL-SCNC: 4 MMOL/L (ref 3.4–5.3)
PROT SERPL-MCNC: 7.6 G/DL (ref 6.4–8.3)
SODIUM SERPL-SCNC: 137 MMOL/L (ref 136–145)
TRIGL SERPL-MCNC: 135 MG/DL

## 2023-06-28 PROCEDURE — 80053 COMPREHEN METABOLIC PANEL: CPT

## 2023-06-28 PROCEDURE — 82043 UR ALBUMIN QUANTITATIVE: CPT

## 2023-06-28 PROCEDURE — 83036 HEMOGLOBIN GLYCOSYLATED A1C: CPT

## 2023-06-28 PROCEDURE — 82570 ASSAY OF URINE CREATININE: CPT

## 2023-06-28 PROCEDURE — 80061 LIPID PANEL: CPT

## 2023-06-28 PROCEDURE — 36415 COLL VENOUS BLD VENIPUNCTURE: CPT

## 2023-06-30 ENCOUNTER — MYC MEDICAL ADVICE (OUTPATIENT)
Dept: FAMILY MEDICINE | Facility: CLINIC | Age: 53
End: 2023-06-30
Payer: COMMERCIAL

## 2023-06-30 ENCOUNTER — PATIENT OUTREACH (OUTPATIENT)
Dept: FAMILY MEDICINE | Facility: CLINIC | Age: 53
End: 2023-06-30
Payer: COMMERCIAL

## 2023-06-30 NOTE — TELEPHONE ENCOUNTER
Patient Quality Outreach    Patient is due for the following:   Physical Preventive Adult Physical    Next Steps:   Schedule a Adult Preventative    Type of outreach:    Sent [employer] message.      Questions for provider review:    None           Narayan Maddox Jr., JEVON  Chart routed to Care Team.

## 2023-07-05 ENCOUNTER — OFFICE VISIT (OUTPATIENT)
Dept: ENDOCRINOLOGY | Facility: CLINIC | Age: 53
End: 2023-07-05
Payer: COMMERCIAL

## 2023-07-05 VITALS — SYSTOLIC BLOOD PRESSURE: 138 MMHG | DIASTOLIC BLOOD PRESSURE: 86 MMHG | HEART RATE: 80 BPM

## 2023-07-05 DIAGNOSIS — E11.65 TYPE 2 DIABETES MELLITUS WITH HYPERGLYCEMIA, WITH LONG-TERM CURRENT USE OF INSULIN (H): ICD-10-CM

## 2023-07-05 DIAGNOSIS — Z79.4 TYPE 2 DIABETES MELLITUS WITH HYPERGLYCEMIA, WITH LONG-TERM CURRENT USE OF INSULIN (H): ICD-10-CM

## 2023-07-05 PROCEDURE — 99215 OFFICE O/P EST HI 40 MIN: CPT | Performed by: INTERNAL MEDICINE

## 2023-07-05 RX ORDER — TADALAFIL 2.5 MG/1
2.5 TABLET ORAL DAILY
Qty: 30 TABLET | Refills: 3 | Status: SHIPPED | OUTPATIENT
Start: 2023-07-05 | End: 2023-10-13

## 2023-07-05 NOTE — PROGRESS NOTES
ENDOCRINOLOGY FOLLOW-UP         HISTORY OF PRESENT ILLNESS    Low Juárez is seen in follow-up for the issues outlined below.     He has been busy at work and not able to prepare meals or make dietary choices more friendly to diabetes.    Has also not been taking NovoLog consistently before each meal, in part because he has been so busy and also in part because his new freestyle kathleen 3 CGM has not been consistently connecting with his phone and he has less consistent data about his glucose values over the course of the day.    Current diabetes regimen:   -Tresiba 50 units daily   -Metformin extended release 2000 mg daily   -Farxiga 10 mg daily  -NovoLog (previously taking 10 to 15 units before each meal for meals with carbohydrates, plus correction scale of 2 units for every 50 mg/dL over 150), now taking only occasionally    We are unable to download CGM data: However my review of logbook and his phone indicates glucose values in the upper 100s or low 200s overnight and more marked hyperglycemia in the 200s from mid afternoon onward.    Pertinent endocrine and related history:  1.  Diabetes mellitus.  Diagnosed in 2017 when patient was noted to have an elevated random glucose to 349.  A1c was 10.4% around the time of diagnosis.  -In 2021, A1c chacorta, prompting referral to diabetes education and then to endocrinology.  Of note, in 12/2021 he had LATESHA antibody checked which returned positive, with C-peptide that was still measurable.  -Initially on oral therapies, initiated on insulin in 10/2021.  -Previously on Victoza: Stopped this at the direction of one of his healthcare providers. Has history of pancreatitis (by his report, gallstone pancreatitis), status post cholecystectomy.  -Previously on Jardiance, discontinued due to discomfort and itching in the abdomen.  2.  Concern for low testosterone.  Patient has wondered if he has low testosterone since he has had erectile dysfunction.  He has been prescribed  Viagra but has not used it since he has not been sexually active. Has some libido. Reports he used anabolic steroids 12 to 15 years ago when bodybuilding and wonders if this may have had longstanding impact on testosterone levels and erectile function.  -Has biological children, no history of infertility.    Pertinent Social History: , works as a radiology technician.    PAST MEDICAL HISTORY  Past Medical History:   Diagnosis Date     Apnea, sleep      Fatty liver 8/18/2017     High cholesterol      Hyperlipidemia LDL goal <100 8/18/2017     Hypertension      Morbid obesity due to excess calories (H) 8/18/2017     Pancreatitis      Rhinitis      Type 2 diabetes mellitus without complication, without long-term current use of insulin (H) 1/31/2018       MEDICATIONS  Current Outpatient Medications   Medication Sig Dispense Refill     aspirin (ASA) 81 MG tablet Take 81 mg by mouth daily       atorvastatin (LIPITOR) 40 MG tablet Take 1 tablet (40 mg) by mouth daily 90 tablet 3     augmented betamethasone dipropionate (DIPROLENE) 0.05 % external lotion Apply topically 2 times daily. 60 mL 6     blood glucose (NO BRAND SPECIFIED) lancets standard Use to test blood sugar 2 times daily or as directed. 200 each 3     blood glucose (NO BRAND SPECIFIED) test strip Use to test blood sugar 2 times daily or as directed. 200 strip 3     blood glucose calibration (NO BRAND SPECIFIED) solution Use to calibrate blood glucose monitor as needed as directed. 1 each 3     blood glucose monitoring (NO BRAND SPECIFIED) meter device kit Use to test blood sugar 2 times daily or as directed. 1 kit 0     cetirizine (ZYRTEC) 10 MG tablet Take 10-20 mg by mouth as needed       Continuous Blood Gluc Sensor (FREESTYLE ROSA 2 SENSOR) MISC 1 kit every 14 days Use per manufacture's instructions to check glucose daily. 6 each 0     Continuous Blood Gluc Sensor (FREESTYLE ROSA 3 SENSOR) MISC 1 each every 14 days 6 each 1     Continuous Blood  Gluc Sensor (FREESTYLE ROSA 3 SENSOR) [facility] 1 each every 14 days 2 each 11     dapagliflozin (FARXIGA) 10 MG TABS tablet Take 1 tablet (10 mg) by mouth daily 90 tablet 3     fluticasone (FLONASE) 50 MCG/ACT spray Spray 1 spray into both nostrils as needed for rhinitis or allergies       glucosamine-chondroitin 500-400 MG CAPS per capsule Take 2 capsules by mouth daily       hydrOXYzine (ATARAX) 25 MG tablet Take 25 mg by mouth 3 times daily as needed       insulin degludec (TRESIBA FLEXTOUCH) 200 UNIT/ML pen Inject 46 Units Subcutaneous daily (Patient taking differently: Inject 48-50 Units Subcutaneous daily) 27 mL 3     insulin lispro (HUMALOG KWIKPEN) 100 UNIT/ML (1 unit dial) KWIKPEN Inject 10-15 Units Subcutaneous 4 times daily (before meals and nightly) Not to exceed 60 units per day (Patient taking differently: Inject 10-15 Units Subcutaneous 1-2 times daily. Not to exceed 60 units per day) 60 mL 1     insulin pen needle (31G X 8 MM) 31G X 8 MM miscellaneous Use up to 4 pen needles daily or as directed. 400 each 3     ipratropium (ATROVENT) 0.06 % nasal spray Spray 2 sprays into both nostrils 4 times daily as needed for rhinitis 15 mL 11     ketoconazole (NIZORAL) 2 % external cream Apply 1 Application topically as needed       lisinopril-hydrochlorothiazide (ZESTORETIC) 20-25 MG tablet Take 1 tablet by mouth daily 90 tablet 3     metFORMIN (GLUCOPHAGE XR) 500 MG 24 hr tablet Take 4 tablets (2,000 mg) by mouth daily (with dinner) 360 tablet 3     sildenafil (VIAGRA) 100 MG tablet Take 1 tablet (100 mg) by mouth daily as needed 20 tablet 11     triamcinolone (KENALOG) 0.1 % external cream Apply 1 Application topically as needed         Allergies, family, and social history were reviewed and documented as needed in EHR.     REVIEW OF SYSTEMS  A focused ROS was performed, with pertinent positives and negatives as noted in the HPI.    PHYSICAL EXAM  /86 (BP Location: Left arm, Patient Position: Sitting, Cuff  Size: Adult Regular)   Pulse 80   There is no height or weight on file to calculate BMI.  Constitutional: Vital signs reviewed, as recorded above. Patient is alert, oriented and appears in no acute distress.  Eyes: PER, EOMI, no stare, lid lag, or retraction; no conjunctival injection.  Respiratory: Normal chest wall motion and respiratory effort.  MSK: No clubbing or cyanosis; normal muscle bulk and tone.  Skin: No lesions on visible skin.  Neurological: Alert and oriented times 3. No tremor.    Foot exam: DP and PT pulses present and symmetric.  Monofilament sensation intact aside from 1 area on right plantar forefoot.  Normal vibratory sensation.      DATA REVIEW  Each of the following laboratory and/or imaging studies were reviewed.    Component      Latest Ref Rng 6/28/2023  10:59 AM 6/28/2023  11:07 AM   Sodium      136 - 145 mmol/L 137     Potassium      3.4 - 5.3 mmol/L 4.0     Chloride      98 - 107 mmol/L 96 (L)     Carbon Dioxide (CO2)      22 - 29 mmol/L 31 (H)     Anion Gap      7 - 15 mmol/L 10     Urea Nitrogen      6.0 - 20.0 mg/dL 15.3     Creatinine      0.67 - 1.17 mg/dL 1.06     Calcium      8.6 - 10.0 mg/dL 9.7     Glucose      70 - 99 mg/dL 120 (H)     Alkaline Phosphatase      40 - 129 U/L 91     AST      0 - 45 U/L 22     ALT      0 - 70 U/L 27     Protein Total      6.4 - 8.3 g/dL 7.6     Albumin      3.5 - 5.2 g/dL 4.6     Bilirubin Total      <=1.2 mg/dL 0.8     GFR Estimate      >60 mL/min/1.73m2 84     Cholesterol      <200 mg/dL 111     Triglycerides      <150 mg/dL 135     HDL Cholesterol      >=40 mg/dL 27 (L)     LDL Cholesterol Calculated      <=100 mg/dL 57     Non HDL Cholesterol      <130 mg/dL 84     Creatinine Urine      mg/dL  113.0    Albumin Urine mg/L      mg/L  52.9    Albumin Urine mg/g Cr      0.00 - 17.00 mg/g Cr  46.81 (H)    Hemoglobin A1C      0.0 - 5.6 % 7.5 (H)        Legend:  (L) Low  (H) High      ASSESSMENT  1.  Diabetes mellitus, [place] with positive LATESHA  antibody and normal range C-peptide.  Hemoglobin A1c is trending up and my review of CGM data on patient's phone (we are unable to download data) indicates fair glycemia overnight and an upward trend in glucose values during the daytime likely since he is not taking mealtime insulin as consistently.  We discussed reintroducing NovoLog consistently before meals (we also discussed the option of using the  CeQur device for mealtime insulin if it would be more helpful: He will give this more thought).  He is anticipating travel to the [facility] and will be scuba diving: We will plan on a visit before he travels to ensure appropriate glycemic control.  Since he is having difficulty with freestyle kathleen 3 connecting with his telephone at work, we will transition back to [employer] system.    2.  Diabetes preventive care.  -Foot exam performed today, 1 area of absent monofilament sensation on the right plantar forefoot, otherwise normal monofilament and vibratory sensation; we reviewed foot care today  -Urine microalbumin screen checked prior to this visit shows microalbuminuria, slightly improved compared to prior study in 5/2022; continue Farxiga and ACE inhibitor, check urine microalbumin screen prior to next visit  -Eye exam in 8/2021 showed no diabetic retinopathy; I reviewed records of most recent eye exam on 12/3/2022 at [facility], no diabetic retinopathy although there is mention that he would be due for follow-up eye exam and was to return for this (patient recalls he had dilated eye exam in 12/2022)  -Acceptable lipid profile on labs drawn prior to this visit, on statin therapy    3.  Concern for hypogonadism.  Primarily erectile dysfunction, lesser diminution in libido.  Testosterone checked in 3/2022 was normal.  Recheck testosterone level has been in normal range in 5/2022.  Testosterone level checked prior to this visit, even at close to noon time, is in normal range and improving.  No indication for  treatment at present, can continue periodic monitoring.  He has erectile dysfunction and wonders if we can use medication for this, especially since he may be sexually active while traveling: We discussed use of tadalafil daily and the patient is interested in a trial.  He will update me if limited benefit, can consider either adjusting dose up or referring to urology.      PLAN  -Continue Tresiba 50 units daily  -Resume NovoLog--can take 5 units with smaller meals, 10 units with regular meals  -Can continue remainder of medications without changes  -Start Cialis 2.5 mg once daily without regard to timing of sexual activity--update me if no benefit and we will consider referral to urology  -I will send prescription for Dara 2 supplies  -Follow-up in October, with labs before visit  -We will communicate results via [employer], or if needed by phone    Orders Placed This Encounter   Procedures     Hemoglobin A1c     Basic metabolic panel     TSH with free T4 reflex     I spent a total of 48 minutes on the date of encounter reviewing medical records, evaluating the patient, coordinating care and documenting in the EHR, as detailed above.      Bushra Roy MD   Division of Diabetes, Endocrinology and Metabolism  Department of Medicine

## 2023-07-05 NOTE — PATIENT INSTRUCTIONS
-Continue Tresiba 50 units daily  -Resume NovoLog--can take 5 units with smaller meals, 10 units with regular meals  -Can continue remainder of medications without changes  -Start Cialis 2.5 mg once daily without regard to timing of sexual activity--update me if no benefit and we will consider referral to urology  -I will send prescription for Dara 2 supplies  -Follow-up in October, with labs before visit  -We will communicate results via [employer] or if needed by phone

## 2023-07-05 NOTE — LETTER
7/5/2023         RE: Low Juárez  [address] [facility] [id number]        Dear Colleague,    Thank you for referring your patient, Low Juárez, to the [facility]. Please see a copy of my visit note below.      ENDOCRINOLOGY FOLLOW-UP         HISTORY OF PRESENT ILLNESS    Low Juárez is seen in follow-up for the issues outlined below.     He has been busy at work and not able to prepare meals or make dietary choices more friendly to diabetes.    Has also not been taking NovoLog consistently before each meal, in part because he has been so busy and also in part because his new freestyle kathleen 3 CGM has not been consistently connecting with his phone and he has less consistent data about his glucose values over the course of the day.    Current diabetes regimen:   -Tresiba 50 units daily   -Metformin extended release 2000 mg daily   -Farxiga 10 mg daily  -NovoLog (previously taking 10 to 15 units before each meal for meals with carbohydrates, plus correction scale of 2 units for every 50 mg/dL over 150), now taking only occasionally    We are unable to download CGM data: However my review of logbook and his phone indicates glucose values in the upper 100s or low 200s overnight and more marked hyperglycemia in the 200s from mid afternoon onward.    Pertinent endocrine and related history:  1.  Diabetes mellitus.  Diagnosed in 2017 when patient was noted to have an elevated random glucose to 349.  A1c was 10.4% around the time of diagnosis.  -In 2021, A1c chacorta, prompting referral to diabetes education and then to endocrinology.  Of note, in 12/2021 he had LATESHA antibody checked which returned positive, with C-peptide that was still measurable.  -Initially on oral therapies, initiated on insulin in 10/2021.  -Previously on Victoza: Stopped this at the direction of one of his healthcare providers. Has history of pancreatitis (by his report, gallstone pancreatitis), status post  cholecystectomy.  -Previously on Jardiance, discontinued due to discomfort and itching in the abdomen.  2.  Concern for low testosterone.  Patient has wondered if he has low testosterone since he has had erectile dysfunction.  He has been prescribed Viagra but has not used it since he has not been sexually active. Has some libido. Reports he used anabolic steroids 12 to 15 years ago when bodybuilding and wonders if this may have had longstanding impact on testosterone levels and erectile function.  -Has biological children, no history of infertility.    Pertinent Social History: , works as a radiology technician.    PAST MEDICAL HISTORY  Past Medical History:   Diagnosis Date     Apnea, sleep      Fatty liver 8/18/2017     High cholesterol      Hyperlipidemia LDL goal <100 8/18/2017     Hypertension      Morbid obesity due to excess calories (H) 8/18/2017     Pancreatitis      Rhinitis      Type 2 diabetes mellitus without complication, without long-term current use of insulin (H) 1/31/2018       MEDICATIONS  Current Outpatient Medications   Medication Sig Dispense Refill     aspirin (ASA) 81 MG tablet Take 81 mg by mouth daily       atorvastatin (LIPITOR) 40 MG tablet Take 1 tablet (40 mg) by mouth daily 90 tablet 3     augmented betamethasone dipropionate (DIPROLENE) 0.05 % external lotion Apply topically 2 times daily. 60 mL 6     blood glucose (NO BRAND SPECIFIED) lancets standard Use to test blood sugar 2 times daily or as directed. 200 each 3     blood glucose (NO BRAND SPECIFIED) test strip Use to test blood sugar 2 times daily or as directed. 200 strip 3     blood glucose calibration (NO BRAND SPECIFIED) solution Use to calibrate blood glucose monitor as needed as directed. 1 each 3     blood glucose monitoring (NO BRAND SPECIFIED) meter device kit Use to test blood sugar 2 times daily or as directed. 1 kit 0     cetirizine (ZYRTEC) 10 MG tablet Take 10-20 mg by mouth as needed       Continuous Blood  Gluc Sensor (FREESTYLE ROSA 2 SENSOR) [facility] 1 kit every 14 days Use per manufacture's instructions to check glucose daily. 6 each 0     Continuous Blood Gluc Sensor (FREESTYLE ROSA 3 SENSOR) MISC 1 each every 14 days 6 each 1     Continuous Blood Gluc Sensor (FREESTYLE ROSA 3 SENSOR) MISC 1 each every 14 days 2 each 11     dapagliflozin (FARXIGA) 10 MG TABS tablet Take 1 tablet (10 mg) by mouth daily 90 tablet 3     fluticasone (FLONASE) 50 MCG/ACT spray Spray 1 spray into both nostrils as needed for rhinitis or allergies       glucosamine-chondroitin 500-400 MG CAPS per capsule Take 2 capsules by mouth daily       hydrOXYzine (ATARAX) 25 MG tablet Take 25 mg by mouth 3 times daily as needed       insulin degludec (TRESIBA FLEXTOUCH) 200 UNIT/ML pen Inject 46 Units Subcutaneous daily (Patient taking differently: Inject 48-50 Units Subcutaneous daily) 27 mL 3     insulin lispro (HUMALOG KWIKPEN) 100 UNIT/ML (1 unit dial) KWIKPEN Inject 10-15 Units Subcutaneous 4 times daily (before meals and nightly) Not to exceed 60 units per day (Patient taking differently: Inject 10-15 Units Subcutaneous 1-2 times daily. Not to exceed 60 units per day) 60 mL 1     insulin pen needle (31G X 8 MM) 31G X 8 MM miscellaneous Use up to 4 pen needles daily or as directed. 400 each 3     ipratropium (ATROVENT) 0.06 % nasal spray Spray 2 sprays into both nostrils 4 times daily as needed for rhinitis 15 mL 11     ketoconazole (NIZORAL) 2 % external cream Apply 1 Application topically as needed       lisinopril-hydrochlorothiazide (ZESTORETIC) 20-25 MG tablet Take 1 tablet by mouth daily 90 tablet 3     metFORMIN (GLUCOPHAGE XR) 500 MG 24 hr tablet Take 4 tablets (2,000 mg) by mouth daily (with dinner) 360 tablet 3     sildenafil (VIAGRA) 100 MG tablet Take 1 tablet (100 mg) by mouth daily as needed 20 tablet 11     triamcinolone (KENALOG) 0.1 % external cream Apply 1 Application topically as needed         Allergies, family, and social  history were reviewed and documented as needed in EHR.     REVIEW OF SYSTEMS  A focused ROS was performed, with pertinent positives and negatives as noted in the HPI.    PHYSICAL EXAM  /86 (BP Location: Left arm, Patient Position: Sitting, Cuff Size: Adult Regular)   Pulse 80   There is no height or weight on file to calculate BMI.  Constitutional: Vital signs reviewed, as recorded above. Patient is alert, oriented and appears in no acute distress.  Eyes: PER, EOMI, no stare, lid lag, or retraction; no conjunctival injection.  Respiratory: Normal chest wall motion and respiratory effort.  MSK: No clubbing or cyanosis; normal muscle bulk and tone.  Skin: No lesions on visible skin.  Neurological: Alert and oriented times 3. No tremor.    Foot exam: DP and PT pulses present and symmetric.  Monofilament sensation intact aside from 1 area on right plantar forefoot.  Normal vibratory sensation.      DATA REVIEW  Each of the following laboratory and/or imaging studies were reviewed.    Component      Latest Ref Rng 6/28/2023  10:59 AM 6/28/2023  11:07 AM   Sodium      136 - 145 mmol/L 137     Potassium      3.4 - 5.3 mmol/L 4.0     Chloride      98 - 107 mmol/L 96 (L)     Carbon Dioxide (CO2)      22 - 29 mmol/L 31 (H)     Anion Gap      7 - 15 mmol/L 10     Urea Nitrogen      6.0 - 20.0 mg/dL 15.3     Creatinine      0.67 - 1.17 mg/dL 1.06     Calcium      8.6 - 10.0 mg/dL 9.7     Glucose      70 - 99 mg/dL 120 (H)     Alkaline Phosphatase      40 - 129 U/L 91     AST      0 - 45 U/L 22     ALT      0 - 70 U/L 27     Protein Total      6.4 - 8.3 g/dL 7.6     Albumin      3.5 - 5.2 g/dL 4.6     Bilirubin Total      <=1.2 mg/dL 0.8     GFR Estimate      >60 mL/min/1.73m2 84     Cholesterol      <200 mg/dL 111     Triglycerides      <150 mg/dL 135     HDL Cholesterol      >=40 mg/dL 27 (L)     LDL Cholesterol Calculated      <=100 mg/dL 57     Non HDL Cholesterol      <130 mg/dL 84     Creatinine Urine      mg/dL   113.0    Albumin Urine mg/L      mg/L  52.9    Albumin Urine mg/g Cr      0.00 - 17.00 mg/g Cr  46.81 (H)    Hemoglobin A1C      0.0 - 5.6 % 7.5 (H)        Legend:  (L) Low  (H) High      ASSESSMENT  1.  Diabetes mellitus, [place] with positive LATESHA antibody and normal range C-peptide.  Hemoglobin A1c is trending up and my review of CGM data on patient's phone (we are unable to download data) indicates fair glycemia overnight and an upward trend in glucose values during the daytime likely since he is not taking mealtime insulin as consistently.  We discussed reintroducing NovoLog consistently before meals (we also discussed the option of using the  CeQur device for mealtime insulin if it would be more helpful: He will give this more thought).  He is anticipating travel to the [facility] and will be scuba diving: We will plan on a visit before he travels to ensure appropriate glycemic control.  Since he is having difficulty with freestyle kathleen 3 connecting with his telephone at work, we will transition back to [employer] system.    2.  Diabetes preventive care.  -Foot exam performed today, 1 area of absent monofilament sensation on the right plantar forefoot, otherwise normal monofilament and vibratory sensation; we reviewed foot care today  -Urine microalbumin screen checked prior to this visit shows microalbuminuria, slightly improved compared to prior study in 5/2022; continue Farxiga and ACE inhibitor, check urine microalbumin screen prior to next visit  -Eye exam in 8/2021 showed no diabetic retinopathy; I reviewed records of most recent eye exam on 12/3/2022 at [facility], no diabetic retinopathy although there is mention that he would be due for follow-up eye exam and was to return for this (patient recalls he had dilated eye exam in 12/2022)  -Acceptable lipid profile on labs drawn prior to this visit, on statin therapy    3.  Concern for hypogonadism.  Primarily erectile dysfunction, lesser diminution in  libido.  Testosterone checked in 3/2022 was normal.  Recheck testosterone level has been in normal range in 5/2022.  Testosterone level checked prior to this visit, even at close to noon time, is in normal range and improving.  No indication for treatment at present, can continue periodic monitoring.  He has erectile dysfunction and wonders if we can use medication for this, especially since he may be sexually active while traveling: We discussed use of tadalafil daily and the patient is interested in a trial.  He will update me if limited benefit, can consider either adjusting dose up or referring to urology.      PLAN  -Continue Tresiba 50 units daily  -Resume NovoLog--can take 5 units with smaller meals, 10 units with regular meals  -Can continue remainder of medications without changes  -Start Cialis 2.5 mg once daily without regard to timing of sexual activity--update me if no benefit and we will consider referral to urology  -I will send prescription for Dara 2 supplies  -Follow-up in October, with labs before visit  -We will communicate results via [employer], or if needed by phone    Orders Placed This Encounter   Procedures     Hemoglobin A1c     Basic metabolic panel     TSH with free T4 reflex     I spent a total of 48 minutes on the date of encounter reviewing medical records, evaluating the patient, coordinating care and documenting in the EHR, as detailed above.      Remberto Roy MD   Division of Diabetes, Endocrinology and Metabolism  Department of Medicine              Again, thank you for allowing me to participate in the care of your patient.        Sincerely,        REMBERTO Roy MD

## 2023-07-28 NOTE — TELEPHONE ENCOUNTER
Patient Quality Outreach    Patient is due for the following:   Physical Preventive Adult Physical    Next Steps:   Schedule a Adult Preventative    Type of outreach:    Phone, left message for patient/parent to call back.    Next Steps:  Reach out within 90 days via Phone.    Max number of attempts reached: Yes. Will try again in 90 days if patient still on fail list.    Questions for provider review:    None           Narayan Maddox Jr., RMA

## 2023-09-12 DIAGNOSIS — E78.5 HYPERLIPIDEMIA LDL GOAL <100: ICD-10-CM

## 2023-09-12 DIAGNOSIS — I10 BENIGN ESSENTIAL HYPERTENSION: ICD-10-CM

## 2023-09-13 RX ORDER — LISINOPRIL AND HYDROCHLOROTHIAZIDE 20; 25 MG/1; MG/1
1 TABLET ORAL DAILY
Qty: 90 TABLET | Refills: 3 | Status: SHIPPED | OUTPATIENT
Start: 2023-09-13

## 2023-09-13 RX ORDER — ATORVASTATIN CALCIUM 40 MG/1
40 TABLET, FILM COATED ORAL DAILY
Qty: 90 TABLET | Refills: 3 | Status: SHIPPED | OUTPATIENT
Start: 2023-09-13 | End: 2024-09-26

## 2023-10-09 ENCOUNTER — LAB (OUTPATIENT)
Dept: LAB | Facility: CLINIC | Age: 53
End: 2023-10-09
Payer: COMMERCIAL

## 2023-10-09 DIAGNOSIS — Z79.4 TYPE 2 DIABETES MELLITUS WITH HYPERGLYCEMIA, WITH LONG-TERM CURRENT USE OF INSULIN (H): ICD-10-CM

## 2023-10-09 DIAGNOSIS — E11.65 TYPE 2 DIABETES MELLITUS WITH HYPERGLYCEMIA, WITH LONG-TERM CURRENT USE OF INSULIN (H): ICD-10-CM

## 2023-10-09 LAB — HBA1C MFR BLD: 8.4 % (ref 0–5.6)

## 2023-10-09 PROCEDURE — 36415 COLL VENOUS BLD VENIPUNCTURE: CPT

## 2023-10-09 PROCEDURE — 83036 HEMOGLOBIN GLYCOSYLATED A1C: CPT

## 2023-10-09 PROCEDURE — 84443 ASSAY THYROID STIM HORMONE: CPT

## 2023-10-09 PROCEDURE — 80048 BASIC METABOLIC PNL TOTAL CA: CPT

## 2023-10-10 LAB
ANION GAP SERPL CALCULATED.3IONS-SCNC: 12 MMOL/L (ref 7–15)
BUN SERPL-MCNC: 15.3 MG/DL (ref 6–20)
CALCIUM SERPL-MCNC: 9.9 MG/DL (ref 8.6–10)
CHLORIDE SERPL-SCNC: 97 MMOL/L (ref 98–107)
CREAT SERPL-MCNC: 0.92 MG/DL (ref 0.67–1.17)
DEPRECATED HCO3 PLAS-SCNC: 28 MMOL/L (ref 22–29)
EGFRCR SERPLBLD CKD-EPI 2021: >90 ML/MIN/1.73M2
GLUCOSE SERPL-MCNC: 197 MG/DL (ref 70–99)
POTASSIUM SERPL-SCNC: 4.1 MMOL/L (ref 3.4–5.3)
SODIUM SERPL-SCNC: 137 MMOL/L (ref 135–145)
TSH SERPL DL<=0.005 MIU/L-ACNC: 3.14 UIU/ML (ref 0.3–4.2)

## 2023-10-13 ENCOUNTER — TELEPHONE (OUTPATIENT)
Dept: ENDOCRINOLOGY | Facility: CLINIC | Age: 53
End: 2023-10-13

## 2023-10-13 ENCOUNTER — OFFICE VISIT (OUTPATIENT)
Dept: ENDOCRINOLOGY | Facility: CLINIC | Age: 53
End: 2023-10-13
Payer: COMMERCIAL

## 2023-10-13 VITALS
WEIGHT: 251.2 LBS | DIASTOLIC BLOOD PRESSURE: 80 MMHG | HEART RATE: 86 BPM | SYSTOLIC BLOOD PRESSURE: 118 MMHG | OXYGEN SATURATION: 94 % | BODY MASS INDEX: 34.55 KG/M2

## 2023-10-13 DIAGNOSIS — Z79.4 TYPE 2 DIABETES MELLITUS WITH HYPERGLYCEMIA, WITH LONG-TERM CURRENT USE OF INSULIN (H): Primary | ICD-10-CM

## 2023-10-13 DIAGNOSIS — E11.65 TYPE 2 DIABETES MELLITUS WITH HYPERGLYCEMIA, WITH LONG-TERM CURRENT USE OF INSULIN (H): ICD-10-CM

## 2023-10-13 DIAGNOSIS — Z79.4 TYPE 2 DIABETES MELLITUS WITH HYPERGLYCEMIA, WITH LONG-TERM CURRENT USE OF INSULIN (H): ICD-10-CM

## 2023-10-13 DIAGNOSIS — E11.65 TYPE 2 DIABETES MELLITUS WITH HYPERGLYCEMIA, WITH LONG-TERM CURRENT USE OF INSULIN (H): Primary | ICD-10-CM

## 2023-10-13 PROCEDURE — 95251 CONT GLUC MNTR ANALYSIS I&R: CPT | Performed by: INTERNAL MEDICINE

## 2023-10-13 PROCEDURE — 99215 OFFICE O/P EST HI 40 MIN: CPT | Mod: 25 | Performed by: INTERNAL MEDICINE

## 2023-10-13 RX ORDER — GLUCAGON 3 MG/1
3 POWDER NASAL PRN
Qty: 1 EACH | Refills: 1 | Status: SHIPPED | OUTPATIENT
Start: 2023-10-13

## 2023-10-13 RX ORDER — DAPAGLIFLOZIN 10 MG/1
10 TABLET, FILM COATED ORAL DAILY
Qty: 90 TABLET | Refills: 3 | Status: SHIPPED | OUTPATIENT
Start: 2023-10-13 | End: 2024-09-06

## 2023-10-13 RX ORDER — INSULIN LISPRO 100 [IU]/ML
INJECTION, SOLUTION INTRAVENOUS; SUBCUTANEOUS
Qty: 60 ML | Refills: 1 | Status: SHIPPED | OUTPATIENT
Start: 2023-10-13 | End: 2023-10-13

## 2023-10-13 RX ORDER — TADALAFIL 2.5 MG/1
2.5 TABLET ORAL DAILY
Qty: 90 TABLET | Refills: 1 | Status: SHIPPED | OUTPATIENT
Start: 2023-10-13 | End: 2024-02-16 | Stop reason: DRUGHIGH

## 2023-10-13 RX ORDER — DOXYCYCLINE 100 MG/1
1 CAPSULE ORAL 2 TIMES DAILY
COMMUNITY
Start: 2023-09-14

## 2023-10-13 RX ORDER — INSULIN LISPRO 100 [IU]/ML
INJECTION, SOLUTION INTRAVENOUS; SUBCUTANEOUS
Qty: 60 ML | Refills: 1 | Status: SHIPPED | OUTPATIENT
Start: 2023-10-13 | End: 2024-02-16

## 2023-10-13 NOTE — LETTER
10/13/2023         RE: Low Juárez  [address] [facility] [id number]        Dear Colleague,    Thank you for referring your patient, Low Juárez, to the [facility]. Please see a copy of my visit note below.      ENDOCRINOLOGY FOLLOW-UP         HISTORY OF PRESENT ILLNESS    Low Juárez is seen in follow-up for the issues outlined below.     As before, he has been very busy with work and his children are living with him: Hard to prepare meals when multiple people in the home eating.  Also not taking insulin as regularly during the daytime while eating at work.  Had a gap in Farxiga refills in pharmacy due to supply issues.    Also, has been using freestyle dara 3 which does not connect to his phone.  Therefore, has not had as much immediate feedback in glucose data.  Plans to switch back to the freestyle dara 2 but needs follow-up reader since he lost his previous one.    Current diabetes regimen:   -Tresiba 50 units daily   -Metformin extended release 2000 mg daily   -Farxiga 10 mg daily  -NovoLog prescribed as 5 units with small meals and 10 units with regular meals (as above, not taking consistently), plus correction scale of 2 units for every 50 mg/dL over 150), now taking only occasionally    Interpretation of CGM    Results of Freestyle Dara continuous glucose monitoring reviewed. Data can be accessed on [employer] application.    Patient Data    Last A1c:   Hemoglobin A1C   Date Value Ref Range Status   10/09/2023 8.4 (H) 0.0 - 5.6 % Final     Comment:     Normal <5.7%   Prediabetes 5.7-6.4%    Diabetes 6.5% or higher     Note: Adopted from ADA consensus guidelines.   04/07/2021 8.2 (H) 0 - 5.6 % Final     Comment:     Reviewed: OK with previous  Normal <5.7% Prediabetes 5.7-6.4%  Diabetes 6.5% or higher - adopted from ADA   consensus guidelines.       Current DM regimen: See above    Sensor Summary/ Accuracy Criteria  Number of days sensor worn: 71% in the last 14  days  Calibration: CGM system does not require calibration.    Average (mean) sensor glucose: 251 mg/dL  Time in range: 3%  Duration below low limit: 0  Coefficient of variation: 22%    Data Assessment  ~Consistent hyperglycemia throughout the day    Impression  -Uncontrolled diabetes with consistent hyperglycemia, likely due to irregular use of insulin and difficulty with following diet changes    Recommendations  -See assessment and plan    Started Cialis at last visit 7/2023.  Has noted some improvement in erections,still not noting significant nocturnal erections.    Pertinent endocrine and related history:  1.  Diabetes mellitus.  Diagnosed in 2017 when patient was noted to have an elevated random glucose to 349.  A1c was 10.4% around the time of diagnosis.  -In 2021, A1c chacorta, prompting referral to diabetes education and then to endocrinology.  Of note, in 12/2021 he had LATESHA antibody checked which returned positive, with C-peptide that was still measurable.  -Initially on oral therapies, initiated on insulin in 10/2021.  -Previously on Victoza: Stopped this at the direction of one of his healthcare providers. Has history of pancreatitis (by his report, gallstone pancreatitis), status post cholecystectomy.  -Previously on Jardiance, discontinued due to discomfort and itching in the abdomen.  2.  Concern for low testosterone.  Patient has wondered if he has low testosterone since he has had erectile dysfunction.  He has been prescribed Viagra but has not used it since he has not been sexually active. Has some libido. Reports he used anabolic steroids 12 to 15 years ago when bodybuilding and wonders if this may have had longstanding impact on testosterone levels and erectile function.  -Has biological children, no history of infertility.    Pertinent Social History: , works as a radiology technician.    PAST MEDICAL HISTORY  Past Medical History:   Diagnosis Date     Apnea, sleep      Fatty liver  8/18/2017     High cholesterol      Hyperlipidemia LDL goal <100 8/18/2017     Hypertension      Morbid obesity due to excess calories (H) 8/18/2017     Pancreatitis      Rhinitis      Type 2 diabetes mellitus without complication, without long-term current use of insulin (H) 1/31/2018       MEDICATIONS  Current Outpatient Medications   Medication Sig Dispense Refill     aspirin (ASA) 81 MG tablet Take 81 mg by mouth daily       atorvastatin (LIPITOR) 40 MG tablet Take 1 Tablet (40 mg) by mouth daily. 90 tablet 3     augmented betamethasone dipropionate (DIPROLENE) 0.05 % external lotion Apply topically 2 times daily. 60 mL 6     blood glucose (NO BRAND SPECIFIED) lancets standard Use to test blood sugar 2 times daily or as directed. 200 each 3     blood glucose (NO BRAND SPECIFIED) test strip Use to test blood sugar 2 times daily or as directed. 200 strip 3     blood glucose calibration (NO BRAND SPECIFIED) solution Use to calibrate blood glucose monitor as needed as directed. 1 each 3     blood glucose monitoring (NO BRAND SPECIFIED) meter device kit Use to test blood sugar 2 times daily or as directed. 1 kit 0     cetirizine (ZYRTEC) 10 MG tablet Take 10-20 mg by mouth as needed       Continuous Blood Gluc Sensor (FREESTYLE ROSA 2 SENSOR) MISC 1 kit every 14 days Use per manufacture's instructions to check glucose daily. 6 each 3     Continuous Blood Gluc Sensor (FREESTYLE ROSA 3 SENSOR) MISC 1 each every 14 days 6 each 1     dapagliflozin (FARXIGA) 10 MG TABS tablet Take 1 tablet (10 mg) by mouth daily 90 tablet 3     fluticasone (FLONASE) 50 MCG/ACT spray Spray 1 spray into both nostrils as needed for rhinitis or allergies       glucosamine-chondroitin 500-400 MG CAPS per capsule Take 2 capsules by mouth daily       hydrOXYzine (ATARAX) 25 MG tablet Take 25 mg by mouth 3 times daily as needed       insulin degludec (TRESIBA FLEXTOUCH) 200 UNIT/ML pen Inject 46 Units Subcutaneous daily (Patient taking  differently: Inject 48-50 Units Subcutaneous daily) 27 mL 3     insulin lispro (HUMALOG KWIKPEN) 100 UNIT/ML (1 unit dial) KWIKPEN Inject 10-15 Units Subcutaneous 4 times daily (before meals and nightly) Not to exceed 60 units per day (Patient taking differently: Inject 10-15 Units Subcutaneous 1-2 times daily. Not to exceed 60 units per day) 60 mL 1     insulin pen needle (31G X 8 MM) 31G X 8 MM miscellaneous Use up to 4 pen needles daily or as directed. 400 each 3     ipratropium (ATROVENT) 0.06 % nasal spray Spray 2 sprays into both nostrils 4 times daily as needed for rhinitis 15 mL 11     ketoconazole (NIZORAL) 2 % external cream Apply 1 Application topically as needed       lisinopril-hydrochlorothiazide (ZESTORETIC) 20-25 MG tablet Take 1 Tablet by mouth daily. 90 tablet 3     metFORMIN (GLUCOPHAGE XR) 500 MG 24 hr tablet Take 4 tablets (2,000 mg) by mouth daily (with dinner) 360 tablet 3     sildenafil (VIAGRA) 100 MG tablet Take 1 tablet (100 mg) by mouth daily as needed 20 tablet 11     tadalafil (CIALIS) 2.5 MG tablet Take 1 tablet (2.5 mg) by mouth daily 30 tablet 3     triamcinolone (KENALOG) 0.1 % external cream Apply 1 Application topically as needed         Allergies, family, and social history were reviewed and documented as needed in EHR.     REVIEW OF SYSTEMS  A focused ROS was performed, with pertinent positives and negatives as noted in the HPI.    PHYSICAL EXAM  /80 (BP Location: Right arm, Patient Position: Sitting, Cuff Size: Adult Large)   Pulse 86   Wt 113.9 kg (251 lb 3.2 oz)   SpO2 94%   BMI 34.55 kg/m    Body mass index is 34.55 kg/m .  Constitutional: Vital signs reviewed, as recorded above. Patient is alert, oriented and appears in no acute distress.  Eyes: PER, EOMI, no stare, lid lag, or retraction; no conjunctival injection.  Respiratory: Normal chest wall motion and respiratory effort.  MSK: No clubbing or cyanosis; normal muscle bulk and tone.  Skin: No lesions on  visible skin,  Neurological: Alert and oriented times 3. No tremor.      DATA REVIEW  Each of the following laboratory and/or imaging studies were reviewed.    Component      Latest Ref Rng 10/9/2023  11:15 AM   Sodium      135 - 145 mmol/L 137    Potassium      3.4 - 5.3 mmol/L 4.1    Chloride      98 - 107 mmol/L 97 (L)    Carbon Dioxide (CO2)      22 - 29 mmol/L 28    Anion Gap      7 - 15 mmol/L 12    Urea Nitrogen      6.0 - 20.0 mg/dL 15.3    Creatinine      0.67 - 1.17 mg/dL 0.92    GFR Estimate      >60 mL/min/1.73m2 >90    Calcium      8.6 - 10.0 mg/dL 9.9    Glucose      70 - 99 mg/dL 197 (H)    TSH      0.30 - 4.20 uIU/mL 3.14    Hemoglobin A1C      0.0 - 5.6 % 8.4 (H)       Legend:  (L) Low  (H) High    ASSESSMENT  1.  Diabetes mellitus, CAM[place] with positive LATESHA antibody and normal range C-peptide.  Progressing hyperglycemia based on previsit hemoglobin A1c and review of CGM data (see interpretation HPI).  Mr. Juárez feels that this is primarily due to inconsistent use of insulin and difficulty with following a diet more in line with diabetes management.  He would like to take some time and work on lifestyle changes as well as insulin use.  Therefore we will not make changes to his medication regimen.  I did offer referral to diabetes care and  to help with these factors: He prefers to defer for now.  He will you like our support via this regard.    We discussed preparation for travel, glucagon use for severe hypoglycemic emergencies, and insulin storage.    2.  Diabetes preventive care.  -Foot exam performed on 7/5/2023, foot care discussed at that visit  -Urine microalbumin screen checked 6/2023 showed microalbuminuria, slightly improved compared to prior study in 5/2022; continue Farxiga and ACE inhibitor  -Most recent eye exam on 12/3/2022 at [facility], no diabetic retinopathy   -Acceptable lipid profile on labs drawn in 6/2023 HDL will hopefully improve as diabetes  control  and lifestyle changes improve, on statin therapy    3.  Concern for hypogonadism.  Primarily erectile dysfunction, lesser diminution in libido.  Testosterone checked in 3/2022 was normal.  Recheck testosterone level has been in normal range in 5/2022.  Testosterone level checked prior to this visit, even at close to noon time, is in normal range and improving.  No indication for treatment at present, can continue periodic monitoring.      4.  Erectile dysfunction.  Some response to Cialis, can clinical continue it.  We have the option to refer to urology in the future if patient is interested.    PLAN  -Take Tresiba 50 units daily  -Take NovoLog--can take 5 units with smaller meals, 10 units with regular meals  -Can continue remainder of medications without changes  -Continue Cialis 2.5 mg once daily without regard to timing of sexual activity  -I will send prescription for Dara 2 reader  -Travel letter signed,  glucagon kit for travel for emergency  -Follow-up in February, with fasting labs before visit  -We will communicate results via [employer], or if needed by phone    Orders Placed This Encounter   Procedures     Hemoglobin A1c     Comprehensive metabolic panel     Lipid Profile     I spent a total of 43 minutes on the date of encounter reviewing medical records, evaluating the patient, coordinating care and documenting in the EHR, as detailed above.    I spent additional time on the date of encounter reviewing and interpreting CGM data, as outlined above.      Remberto Roy MD   Division of Diabetes, Endocrinology and Metabolism  Department of Medicine                  Again, thank you for allowing me to participate in the care of your patient.        Sincerely,        REMBERTO Roy MD

## 2023-10-13 NOTE — PROGRESS NOTES
ENDOCRINOLOGY FOLLOW-UP         HISTORY OF PRESENT ILLNESS    Low Juárez is seen in follow-up for the issues outlined below.     As before, he has been very busy with work and his children are living with him: Hard to prepare meals when multiple people in the home eating.  Also not taking insulin as regularly during the daytime while eating at work.  Had a gap in Farxiga refills in pharmacy due to supply issues.    Also, has been using freestyle dara 3 which does not connect to his phone.  Therefore, has not had as much immediate feedback in glucose data.  Plans to switch back to the freestyle dara 2 but needs follow-up reader since he lost his previous one.    Current diabetes regimen:   -Tresiba 50 units daily   -Metformin extended release 2000 mg daily   -Farxiga 10 mg daily  -NovoLog prescribed as 5 units with small meals and 10 units with regular meals (as above, not taking consistently), plus correction scale of 2 units for every 50 mg/dL over 150), now taking only occasionally    Interpretation of CGM    Results of Freestyle Dara continuous glucose monitoring reviewed. Data can be accessed on [employer] application.    Patient Data    Last A1c:   Hemoglobin A1C   Date Value Ref Range Status   10/09/2023 8.4 (H) 0.0 - 5.6 % Final     Comment:     Normal <5.7%   Prediabetes 5.7-6.4%    Diabetes 6.5% or higher     Note: Adopted from ADA consensus guidelines.   04/07/2021 8.2 (H) 0 - 5.6 % Final     Comment:     Reviewed: OK with previous  Normal <5.7% Prediabetes 5.7-6.4%  Diabetes 6.5% or higher - adopted from ADA   consensus guidelines.       Current DM regimen: See above    Sensor Summary/ Accuracy Criteria  Number of days sensor worn: 71% in the last 14 days  Calibration: CGM system does not require calibration.    Average (mean) sensor glucose: 251 mg/dL  Time in range: 3%  Duration below low limit: 0  Coefficient of variation: 22%    Data Assessment  ~Consistent hyperglycemia throughout the  day    Impression  -Uncontrolled diabetes with consistent hyperglycemia, likely due to irregular use of insulin and difficulty with following diet changes    Recommendations  -See assessment and plan    Started Cialis at last visit 7/2023.  Has noted some improvement in erections,still not noting significant nocturnal erections.    Pertinent endocrine and related history:  1.  Diabetes mellitus.  Diagnosed in 2017 when patient was noted to have an elevated random glucose to 349.  A1c was 10.4% around the time of diagnosis.  -In 2021, A1c chacorta, prompting referral to diabetes education and then to endocrinology.  Of note, in 12/2021 he had LATESHA antibody checked which returned positive, with C-peptide that was still measurable.  -Initially on oral therapies, initiated on insulin in 10/2021.  -Previously on Victoza: Stopped this at the direction of one of his healthcare providers. Has history of pancreatitis (by his report, gallstone pancreatitis), status post cholecystectomy.  -Previously on Jardiance, discontinued due to discomfort and itching in the abdomen.  2.  Concern for low testosterone.  Patient has wondered if he has low testosterone since he has had erectile dysfunction.  He has been prescribed Viagra but has not used it since he has not been sexually active. Has some libido. Reports he used anabolic steroids 12 to 15 years ago when bodybuilding and wonders if this may have had longstanding impact on testosterone levels and erectile function.  -Has biological children, no history of infertility.    Pertinent Social History: , works as a radiology technician.    PAST MEDICAL HISTORY  Past Medical History:   Diagnosis Date    Apnea, sleep     Fatty liver 8/18/2017    High cholesterol     Hyperlipidemia LDL goal <100 8/18/2017    Hypertension     Morbid obesity due to excess calories (H) 8/18/2017    Pancreatitis     Rhinitis     Type 2 diabetes mellitus without complication, without long-term current use  of insulin (H) 1/31/2018       MEDICATIONS  Current Outpatient Medications   Medication Sig Dispense Refill    aspirin (ASA) 81 MG tablet Take 81 mg by mouth daily      atorvastatin (LIPITOR) 40 MG tablet Take 1 Tablet (40 mg) by mouth daily. 90 tablet 3    augmented betamethasone dipropionate (DIPROLENE) 0.05 % external lotion Apply topically 2 times daily. 60 mL 6    blood glucose (NO BRAND SPECIFIED) lancets standard Use to test blood sugar 2 times daily or as directed. 200 each 3    blood glucose (NO BRAND SPECIFIED) test strip Use to test blood sugar 2 times daily or as directed. 200 strip 3    blood glucose calibration (NO BRAND SPECIFIED) solution Use to calibrate blood glucose monitor as needed as directed. 1 each 3    blood glucose monitoring (NO BRAND SPECIFIED) meter device kit Use to test blood sugar 2 times daily or as directed. 1 kit 0    cetirizine (ZYRTEC) 10 MG tablet Take 10-20 mg by mouth as needed      Continuous Blood Gluc Sensor (FREESTYLE ROSA 2 SENSOR) MISC 1 kit every 14 days Use per manufacture's instructions to check glucose daily. 6 each 3    Continuous Blood Gluc Sensor (FREESTYLE ROSA 3 SENSOR) MISC 1 each every 14 days 6 each 1    dapagliflozin (FARXIGA) 10 MG TABS tablet Take 1 tablet (10 mg) by mouth daily 90 tablet 3    fluticasone (FLONASE) 50 MCG/ACT spray Spray 1 spray into both nostrils as needed for rhinitis or allergies      glucosamine-chondroitin 500-400 MG CAPS per capsule Take 2 capsules by mouth daily      hydrOXYzine (ATARAX) 25 MG tablet Take 25 mg by mouth 3 times daily as needed      insulin degludec (TRESIBA FLEXTOUCH) 200 UNIT/ML pen Inject 46 Units Subcutaneous daily (Patient taking differently: Inject 48-50 Units Subcutaneous daily) 27 mL 3    insulin lispro (HUMALOG KWIKPEN) 100 UNIT/ML (1 unit dial) KWIKPEN Inject 10-15 Units Subcutaneous 4 times daily (before meals and nightly) Not to exceed 60 units per day (Patient taking differently: Inject 10-15 Units  Subcutaneous 1-2 times daily. Not to exceed 60 units per day) 60 mL 1    insulin pen needle (31G X 8 MM) 31G X 8 MM miscellaneous Use up to 4 pen needles daily or as directed. 400 each 3    ipratropium (ATROVENT) 0.06 % nasal spray Spray 2 sprays into both nostrils 4 times daily as needed for rhinitis 15 mL 11    ketoconazole (NIZORAL) 2 % external cream Apply 1 Application topically as needed      lisinopril-hydrochlorothiazide (ZESTORETIC) 20-25 MG tablet Take 1 Tablet by mouth daily. 90 tablet 3    metFORMIN (GLUCOPHAGE XR) 500 MG 24 hr tablet Take 4 tablets (2,000 mg) by mouth daily (with dinner) 360 tablet 3    sildenafil (VIAGRA) 100 MG tablet Take 1 tablet (100 mg) by mouth daily as needed 20 tablet 11    tadalafil (CIALIS) 2.5 MG tablet Take 1 tablet (2.5 mg) by mouth daily 30 tablet 3    triamcinolone (KENALOG) 0.1 % external cream Apply 1 Application topically as needed         Allergies, family, and social history were reviewed and documented as needed in EHR.     REVIEW OF SYSTEMS  A focused ROS was performed, with pertinent positives and negatives as noted in the HPI.    PHYSICAL EXAM  /80 (BP Location: Right arm, Patient Position: Sitting, Cuff Size: Adult Large)   Pulse 86   Wt 113.9 kg (251 lb 3.2 oz)   SpO2 94%   BMI 34.55 kg/m    Body mass index is 34.55 kg/m .  Constitutional: Vital signs reviewed, as recorded above. Patient is alert, oriented and appears in no acute distress.  Eyes: PER, EOMI, no stare, lid lag, or retraction; no conjunctival injection.  Respiratory: Normal chest wall motion and respiratory effort.  MSK: No clubbing or cyanosis; normal muscle bulk and tone.  Skin: No lesions on visible skin,  Neurological: Alert and oriented times 3. No tremor.      DATA REVIEW  Each of the following laboratory and/or imaging studies were reviewed.    Component      Latest Ref Rng 10/9/2023  11:15 AM   Sodium      135 - 145 mmol/L 137    Potassium      3.4 - 5.3 mmol/L 4.1    Chloride       98 - 107 mmol/L 97 (L)    Carbon Dioxide (CO2)      22 - 29 mmol/L 28    Anion Gap      7 - 15 mmol/L 12    Urea Nitrogen      6.0 - 20.0 mg/dL 15.3    Creatinine      0.67 - 1.17 mg/dL 0.92    GFR Estimate      >60 mL/min/1.73m2 >90    Calcium      8.6 - 10.0 mg/dL 9.9    Glucose      70 - 99 mg/dL 197 (H)    TSH      0.30 - 4.20 uIU/mL 3.14    Hemoglobin A1C      0.0 - 5.6 % 8.4 (H)       Legend:  (L) Low  (H) High    ASSESSMENT  1.  Diabetes mellitus, [place] with positive LATESHA antibody and normal range C-peptide.  Progressing hyperglycemia based on previsit hemoglobin A1c and review of CGM data (see interpretation HPI).  Mr. Juárez feels that this is primarily due to inconsistent use of insulin and difficulty with following a diet more in line with diabetes management.  He would like to take some time and work on lifestyle changes as well as insulin use.  Therefore we will not make changes to his medication regimen.  I did offer referral to diabetes care and  to help with these factors: He prefers to defer for now.  He will you like our support via this regard.    We discussed preparation for travel, glucagon use for severe hypoglycemic emergencies, and insulin storage.    2.  Diabetes preventive care.  -Foot exam performed on 7/5/2023, foot care discussed at that visit  -Urine microalbumin screen checked 6/2023 showed microalbuminuria, slightly improved compared to prior study in 5/2022; continue Farxiga and ACE inhibitor  -Most recent eye exam on 12/3/2022 at [facility], no diabetic retinopathy   -Acceptable lipid profile on labs drawn in 6/2023 HDL will hopefully improve as diabetes control  and lifestyle changes improve, on statin therapy    3.  Concern for hypogonadism.  Primarily erectile dysfunction, lesser diminution in libido.  Testosterone checked in 3/2022 was normal.  Recheck testosterone level has been in normal range in 5/2022.  Testosterone level checked prior to this  visit, even at close to noon time, is in normal range and improving.  No indication for treatment at present, can continue periodic monitoring.      4.  Erectile dysfunction.  Some response to Cialis, can clinical continue it.  We have the option to refer to urology in the future if patient is interested.    PLAN  -Take Tresiba 50 units daily  -Take NovoLog--can take 5 units with smaller meals, 10 units with regular meals  -Can continue remainder of medications without changes  -Continue Cialis 2.5 mg once daily without regard to timing of sexual activity  -I will send prescription for Dara 2 reader  -Travel letter signed,  glucagon kit for travel for emergency  -Follow-up in February, with fasting labs before visit  -We will communicate results via [employer], or if needed by phone    Orders Placed This Encounter   Procedures    Hemoglobin A1c    Comprehensive metabolic panel    Lipid Profile     I spent a total of 43 minutes on the date of encounter reviewing medical records, evaluating the patient, coordinating care and documenting in the EHR, as detailed above.    I spent additional time on the date of encounter reviewing and interpreting CGM data, as outlined above.      Bushra Roy MD   Division of Diabetes, Endocrinology and Metabolism  Department of Medicine

## 2023-10-13 NOTE — PATIENT INSTRUCTIONS
-Take Tresiba 50 units daily  -Take NovoLog--can take 5 units with smaller meals, 10 units with regular meals  -Can continue remainder of medications without changes  -Continue Cialis 2.5 mg once daily without regard to timing of sexual activity  -I will send prescription for Advanced-Tec 2 reader  -Travel letter signed,  glucagon kit for travel for emergency  -Follow-up in February, with fasting labs before visit  -We will communicate results via [employer], or if needed by phone   X Size Of Lesion In Cm (Optional): 1.3

## 2023-10-13 NOTE — TELEPHONE ENCOUNTER
[facility] Call Center    Phone Message    May a detailed message be left on voicemail: yes     Reason for Call: Medication Question or concern regarding medication   Prescription Clarification  Name of Medication: insulin lispro (HUMALOG KWIKPEN) 100 UNIT/ML (1 unit dial) KWIKPEN   Prescribing Provider: Dr. Roy    Pharmacy: [facility] - [address]    What on the order needs clarification? Caller needs to clarify the changes made to the dosage instructions from 4 times daily to 1-2 times a day states it does correlate to patient not exceeding 60 units a day       Action Taken: Other: ENDO    Travel Screening: Not Applicable

## 2023-11-10 ENCOUNTER — TELEPHONE (OUTPATIENT)
Dept: FAMILY MEDICINE | Facility: CLINIC | Age: 53
End: 2023-11-10
Payer: COMMERCIAL

## 2024-01-15 NOTE — CONFIDENTIAL NOTE
Spoke with Low.  FBG running 170's-low 200's on 50 units.  Did  Dara system.  Appointment to start this set for next Tuesday.  Will touch base with Dr. Roy about possible next step before she sees patient in March.   Yes

## 2024-02-09 ENCOUNTER — LAB (OUTPATIENT)
Dept: LAB | Facility: CLINIC | Age: 54
End: 2024-02-09
Payer: COMMERCIAL

## 2024-02-09 DIAGNOSIS — E11.65 TYPE 2 DIABETES MELLITUS WITH HYPERGLYCEMIA, WITH LONG-TERM CURRENT USE OF INSULIN (H): ICD-10-CM

## 2024-02-09 DIAGNOSIS — Z79.4 TYPE 2 DIABETES MELLITUS WITH HYPERGLYCEMIA, WITH LONG-TERM CURRENT USE OF INSULIN (H): ICD-10-CM

## 2024-02-09 LAB
ALBUMIN SERPL BCG-MCNC: 4.5 G/DL (ref 3.5–5.2)
ALP SERPL-CCNC: 82 U/L (ref 40–150)
ALT SERPL W P-5'-P-CCNC: 42 U/L (ref 0–70)
ANION GAP SERPL CALCULATED.3IONS-SCNC: 10 MMOL/L (ref 7–15)
AST SERPL W P-5'-P-CCNC: 39 U/L (ref 0–45)
BILIRUB SERPL-MCNC: 0.6 MG/DL
BUN SERPL-MCNC: 21.6 MG/DL (ref 6–20)
CALCIUM SERPL-MCNC: 9.7 MG/DL (ref 8.6–10)
CHLORIDE SERPL-SCNC: 98 MMOL/L (ref 98–107)
CHOLEST SERPL-MCNC: 143 MG/DL
CREAT SERPL-MCNC: 1.12 MG/DL (ref 0.67–1.17)
DEPRECATED HCO3 PLAS-SCNC: 29 MMOL/L (ref 22–29)
EGFRCR SERPLBLD CKD-EPI 2021: 79 ML/MIN/1.73M2
FASTING STATUS PATIENT QL REPORTED: YES
GLUCOSE SERPL-MCNC: 162 MG/DL (ref 70–99)
HBA1C MFR BLD: 7.4 % (ref 0–5.6)
HDLC SERPL-MCNC: 29 MG/DL
LDLC SERPL CALC-MCNC: 69 MG/DL
NONHDLC SERPL-MCNC: 114 MG/DL
POTASSIUM SERPL-SCNC: 4.1 MMOL/L (ref 3.4–5.3)
PROT SERPL-MCNC: 7.5 G/DL (ref 6.4–8.3)
SODIUM SERPL-SCNC: 137 MMOL/L (ref 135–145)
TRIGL SERPL-MCNC: 223 MG/DL

## 2024-02-09 PROCEDURE — 80061 LIPID PANEL: CPT

## 2024-02-09 PROCEDURE — 83036 HEMOGLOBIN GLYCOSYLATED A1C: CPT

## 2024-02-09 PROCEDURE — 80053 COMPREHEN METABOLIC PANEL: CPT

## 2024-02-09 PROCEDURE — 36415 COLL VENOUS BLD VENIPUNCTURE: CPT

## 2024-02-16 ENCOUNTER — OFFICE VISIT (OUTPATIENT)
Dept: ENDOCRINOLOGY | Facility: CLINIC | Age: 54
End: 2024-02-16
Payer: COMMERCIAL

## 2024-02-16 VITALS
SYSTOLIC BLOOD PRESSURE: 138 MMHG | DIASTOLIC BLOOD PRESSURE: 84 MMHG | HEART RATE: 84 BPM | BODY MASS INDEX: 33.28 KG/M2 | WEIGHT: 242 LBS

## 2024-02-16 DIAGNOSIS — E11.65 TYPE 2 DIABETES MELLITUS WITH HYPERGLYCEMIA, WITH LONG-TERM CURRENT USE OF INSULIN (H): ICD-10-CM

## 2024-02-16 DIAGNOSIS — Z79.4 TYPE 2 DIABETES MELLITUS WITH HYPERGLYCEMIA, WITH LONG-TERM CURRENT USE OF INSULIN (H): ICD-10-CM

## 2024-02-16 PROCEDURE — 99215 OFFICE O/P EST HI 40 MIN: CPT | Performed by: INTERNAL MEDICINE

## 2024-02-16 PROCEDURE — 95251 CONT GLUC MNTR ANALYSIS I&R: CPT | Performed by: INTERNAL MEDICINE

## 2024-02-16 RX ORDER — METFORMIN HCL 500 MG
2000 TABLET, EXTENDED RELEASE 24 HR ORAL
Qty: 360 TABLET | Refills: 3 | Status: SHIPPED | OUTPATIENT
Start: 2024-02-16

## 2024-02-16 RX ORDER — INSULIN LISPRO 100 [IU]/ML
INJECTION, SOLUTION INTRAVENOUS; SUBCUTANEOUS
Qty: 60 ML | Refills: 1 | Status: SHIPPED | OUTPATIENT
Start: 2024-02-16

## 2024-02-16 RX ORDER — NEOMYCIN SULFATE, POLYMYXIN B SULFATE, AND DEXAMETHASONE 3.5; 10000; 1 MG/G; [USP'U]/G; MG/G
OINTMENT OPHTHALMIC
COMMUNITY
Start: 2023-07-13

## 2024-02-16 RX ORDER — TADALAFIL 2.5 MG/1
2.5 TABLET ORAL DAILY
Qty: 90 TABLET | Refills: 1 | Status: CANCELLED | OUTPATIENT
Start: 2024-02-16

## 2024-02-16 RX ORDER — TADALAFIL 5 MG/1
5 TABLET ORAL EVERY 24 HOURS
Qty: 30 TABLET | Refills: 3 | Status: SHIPPED | OUTPATIENT
Start: 2024-02-16 | End: 2024-05-17

## 2024-02-16 RX ORDER — INSULIN DEGLUDEC 200 U/ML
48-50 INJECTION, SOLUTION SUBCUTANEOUS DAILY
Qty: 60 ML | Refills: 1 | Status: SHIPPED | OUTPATIENT
Start: 2024-02-16 | End: 2024-09-06

## 2024-02-16 NOTE — LETTER
2/16/2024         RE: Low Juárez  [address]  [facility] [place] [address]        Dear Colleague,    Thank you for referring your patient, Low Juárez, to the [facility]. Please see a copy of my visit note below.      ENDOCRINOLOGY FOLLOW-UP         HISTORY OF PRESENT ILLNESS    Low Juárez is seen in follow-up for the issues outlined below.     He has been making dietary changes in the interim since last visit.  Trying to eat lower carbohydrate meals.  Still has some white rice but is minimizing bread.    With these changes, he has not been taking mealtime insulin unless his CGM alerts him to marked hyperglycemia (mid 200s or higher usually).    He has lost weight with these changes: Weighs 242 pounds today.  Weight 251 pounds at our last visit in 10/2023, 263 pounds in 9/2022.  Along with dietary changes, he has started some over-the-counter supplements to help with energy, libido and overall wellbeing.    Current diabetes regimen:   -Tresiba 50 units daily (rarely, he may skip evening dose of Tresiba and take it the next morning if he is skipping dinner)  -Metformin extended release 2000 mg daily   -Farxiga 10 mg daily  -NovoLog prescribed as 5 units with small meals and 10 units with regular meals (as above, not taking consistently), plus correction scale of 2 units for every 50 mg/dL over 150), now taking only occasionally    Interpretation of CGM    Results of Freestyle Dara continuous glucose monitoring reviewed. Data can be accessed on [employer] application.    Patient Data    Last A1c:   Hemoglobin A1C   Date Value Ref Range Status   02/09/2024 7.4 (H) 0.0 - 5.6 % Final     Comment:     Normal <5.7%   Prediabetes 5.7-6.4%    Diabetes 6.5% or higher     Note: Adopted from ADA consensus guidelines.   04/07/2021 8.2 (H) 0 - 5.6 % Final     Comment:     Reviewed: OK with previous  Normal <5.7% Prediabetes 5.7-6.4%  Diabetes 6.5% or higher - adopted from ADA   consensus  guidelines.       Current DM regimen: See above    Sensor Summary/ Accuracy Criteria  Number of days sensor worn: 33% in the last 14 days  Calibration: CGM system does not require calibration.    Average (mean) sensor glucose: 176 mg/dL (last visit mean glucose was 251 mg/dL)  Time in range: 65%  Duration below low limit: 0%  Coefficient of variation: 20%    Data Assessment  ~Significant improvement in glycemia, fairly stable and acceptable glucose values most of the time with rare hyperglycemia occurring either after lunch or evening meal with carbohydrate rich meals    Impression  -Improving glycemia, intermittent hyperglycemia after carbohydrate rich meals    Recommendations  -See assessment and plan    Started Cialis in 7/2023.  Has noted some improvement in erections, although he does not feel back to baseline and he does not have significant nocturnal erections.    Pertinent endocrine and related history:  1.  Diabetes mellitus.  Diagnosed in 2017 when patient was noted to have an elevated random glucose to 349.  A1c was 10.4% around the time of diagnosis.  -In 2021, A1c chacorta, prompting referral to diabetes education and then to endocrinology.  Of note, in 12/2021 he had LATESHA antibody checked which returned positive, with C-peptide that was still measurable.  -Initially on oral therapies, initiated on insulin in 10/2021.  -Previously on Victoza: Stopped this at the direction of one of his healthcare providers. Has history of pancreatitis (by his report, gallstone pancreatitis), status post cholecystectomy.  -Previously on Jardiance, discontinued due to discomfort and itching in the abdomen.  2.  Concern for low testosterone.  Patient has wondered if he has low testosterone since he has had erectile dysfunction.  He has been prescribed Viagra but has not used it since he has not been sexually active. Has some libido. Reports he used anabolic steroids 12 to 15 years ago when bodybuilding and wonders if this may  have had longstanding impact on testosterone levels and erectile function.  -Has biological children, no history of infertility.    Pertinent Social History: , works as a radiology technician.    PAST MEDICAL HISTORY  Past Medical History:   Diagnosis Date     Apnea, sleep      Fatty liver 8/18/2017     High cholesterol      Hyperlipidemia LDL goal <100 8/18/2017     Hypertension      Morbid obesity due to excess calories (H) 8/18/2017     Pancreatitis      Rhinitis      Type 2 diabetes mellitus without complication, without long-term current use of insulin (H) 1/31/2018       MEDICATIONS  Current Outpatient Medications   Medication Sig Dispense Refill     aspirin (ASA) 81 MG tablet Take 81 mg by mouth daily       atorvastatin (LIPITOR) 40 MG tablet Take 1 Tablet (40 mg) by mouth daily. 90 tablet 3     augmented betamethasone dipropionate (DIPROLENE) 0.05 % external lotion Apply topically 2 times daily. 60 mL 6     blood glucose (NO BRAND SPECIFIED) lancets standard Use to test blood sugar 2 times daily or as directed. 200 each 3     blood glucose (NO BRAND SPECIFIED) test strip Use to test blood sugar 2 times daily or as directed. 200 strip 3     blood glucose calibration (NO BRAND SPECIFIED) solution Use to calibrate blood glucose monitor as needed as directed. 1 each 3     blood glucose monitoring (NO BRAND SPECIFIED) meter device kit Use to test blood sugar 2 times daily or as directed. 1 kit 0     cetirizine (ZYRTEC) 10 MG tablet Take 10-20 mg by mouth as needed       Continuous Blood Gluc  (FREESTYLE ROSA 2 READER) PATRICK 1 each daily For continuous glucose monitoring 1 each 0     Continuous Blood Gluc Sensor (FREESTYLE ROSA 2 SENSOR) MISC 1 kit every 14 days Use per manufacture's instructions to check glucose daily. 6 each 3     Continuous Blood Gluc Sensor (FREESTYLE ROSA 3 SENSOR) MISC 1 each every 14 days 6 each 1     dapagliflozin (FARXIGA) 10 MG TABS tablet Take 1 tablet (10 mg) by mouth  daily 90 tablet 3     doxycycline hyclate (VIBRAMYCIN) 100 MG capsule Take 1 capsule by mouth 2 times daily       fluticasone (FLONASE) 50 MCG/ACT spray Spray 1 spray into both nostrils as needed for rhinitis or allergies       Glucagon (BAQSIMI TWO PACK) 3 MG/DOSE nasal powder Spray 1 spray (3 mg) in nostril as needed (Hypoglycemia) For use with severe hypoglycemia as instructed. Dispense one 2-pack. 1 each 1     glucosamine-chondroitin 500-400 MG CAPS per capsule Take 2 capsules by mouth daily       hydrOXYzine (ATARAX) 25 MG tablet Take 25 mg by mouth 3 times daily as needed       insulin degludec (TRESIBA FLEXTOUCH) 200 UNIT/ML pen Inject 48-50 Units Subcutaneous daily 60 mL 1     insulin lispro (HUMALOG KWIKPEN) 100 UNIT/ML (1 unit dial) KWIKPEN Inject 10-15 Units Subcutaneous 1-3 times daily. Not to exceed 60 units per day 60 mL 1     insulin pen needle (31G X 8 MM) 31G X 8 MM miscellaneous Use up to 4 pen needles daily or as directed. 400 each 3     ipratropium (ATROVENT) 0.06 % nasal spray Spray 2 sprays into both nostrils 4 times daily as needed for rhinitis 15 mL 11     ketoconazole (NIZORAL) 2 % external cream Apply 1 Application topically as needed       lisinopril-hydrochlorothiazide (ZESTORETIC) 20-25 MG tablet Take 1 Tablet by mouth daily. 90 tablet 3     metFORMIN (GLUCOPHAGE XR) 500 MG 24 hr tablet Take 4 tablets (2,000 mg) by mouth daily (with dinner) 360 tablet 3     neomycin-polymyxin-dexAMETHasone (MAXITROL) 3.5-38253-9.1 ophthalmic ointment        tadalafil (CIALIS) 5 MG tablet Take 1 tablet (5 mg) by mouth every 24 hours 30 tablet 3     triamcinolone (KENALOG) 0.1 % external cream Apply 1 Application topically as needed         Allergies, family, and social history were reviewed and documented as needed in EHR.     REVIEW OF SYSTEMS  A focused ROS was performed, with pertinent positives and negatives as noted in the HPI.    PHYSICAL EXAM  /84 (BP Location: Left arm, Patient Position:  Sitting, Cuff Size: Adult Large)   Pulse 84   Wt 109.8 kg (242 lb)   BMI 33.28 kg/m    Body mass index is 33.28 kg/m .  Constitutional: Vital signs reviewed, as recorded above. Patient is alert, oriented and appears in no acute distress.  Eyes: PER, EOMI, no stare, lid lag, or retraction; no conjunctival injection.  Respiratory: Normal chest wall motion and respiratory effort.  MSK: No clubbing or cyanosis; normal muscle bulk and tone.  Skin: No lesions on visible skin,  Neurological: Alert and oriented times 3. No tremor.      DATA REVIEW  Each of the following laboratory and/or imaging studies were reviewed.            ASSESSMENT  1.  Diabetes mellitus, CAMDEN with positive LATESHA antibody and normal range C-peptide.  Based on my review of CGM data (see interpretation HPI) glycemia is improving.  Patient congratulated on lifestyle changes that have had impact on glycemic control and weight loss also.  He is still experiencing some hyperglycemia after meals: We discussed taking NovoLog with meals that have higher carbohydrate content.  Otherwise, he can continue his current regimen without changes.    2.  Diabetes preventive care.  -Foot exam performed on 7/5/2023, foot care discussed at that visit  -Urine microalbumin screen checked 6/2023 showed microalbuminuria, slightly improved compared to prior study in 5/2022; continue Farxiga and ACE inhibitor  -I reviewed records of eye exam performed at [facility] on 12/3/2022: No diabetic retinopathy; patient will schedule follow-up eye exam  -Lipid profile overall acceptable; hypertriglyceridemia and HDL will hopefully continue to improve as he achieved weight loss and improved diabetes control; continue statin therapy    3.  Concern for hypogonadism.  Primarily erectile dysfunction, lesser diminution in libido.  Testosterone checked in 3/2022 was normal.  Recheck testosterone level has been in normal range in 5/2022.  Testosterone level checked again in 10/2023,  "even at close to noon time, was in normal range and improving.  No indication for treatment at present.  He has started taking natural supplements including \"men's health supplements\" and I have encouraged him to ensure supplements do not have interactions with his current medications or side effects; I have given him online resource to look up effects of his supplements and evidence behind their use.    4.  Erectile dysfunction.  Some response to Cialis, although patient does not think he has had optimal response.  We will adjust the dose up today.  We have the option to refer to urology in the future if patient is interested.    PLAN  -Take NovoLog 10 units with largest meal if having carbohydrate-rich foods such as rice, bread or pasta  -Continue all other diabetes medications without changes  -Adjust Cialis up to 5 mg once daily without regard to timing of sexual activity  -Schedule eye exam  -Follow-up in May, with labs before visit  -Have given patient online resource to review and ensure his over-the-counter supplements do not have interactions and side effects  -We will communicate results via [employer], or if needed by phone    Orders Placed This Encounter   Procedures     TSH with free T4 reflex     Basic metabolic panel     Hemoglobin A1c     I spent a total of 46 minutes on the date of encounter reviewing medical records, evaluating the patient, coordinating care and documenting in the EHR, as detailed above.    I spent additional time on the date of encounter reviewing and interpreting CGM data, as outlined above.      Remberto Roy MD   Division of Diabetes, Endocrinology and Metabolism  Department of Medicine            Again, thank you for allowing me to participate in the care of your patient.        Sincerely,        REMBERTO Roy MD  "

## 2024-02-16 NOTE — PATIENT INSTRUCTIONS
-Take NovoLog 10 units with largest meal if having carbohydrate-rich foods such as rice, bread or pasta  -Continue all other diabetes medications without changes  -Adjust Cialis up to 5 mg once daily without regard to timing of sexual activity  -Schedule eye exam  -Follow-up in May, with labs before visit  -Review the following website on [employer] to make sure supplements do not have interactions and side effects: [employer]'s comprehensive database for vitamins and supplements information from A to Z   -We will communicate results via [employer], or if needed by phone

## 2024-02-16 NOTE — PROGRESS NOTES
ENDOCRINOLOGY FOLLOW-UP         HISTORY OF PRESENT ILLNESS    Low Juárez is seen in follow-up for the issues outlined below.     He has been making dietary changes in the interim since last visit.  Trying to eat lower carbohydrate meals.  Still has some white rice but is minimizing bread.    With these changes, he has not been taking mealtime insulin unless his CGM alerts him to marked hyperglycemia (mid 200s or higher usually).    He has lost weight with these changes: Weighs 242 pounds today.  Weight 251 pounds at our last visit in 10/2023, 263 pounds in 9/2022.  Along with dietary changes, he has started some over-the-counter supplements to help with energy, libido and overall wellbeing.    Current diabetes regimen:   -Tresiba 50 units daily (rarely, he may skip evening dose of Tresiba and take it the next morning if he is skipping dinner)  -Metformin extended release 2000 mg daily   -Farxiga 10 mg daily  -NovoLog prescribed as 5 units with small meals and 10 units with regular meals (as above, not taking consistently), plus correction scale of 2 units for every 50 mg/dL over 150), now taking only occasionally    Interpretation of CGM    Results of Freestyle Dara continuous glucose monitoring reviewed. Data can be accessed on [employer] application.    Patient Data    Last A1c:   Hemoglobin A1C   Date Value Ref Range Status   02/09/2024 7.4 (H) 0.0 - 5.6 % Final     Comment:     Normal <5.7%   Prediabetes 5.7-6.4%    Diabetes 6.5% or higher     Note: Adopted from ADA consensus guidelines.   04/07/2021 8.2 (H) 0 - 5.6 % Final     Comment:     Reviewed: OK with previous  Normal <5.7% Prediabetes 5.7-6.4%  Diabetes 6.5% or higher - adopted from ADA   consensus guidelines.       Current DM regimen: See above    Sensor Summary/ Accuracy Criteria  Number of days sensor worn: 33% in the last 14 days  Calibration: CGM system does not require calibration.    Average (mean) sensor glucose: 176 mg/dL (last visit mean  glucose was 251 mg/dL)  Time in range: 65%  Duration below low limit: 0%  Coefficient of variation: 20%    Data Assessment  ~Significant improvement in glycemia, fairly stable and acceptable glucose values most of the time with rare hyperglycemia occurring either after lunch or evening meal with carbohydrate rich meals    Impression  -Improving glycemia, intermittent hyperglycemia after carbohydrate rich meals    Recommendations  -See assessment and plan    Started Cialis in 7/2023.  Has noted some improvement in erections, although he does not feel back to baseline and he does not have significant nocturnal erections.    Pertinent endocrine and related history:  1.  Diabetes mellitus.  Diagnosed in 2017 when patient was noted to have an elevated random glucose to 349.  A1c was 10.4% around the time of diagnosis.  -In 2021, A1c chacorta, prompting referral to diabetes education and then to endocrinology.  Of note, in 12/2021 he had LATESHA antibody checked which returned positive, with C-peptide that was still measurable.  -Initially on oral therapies, initiated on insulin in 10/2021.  -Previously on Victoza: Stopped this at the direction of one of his healthcare providers. Has history of pancreatitis (by his report, gallstone pancreatitis), status post cholecystectomy.  -Previously on Jardiance, discontinued due to discomfort and itching in the abdomen.  2.  Concern for low testosterone.  Patient has wondered if he has low testosterone since he has had erectile dysfunction.  He has been prescribed Viagra but has not used it since he has not been sexually active. Has some libido. Reports he used anabolic steroids 12 to 15 years ago when bodybuilding and wonders if this may have had longstanding impact on testosterone levels and erectile function.  -Has biological children, no history of infertility.    Pertinent Social History: , works as a radiology technician.    PAST MEDICAL HISTORY  Past Medical History:    Diagnosis Date    Apnea, sleep     Fatty liver 8/18/2017    High cholesterol     Hyperlipidemia LDL goal <100 8/18/2017    Hypertension     Morbid obesity due to excess calories (H) 8/18/2017    Pancreatitis     Rhinitis     Type 2 diabetes mellitus without complication, without long-term current use of insulin (H) 1/31/2018       MEDICATIONS  Current Outpatient Medications   Medication Sig Dispense Refill    aspirin (ASA) 81 MG tablet Take 81 mg by mouth daily      atorvastatin (LIPITOR) 40 MG tablet Take 1 Tablet (40 mg) by mouth daily. 90 tablet 3    augmented betamethasone dipropionate (DIPROLENE) 0.05 % external lotion Apply topically 2 times daily. 60 mL 6    blood glucose (NO BRAND SPECIFIED) lancets standard Use to test blood sugar 2 times daily or as directed. 200 each 3    blood glucose (NO BRAND SPECIFIED) test strip Use to test blood sugar 2 times daily or as directed. 200 strip 3    blood glucose calibration (NO BRAND SPECIFIED) solution Use to calibrate blood glucose monitor as needed as directed. 1 each 3    blood glucose monitoring (NO BRAND SPECIFIED) meter device kit Use to test blood sugar 2 times daily or as directed. 1 kit 0    cetirizine (ZYRTEC) 10 MG tablet Take 10-20 mg by mouth as needed      Continuous Blood Gluc  (FREESTYLE ROSA 2 READER) PATRICK 1 each daily For continuous glucose monitoring 1 each 0    Continuous Blood Gluc Sensor (FREESTYLE ROSA 2 SENSOR) MISC 1 kit every 14 days Use per manufacture's instructions to check glucose daily. 6 each 3    Continuous Blood Gluc Sensor (FREESTYLE ROSA 3 SENSOR) MISC 1 each every 14 days 6 each 1    dapagliflozin (FARXIGA) 10 MG TABS tablet Take 1 tablet (10 mg) by mouth daily 90 tablet 3    doxycycline hyclate (VIBRAMYCIN) 100 MG capsule Take 1 capsule by mouth 2 times daily      fluticasone (FLONASE) 50 MCG/ACT spray Spray 1 spray into both nostrils as needed for rhinitis or allergies      Glucagon (BAQSIMI TWO PACK) 3 MG/DOSE nasal  powder Spray 1 spray (3 mg) in nostril as needed (Hypoglycemia) For use with severe hypoglycemia as instructed. Dispense one 2-pack. 1 each 1    glucosamine-chondroitin 500-400 MG CAPS per capsule Take 2 capsules by mouth daily      hydrOXYzine (ATARAX) 25 MG tablet Take 25 mg by mouth 3 times daily as needed      insulin degludec (TRESIBA FLEXTOUCH) 200 UNIT/ML pen Inject 48-50 Units Subcutaneous daily 60 mL 1    insulin lispro (HUMALOG KWIKPEN) 100 UNIT/ML (1 unit dial) KWIKPEN Inject 10-15 Units Subcutaneous 1-3 times daily. Not to exceed 60 units per day 60 mL 1    insulin pen needle (31G X 8 MM) 31G X 8 MM miscellaneous Use up to 4 pen needles daily or as directed. 400 each 3    ipratropium (ATROVENT) 0.06 % nasal spray Spray 2 sprays into both nostrils 4 times daily as needed for rhinitis 15 mL 11    ketoconazole (NIZORAL) 2 % external cream Apply 1 Application topically as needed      lisinopril-hydrochlorothiazide (ZESTORETIC) 20-25 MG tablet Take 1 Tablet by mouth daily. 90 tablet 3    metFORMIN (GLUCOPHAGE XR) 500 MG 24 hr tablet Take 4 tablets (2,000 mg) by mouth daily (with dinner) 360 tablet 3    neomycin-polymyxin-dexAMETHasone (MAXITROL) 3.5-75594-9.1 ophthalmic ointment       tadalafil (CIALIS) 5 MG tablet Take 1 tablet (5 mg) by mouth every 24 hours 30 tablet 3    triamcinolone (KENALOG) 0.1 % external cream Apply 1 Application topically as needed         Allergies, family, and social history were reviewed and documented as needed in EHR.     REVIEW OF SYSTEMS  A focused ROS was performed, with pertinent positives and negatives as noted in the HPI.    PHYSICAL EXAM  /84 (BP Location: Left arm, Patient Position: Sitting, Cuff Size: Adult Large)   Pulse 84   Wt 109.8 kg (242 lb)   BMI 33.28 kg/m    Body mass index is 33.28 kg/m .  Constitutional: Vital signs reviewed, as recorded above. Patient is alert, oriented and appears in no acute distress.  Eyes: PER, EOMI, no stare, lid lag, or  "retraction; no conjunctival injection.  Respiratory: Normal chest wall motion and respiratory effort.  MSK: No clubbing or cyanosis; normal muscle bulk and tone.  Skin: No lesions on visible skin,  Neurological: Alert and oriented times 3. No tremor.      DATA REVIEW  Each of the following laboratory and/or imaging studies were reviewed.            ASSESSMENT  1.  Diabetes mellitus, CAMDEN with positive LATESHA antibody and normal range C-peptide.  Based on my review of CGM data (see interpretation HPI) glycemia is improving.  Patient congratulated on lifestyle changes that have had impact on glycemic control and weight loss also.  He is still experiencing some hyperglycemia after meals: We discussed taking NovoLog with meals that have higher carbohydrate content.  Otherwise, he can continue his current regimen without changes.    2.  Diabetes preventive care.  -Foot exam performed on 7/5/2023, foot care discussed at that visit  -Urine microalbumin screen checked 6/2023 showed microalbuminuria, slightly improved compared to prior study in 5/2022; continue Farxiga and ACE inhibitor  -I reviewed records of eye exam performed at [facility] on 12/3/2022: No diabetic retinopathy; patient will schedule follow-up eye exam  -Lipid profile overall acceptable; hypertriglyceridemia and HDL will hopefully continue to improve as he achieved weight loss and improved diabetes control; continue statin therapy    3.  Concern for hypogonadism.  Primarily erectile dysfunction, lesser diminution in libido.  Testosterone checked in 3/2022 was normal.  Recheck testosterone level has been in normal range in 5/2022.  Testosterone level checked again in 10/2023, even at close to noon time, was in normal range and improving.  No indication for treatment at present.  He has started taking natural supplements including \"men's health supplements\" and I have encouraged him to ensure supplements do not have interactions with his current " medications or side effects; I have given him online resource to look up effects of his supplements and evidence behind their use.    4.  Erectile dysfunction.  Some response to Cialis, although patient does not think he has had optimal response.  We will adjust the dose up today.  We have the option to refer to urology in the future if patient is interested.    PLAN  -Take NovoLog 10 units with largest meal if having carbohydrate-rich foods such as rice, bread or pasta  -Continue all other diabetes medications without changes  -Adjust Cialis up to 5 mg once daily without regard to timing of sexual activity  -Schedule eye exam  -Follow-up in May, with labs before visit  -Have given patient online resource to review and ensure his over-the-counter supplements do not have interactions and side effects  -We will communicate results via [employer], or if needed by phone    Orders Placed This Encounter   Procedures    TSH with free T4 reflex    Basic metabolic panel    Hemoglobin A1c     I spent a total of 46 minutes on the date of encounter reviewing medical records, evaluating the patient, coordinating care and documenting in the EHR, as detailed above.    I spent additional time on the date of encounter reviewing and interpreting CGM data, as outlined above.      Bushra Roy MD   Division of Diabetes, Endocrinology and Metabolism  Department of Medicine

## 2024-02-20 ENCOUNTER — TELEPHONE (OUTPATIENT)
Dept: ENDOCRINOLOGY | Facility: CLINIC | Age: 54
End: 2024-02-20
Payer: COMMERCIAL

## 2024-02-21 NOTE — TELEPHONE ENCOUNTER
Prior Authorization Approval    Medication: TRESIBA FLEXTOUCH 200 UNIT/ML SC SOPN  Authorization Effective Date: 2/19/2024  Authorization Expiration Date: 2/18/2027  Approved Dose/Quantity: 9  Reference #: [id number]   Insurance Company: [employer] - Phone [phone] Fax [phone]  Expected CoPay: $    CoPay Card Available:      Financial Assistance Needed:    Which Pharmacy is filling the prescription:    Pharmacy Notified: yes  Patient Notified: yes

## 2024-03-13 ENCOUNTER — TELEPHONE (OUTPATIENT)
Dept: FAMILY MEDICINE | Facility: CLINIC | Age: 54
End: 2024-03-13
Payer: COMMERCIAL

## 2024-03-13 ENCOUNTER — MYC MEDICAL ADVICE (OUTPATIENT)
Dept: FAMILY MEDICINE | Facility: CLINIC | Age: 54
End: 2024-03-13
Payer: COMMERCIAL

## 2024-03-13 NOTE — TELEPHONE ENCOUNTER
Patient Quality Outreach    Patient is due for the following:   Depression  -  PHQ2    Next Steps:   Patient was assigned appropriate questionnaire to complete    Type of outreach:    Sent [employer] message.      Questions for provider review:    None           Narayan Maddox Jr., MA  Chart routed to Care Team.

## 2024-03-28 NOTE — PROGRESS NOTES
Low is here alone today to get his Dara 3 sensor set up.  We downloaded the marily and reviewed settings and alerts.  We discussed less likely to have false low readings.      All additional questions and concerns addressed today.    Mary Hickman RN, [facility]  [phone]  Clinic Schedule  Tuesday [facility]  Wednesday [facility]  Thursday [facility]  DSMT 13 minutes  In person

## 2024-04-12 NOTE — TELEPHONE ENCOUNTER
Patient Quality Outreach    Patient is due for the following:   Depression  -  PHQ2    Next Steps:   Perform PHQ2    Type of outreach:    Phone, left message for patient/parent to call back.    Next Steps:  Reach out within 90 days via Phone.    Max number of attempts reached: Yes. Will try again in 90 days if patient still on fail list.    Questions for provider review:    None           Narayan Maddox Jr., MA

## 2024-05-10 ENCOUNTER — LAB (OUTPATIENT)
Dept: LAB | Facility: CLINIC | Age: 54
End: 2024-05-10
Payer: COMMERCIAL

## 2024-05-10 DIAGNOSIS — E11.65 TYPE 2 DIABETES MELLITUS WITH HYPERGLYCEMIA, WITH LONG-TERM CURRENT USE OF INSULIN (H): ICD-10-CM

## 2024-05-10 DIAGNOSIS — Z79.4 TYPE 2 DIABETES MELLITUS WITH HYPERGLYCEMIA, WITH LONG-TERM CURRENT USE OF INSULIN (H): ICD-10-CM

## 2024-05-10 LAB — HBA1C MFR BLD: 8.2 % (ref 0–5.6)

## 2024-05-10 PROCEDURE — 83036 HEMOGLOBIN GLYCOSYLATED A1C: CPT

## 2024-05-10 PROCEDURE — 36415 COLL VENOUS BLD VENIPUNCTURE: CPT

## 2024-05-10 PROCEDURE — 80048 BASIC METABOLIC PNL TOTAL CA: CPT

## 2024-05-10 PROCEDURE — 84443 ASSAY THYROID STIM HORMONE: CPT

## 2024-05-11 LAB
ANION GAP SERPL CALCULATED.3IONS-SCNC: 11 MMOL/L (ref 7–15)
BUN SERPL-MCNC: 14.6 MG/DL (ref 6–20)
CALCIUM SERPL-MCNC: 11.2 MG/DL (ref 8.6–10)
CHLORIDE SERPL-SCNC: 95 MMOL/L (ref 98–107)
CREAT SERPL-MCNC: 1.12 MG/DL (ref 0.67–1.17)
DEPRECATED HCO3 PLAS-SCNC: 29 MMOL/L (ref 22–29)
EGFRCR SERPLBLD CKD-EPI 2021: 78 ML/MIN/1.73M2
GLUCOSE SERPL-MCNC: 133 MG/DL (ref 70–99)
POTASSIUM SERPL-SCNC: 4.6 MMOL/L (ref 3.4–5.3)
SODIUM SERPL-SCNC: 135 MMOL/L (ref 135–145)
TSH SERPL DL<=0.005 MIU/L-ACNC: 3.2 UIU/ML (ref 0.3–4.2)

## 2024-05-17 ENCOUNTER — LAB (OUTPATIENT)
Dept: LAB | Facility: CLINIC | Age: 54
End: 2024-05-17
Payer: COMMERCIAL

## 2024-05-17 ENCOUNTER — OFFICE VISIT (OUTPATIENT)
Dept: ENDOCRINOLOGY | Facility: CLINIC | Age: 54
End: 2024-05-17
Payer: COMMERCIAL

## 2024-05-17 VITALS
SYSTOLIC BLOOD PRESSURE: 144 MMHG | HEART RATE: 84 BPM | BODY MASS INDEX: 32.73 KG/M2 | DIASTOLIC BLOOD PRESSURE: 82 MMHG | WEIGHT: 238 LBS

## 2024-05-17 DIAGNOSIS — Z79.4 TYPE 2 DIABETES MELLITUS WITH HYPERGLYCEMIA, WITH LONG-TERM CURRENT USE OF INSULIN (H): Primary | ICD-10-CM

## 2024-05-17 DIAGNOSIS — E83.52 HYPERCALCEMIA: ICD-10-CM

## 2024-05-17 DIAGNOSIS — E11.65 TYPE 2 DIABETES MELLITUS WITH HYPERGLYCEMIA, WITH LONG-TERM CURRENT USE OF INSULIN (H): Primary | ICD-10-CM

## 2024-05-17 PROCEDURE — 36415 COLL VENOUS BLD VENIPUNCTURE: CPT

## 2024-05-17 PROCEDURE — 83970 ASSAY OF PARATHORMONE: CPT

## 2024-05-17 PROCEDURE — 99215 OFFICE O/P EST HI 40 MIN: CPT | Mod: 25 | Performed by: INTERNAL MEDICINE

## 2024-05-17 PROCEDURE — 80069 RENAL FUNCTION PANEL: CPT

## 2024-05-17 PROCEDURE — 82306 VITAMIN D 25 HYDROXY: CPT

## 2024-05-17 PROCEDURE — 95251 CONT GLUC MNTR ANALYSIS I&R: CPT | Performed by: INTERNAL MEDICINE

## 2024-05-17 RX ORDER — TADALAFIL 5 MG/1
5 TABLET ORAL EVERY 24 HOURS
Qty: 30 TABLET | Refills: 5 | Status: SHIPPED | OUTPATIENT
Start: 2024-05-17

## 2024-05-17 NOTE — PATIENT INSTRUCTIONS
-Labs today  -For now, continue vitamin D and we will decide whether adjustment is needed based on results  -Continue current regimen without changes  -Schedule visit with urology (update me if referral is needed)  -Follow-up in August, with labs before visit--let me know if interested in checking testosterone before that visit  -We will communicate results via [employer] or if needed by phone

## 2024-05-17 NOTE — LETTER
5/17/2024         RE: Low Juárez  [address]  [place] [address]        Dear Colleague,    Thank you for referring your patient, Low Juárez, to the [facility]. Please see a copy of my visit note below.      ENDOCRINOLOGY FOLLOW-UP         HISTORY OF PRESENT ILLNESS    Low Juárez is seen in follow-up for the issues outlined below.     1.  Diabetes mellitus.  He was traveling on vacation for 3 weeks to [facility] and the [facility].  As would be expected, he enjoyed his trip and did not follow his usual diet.  Also, he inadvertently took his glucometer rather than his CGM supplies and did not have a chance to use CGM over those weeks.    Now that he has returned to the , he has been working on getting back into his usual dietary regimen for the past week.    Continues to lose weight: 232 pounds today, 242 pounds last visit in 2/2024.  He weighed 263 pounds in 9/2022.      He sometimes may forget to take mealtime NovoLog with his larger meals if he is busy at work.    Current diabetes regimen:   -Tresiba 50 units daily  -Metformin extended release 2000 mg daily   -Farxiga 10 mg daily  -NovoLog 10 units with larger carbohydrate-rich meals, plus correction scale of 2 units for every 50 mg/dL over 150), now taking only occasionally    Interpretation of CGM    Results of Freestyle Dara continuous glucose monitoring reviewed. Data can be accessed on [employer] application.    Patient Data    Last A1c:   Hemoglobin A1C   Date Value Ref Range Status   05/10/2024 8.2 (H) 0.0 - 5.6 % Final     Comment:     Normal <5.7%   Prediabetes 5.7-6.4%    Diabetes 6.5% or higher     Note: Adopted from ADA consensus guidelines.   04/07/2021 8.2 (H) 0 - 5.6 % Final     Comment:     Reviewed: OK with previous  Normal <5.7% Prediabetes 5.7-6.4%  Diabetes 6.5% or higher - adopted from ADA   consensus guidelines.       Current DM regimen: See above    Sensor Summary/ Accuracy Criteria  Number of  days sensor worn: 38% in the last 14 days  Calibration: CGM system does not require calibration.    Average (mean) sensor glucose: 199 mg/dL   Time in range: 38%  Duration below low limit: 0%  Coefficient of variation: 32%    Data Assessment  ~Progressing hyperglycemia, most pronounced in the evening after meals  ~No recurrent hypoglycemia    Impression  -Hyperglycemia, likely due to change in dietary habits and inconsistent mealtime insulin dosing    Recommendations  -See assessment and plan    2.  Erectile dysfunction.  Started Cialis in 7/2023.  We increased Cialis dose to 5 mg daily last visit in 2/2024.  He has been taking this dose.  Now having consistent erections but difficulty maintaining erections.    3.  Hypercalcemia.  Noted on previsit labs.  He does not take calcium supplement.  Does not use Tums often.  He does take vitamin D3 daily, unknown dose.  Also on thiazide.    Pertinent endocrine and related history:  1.  Diabetes mellitus.  Diagnosed in 2017 when patient was noted to have an elevated random glucose to 349.  A1c was 10.4% around the time of diagnosis.  -In 2021, A1c chacorta, prompting referral to diabetes education and then to endocrinology.  Of note, in 12/2021 he had LATESHA antibody checked which returned positive, with C-peptide that was still measurable.  -Initially on oral therapies, initiated on insulin in 10/2021.  -Previously on Victoza: Stopped this at the direction of one of his healthcare providers. Has history of pancreatitis (by his report, gallstone pancreatitis), status post cholecystectomy.  -Previously on Jardiance, discontinued due to discomfort and itching in the abdomen.  2.  Concern for low testosterone.  Patient has wondered if he has low testosterone since he has had erectile dysfunction.  He has been prescribed Viagra but has not used it since he has not been sexually active. Has some libido. Reports he used anabolic steroids 12 to 15 years ago when bodybuilding and wonders  if this may have had longstanding impact on testosterone levels and erectile function.  -Has biological children, no history of infertility.    Pertinent Social History: , works as a radiology technician.    PAST MEDICAL HISTORY  Past Medical History:   Diagnosis Date     Apnea, sleep      Fatty liver 8/18/2017     High cholesterol      Hyperlipidemia LDL goal <100 8/18/2017     Hypertension      Morbid obesity due to excess calories (H) 8/18/2017     Pancreatitis      Rhinitis      Type 2 diabetes mellitus without complication, without long-term current use of insulin (H) 1/31/2018       MEDICATIONS  Current Outpatient Medications   Medication Sig Dispense Refill     aspirin (ASA) 81 MG tablet Take 81 mg by mouth daily       atorvastatin (LIPITOR) 40 MG tablet Take 1 Tablet (40 mg) by mouth daily. 90 tablet 3     augmented betamethasone dipropionate (DIPROLENE) 0.05 % external lotion Apply topically 2 times daily. 60 mL 6     blood glucose (NO BRAND SPECIFIED) lancets standard Use to test blood sugar 2 times daily or as directed. 200 each 3     blood glucose (NO BRAND SPECIFIED) test strip Use to test blood sugar 2 times daily or as directed. 200 strip 3     blood glucose calibration (NO BRAND SPECIFIED) solution Use to calibrate blood glucose monitor as needed as directed. 1 each 3     blood glucose monitoring (NO BRAND SPECIFIED) meter device kit Use to test blood sugar 2 times daily or as directed. 1 kit 0     cetirizine (ZYRTEC) 10 MG tablet Take 10-20 mg by mouth as needed       Continuous Blood Gluc  (FREESTYLE ROSA 2 READER) PATRICK 1 each daily For continuous glucose monitoring 1 each 0     Continuous Blood Gluc Sensor (FREESTYLE ROSA 2 SENSOR) MISC 1 kit every 14 days Use per manufacture's instructions to check glucose daily. 6 each 3     dapagliflozin (FARXIGA) 10 MG TABS tablet Take 1 tablet (10 mg) by mouth daily 90 tablet 3     doxycycline hyclate (VIBRAMYCIN) 100 MG capsule Take 1  capsule by mouth 2 times daily       fluticasone (FLONASE) 50 MCG/ACT spray Spray 1 spray into both nostrils as needed for rhinitis or allergies       glucosamine-chondroitin 500-400 MG CAPS per capsule Take 2 capsules by mouth daily       hydrOXYzine (ATARAX) 25 MG tablet Take 25 mg by mouth 3 times daily as needed       insulin degludec (TRESIBA FLEXTOUCH) 200 UNIT/ML pen Inject 48-50 Units Subcutaneous daily 60 mL 1     insulin lispro (HUMALOG KWIKPEN) 100 UNIT/ML (1 unit dial) KWIKPEN Inject 10-15 Units Subcutaneous 1-3 times daily. Not to exceed 60 units per day 60 mL 1     ipratropium (ATROVENT) 0.06 % nasal spray Spray 2 sprays into both nostrils 4 times daily as needed for rhinitis 15 mL 11     ketoconazole (NIZORAL) 2 % external cream Apply 1 Application topically as needed       lisinopril-hydrochlorothiazide (ZESTORETIC) 20-25 MG tablet Take 1 Tablet by mouth daily. 90 tablet 3     metFORMIN (GLUCOPHAGE XR) 500 MG 24 hr tablet Take 4 tablets (2,000 mg) by mouth daily (with dinner) 360 tablet 3     neomycin-polymyxin-dexAMETHasone (MAXITROL) 3.5-20120-4.1 ophthalmic ointment        tadalafil (CIALIS) 5 MG tablet Take 1 tablet (5 mg) by mouth every 24 hours 30 tablet 3     triamcinolone (KENALOG) 0.1 % external cream Apply 1 Application topically as needed       Continuous Blood Gluc Sensor (FREESTYLE ROSA 3 SENSOR) MISC 1 each every 14 days (Patient not taking: Reported on 5/17/2024) 6 each 1     Glucagon (BAQSIMI TWO PACK) 3 MG/DOSE nasal powder Spray 1 spray (3 mg) in nostril as needed (Hypoglycemia) For use with severe hypoglycemia as instructed. Dispense one 2-pack. 1 each 1     insulin pen needle (31G X 8 MM) 31G X 8 MM miscellaneous Use up to 4 pen needles daily or as directed. 400 each 3       Allergies, family, and social history were reviewed and documented as needed in EHR.     REVIEW OF SYSTEMS  A focused ROS was performed, with pertinent positives and negatives as noted in the HPI.    PHYSICAL  EXAM  BP (!) 156/82 (BP Location: Right arm, Patient Position: Sitting, Cuff Size: Adult Large)   Pulse 84   Wt 108 kg (238 lb)   BMI 32.73 kg/m    Body mass index is 32.73 kg/m .  Constitutional: Vital signs reviewed, as recorded above. Patient is alert, oriented and appears in no acute distress.  Eyes: PER, EOMI, no stare, lid lag, or retraction; no conjunctival injection.  Respiratory: Normal chest wall motion and respiratory effort.  MSK: No clubbing or cyanosis; normal muscle bulk and tone.  Skin: No lesions on visible skin,  Neurological: Alert and oriented times 3. No tremor.      DATA REVIEW  Each of the following laboratory and/or imaging studies were reviewed.            ASSESSMENT  1.  Diabetes mellitus, [place] with positive LATESHA antibody and normal range C-peptide.  Based on previsit A1c and CGM data, has progressing hyperglycemia likely due to dietary changes and less consistent mealtime insulin dosing.  We discussed working on resuming his previous regimen.    Patient has questions about extended fasts for longevity (up to 3 days): Would recommend against this in the context of diabetes and ultralong acting insulin use.  We did discuss the option of more consistent but shorter instances of time restricted eating may be beneficial with less risk of hyper or hypoglycemia.    2.  Diabetes preventive care.  -Foot exam performed on 7/5/2023, foot care discussed at that visit  -Urine microalbumin screen checked 6/2023 showed microalbuminuria, slightly improved compared to prior study in 5/2022; continue Farxiga and ACE inhibitor  -I reviewed records of eye exam performed at [place] [facility] on 12/3/2022: No diabetic retinopathy; has follow-up eye exam scheduled next week  -Lipid profile overall acceptable in 2/2024; hypertriglyceridemia and HDL will hopefully continue to improve as he achieved weight loss and improved diabetes control; continue statin therapy    3.  Concern for hypogonadism.  Primarily  erectile dysfunction, lesser diminution in libido.  Testosterone levels checked in 2022 in 2023 multiple times have been in normal range.  He does have erectile dysfunction, as discussed below.  No indication for treatment at present.  We do have the option to recheck testosterone levels (patient has wondered about rechecking this) but this would need to be drawn at 8 AM: He will let me know if he is interested so that we can order these labs before next visit.    4.  Erectile dysfunction.  Some response to Cialis, although some difficulty maintaining erections.  Would recommend consultation with urology to address these ongoing concerns.    5.  Hypercalcemia.  Reevaluate calcium metabolism labs.  Differential includes spurious findings, vitamin D excess, hyperparathyroidism or thiazide-induced hypercalcemia.    PLAN  -Labs today  -For now, continue vitamin D and we will decide whether adjustment is needed based on results  -Continue current regimen without changes  -Schedule visit with urology (update me if referral is needed)  -Follow-up in August, with labs before visit--let me know if interested in checking testosterone before that visit  -We will communicate results via [employer], or if needed by phone      Orders Placed This Encounter   Procedures     Basic metabolic panel     Albumin level     Vitamin D Deficiency     Parathyroid Hormone Intact     Phosphorus     I spent a total of 42 minutes on the date of encounter reviewing medical records, evaluating the patient, coordinating care and documenting in the EHR, as detailed above.    I spent additional time on the date of encounter reviewing and interpreting CGM data, as outlined above.      Bushra Roy MD   Division of Diabetes, Endocrinology and Metabolism  Department of Medicine            Again, thank you for allowing me to participate in the care of your patient.        Sincerely,        Bushra Roy MD

## 2024-05-17 NOTE — PROGRESS NOTES
ENDOCRINOLOGY FOLLOW-UP         HISTORY OF PRESENT ILLNESS    Low Juárez is seen in follow-up for the issues outlined below.     1.  Diabetes mellitus.  He was traveling on vacation for 3 weeks to [facility] and the [facility].  As would be expected, he enjoyed his trip and did not follow his usual diet.  Also, he inadvertently took his glucometer rather than his CGM supplies and did not have a chance to use CGM over those weeks.    Now that he has returned to the [place], he has been working on getting back into his usual dietary regimen for the past week.    Continues to lose weight: 232 pounds today, 242 pounds last visit in 2/2024.  He weighed 263 pounds in 9/2022.      He sometimes may forget to take mealtime NovoLog with his larger meals if he is busy at work.    Current diabetes regimen:   -Tresiba 50 units daily  -Metformin extended release 2000 mg daily   -Farxiga 10 mg daily  -NovoLog 10 units with larger carbohydrate-rich meals, plus correction scale of 2 units for every 50 mg/dL over 150), now taking only occasionally    Interpretation of CGM    Results of Freestyle Dara continuous glucose monitoring reviewed. Data can be accessed on [employer] application.    Patient Data    Last A1c:   Hemoglobin A1C   Date Value Ref Range Status   05/10/2024 8.2 (H) 0.0 - 5.6 % Final     Comment:     Normal <5.7%   Prediabetes 5.7-6.4%    Diabetes 6.5% or higher     Note: Adopted from ADA consensus guidelines.   04/07/2021 8.2 (H) 0 - 5.6 % Final     Comment:     Reviewed: OK with previous  Normal <5.7% Prediabetes 5.7-6.4%  Diabetes 6.5% or higher - adopted from ADA   consensus guidelines.       Current DM regimen: See above    Sensor Summary/ Accuracy Criteria  Number of days sensor worn: 38% in the last 14 days  Calibration: CGM system does not require calibration.    Average (mean) sensor glucose: 199 mg/dL   Time in range: 38%  Duration below low limit: 0%  Coefficient of variation: 32%    Data  Assessment  ~Progressing hyperglycemia, most pronounced in the evening after meals  ~No recurrent hypoglycemia    Impression  -Hyperglycemia, likely due to change in dietary habits and inconsistent mealtime insulin dosing    Recommendations  -See assessment and plan    2.  Erectile dysfunction.  Started Cialis in 7/2023.  We increased Cialis dose to 5 mg daily last visit in 2/2024.  He has been taking this dose.  Now having consistent erections but difficulty maintaining erections.    3.  Hypercalcemia.  Noted on previsit labs.  He does not take calcium supplement.  Does not use Tums often.  He does take vitamin D3 daily, unknown dose.  Also on thiazide.    Pertinent endocrine and related history:  1.  Diabetes mellitus.  Diagnosed in 2017 when patient was noted to have an elevated random glucose to 349.  A1c was 10.4% around the time of diagnosis.  -In 2021, A1c chacorta, prompting referral to diabetes education and then to endocrinology.  Of note, in 12/2021 he had LATESHA antibody checked which returned positive, with C-peptide that was still measurable.  -Initially on oral therapies, initiated on insulin in 10/2021.  -Previously on Victoza: Stopped this at the direction of one of his healthcare providers. Has history of pancreatitis (by his report, gallstone pancreatitis), status post cholecystectomy.  -Previously on Jardiance, discontinued due to discomfort and itching in the abdomen.  2.  Concern for low testosterone.  Patient has wondered if he has low testosterone since he has had erectile dysfunction.  He has been prescribed Viagra but has not used it since he has not been sexually active. Has some libido. Reports he used anabolic steroids 12 to 15 years ago when bodybuilding and wonders if this may have had longstanding impact on testosterone levels and erectile function.  -Has biological children, no history of infertility.    Pertinent Social History: , works as a radiology technician.    PAST MEDICAL  HISTORY  Past Medical History:   Diagnosis Date    Apnea, sleep     Fatty liver 8/18/2017    High cholesterol     Hyperlipidemia LDL goal <100 8/18/2017    Hypertension     Morbid obesity due to excess calories (H) 8/18/2017    Pancreatitis     Rhinitis     Type 2 diabetes mellitus without complication, without long-term current use of insulin (H) 1/31/2018       MEDICATIONS  Current Outpatient Medications   Medication Sig Dispense Refill    aspirin (ASA) 81 MG tablet Take 81 mg by mouth daily      atorvastatin (LIPITOR) 40 MG tablet Take 1 Tablet (40 mg) by mouth daily. 90 tablet 3    augmented betamethasone dipropionate (DIPROLENE) 0.05 % external lotion Apply topically 2 times daily. 60 mL 6    blood glucose (NO BRAND SPECIFIED) lancets standard Use to test blood sugar 2 times daily or as directed. 200 each 3    blood glucose (NO BRAND SPECIFIED) test strip Use to test blood sugar 2 times daily or as directed. 200 strip 3    blood glucose calibration (NO BRAND SPECIFIED) solution Use to calibrate blood glucose monitor as needed as directed. 1 each 3    blood glucose monitoring (NO BRAND SPECIFIED) meter device kit Use to test blood sugar 2 times daily or as directed. 1 kit 0    cetirizine (ZYRTEC) 10 MG tablet Take 10-20 mg by mouth as needed      Continuous Blood Gluc  (FREESTYLE ROSA 2 READER) PATRICK 1 each daily For continuous glucose monitoring 1 each 0    Continuous Blood Gluc Sensor (FREESTYLE ROSA 2 SENSOR) MISC 1 kit every 14 days Use per manufacture's instructions to check glucose daily. 6 each 3    dapagliflozin (FARXIGA) 10 MG TABS tablet Take 1 tablet (10 mg) by mouth daily 90 tablet 3    doxycycline hyclate (VIBRAMYCIN) 100 MG capsule Take 1 capsule by mouth 2 times daily      fluticasone (FLONASE) 50 MCG/ACT spray Spray 1 spray into both nostrils as needed for rhinitis or allergies      glucosamine-chondroitin 500-400 MG CAPS per capsule Take 2 capsules by mouth daily      hydrOXYzine  (ATARAX) 25 MG tablet Take 25 mg by mouth 3 times daily as needed      insulin degludec (TRESIBA FLEXTOUCH) 200 UNIT/ML pen Inject 48-50 Units Subcutaneous daily 60 mL 1    insulin lispro (HUMALOG KWIKPEN) 100 UNIT/ML (1 unit dial) KWIKPEN Inject 10-15 Units Subcutaneous 1-3 times daily. Not to exceed 60 units per day 60 mL 1    ipratropium (ATROVENT) 0.06 % nasal spray Spray 2 sprays into both nostrils 4 times daily as needed for rhinitis 15 mL 11    ketoconazole (NIZORAL) 2 % external cream Apply 1 Application topically as needed      lisinopril-hydrochlorothiazide (ZESTORETIC) 20-25 MG tablet Take 1 Tablet by mouth daily. 90 tablet 3    metFORMIN (GLUCOPHAGE XR) 500 MG 24 hr tablet Take 4 tablets (2,000 mg) by mouth daily (with dinner) 360 tablet 3    neomycin-polymyxin-dexAMETHasone (MAXITROL) 3.5-86159-7.1 ophthalmic ointment       tadalafil (CIALIS) 5 MG tablet Take 1 tablet (5 mg) by mouth every 24 hours 30 tablet 3    triamcinolone (KENALOG) 0.1 % external cream Apply 1 Application topically as needed      Continuous Blood Gluc Sensor (FREESTYLE ROSA 3 SENSOR) MISC 1 each every 14 days (Patient not taking: Reported on 5/17/2024) 6 each 1    Glucagon (BAQSIMI TWO PACK) 3 MG/DOSE nasal powder Spray 1 spray (3 mg) in nostril as needed (Hypoglycemia) For use with severe hypoglycemia as instructed. Dispense one 2-pack. 1 each 1    insulin pen needle (31G X 8 MM) 31G X 8 MM miscellaneous Use up to 4 pen needles daily or as directed. 400 each 3       Allergies, family, and social history were reviewed and documented as needed in EHR.     REVIEW OF SYSTEMS  A focused ROS was performed, with pertinent positives and negatives as noted in the HPI.    PHYSICAL EXAM  BP (!) 156/82 (BP Location: Right arm, Patient Position: Sitting, Cuff Size: Adult Large)   Pulse 84   Wt 108 kg (238 lb)   BMI 32.73 kg/m    Body mass index is 32.73 kg/m .  Constitutional: Vital signs reviewed, as recorded above. Patient is alert,  oriented and appears in no acute distress.  Eyes: PER, EOMI, no stare, lid lag, or retraction; no conjunctival injection.  Respiratory: Normal chest wall motion and respiratory effort.  MSK: No clubbing or cyanosis; normal muscle bulk and tone.  Skin: No lesions on visible skin,  Neurological: Alert and oriented times 3. No tremor.      DATA REVIEW  Each of the following laboratory and/or imaging studies were reviewed.            ASSESSMENT  1.  Diabetes mellitus, [place] with positive LATESHA antibody and normal range C-peptide.  Based on previsit A1c and CGM data, has progressing hyperglycemia likely due to dietary changes and less consistent mealtime insulin dosing.  We discussed working on resuming his previous regimen.    Patient has questions about extended fasts for longevity (up to 3 days): Would recommend against this in the context of diabetes and ultralong acting insulin use.  We did discuss the option of more consistent but shorter instances of time restricted eating may be beneficial with less risk of hyper or hypoglycemia.    2.  Diabetes preventive care.  -Foot exam performed on 7/5/2023, foot care discussed at that visit  -Urine microalbumin screen checked 6/2023 showed microalbuminuria, slightly improved compared to prior study in 5/2022; continue Farxiga and ACE inhibitor  -I reviewed records of eye exam performed at [facility] on 12/3/2022: No diabetic retinopathy; has follow-up eye exam scheduled next week  -Lipid profile overall acceptable in 2/2024; hypertriglyceridemia and HDL will hopefully continue to improve as he achieved weight loss and improved diabetes control; continue statin therapy    3.  Concern for hypogonadism.  Primarily erectile dysfunction, lesser diminution in libido.  Testosterone levels checked in 2022 in 2023 multiple times have been in normal range.  He does have erectile dysfunction, as discussed below.  No indication for treatment at present.  We do have the option to  recheck testosterone levels (patient has wondered about rechecking this) but this would need to be drawn at 8 AM: He will let me know if he is interested so that we can order these labs before next visit.    4.  Erectile dysfunction.  Some response to Cialis, although some difficulty maintaining erections.  Would recommend consultation with urology to address these ongoing concerns.    5.  Hypercalcemia.  Reevaluate calcium metabolism labs.  Differential includes spurious findings, vitamin D excess, hyperparathyroidism or thiazide-induced hypercalcemia.    PLAN  -Labs today  -For now, continue vitamin D and we will decide whether adjustment is needed based on results  -Continue current regimen without changes  -Schedule visit with urology (update me if referral is needed)  -Follow-up in August, with labs before visit--let me know if interested in checking testosterone before that visit  -We will communicate results via [employer], or if needed by phone      Orders Placed This Encounter   Procedures    Basic metabolic panel    Albumin level    Vitamin D Deficiency    Parathyroid Hormone Intact    Phosphorus     I spent a total of 42 minutes on the date of encounter reviewing medical records, evaluating the patient, coordinating care and documenting in the EHR, as detailed above.    I spent additional time on the date of encounter reviewing and interpreting CGM data, as outlined above.      Bushra Roy MD   Division of Diabetes, Endocrinology and Metabolism  Department of Medicine

## 2024-05-18 LAB
ALBUMIN SERPL BCG-MCNC: 4.7 G/DL (ref 3.5–5.2)
ANION GAP SERPL CALCULATED.3IONS-SCNC: 11 MMOL/L (ref 7–15)
BUN SERPL-MCNC: 19.6 MG/DL (ref 6–20)
CALCIUM SERPL-MCNC: 10 MG/DL (ref 8.6–10)
CHLORIDE SERPL-SCNC: 100 MMOL/L (ref 98–107)
CREAT SERPL-MCNC: 1.05 MG/DL (ref 0.67–1.17)
DEPRECATED HCO3 PLAS-SCNC: 25 MMOL/L (ref 22–29)
EGFRCR SERPLBLD CKD-EPI 2021: 84 ML/MIN/1.73M2
GLUCOSE SERPL-MCNC: 151 MG/DL (ref 70–99)
PHOSPHATE SERPL-MCNC: 3.5 MG/DL (ref 2.5–4.5)
POTASSIUM SERPL-SCNC: 4.8 MMOL/L (ref 3.4–5.3)
PTH-INTACT SERPL-MCNC: 21 PG/ML (ref 15–65)
SODIUM SERPL-SCNC: 136 MMOL/L (ref 135–145)
VIT D+METAB SERPL-MCNC: 36 NG/ML (ref 20–50)

## 2024-05-29 ENCOUNTER — TRANSFERRED RECORDS (OUTPATIENT)
Dept: HEALTH INFORMATION MANAGEMENT | Facility: CLINIC | Age: 54
End: 2024-05-29
Payer: COMMERCIAL

## 2024-07-03 ENCOUNTER — TELEPHONE (OUTPATIENT)
Dept: FAMILY MEDICINE | Facility: CLINIC | Age: 54
End: 2024-07-03
Payer: COMMERCIAL

## 2024-07-03 NOTE — TELEPHONE ENCOUNTER
Patient Quality Outreach    Patient is due for the following:   Diabetes -  A1C    Next Steps:   Patient has upcoming appointment, these items will be addressed at that time.  Pt has Lab appt on 8/30/24.    Type of outreach:    Chart review performed, no outreach needed.      Questions for provider review:    None           Narayan Maddox Jr., MA

## 2024-07-06 ENCOUNTER — HEALTH MAINTENANCE LETTER (OUTPATIENT)
Age: 54
End: 2024-07-06

## 2024-08-30 ENCOUNTER — LAB (OUTPATIENT)
Dept: LAB | Facility: CLINIC | Age: 54
End: 2024-08-30
Payer: COMMERCIAL

## 2024-08-30 DIAGNOSIS — E11.65 TYPE 2 DIABETES MELLITUS WITH HYPERGLYCEMIA, WITH LONG-TERM CURRENT USE OF INSULIN (H): ICD-10-CM

## 2024-08-30 DIAGNOSIS — Z79.4 TYPE 2 DIABETES MELLITUS WITH HYPERGLYCEMIA, WITH LONG-TERM CURRENT USE OF INSULIN (H): ICD-10-CM

## 2024-08-30 DIAGNOSIS — E83.52 HYPERCALCEMIA: ICD-10-CM

## 2024-08-30 LAB
ANION GAP SERPL CALCULATED.3IONS-SCNC: 9 MMOL/L (ref 7–15)
BUN SERPL-MCNC: 18.9 MG/DL (ref 6–20)
CALCIUM SERPL-MCNC: 9.6 MG/DL (ref 8.8–10.4)
CHLORIDE SERPL-SCNC: 99 MMOL/L (ref 98–107)
CREAT SERPL-MCNC: 1.04 MG/DL (ref 0.67–1.17)
CREAT UR-MCNC: 107 MG/DL
EGFRCR SERPLBLD CKD-EPI 2021: 85 ML/MIN/1.73M2
GLUCOSE SERPL-MCNC: 163 MG/DL (ref 70–99)
HBA1C MFR BLD: 8.1 % (ref 0–5.6)
HCO3 SERPL-SCNC: 30 MMOL/L (ref 22–29)
MICROALBUMIN UR-MCNC: 86.3 MG/L
MICROALBUMIN/CREAT UR: 80.65 MG/G CR (ref 0–17)
POTASSIUM SERPL-SCNC: 4 MMOL/L (ref 3.4–5.3)
SODIUM SERPL-SCNC: 138 MMOL/L (ref 135–145)

## 2024-08-30 PROCEDURE — 83036 HEMOGLOBIN GLYCOSYLATED A1C: CPT

## 2024-08-30 PROCEDURE — 82043 UR ALBUMIN QUANTITATIVE: CPT

## 2024-08-30 PROCEDURE — 36415 COLL VENOUS BLD VENIPUNCTURE: CPT

## 2024-08-30 PROCEDURE — 82570 ASSAY OF URINE CREATININE: CPT

## 2024-08-30 PROCEDURE — 80048 BASIC METABOLIC PNL TOTAL CA: CPT

## 2024-09-06 ENCOUNTER — OFFICE VISIT (OUTPATIENT)
Dept: ENDOCRINOLOGY | Facility: CLINIC | Age: 54
End: 2024-09-06
Payer: COMMERCIAL

## 2024-09-06 VITALS
SYSTOLIC BLOOD PRESSURE: 147 MMHG | WEIGHT: 250.2 LBS | BODY MASS INDEX: 34.41 KG/M2 | OXYGEN SATURATION: 97 % | DIASTOLIC BLOOD PRESSURE: 87 MMHG | HEART RATE: 89 BPM

## 2024-09-06 DIAGNOSIS — Z79.4 TYPE 2 DIABETES MELLITUS WITH HYPERGLYCEMIA, WITH LONG-TERM CURRENT USE OF INSULIN (H): ICD-10-CM

## 2024-09-06 DIAGNOSIS — E11.65 TYPE 2 DIABETES MELLITUS WITH HYPERGLYCEMIA, WITH LONG-TERM CURRENT USE OF INSULIN (H): ICD-10-CM

## 2024-09-06 PROCEDURE — 99215 OFFICE O/P EST HI 40 MIN: CPT | Mod: 25 | Performed by: INTERNAL MEDICINE

## 2024-09-06 PROCEDURE — 99417 PROLNG OP E/M EACH 15 MIN: CPT | Performed by: INTERNAL MEDICINE

## 2024-09-06 PROCEDURE — 95251 CONT GLUC MNTR ANALYSIS I&R: CPT | Performed by: INTERNAL MEDICINE

## 2024-09-06 RX ORDER — INSULIN DEGLUDEC 200 U/ML
60 INJECTION, SOLUTION SUBCUTANEOUS DAILY
Qty: 60 ML | Refills: 3 | Status: SHIPPED | OUTPATIENT
Start: 2024-09-06

## 2024-09-06 RX ORDER — DAPAGLIFLOZIN 10 MG/1
10 TABLET, FILM COATED ORAL DAILY
Qty: 90 TABLET | Refills: 3 | Status: SHIPPED | OUTPATIENT
Start: 2024-09-06

## 2024-09-06 NOTE — LETTER
9/6/2024      Low Juárez  [address] [place] [id number]      Dear Colleague,    Thank you for referring your patient, Low Juárez, to the [facility]. Please see a copy of my visit note below.      ENDOCRINOLOGY FOLLOW-UP         HISTORY OF PRESENT ILLNESS    Low Juárez is seen in follow-up for the issues outlined below.     1.  Diabetes mellitus.      Seen in the ED in 6/2024 with periorbital cellulitis.    Has had weight gain in the interim since last visit: Weight 238 pounds in 5/2024, weighs 250 pounds today (242 pounds in 2/2024, 263 pounds in 9/2022).      He does not identify any significant changes in diet or level of physical activity.  He has been taking new supplements, including a chelating supplement.    Current diabetes regimen:   -Tresiba 50 units daily  -Metformin extended release 2000 mg daily   -Farxiga 10 mg daily  -Humalog 10 units with larger carbohydrate-rich meals (also prescribed correction scale of 2 units for every 50 mg/dL over 150)    Interpretation of CGM    Results of Freestyle Dara continuous glucose monitoring reviewed. Data can be accessed on [employer] application.    Patient Data    Last A1c:   Hemoglobin A1C   Date Value Ref Range Status   08/30/2024 8.1 (H) 0.0 - 5.6 % Final     Comment:     Normal <5.7%   Prediabetes 5.7-6.4%    Diabetes 6.5% or higher     Note: Adopted from ADA consensus guidelines.   04/07/2021 8.2 (H) 0 - 5.6 % Final     Comment:     Reviewed: OK with previous  Normal <5.7% Prediabetes 5.7-6.4%  Diabetes 6.5% or higher - adopted from ADA   consensus guidelines.       Current DM regimen: See above    Sensor Summary/ Accuracy Criteria  Number of days sensor worn: 41% in the last 14 days  Calibration: CGM system does not require calibration.    Average (mean) sensor glucose: 253 mg/dL (last visit, 199 mg/dL)  Time in range: 12%  Duration below low limit: 0%  Coefficient of variation: 24%     Data Assessment  ~Progressing  hyperglycemia, consistently throughout the day and worsening after meals    Impression  -Hyperglycemia    Recommendations  -See assessment and plan    2.  Erectile dysfunction.  Started Cialis in 7/2023.  We increased Cialis dose to 5 mg daily last visit in 2/2024.  At our last visit in 5/2024, he reported he was having consistent erections but difficulty maintaining erections.  I had suggested evaluation in urology but he has not yet pursued this.    3.  Hypercalcemia.  Noted on previsit labs in 5/2024, not persistent on labs drawn prior to this visit.  He does not take calcium supplement.  Does not use Tums often.  He does take vitamin D3 daily, unknown dose.  Also on thiazide.    Pertinent endocrine and related history:  1.  Diabetes mellitus.  Diagnosed in 2017 when patient was noted to have an elevated random glucose to 349.  A1c was 10.4% around the time of diagnosis.  -In 2021, A1c chacorta, prompting referral to diabetes education and then to endocrinology.  Of note, in 12/2021 he had LATESHA antibody checked which returned positive, with C-peptide that was still measurable.  -Initially on oral therapies, initiated on insulin in 10/2021.  -Previously on Victoza: Stopped this at the direction of one of his healthcare providers. Has history of pancreatitis (by his report, gallstone pancreatitis), status post cholecystectomy.  -Previously on Jardiance, discontinued due to discomfort and itching in the abdomen.  2.  Concern for low testosterone.  Patient has wondered if he has low testosterone since he has had erectile dysfunction.  He has been prescribed Viagra but has not used it since he has not been sexually active. Has some libido. Reports he used anabolic steroids 12 to 15 years ago when bodybuilding and wonders if this may have had longstanding impact on testosterone levels and erectile function.  -Has biological children, no history of infertility.    Pertinent Social History: , works as a radiology  technician.    PAST MEDICAL HISTORY  Past Medical History:   Diagnosis Date     Apnea, sleep      Fatty liver 8/18/2017     High cholesterol      Hyperlipidemia LDL goal <100 8/18/2017     Hypertension      Morbid obesity due to excess calories (H) 8/18/2017     Pancreatitis      Rhinitis      Type 2 diabetes mellitus without complication, without long-term current use of insulin (H) 1/31/2018       MEDICATIONS  Current Outpatient Medications   Medication Sig Dispense Refill     aspirin (ASA) 81 MG tablet Take 81 mg by mouth daily       atorvastatin (LIPITOR) 40 MG tablet Take 1 Tablet (40 mg) by mouth daily. 90 tablet 3     augmented betamethasone dipropionate (DIPROLENE) 0.05 % external lotion Apply topically 2 times daily. 60 mL 6     blood glucose (NO BRAND SPECIFIED) lancets standard Use to test blood sugar 2 times daily or as directed. 200 each 3     blood glucose (NO BRAND SPECIFIED) test strip Use to test blood sugar 2 times daily or as directed. 200 strip 3     blood glucose calibration (NO BRAND SPECIFIED) solution Use to calibrate blood glucose monitor as needed as directed. 1 each 3     blood glucose monitoring (NO BRAND SPECIFIED) meter device kit Use to test blood sugar 2 times daily or as directed. 1 kit 0     cetirizine (ZYRTEC) 10 MG tablet Take 10-20 mg by mouth as needed       Continuous Blood Gluc  (FREESTYLE ROSA 2 READER) PATRICK 1 each daily For continuous glucose monitoring 1 each 0     Continuous Blood Gluc Sensor (FREESTYLE ROSA 2 SENSOR) MISC 1 kit every 14 days Use per manufacture's instructions to check glucose daily. 6 each 3     dapagliflozin (FARXIGA) 10 MG TABS tablet Take 1 tablet (10 mg) by mouth daily. 90 tablet 3     doxycycline hyclate (VIBRAMYCIN) 100 MG capsule Take 1 capsule by mouth 2 times daily       fluticasone (FLONASE) 50 MCG/ACT spray Spray 1 spray into both nostrils as needed for rhinitis or allergies       Glucagon (BAQSIMI TWO PACK) 3 MG/DOSE nasal powder  Spray 1 spray (3 mg) in nostril as needed (Hypoglycemia) For use with severe hypoglycemia as instructed. Dispense one 2-pack. 1 each 1     insulin degludec (TRESIBA FLEXTOUCH) 200 UNIT/ML pen Inject 60 Units subcutaneously daily. 60 mL 3     insulin lispro (HUMALOG KWIKPEN) 100 UNIT/ML (1 unit dial) KWIKPEN Inject 10-15 Units Subcutaneous 1-3 times daily. Not to exceed 60 units per day 60 mL 1     insulin pen needle (31G X 8 MM) 31G X 8 MM miscellaneous Use up to 4 pen needles daily or as directed. 400 each 3     ipratropium (ATROVENT) 0.06 % nasal spray Spray 2 sprays into both nostrils 4 times daily as needed for rhinitis 15 mL 11     ketoconazole (NIZORAL) 2 % external cream Apply 1 Application topically as needed       lisinopril-hydrochlorothiazide (ZESTORETIC) 20-25 MG tablet Take 1 Tablet by mouth daily. 90 tablet 3     metFORMIN (GLUCOPHAGE XR) 500 MG 24 hr tablet Take 4 tablets (2,000 mg) by mouth daily (with dinner) 360 tablet 3     neomycin-polymyxin-dexAMETHasone (MAXITROL) 3.5-13374-9.1 ophthalmic ointment        tadalafil (CIALIS) 5 MG tablet Take 1 tablet (5 mg) by mouth every 24 hours 30 tablet 5     triamcinolone (KENALOG) 0.1 % external cream Apply 1 Application topically as needed       Continuous Blood Gluc Sensor (FREESTYLE ROSA 3 SENSOR) MISC 1 each every 14 days (Patient not taking: Reported on 5/17/2024) 6 each 1     glucosamine-chondroitin 500-400 MG CAPS per capsule Take 2 capsules by mouth daily (Patient not taking: Reported on 9/6/2024)       hydrOXYzine (ATARAX) 25 MG tablet Take 25 mg by mouth 3 times daily as needed (Patient not taking: Reported on 9/6/2024)         Allergies, family, and social history were reviewed and documented as needed in EHR.     REVIEW OF SYSTEMS  A focused ROS was performed, with pertinent positives and negatives as noted in the HPI.    PHYSICAL EXAM  BP (!) 147/87 (BP Location: Right arm, Patient Position: Sitting, Cuff Size: Adult Large)   Pulse 89   Wt  "113.5 kg (250 lb 3.2 oz)   SpO2 97%   BMI 34.41 kg/m    Body mass index is 34.41 kg/m .  Constitutional: Vital signs reviewed, as recorded above. Patient is alert, oriented and appears in no acute distress.  Eyes: PER, EOMI, no stare, lid lag, or retraction; no conjunctival injection.  ENMT: OP clear with moist MM. Lips are without lesions.   Neck: Neck supple, no palpable thyromegaly.  Lymphatic: No cervical or supraclavicular LAD.  Cardiovascular: RRR, normal S1/S2, no audible murmurs, rubs or gallops; DP pulses present and symmetric. No LE edema.  Respiratory: CTAB, without wheezes, crackles or rhonchi; normal chest wall motion and respiratory effort.  MSK: No clubbing or cyanosis; normal muscle bulk and tone.  Skin: Normal skin color, temperature, turgor and texture.  Neurological: Alert and oriented times 3. No tremor.    Foot exam: DP and PT pulses present and symmetric.  Monofilament and vibratory sensation intact.    DATA REVIEW  Each of the following laboratory and/or imaging studies were reviewed.            ASSESSMENT  1.  Diabetes mellitus, [place] with positive LATESHA antibody and normal range C-peptide in 2022.  However, blood glucose values have trended up, as has A1c: Question if endogenous insulin production is waning.  Recommend adjusting insulin doses upward as below.  To reevaluate C-peptide prior to next visit.  We have deferred GLP-1/GIP receptor agonists thus far due to prior history of pancreatitis; pancreatitis, based on patient's recollection, was likely due to gallstones so we could consider this class of drugs depending on his progress.    Mr. Juárez does use various supplements for longevity/health maintenance but I am not certain if additives in such supplements may be contributing to progression of hyperglycemia also.  We discussed perhaps reducing supplements such as chelators or \"peptides\" which may have unregulated ingredients that could be impacting glycemia.    2.  Diabetes preventive " care.  -Foot exam performed on 7/5/2023, foot care discussed at that visit  -Persistent microalbuminuria on most recent urine microalbumin screen on 8/30/2024; continue Farxiga and ACE inhibitor  -I reviewed records of eye exam performed at [place] [facility] on 5/29/2024: No diabetic retinopathy; has follow-up eye exam scheduled next week  -Lipid profile overall acceptable in 2/2024; hypertriglyceridemia and HDL will hopefully continue to improve as he achieved weight loss and improved diabetes control; continue statin therapy    3.  Concern for hypogonadism.  Primarily erectile dysfunction, lesser diminution in libido.  Testosterone levels checked in 2022 in 2023 multiple times have been in normal range.  He does have erectile dysfunction, as discussed below.  No indication for treatment at present.  We do have the option to recheck testosterone levels (patient has wondered about rechecking this) but this would need to be drawn at 8 AM: He will let me know if he is interested so that we can order these labs before next visit.    4.  Erectile dysfunction.  Some response to Cialis, although some difficulty maintaining erections.  Has not yet pursued consultation with urology; can defer for now if preferred by patient.    5.  Hypercalcemia.  Calcium metabolism labs checked after last visit were normal.  Serum calcium remains normal on labs drawn prior to this visit.  No additional intervention for now.    PLAN  -Increase Tresiba to 60 units daily  -Take Humalog 10 units before each meal  Please use the following correction scale using Humalog insulin before meals.    Blood sugar 150 - 200; take 2 units of insulin.  Blood sugar 201 - 250; take 4 units of insulin.  Blood sugar 251 - 300; take 6 units of insulin.  Blood sugar 301 - 350; take 8 units of insulin.  Blood sugar 351 - 400; take 10 units of insulin.  Blood sugar over 401;  take 12 units of insulin.    -Continue remainder of current regimen without  changes  -Can consider visit with urology (update me if referral is needed)  -Follow-up in January, with fasting labs before visit  -We will communicate results via MyChart, or if needed by phone      Orders Placed This Encounter   Procedures     C-peptide     Comprehensive metabolic panel     Lipid Profile     TSH with free T4 reflex     Hemoglobin A1c     I spent a total of 58 minutes on the date of encounter reviewing medical records, evaluating the patient, coordinating care and documenting in the EHR, as detailed above.    I spent additional time on the date of encounter reviewing and interpreting CGM data, as outlined above.      Bushra Roy MD   Division of Diabetes, Endocrinology and Metabolism  Department of Medicine            Again, thank you for allowing me to participate in the care of your patient.        Sincerely,        Bushra Roy MD

## 2024-09-06 NOTE — PATIENT INSTRUCTIONS
-Increase Tresiba to 60 units daily  -Take Humalog 10 units before each meal  Please use the following correction scale using Humalog insulin before meals.    Blood sugar 150 - 200; take 2 units of insulin.  Blood sugar 201 - 250; take 4 units of insulin.  Blood sugar 251 - 300; take 6 units of insulin.  Blood sugar 301 - 350; take 8 units of insulin.  Blood sugar 351 - 400; take 10 units of insulin.  Blood sugar over 401;  take 12 units of insulin.    -Continue current regimen without changes  -Schedule visit with urology (update me if referral is needed)  -Follow-up in January, with fasting labs before visit  -We will communicate results via [employer], or if needed by phone

## 2024-09-06 NOTE — PROGRESS NOTES
ENDOCRINOLOGY FOLLOW-UP         HISTORY OF PRESENT ILLNESS    Low Juárez is seen in follow-up for the issues outlined below.     1.  Diabetes mellitus.      Seen in the ED in 6/2024 with periorbital cellulitis.    Has had weight gain in the interim since last visit: Weight 238 pounds in 5/2024, weighs 250 pounds today (242 pounds in 2/2024, 263 pounds in 9/2022).      He does not identify any significant changes in diet or level of physical activity.  He has been taking new supplements, including a chelating supplement.    Current diabetes regimen:   -Tresiba 50 units daily  -Metformin extended release 2000 mg daily   -Farxiga 10 mg daily  -Humalog 10 units with larger carbohydrate-rich meals (also prescribed correction scale of 2 units for every 50 mg/dL over 150)    Interpretation of CGM    Results of Freestyle Dara continuous glucose monitoring reviewed. Data can be accessed on [email or web address] application.    Patient Data    Last A1c:   Hemoglobin A1C   Date Value Ref Range Status   08/30/2024 8.1 (H) 0.0 - 5.6 % Final     Comment:     Normal <5.7%   Prediabetes 5.7-6.4%    Diabetes 6.5% or higher     Note: Adopted from ADA consensus guidelines.   04/07/2021 8.2 (H) 0 - 5.6 % Final     Comment:     Reviewed: OK with previous  Normal <5.7% Prediabetes 5.7-6.4%  Diabetes 6.5% or higher - adopted from ADA   consensus guidelines.       Current DM regimen: See above    Sensor Summary/ Accuracy Criteria  Number of days sensor worn: 41% in the last 14 days  Calibration: CGM system does not require calibration.    Average (mean) sensor glucose: 253 mg/dL (last visit, 199 mg/dL)  Time in range: 12%  Duration below low limit: 0%  Coefficient of variation: 24%     Data Assessment  ~Progressing hyperglycemia, consistently throughout the day and worsening after meals    Impression  -Hyperglycemia    Recommendations  -See assessment and plan    2.  Erectile dysfunction.  Started Cialis in 7/2023.  We increased Cialis dose to 5  mg daily last visit in 2/2024.  At our last visit in 5/2024, he reported he was having consistent erections but difficulty maintaining erections.  I had suggested evaluation in urology but he has not yet pursued this.    3.  Hypercalcemia.  Noted on previsit labs in 5/2024, not persistent on labs drawn prior to this visit.  He does not take calcium supplement.  Does not use Tums often.  He does take vitamin D3 daily, unknown dose.  Also on thiazide.    Pertinent endocrine and related history:  1.  Diabetes mellitus.  Diagnosed in 2017 when patient was noted to have an elevated random glucose to 349.  A1c was 10.4% around the time of diagnosis.  -In 2021, A1c chacorta, prompting referral to diabetes education and then to endocrinology.  Of note, in 12/2021 he had LATESHA antibody checked which returned positive, with C-peptide that was still measurable.  -Initially on oral therapies, initiated on insulin in 10/2021.  -Previously on Victoza: Stopped this at the direction of one of his healthcare providers. Has history of pancreatitis (by his report, gallstone pancreatitis), status post cholecystectomy.  -Previously on Jardiance, discontinued due to discomfort and itching in the abdomen.  2.  Concern for low testosterone.  Patient has wondered if he has low testosterone since he has had erectile dysfunction.  He has been prescribed Viagra but has not used it since he has not been sexually active. Has some libido. Reports he used anabolic steroids 12 to 15 years ago when bodybuilding and wonders if this may have had longstanding impact on testosterone levels and erectile function.  -Has biological children, no history of infertility.    Pertinent Social History: , works as a radiology technician.    PAST MEDICAL HISTORY  Past Medical History:   Diagnosis Date    Apnea, sleep     Fatty liver 8/18/2017    High cholesterol     Hyperlipidemia LDL goal <100 8/18/2017    Hypertension     Morbid obesity due to excess calories  (H) 8/18/2017    Pancreatitis     Rhinitis     Type 2 diabetes mellitus without complication, without long-term current use of insulin (H) 1/31/2018       MEDICATIONS  Current Outpatient Medications   Medication Sig Dispense Refill    aspirin (ASA) 81 MG tablet Take 81 mg by mouth daily      atorvastatin (LIPITOR) 40 MG tablet Take 1 Tablet (40 mg) by mouth daily. 90 tablet 3    augmented betamethasone dipropionate (DIPROLENE) 0.05 % external lotion Apply topically 2 times daily. 60 mL 6    blood glucose (NO BRAND SPECIFIED) lancets standard Use to test blood sugar 2 times daily or as directed. 200 each 3    blood glucose (NO BRAND SPECIFIED) test strip Use to test blood sugar 2 times daily or as directed. 200 strip 3    blood glucose calibration (NO BRAND SPECIFIED) solution Use to calibrate blood glucose monitor as needed as directed. 1 each 3    blood glucose monitoring (NO BRAND SPECIFIED) meter device kit Use to test blood sugar 2 times daily or as directed. 1 kit 0    cetirizine (ZYRTEC) 10 MG tablet Take 10-20 mg by mouth as needed      Continuous Blood Gluc  (FREESTYLE ROSA 2 READER) PATRICK 1 each daily For continuous glucose monitoring 1 each 0    Continuous Blood Gluc Sensor (FREESTYLE ROSA 2 SENSOR) MISC 1 kit every 14 days Use per manufacture's instructions to check glucose daily. 6 each 3    dapagliflozin (FARXIGA) 10 MG TABS tablet Take 1 tablet (10 mg) by mouth daily. 90 tablet 3    doxycycline hyclate (VIBRAMYCIN) 100 MG capsule Take 1 capsule by mouth 2 times daily      fluticasone (FLONASE) 50 MCG/ACT spray Spray 1 spray into both nostrils as needed for rhinitis or allergies      Glucagon (BAQSIMI TWO PACK) 3 MG/DOSE nasal powder Spray 1 spray (3 mg) in nostril as needed (Hypoglycemia) For use with severe hypoglycemia as instructed. Dispense one 2-pack. 1 each 1    insulin degludec (TRESIBA FLEXTOUCH) 200 UNIT/ML pen Inject 60 Units subcutaneously daily. 60 mL 3    insulin lispro (HUMALOG  KWIKPEN) 100 UNIT/ML (1 unit dial) KWIKPEN Inject 10-15 Units Subcutaneous 1-3 times daily. Not to exceed 60 units per day 60 mL 1    insulin pen needle (31G X 8 MM) 31G X 8 MM miscellaneous Use up to 4 pen needles daily or as directed. 400 each 3    ipratropium (ATROVENT) 0.06 % nasal spray Spray 2 sprays into both nostrils 4 times daily as needed for rhinitis 15 mL 11    ketoconazole (NIZORAL) 2 % external cream Apply 1 Application topically as needed      lisinopril-hydrochlorothiazide (ZESTORETIC) 20-25 MG tablet Take 1 Tablet by mouth daily. 90 tablet 3    metFORMIN (GLUCOPHAGE XR) 500 MG 24 hr tablet Take 4 tablets (2,000 mg) by mouth daily (with dinner) 360 tablet 3    neomycin-polymyxin-dexAMETHasone (MAXITROL) 3.5-73147-8.1 ophthalmic ointment       tadalafil (CIALIS) 5 MG tablet Take 1 tablet (5 mg) by mouth every 24 hours 30 tablet 5    triamcinolone (KENALOG) 0.1 % external cream Apply 1 Application topically as needed      Continuous Blood Gluc Sensor (FREESTYLE ROSA 3 SENSOR) MISC 1 each every 14 days (Patient not taking: Reported on 5/17/2024) 6 each 1    glucosamine-chondroitin 500-400 MG CAPS per capsule Take 2 capsules by mouth daily (Patient not taking: Reported on 9/6/2024)      hydrOXYzine (ATARAX) 25 MG tablet Take 25 mg by mouth 3 times daily as needed (Patient not taking: Reported on 9/6/2024)         Allergies, family, and social history were reviewed and documented as needed in EHR.     REVIEW OF SYSTEMS  A focused ROS was performed, with pertinent positives and negatives as noted in the HPI.    PHYSICAL EXAM  BP (!) 147/87 (BP Location: Right arm, Patient Position: Sitting, Cuff Size: Adult Large)   Pulse 89   Wt 113.5 kg (250 lb 3.2 oz)   SpO2 97%   BMI 34.41 kg/m    Body mass index is 34.41 kg/m .  Constitutional: Vital signs reviewed, as recorded above. Patient is alert, oriented and appears in no acute distress.  Eyes: PER, EOMI, no stare, lid lag, or retraction; no conjunctival  "injection.  ENMT: OP clear with moist MM. Lips are without lesions.   Neck: Neck supple, no palpable thyromegaly.  Lymphatic: No cervical or supraclavicular LAD.  Cardiovascular: RRR, normal S1/S2, no audible murmurs, rubs or gallops; DP pulses present and symmetric. No LE edema.  Respiratory: CTAB, without wheezes, crackles or rhonchi; normal chest wall motion and respiratory effort.  MSK: No clubbing or cyanosis; normal muscle bulk and tone.  Skin: Normal skin color, temperature, turgor and texture.  Neurological: Alert and oriented times 3. No tremor.    Foot exam: DP and PT pulses present and symmetric.  Monofilament and vibratory sensation intact.    DATA REVIEW  Each of the following laboratory and/or imaging studies were reviewed.            ASSESSMENT  1.  Diabetes mellitus, CAMDEN with positive LATESHA antibody and normal range C-peptide in 2022.  However, blood glucose values have trended up, as has A1c: Question if endogenous insulin production is waning.  Recommend adjusting insulin doses upward as below.  To reevaluate C-peptide prior to next visit.  We have deferred GLP-1/GIP receptor agonists thus far due to prior history of pancreatitis; pancreatitis, based on patient's recollection, was likely due to gallstones so we could consider this class of drugs depending on his progress.    Mr. Juárez does use various supplements for longevity/health maintenance but I am not certain if additives in such supplements may be contributing to progression of hyperglycemia also.  We discussed perhaps reducing supplements such as chelators or \"peptides\" which may have unregulated ingredients that could be impacting glycemia.    2.  Diabetes preventive care.  -Foot exam performed on 7/5/2023, foot care discussed at that visit  -Persistent microalbuminuria on most recent urine microalbumin screen on 8/30/2024; continue Farxiga and ACE inhibitor  -I reviewed records of eye exam performed at [facility] on 5/29/2024: " No diabetic retinopathy; has follow-up eye exam scheduled next week  -Lipid profile overall acceptable in 2/2024; hypertriglyceridemia and HDL will hopefully continue to improve as he achieved weight loss and improved diabetes control; continue statin therapy    3.  Concern for hypogonadism.  Primarily erectile dysfunction, lesser diminution in libido.  Testosterone levels checked in 2022 in 2023 multiple times have been in normal range.  He does have erectile dysfunction, as discussed below.  No indication for treatment at present.  We do have the option to recheck testosterone levels (patient has wondered about rechecking this) but this would need to be drawn at 8 AM: He will let me know if he is interested so that we can order these labs before next visit.    4.  Erectile dysfunction.  Some response to Cialis, although some difficulty maintaining erections.  Has not yet pursued consultation with urology; can defer for now if preferred by patient.    5.  Hypercalcemia.  Calcium metabolism labs checked after last visit were normal.  Serum calcium remains normal on labs drawn prior to this visit.  No additional intervention for now.    PLAN  -Increase Tresiba to 60 units daily  -Take Humalog 10 units before each meal  Please use the following correction scale using Humalog insulin before meals.    Blood sugar 150 - 200; take 2 units of insulin.  Blood sugar 201 - 250; take 4 units of insulin.  Blood sugar 251 - 300; take 6 units of insulin.  Blood sugar 301 - 350; take 8 units of insulin.  Blood sugar 351 - 400; take 10 units of insulin.  Blood sugar over 401;  take 12 units of insulin.    -Continue remainder of current regimen without changes  -Can consider visit with urology (update me if referral is needed)  -Follow-up in January, with fasting labs before visit  -We will communicate results via [employer], or if needed by phone      Orders Placed This Encounter   Procedures    C-peptide    Comprehensive metabolic  panel    Lipid Profile    TSH with free T4 reflex    Hemoglobin A1c     I spent a total of 58 minutes on the date of encounter reviewing medical records, evaluating the patient, coordinating care and documenting in the EHR, as detailed above.    I spent additional time on the date of encounter reviewing and interpreting CGM data, as outlined above.      Bushra Roy MD   Division of Diabetes, Endocrinology and Metabolism  Department of Medicine

## 2024-09-26 ENCOUNTER — TELEPHONE (OUTPATIENT)
Dept: FAMILY MEDICINE | Facility: CLINIC | Age: 54
End: 2024-09-26

## 2024-09-26 DIAGNOSIS — E78.5 HYPERLIPIDEMIA LDL GOAL <100: ICD-10-CM

## 2024-09-26 RX ORDER — ATORVASTATIN CALCIUM 40 MG/1
40 TABLET, FILM COATED ORAL DAILY
Qty: 30 TABLET | Refills: 1 | Status: SHIPPED | OUTPATIENT
Start: 2024-09-26

## 2024-09-26 NOTE — TELEPHONE ENCOUNTER
Covering provider.  Patient has not been seen by Dr. Ortiz since 2/16/2022.  Please assist him in scheduling follow-up with Dr. Cao.  Short supply today.

## 2024-10-11 NOTE — TELEPHONE ENCOUNTER
Left message to call back for: patient  Information to relay to patient: see message from provider below, assist in scheduling appt

## 2024-10-28 NOTE — TELEPHONE ENCOUNTER
Left message to call back for: patient  Information to relay to patient: see message from provider below and assist in scheduling appt

## 2024-11-13 ENCOUNTER — TELEPHONE (OUTPATIENT)
Dept: FAMILY MEDICINE | Facility: CLINIC | Age: 54
End: 2024-11-13

## 2024-11-13 ENCOUNTER — MYC MEDICAL ADVICE (OUTPATIENT)
Dept: FAMILY MEDICINE | Facility: CLINIC | Age: 54
End: 2024-11-13

## 2024-11-13 NOTE — TELEPHONE ENCOUNTER
Patient Quality Outreach    Patient is due for the following:   Diabetes -  Diabetic Follow-Up Visit    Action(s) Taken:   Schedule a office visit for   Diabetic Follow-up Appointment    Type of outreach:    Sent [employer] message.    Questions for provider review:    None           Narayan Maddox Jr., MA  Chart routed to Care Team.

## 2024-12-16 NOTE — TELEPHONE ENCOUNTER
Patient Quality Outreach    Patient is due for the following:   Diabetes -  Diabetic Follow-Up Visit    Action(s) Taken:   Schedule a office visit for   Diabetic Follow-up Appointment     Type of outreach:    Phone, left message for patient/parent to call back.    Questions for provider review:    None           Narayan Maddox Jr., MA

## 2025-01-08 ENCOUNTER — LAB (OUTPATIENT)
Dept: LAB | Facility: CLINIC | Age: 55
End: 2025-01-08
Payer: COMMERCIAL

## 2025-01-08 DIAGNOSIS — E11.65 TYPE 2 DIABETES MELLITUS WITH HYPERGLYCEMIA, WITH LONG-TERM CURRENT USE OF INSULIN (H): ICD-10-CM

## 2025-01-08 DIAGNOSIS — Z79.4 TYPE 2 DIABETES MELLITUS WITH HYPERGLYCEMIA, WITH LONG-TERM CURRENT USE OF INSULIN (H): ICD-10-CM

## 2025-01-08 LAB
EST. AVERAGE GLUCOSE BLD GHB EST-MCNC: 171 MG/DL
HBA1C MFR BLD: 7.6 % (ref 0–5.6)

## 2025-01-08 PROCEDURE — 36415 COLL VENOUS BLD VENIPUNCTURE: CPT

## 2025-01-08 PROCEDURE — 80061 LIPID PANEL: CPT

## 2025-01-08 PROCEDURE — 84443 ASSAY THYROID STIM HORMONE: CPT

## 2025-01-08 PROCEDURE — 83036 HEMOGLOBIN GLYCOSYLATED A1C: CPT

## 2025-01-08 PROCEDURE — 80053 COMPREHEN METABOLIC PANEL: CPT

## 2025-01-08 PROCEDURE — 84681 ASSAY OF C-PEPTIDE: CPT

## 2025-01-09 LAB
ALBUMIN SERPL BCG-MCNC: 4.4 G/DL (ref 3.5–5.2)
ALP SERPL-CCNC: 100 U/L (ref 40–150)
ALT SERPL W P-5'-P-CCNC: 51 U/L (ref 0–70)
ANION GAP SERPL CALCULATED.3IONS-SCNC: 11 MMOL/L (ref 7–15)
AST SERPL W P-5'-P-CCNC: 33 U/L (ref 0–45)
BILIRUB SERPL-MCNC: 0.5 MG/DL
BUN SERPL-MCNC: 13.9 MG/DL (ref 6–20)
C PEPTIDE SERPL-MCNC: 2.1 NG/ML (ref 0.9–6.9)
CALCIUM SERPL-MCNC: 9.7 MG/DL (ref 8.8–10.4)
CHLORIDE SERPL-SCNC: 102 MMOL/L (ref 98–107)
CHOLEST SERPL-MCNC: 159 MG/DL
CREAT SERPL-MCNC: 1 MG/DL (ref 0.67–1.17)
EGFRCR SERPLBLD CKD-EPI 2021: 89 ML/MIN/1.73M2
FASTING STATUS PATIENT QL REPORTED: YES
GLUCOSE SERPL-MCNC: 165 MG/DL (ref 70–99)
HCO3 SERPL-SCNC: 25 MMOL/L (ref 22–29)
HDLC SERPL-MCNC: 29 MG/DL
LDLC SERPL CALC-MCNC: 88 MG/DL
NONHDLC SERPL-MCNC: 130 MG/DL
POTASSIUM SERPL-SCNC: 4.4 MMOL/L (ref 3.4–5.3)
PROT SERPL-MCNC: 7.1 G/DL (ref 6.4–8.3)
SODIUM SERPL-SCNC: 138 MMOL/L (ref 135–145)
TRIGL SERPL-MCNC: 208 MG/DL
TSH SERPL DL<=0.005 MIU/L-ACNC: 3.11 UIU/ML (ref 0.3–4.2)

## 2025-01-10 ENCOUNTER — TELEPHONE (OUTPATIENT)
Dept: ENDOCRINOLOGY | Facility: CLINIC | Age: 55
End: 2025-01-10

## 2025-01-10 NOTE — TELEPHONE ENCOUNTER
Upon running test claim under patient's insurance, he must be using insulin for insurance to pay for sensors and reader. Do you want me to attempt a PA regardless?      Evie Carreno [facility]  Endo Clinic Liaison  [facility]  [email or web address]   [email or web address]  Phone: [phone]  Fax: [phone]

## 2025-01-10 NOTE — TELEPHONE ENCOUNTER
Prior Authorization Not Needed per Insurance    Medication:    Insurance Company: [email or web address] - Phone [phone] Fax [phone]  Expected CoPay: $ 74.84  Pharmacy Filling the Rx:    Pharmacy Notified: Rx released  Patient Notified: no    Attempted PA, says not required. They must count Mounjaro as insulin.

## 2025-01-10 NOTE — TELEPHONE ENCOUNTER
Per notes 1/10/25:  reduce Humalog 5 units before each meal  Please use the following correction scale using Humalog insulin before meals.     Blood sugar 150 - 200; take 2 units of insulin.  Blood sugar 201 - 250; take 4 units of insulin.  Blood sugar 251 - 300; take 6 units of insulin.  Blood sugar 301 - 350; take 8 units of insulin.  Blood sugar 351 - 400; take 10 units of insulin.  Blood sugar over 401;  take 12 units of insulin.

## 2025-01-15 ENCOUNTER — TELEPHONE (OUTPATIENT)
Dept: FAMILY MEDICINE | Facility: CLINIC | Age: 55
End: 2025-01-15

## 2025-01-15 ENCOUNTER — MYC MEDICAL ADVICE (OUTPATIENT)
Dept: FAMILY MEDICINE | Facility: CLINIC | Age: 55
End: 2025-01-15

## 2025-01-15 NOTE — TELEPHONE ENCOUNTER
Patient Quality Outreach    Patient is due for the following:   Annual Flu Vaccine    Action(s) Taken:   Schedule a nurse only visit for   Annual Flu Vaccine    Type of outreach:    Sent "[employer] message.    Questions for provider review:    None           Narayan Maddox Jr., MA  Chart routed to Care Team.

## 2025-01-30 ENCOUNTER — VIRTUAL VISIT (OUTPATIENT)
Dept: PHARMACY | Facility: CLINIC | Age: 55
End: 2025-01-30
Attending: INTERNAL MEDICINE
Payer: COMMERCIAL

## 2025-01-30 DIAGNOSIS — E11.65 TYPE 2 DIABETES MELLITUS WITH HYPERGLYCEMIA, WITH LONG-TERM CURRENT USE OF INSULIN (H): Primary | ICD-10-CM

## 2025-01-30 DIAGNOSIS — Z79.4 TYPE 2 DIABETES MELLITUS WITH HYPERGLYCEMIA, WITH LONG-TERM CURRENT USE OF INSULIN (H): Primary | ICD-10-CM

## 2025-01-30 RX ORDER — TIRZEPATIDE 5 MG/.5ML
5 INJECTION, SOLUTION SUBCUTANEOUS
Qty: 2 ML | Refills: 1 | OUTPATIENT
Start: 2025-01-30

## 2025-01-30 RX ORDER — CALCIUM/MAGNESIUM/ZINC 333-133 MG
TABLET ORAL DAILY
COMMUNITY

## 2025-01-30 NOTE — PATIENT INSTRUCTIONS
"Recommendations from today's MTM visit:                                                      Increase Mounjaro to 5mg weekly starting 2/9    If you start to see your low fasting blood sugars near 70-80 in the early morning or overnight then reduce Tresiba dose by 2 units every 2-3 days until not having low blood sugars     Review handout attached to your after visit summary on instructions how to correct for a low blood sugar less than 70.     Follow-up: 2/28/25 at 1 PM    Endocrine Team & Next Follow-Up:  2/28/25 with Loan  4/11/25 & 8/8/25 with Dr. Roy    It was great speaking with you today.  I value your experience and would be very thankful for your time in providing feedback in our clinic survey. In the next few days, you may receive an email or text message from [employer] with a link to a survey related to your  clinical pharmacist.\"     To schedule another MTM appointment, please call the clinic directly or you may call the MTM scheduling line at [phone].    My Clinical Pharmacist's contact information:                                                      Please feel free to contact me with any questions or concerns you have.      Loan Friedman), PharmD  Endocrine & Diabetes Medication Therapy Management Pharmacist   [address] [place] [id number]     Contact information:   To schedule a MTM appointment: [phone]  For questions or concerns, please send a [email or web address] message or call the clinic at [phone].    For more urgent concerns that do not require 569, please call [phone] after hours/weekends and ask to speak with the Endocrinologist on call.     "

## 2025-01-30 NOTE — PROGRESS NOTES
Medication Therapy Management (MTM) Encounter    ASSESSMENT:                            Medication Adherence/Access: No issues identified.    Diabetes: Patient reported time in target range has improved and at goal of >70% with start of Mounjaro and stopping Humalog. Recommended additional dose increase of Mounjaro to help with overall blood sugar control,  weight loss, and to reduce insulin requirements. Recommended patient decrease Tresiba dose as needed if hypoglycemia occurs with Mounjaro dose increase. Discussed that goal would be to reduce Tresiba dose and/or stop therapy all together as able while continuing to increase Mounjaro dose as patient is able to tolerate. Discussed that we would like to continue other therapies besides insulin including Farxiga to help with kidney protection given patient's recent elevated UACR, patient is also on lisinopril therapy and suspect that both A1c and UACR results will improve with continued use of Mounjaro. Of note, patient does have history of pancreatitis related to their gallbladder and since has had removed thus appropriate to continue on Mounjaro therapy with close monitoring, reminded patient of this.     Due to time constraints, I was only able to assess the above with the patient today. Will follow-up on other disease states in future encounters    PLAN:                            Increase Mounjaro to 5mg weekly starting 2/9    If you start to see your low fasting blood sugars near 70-80 in the early morning or overnight then reduce Tresiba dose by 2 units every 2-3 days until not having low blood sugars     Review handout attached to your after visit summary on instructions how to correct for a low blood sugar less than 70.     Follow-up: 2/28/25 at 1 PM    Endocrine Team & Next Follow-Up:  2/28/25 with Loan  4/11/25 & 8/8/25 with Dr. Roy    SUBJECTIVE/OBJECTIVE:                          Low Juárez is a 54 year old male called for an initial visit. He was  referred to me from Dr. Roy.      Reason for visit: Titration of Mounjaro and reduction of mealtime insulin.    Allergies/ADRs: Reviewed in chart  Past Medical History: Reviewed in chart  Tobacco: He reports that he has quit smoking. His smoking use included cigarettes. He has a 20 pack-year smoking history. He has never used smokeless tobacco.  Alcohol: 1-2 beverages / month    Medication Adherence/Access: no issues reported.    Diabetes   Patient diagnosed with CAMDEN, in 12/2021 he had ALTESHA antibody checked which returned positive, with C-peptide that was still measurable.   Current Medications:  Mounjaro 2.5mg weekly on Sundays- patient reports that they have completed 3 doses thus far. Patient declines any side effects since starting. Patient confirms proper administration and storage.   Farxiga 10mg daily   Metformin XR 2,000mg daily   Tresiba 48-50 units daily   Humalog 5 units plus sliding scale   Blood sugar 150 - 200; take 2 units of insulin.  Blood sugar 201 - 250; take 4 units of insulin.  Blood sugar 251 - 300; take 6 units of insulin.  Blood sugar 301 - 350; take 8 units of insulin.  Blood sugar 351 - 400; take 10 units of insulin.  Blood sugar over 401;  take 12 units of insulin.  Patient notes that he has not been using any Humalog since starting on Mounjaro.   Medication history per chart review:  - Victoza: Stopped this at the direction of one of his healthcare providers. Has history of pancreatitis (by his report, gallstone pancreatitis), status post cholecystectomy.   - Jardiance: discontinued due to discomfort and itching in the abdomen.   Blood sugar monitoring: Continuous Glucose Monitor:   Patient uses Freestyle Dara 3 reader thus unable to download data today.   Time in target for past 14 days reported by patient:   Above: 13%  In target: 87%  Below: 0%  Average for past 14 days reported by patient: 143  12AM- 6AM: 140  6AM-12PM: 135  12PM- 6PM: 147  6PM- 12AM: 153  Patient declines any  "low blood sugar less than 70 or symptoms of hypoglycemia.     Hemoglobin A1C   Date Value Ref Range Status   01/08/2025 7.6 (H) 0.0 - 5.6 % Final     Comment:     Normal <5.7%   Prediabetes 5.7-6.4%    Diabetes 6.5% or higher     Note: Adopted from ADA consensus guidelines.   04/07/2021 8.2 (H) 0 - 5.6 % Final     Comment:     Reviewed: OK with previous  Normal <5.7% Prediabetes 5.7-6.4%  Diabetes 6.5% or higher - adopted from ADA   consensus guidelines.       Estimated body mass index is 34.52 kg/m  as calculated from the following:    Height as of 3/29/22: 5' 11.5\" (1.816 m).    Weight as of 1/10/25: 251 lb (113.9 kg).    Wt Readings from Last 3 Encounters:   01/10/25 251 lb (113.9 kg)   09/06/24 250 lb 3.2 oz (113.5 kg)   05/17/24 238 lb (108 kg)     Lab Results   Component Value Date    UMALCR 80.65 (H) 08/30/2024     Lab Results   Component Value Date    GFRESTIMATED 89 01/08/2025    GFRESTIMATED 85 08/30/2024    GFRESTIMATED 84 05/17/2024     Today's Vitals: There were no vitals taken for this visit.  ----------------    I spent 41 minutes with this patient today. All changes were made via collaborative practice agreement with Bushra Roy.     A summary of these recommendations was sent via [employer].    Loan Friedman), PharmD  Endocrine & Diabetes Medication Therapy Management Pharmacist   [address] [place] [id number]     Contact information:   To schedule a MTM appointment: [phone]  For questions or concerns, please send a [employer] message or call the clinic at [phone].    For more urgent concerns that do not require 389, please call [phone] after hours/weekends and ask to speak with the Endocrinologist on call.      Telemedicine Visit Details  The patient's medications can be safely assessed via a telemedicine encounter.  Type of service:  Telephone visit  Originating Location (pt. Location): Home    Distant Location (provider location):  Off-site  Start Time:  1:00 " PM  End Time: 1:41 PM     Medication Therapy Recommendations  Type 2 diabetes mellitus with hyperglycemia, with long-term current use of insulin (H)   1 Current Medication: MOUNJARO 2.5 MG/0.5ML SOAJ (Discontinued)   Current Medication Sig: Inject 0.5 mLs (2.5 mg) subcutaneously every 7 days.   Rationale: Dose too low - Dosage too low - Effectiveness   Recommendation: Increase Dose   Status: Accepted per CPA   Identified Date: 1/30/2025 Completed Date: 1/30/2025         2 Current Medication: insulin degludec (TRESIBA FLEXTOUCH) 200 UNIT/ML pen   Current Medication Sig: Inject 60 Units subcutaneously daily.   Rationale: Dose too high - Dosage too high - Safety   Recommendation: Decrease Dose   Status: Accepted per CPA   Identified Date: 1/30/2025 Completed Date: 1/30/2025

## 2025-02-04 DIAGNOSIS — E78.5 HYPERLIPIDEMIA LDL GOAL <100: ICD-10-CM

## 2025-02-04 NOTE — TELEPHONE ENCOUNTER
Please advise on refill.    Originally sent by PCP. Per 9/26/24 TE:    Covering provider.  Patient has not been seen by Dr. Ortiz since 2/16/2022.  Please assist him in scheduling follow-up with Dr. Cao.  Short supply today.       Requested Prescriptions   Pending Prescriptions Disp Refills    atorvastatin (LIPITOR) 40 MG tablet 30 tablet 1     Sig: Take 1 tablet (40 mg) by mouth daily.       Antihyperlipidemic agents Passed - 2/4/2025 11:53 AM        Passed - LDL on file in the past 12 months        Passed - Medication is active on med list        Passed - Recent (12 mo) or future (90 days) visit within the authorizing provider's specialty     The patient must have completed an in-person or virtual visit within the past 12 months or has a future visit scheduled within the next 90 days with the authorizing provider s specialty.  Urgent care and e-visits do not qualify as an office visit for this protocol.          Passed - Patient is age 18 years or older

## 2025-02-05 RX ORDER — ATORVASTATIN CALCIUM 40 MG/1
40 TABLET, FILM COATED ORAL DAILY
Qty: 90 TABLET | Refills: 0 | Status: SHIPPED | OUTPATIENT
Start: 2025-02-05

## 2025-02-17 NOTE — TELEPHONE ENCOUNTER
Patient Quality Outreach    Patient is due for the following:   Annual Flu Vaccine     Action(s) Taken:   Schedule a nurse only visit for   Annual Flu Vaccine     Type of outreach:    Phone, left message for patient/parent to call back.    Questions for provider review:    None           Narayan Maddox Jr., MA

## 2025-03-10 DIAGNOSIS — E11.65 TYPE 2 DIABETES MELLITUS WITH HYPERGLYCEMIA, WITH LONG-TERM CURRENT USE OF INSULIN (H): ICD-10-CM

## 2025-03-10 DIAGNOSIS — Z79.4 TYPE 2 DIABETES MELLITUS WITH HYPERGLYCEMIA, WITH LONG-TERM CURRENT USE OF INSULIN (H): ICD-10-CM

## 2025-03-10 RX ORDER — METFORMIN HYDROCHLORIDE 500 MG/1
2000 TABLET, EXTENDED RELEASE ORAL
Qty: 360 TABLET | Refills: 0 | Status: SHIPPED | OUTPATIENT
Start: 2025-03-10

## 2025-03-10 NOTE — TELEPHONE ENCOUNTER
Requested Prescriptions   Pending Prescriptions Disp Refills    metFORMIN (GLUCOPHAGE XR) 500 MG 24 hr tablet 360 tablet 3     Sig: Take 4 tablets (2,000 mg) by mouth daily (with dinner).       Biguanide Agents Passed - 3/10/2025  2:33 PM        Passed - Patient is age 10 or older        Passed - Patient has documented A1c within the specified period of time.     If HgbA1C is 8 or greater, it needs to be on file within the past 3 months.  If less than 8, must be on file within the past 6 months.     Recent Labs   Lab Test 01/08/25  1309   A1C 7.6*             Passed - Patient does NOT have a diagnosis of CHF.        Passed - Medication is active on med list and the sig matches. RN to manually verify dose and sig if red X/fail.     If the protocol passes (green check), you do not need to verify med dose and sig.    A prescription matches if they are the same clinical intention.    For Example: once daily and every morning are the same.    The protocol can not identify upper and lower case letters as matching and will fail.     For Example: Take 1 tablet (50 mg) by mouth daily     TAKE 1 TABLET (50 MG) BY MOUTH DAILY    For all fails (red x), verify dose and sig.    If the refill does match what is on file, the RN can still proceed to approve the refill request.       If they do not match, route to the appropriate provider.             Passed - Medication indicated for associated diagnosis     Medication is associated with one or more of the following diagnoses:     Gestational diabetes mellitus     Hyperinsulinar obesity     Hypersecretion of ovarian androgens    Non-alcoholic fatty liver    Polycystic ovarian syndrome               Pre-diabetes (DM 2 prevention)    Type 2 diabetes mellitus     Weight gain, antipsychotic therapy-induced    Impaired fasting glucose          Passed - Has GFR on file in past 12 months and most recent value is normal        Passed - Recent (6 mo) or future (90 days) visit within the  authorizing provider's specialty     The patient must have completed an in-person or virtual visit within the past 6 months or has a future visit scheduled within the next 90 days with the authorizing provider s specialty.  Urgent care and e-visits do not quality as an office visit for this protocol.

## 2025-03-28 ENCOUNTER — VIRTUAL VISIT (OUTPATIENT)
Dept: PHARMACY | Facility: CLINIC | Age: 55
End: 2025-03-28
Attending: INTERNAL MEDICINE
Payer: COMMERCIAL

## 2025-03-28 DIAGNOSIS — Z79.4 TYPE 2 DIABETES MELLITUS WITH HYPERGLYCEMIA, WITH LONG-TERM CURRENT USE OF INSULIN (H): Primary | ICD-10-CM

## 2025-03-28 DIAGNOSIS — E11.65 TYPE 2 DIABETES MELLITUS WITH HYPERGLYCEMIA, WITH LONG-TERM CURRENT USE OF INSULIN (H): Primary | ICD-10-CM

## 2025-03-28 RX ORDER — TIRZEPATIDE 7.5 MG/.5ML
7.5 INJECTION, SOLUTION SUBCUTANEOUS
Qty: 2 ML | Refills: 1 | Status: SHIPPED | OUTPATIENT
Start: 2025-03-28

## 2025-03-28 RX ORDER — TIRZEPATIDE 10 MG/.5ML
10 INJECTION, SOLUTION SUBCUTANEOUS
Qty: 2 ML | Refills: 2 | OUTPATIENT
Start: 2025-03-28 | End: 2025-03-28

## 2025-03-28 NOTE — PROGRESS NOTES
Medication Therapy Management (MTM) Encounter    ASSESSMENT:                            Medication Adherence/Access: No issues identified.    Diabetes: Patient's time in target range remains at goal of >70%, slight increase in blood sugars since last MTM visit likely due to recent missed doses of Farxiga thus suspect that blood sugars will improve when patient is able to re-start Farxgia therapy. Recommend patient continue on current Mounjaro dose while traveling to avoid side effects and ensure tolerance before considering increasing to next dose. Agree that patient can continue off Tresiba therapy to avoid hypoglycemia but did educate on monitoring of symptoms of DKA given LATESHA antibody positive, patient declines any DKA symptoms or troubles with high blood sugars at this time.      PLAN:                            Continue on Mounjaro 7.5mg dose while traveling, will consider increasing to the 10mg dose when you return.     Follow-up: 5/2/25 at 1:30 PM    Endocrine Team & Next Follow-Up:  4/11/25 with Dr. Roy  5/2/25 with Loan  8/8/25 with Dr. Roy     SUBJECTIVE/OBJECTIVE:                          Low Juárez is a 54 year old male called for a follow-up visit.       Reason for visit: Mounjaro follow up.    Allergies/ADRs: Reviewed in chart  Past Medical History: Reviewed in chart  Tobacco: He reports that he has quit smoking. His smoking use included cigarettes. He has a 20 pack-year smoking history. He has never used smokeless tobacco.  Alcohol: Reviewed in chart     Medication Adherence/Access: no issues reported, patient notes that they will be traveling to [facility] from 4/13-5/1.     Diabetes   Patient diagnosed with CAMDEN, in 12/2021 he had LATESHA antibody checked which returned positive, with C-peptide that was still measurable.   Current Medications:  Mounjaro 7.5mg weekly on Sundays (dose increase since last MTM visit) - patient notes that they have completed 3 doses thus far. Declines any side  effects with dose increase. Notes that he is not feeling as much appetite suppression.   Farxiga 10mg daily - patient has not been taking Farxiga for a couple days due to delay in picking up refill from pharmacy, will start taking again now since getting next refill.   Metformin XR 2,000mg daily   Patient notes that he has not been using Humalog as much since starting on Mounjaro. Reports using twice in the past week when blood sugars are higher.   Medication history per chart review:  - Victoza: Stopped this at the direction of one of his healthcare providers. Has history of pancreatitis (by his report, gallstone pancreatitis), status post cholecystectomy.   - Jardiance: discontinued due to discomfort and itching in the abdomen.   - Tresiba: stopped when using higher doses of Mounjaro  Blood sugar monitoring: Continuous Glucose Monitor:   Patient uses Freestyle Dara 3 reader thus unable to download data today.   Time in target for past 14 days reported by patient:   Above: 12%  In target: 88%  Below: 0%  Patient declines any low blood sugar less than 70 or symptoms of hypoglycemia.   Diet: patient reports eating less carbohydrates, continues to watch their diet closely.      Today's Vitals: There were no vitals taken for this visit.  ----------------    I spent 18 minutes with this patient today. All changes were made via collaborative practice agreement with Bushra Roy.     A summary of these recommendations was sent via [employer].    Loan Friedman), PharmD  Endocrine & Diabetes Medication Therapy Management Pharmacist   [address] [place] [id number]     Contact information:   To schedule a MTM appointment: [phone]  For questions or concerns, please send a [employer] message or call the clinic at [phone].    For more urgent concerns that do not require 301, please call [phone] after hours/weekends and ask to speak with the Endocrinologist on call.      Telemedicine Visit  Details  The patient's medications can be safely assessed via a telemedicine encounter.  Type of service:  Telephone visit  Originating Location (pt. Location): Home    Distant Location (provider location):  Off-site  Start Time:  1:32 PM  End Time: 1:50 PM     Medication Therapy Recommendations  No medication therapy recommendations to display

## 2025-03-31 NOTE — PATIENT INSTRUCTIONS
"Recommendations from today's MTM visit:                                                      Continue on Mounjaro 7.5mg dose while traveling, will consider increasing to the 10mg dose when you return.     Follow-up: 5/2/25 at 1:30 PM    Endocrine Team & Next Follow-Up:  4/11/25 with Dr. Roy  5/2/25 with Loan  8/8/25 with Dr. Roy     It was great speaking with you today.  I value your experience and would be very thankful for your time in providing feedback in our clinic survey. In the next few days, you may receive an email or text message from [facility] [employer] with a link to a survey related to your  clinical pharmacist.\"     To schedule another MTM appointment, please call the clinic directly or you may call the MTM scheduling line at [phone].    My Clinical Pharmacist's contact information:                                                      Please feel free to contact me with any questions or concerns you have.      Loan Lopez ([place]), PharmD  Endocrine & Diabetes Medication Therapy Management Pharmacist   [address] [place] [id number]     Contact information:   To schedule a MTM appointment: [phone]  For questions or concerns, please send a [employer] message or call the clinic at [phone].    For more urgent concerns that do not require [phone], please call [phone] after hours/weekends and ask to speak with the Endocrinologist on call.     "

## 2025-05-19 DIAGNOSIS — E78.5 HYPERLIPIDEMIA LDL GOAL <100: ICD-10-CM

## 2025-05-19 RX ORDER — ATORVASTATIN CALCIUM 40 MG/1
40 TABLET, FILM COATED ORAL DAILY
Qty: 90 TABLET | Refills: 0 | Status: SHIPPED | OUTPATIENT
Start: 2025-05-19

## 2025-05-19 NOTE — TELEPHONE ENCOUNTER
Requested Prescriptions   Pending Prescriptions Disp Refills    atorvastatin (LIPITOR) 40 MG tablet 90 tablet 0     Sig: Take 1 tablet (40 mg) by mouth daily.       Antihyperlipidemic agents Passed - 5/19/2025  2:43 PM        Passed - LDL on file in the past 12 months        Passed - Medication is active on med list and the sig matches. RN to manually verify dose and sig if red X/fail.     If the protocol passes (green check), you do not need to verify med dose and sig.    A prescription matches if they are the same clinical intention.    For Example: once daily and every morning are the same.    The protocol can not identify upper and lower case letters as matching and will fail.     For Example: Take 1 tablet (50 mg) by mouth daily     TAKE 1 TABLET (50 MG) BY MOUTH DAILY    For all fails (red x), verify dose and sig.    If the refill does match what is on file, the RN can still proceed to approve the refill request.       If they do not match, route to the appropriate provider.             Passed - Recent (12 mo) or future (90 days) visit within the authorizing provider's specialty     The patient must have completed an in-person or virtual visit within the past 12 months or has a future visit scheduled within the next 90 days with the authorizing provider s specialty.  Urgent care and e-visits do not qualify as an office visit for this protocol.          Passed - Patient is age 18 years or older

## 2025-07-13 ENCOUNTER — HEALTH MAINTENANCE LETTER (OUTPATIENT)
Age: 55
End: 2025-07-13

## (undated) DEVICE — SU SILK 2-0 SH CR 5X18" C0125

## (undated) DEVICE — ADH LIQUID MASTISOL TOPICAL VIAL 2-3ML 0523-48

## (undated) DEVICE — SU CHROMIC 4-0 SH 27" G121H

## (undated) DEVICE — SOL WATER IRRIG 1000ML BOTTLE 2F7114

## (undated) DEVICE — ESU ELEC NDL 1" COATED/INSULATED E1465

## (undated) DEVICE — DRAPE U SPLIT 74X120" 29440

## (undated) DEVICE — Device

## (undated) DEVICE — DRSG JAWBRA  95

## (undated) DEVICE — PREP POVIDONE IODINE SOLUTION 10% 4OZ

## (undated) DEVICE — BRUSH SURGICAL EZ SCRUB PLAIN 371603

## (undated) DEVICE — TOOTHBRUSH ADULT NON STERILE MDS136850

## (undated) DEVICE — DRAIN PENROSE 1/4X18" LATEX

## (undated) DEVICE — SOL NACL 0.9% IRRIG 1000ML BOTTLE 2F7124

## (undated) DEVICE — LINEN TOWEL PACK X6 WHITE 5487

## (undated) DEVICE — ESU GROUND PAD ADULT W/CORD E7507

## (undated) DEVICE — TAPE CLOTH 2" CARDINAL 3TRCL02

## (undated) DEVICE — BLADE KNIFE SURG 15 371115

## (undated) DEVICE — SPONGE RAY-TEC 4X8" 7318

## (undated) DEVICE — LINEN TOWEL PACK X5 5464

## (undated) DEVICE — SYR EAR BULB 3OZ 0035830

## (undated) DEVICE — GLOVE PROTEXIS POWDER FREE 8.5 ORTHOPEDIC 2D73ET85

## (undated) RX ORDER — LIDOCAINE HYDROCHLORIDE 20 MG/ML
INJECTION, SOLUTION EPIDURAL; INFILTRATION; INTRACAUDAL; PERINEURAL
Status: DISPENSED
Start: 2020-08-23

## (undated) RX ORDER — PROPOFOL 10 MG/ML
INJECTION, EMULSION INTRAVENOUS
Status: DISPENSED
Start: 2020-08-23

## (undated) RX ORDER — EPHEDRINE SULFATE 50 MG/ML
INJECTION, SOLUTION INTRAMUSCULAR; INTRAVENOUS; SUBCUTANEOUS
Status: DISPENSED
Start: 2020-08-23

## (undated) RX ORDER — KETOROLAC TROMETHAMINE 30 MG/ML
INJECTION, SOLUTION INTRAMUSCULAR; INTRAVENOUS
Status: DISPENSED
Start: 2020-08-23

## (undated) RX ORDER — HYDROMORPHONE HYDROCHLORIDE 1 MG/ML
INJECTION, SOLUTION INTRAMUSCULAR; INTRAVENOUS; SUBCUTANEOUS
Status: DISPENSED
Start: 2020-08-23

## (undated) RX ORDER — SODIUM CHLORIDE 9 MG/ML
INJECTION, SOLUTION INTRAVENOUS
Status: DISPENSED
Start: 2020-08-23

## (undated) RX ORDER — FENTANYL CITRATE 50 UG/ML
INJECTION, SOLUTION INTRAMUSCULAR; INTRAVENOUS
Status: DISPENSED
Start: 2020-08-23

## (undated) RX ORDER — FENTANYL CITRATE-0.9 % NACL/PF 10 MCG/ML
PLASTIC BAG, INJECTION (ML) INTRAVENOUS
Status: DISPENSED
Start: 2020-08-23

## (undated) RX ORDER — CHLORHEXIDINE GLUCONATE ORAL RINSE 1.2 MG/ML
SOLUTION DENTAL
Status: DISPENSED
Start: 2020-08-23

## (undated) RX ORDER — DEXAMETHASONE SODIUM PHOSPHATE 4 MG/ML
INJECTION, SOLUTION INTRA-ARTICULAR; INTRALESIONAL; INTRAMUSCULAR; INTRAVENOUS; SOFT TISSUE
Status: DISPENSED
Start: 2020-08-23

## (undated) RX ORDER — BUPIVACAINE HYDROCHLORIDE AND EPINEPHRINE 2.5; 5 MG/ML; UG/ML
INJECTION, SOLUTION EPIDURAL; INFILTRATION; INTRACAUDAL; PERINEURAL
Status: DISPENSED
Start: 2020-08-23

## (undated) RX ORDER — AMPICILLIN AND SULBACTAM 2; 1 G/1; G/1
INJECTION, POWDER, FOR SOLUTION INTRAMUSCULAR; INTRAVENOUS
Status: DISPENSED
Start: 2020-08-23

## (undated) RX ORDER — GLYCOPYRROLATE 0.2 MG/ML
INJECTION, SOLUTION INTRAMUSCULAR; INTRAVENOUS
Status: DISPENSED
Start: 2020-08-23

## (undated) RX ORDER — ONDANSETRON 2 MG/ML
INJECTION INTRAMUSCULAR; INTRAVENOUS
Status: DISPENSED
Start: 2020-08-23